# Patient Record
Sex: MALE | Race: WHITE | Employment: OTHER | URBAN - METROPOLITAN AREA
[De-identification: names, ages, dates, MRNs, and addresses within clinical notes are randomized per-mention and may not be internally consistent; named-entity substitution may affect disease eponyms.]

---

## 2017-05-15 ENCOUNTER — OFFICE VISIT (OUTPATIENT)
Dept: INTERNAL MEDICINE CLINIC | Age: 72
End: 2017-05-15

## 2017-05-15 VITALS
TEMPERATURE: 98.7 F | WEIGHT: 229 LBS | RESPIRATION RATE: 18 BRPM | HEART RATE: 52 BPM | SYSTOLIC BLOOD PRESSURE: 132 MMHG | DIASTOLIC BLOOD PRESSURE: 71 MMHG | OXYGEN SATURATION: 98 % | BODY MASS INDEX: 34.71 KG/M2 | HEIGHT: 68 IN

## 2017-05-15 DIAGNOSIS — L98.9 SKIN LESION: ICD-10-CM

## 2017-05-15 DIAGNOSIS — I10 ESSENTIAL HYPERTENSION, BENIGN: ICD-10-CM

## 2017-05-15 DIAGNOSIS — Z76.89 ENCOUNTER TO ESTABLISH CARE WITH NEW DOCTOR: Primary | ICD-10-CM

## 2017-05-15 DIAGNOSIS — M10.9 GOUT, UNSPECIFIED: ICD-10-CM

## 2017-05-15 DIAGNOSIS — E78.5 OTHER AND UNSPECIFIED HYPERLIPIDEMIA: ICD-10-CM

## 2017-05-15 DIAGNOSIS — R79.89 LOW TESTOSTERONE LEVEL IN MALE: ICD-10-CM

## 2017-05-15 DIAGNOSIS — G47.33 OSA TREATED WITH BIPAP: ICD-10-CM

## 2017-05-15 DIAGNOSIS — R73.09 ELEVATED RANDOM BLOOD GLUCOSE LEVEL: ICD-10-CM

## 2017-05-15 DIAGNOSIS — E53.8 B12 DEFICIENCY: ICD-10-CM

## 2017-05-15 DIAGNOSIS — Z85.46 HISTORY OF PROSTATE CANCER: ICD-10-CM

## 2017-05-15 DIAGNOSIS — M19.90 ARTHRITIS: ICD-10-CM

## 2017-05-15 RX ORDER — ALBUTEROL SULFATE 90 UG/1
AEROSOL, METERED RESPIRATORY (INHALATION) AS NEEDED
COMMUNITY

## 2017-05-15 RX ORDER — LISINOPRIL 40 MG/1
40 TABLET ORAL DAILY
COMMUNITY

## 2017-05-15 RX ORDER — LORATADINE 10 MG/1
10 TABLET ORAL
COMMUNITY
End: 2018-12-03

## 2017-05-15 RX ORDER — ALLOPURINOL 300 MG/1
TABLET ORAL DAILY
COMMUNITY
End: 2017-05-15 | Stop reason: SDUPTHER

## 2017-05-15 RX ORDER — ALLOPURINOL 300 MG/1
300 TABLET ORAL DAILY
Qty: 30 TAB | Refills: 11 | Status: SHIPPED | OUTPATIENT
Start: 2017-05-15 | End: 2019-04-21 | Stop reason: SDUPTHER

## 2017-05-15 RX ORDER — OMEPRAZOLE 20 MG/1
20 CAPSULE, DELAYED RELEASE ORAL DAILY
COMMUNITY
End: 2019-06-07

## 2017-05-15 RX ORDER — ASPIRIN 81 MG/1
TABLET ORAL DAILY
COMMUNITY

## 2017-05-15 RX ORDER — LANOLIN ALCOHOL/MO/W.PET/CERES
CREAM (GRAM) TOPICAL
COMMUNITY
End: 2018-12-03

## 2017-05-15 NOTE — PROGRESS NOTES
Jessy Shields is a 80 y.o. male who presents today for Establish Care (Not fasting)      He has a history of   Patient Active Problem List   Diagnosis Code    Other and unspecified hyperlipidemia E78.5    Other abnormal glucose R73.09    Essential hypertension, benign I10    Gout, unspecified M10.9    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    Allergic rhinitis, cause unspecified J30.9    Reflux esophagitis K21.0    Benign neoplasm of colon D12.6    Rheumatoid arthritis ([facility] [address] M06.9    Contact dermatitis and other eczema, due to unspecified cause L25.9    Bunion M21.619    History of prostate cancer Z85.46   . Today patient is here to establish care. Previous PCP Dr. Jose Ly . he is switching because moved here back from [facility]. Records are not available for me to review. he does not have other concerns. HTN: on ACEi, BB, CBB. Pretty well controlled today. Low Testosterone: having injection monthly from old PCP for the last couple mos. Last injection 30 days ago. Notes no improvement with shots. B12 def: getting shots last about one month ago. СЕРГЕЙ: on BiPAP    HLD: on Lipitor. Gout: on allopurinol. Last big flare some time ago. Chronic arthritis. L knee replacement in 2012. History of Prostate Ca: S/p prostatectomy at least 10 yrs ago. Social: Retired from [employer]. Enjoys golfing. Lives with wife,  2 adult children. ROS  Review of Systems   Constitutional: Negative for chills, diaphoresis, fever, malaise/fatigue and weight loss. HENT: Negative for congestion, ear discharge, ear pain, hearing loss, nosebleeds, sore throat and tinnitus. Eyes: Negative for blurred vision, double vision and photophobia. Respiratory: Negative for cough, hemoptysis, sputum production and shortness of breath. Cardiovascular: Negative for chest pain, palpitations and leg swelling.    Gastrointestinal: Negative for abdominal pain, constipation, diarrhea, heartburn, nausea and vomiting. Genitourinary: Negative for dysuria, flank pain, frequency, hematuria and urgency. Erectile dysfunction   Musculoskeletal: Positive for joint pain (Mainly hands in the AM, denies RA history). Negative for myalgias. Skin: Negative for itching and rash. Neurological: Negative. Negative for headaches. Endo/Heme/Allergies: Does not bruise/bleed easily. Psychiatric/Behavioral: Positive for depression (stable). Negative for hallucinations, memory loss, substance abuse and suicidal ideas. The patient is not nervous/anxious. Visit Vitals    /71 (BP 1 Location: Left arm, BP Patient Position: Sitting)    Pulse (!) 52    Temp 98.7 °F (37.1 °C) (Oral)    Resp 18    Ht 5' 8\" (1.727 m)    Wt 229 lb (103.9 kg)    SpO2 98%    BMI 34.82 kg/m2       Physical Exam   Constitutional: He is oriented to person, place, and time and well-developed, well-nourished, and in no distress. No distress. HENT:   Head: Normocephalic and atraumatic. Mouth/Throat: No oropharyngeal exudate. Eyes: Conjunctivae are normal. Pupils are equal, round, and reactive to light. No scleral icterus. Neck: Normal range of motion. No JVD present. No thyromegaly present. Cardiovascular: Normal rate and regular rhythm. Exam reveals no gallop and no friction rub. No murmur heard. Pulmonary/Chest: Effort normal and breath sounds normal. No respiratory distress. He has no wheezes. Abdominal: Soft. Bowel sounds are normal. He exhibits no distension. There is no rebound and no guarding. Musculoskeletal: Normal range of motion. He exhibits no edema. Swelling of PIP of L and R PIP. Denies any history of AI arthritis. Neurological: He is alert and oriented to person, place, and time. No cranial nerve deficit. Skin: Skin is dry. No rash noted. Rosacea, very fair skinned.     Psychiatric: Mood, memory, affect and judgment normal.       Current Outpatient Prescriptions   Medication Sig    lisinopril (PRINIVIL, ZESTRIL) 40 mg tablet Take 40 mg by mouth daily.  aspirin delayed-release 81 mg tablet Take  by mouth daily.  omeprazole (PRILOSEC) 20 mg capsule Take 20 mg by mouth daily.  ferrous sulfate (IRON) 325 mg (65 mg iron) tablet Take  by mouth Daily (before breakfast).  loratadine (CLARITIN) 10 mg tablet Take 10 mg by mouth.  guaiFENesin (MUCINEX) 1,200 mg Ta12 ER tablet Take 1,200 mg by mouth two (2) times a day.  albuterol (VENTOLIN HFA) 90 mcg/actuation inhaler Take  by inhalation.  allopurinol (ZYLOPRIM) 300 mg tablet Take 1 Tab by mouth daily.  omega-3 fatty acids-vitamin e (FISH OIL) 1,000 mg Cap Take 1 Cap by mouth.  citalopram (CELEXA) 20 mg tablet Take 1 Tab by mouth daily.  atorvastatin (LIPITOR) 40 mg tablet Take 1 Tab by mouth daily.  amlodipine (NORVASC) 2.5 mg tablet Take 1 Tab by mouth daily.  atenolol (TENORMIN) 25 mg tablet Take 1 Tab by mouth daily. No current facility-administered medications for this visit.          Past Medical History:   Diagnosis Date    Allergic rhinitis     Arthritis     RA    Cancer (HCC)     prostate    Diabetes (HCC)     GERD (gastroesophageal reflux disease)     Gout     Hypercholesterolemia     Hypertension       Past Surgical History:   Procedure Laterality Date    ENDOSCOPY, COLON, DIAGNOSTIC      09, due 12, done 11, due 15    HX KNEE REPLACEMENT Left 2012    HX PROSTATECTOMY        Social History   Substance Use Topics    Smoking status: Former Smoker    Smokeless tobacco: Never Used    Alcohol use Yes      Comment: 5-6 per day      Family History   Problem Relation Age of Onset    Dementia Mother     Heart Disease Father      CAD    Kidney Disease Father      renal failure    Diabetes Paternal Grandmother     Cancer Paternal Grandfather      colon    Cancer Other      family hx colon cancer        Allergies   Allergen Reactions    Stings [Sting, Bee] Swelling     Swelling at site Assessment/Plan  Edison Mabry was seen today for establish care. Diagnoses and all orders for this visit:    Encounter to establish care with new doctor - will get old recs. Some in our system from 2011    Essential hypertension, benign - good control. Continue current therapy. -     METABOLIC PANEL, COMPREHENSIVE  -     CBC WITH AUTOMATED DIFF    History of prostate cancer - s/p prostatectomy. Some residual ED  -     PSA W/ REFLX FREE PSA    Gout, unspecified - no acute flare, but some signs of arthritis in hands. -     URIC ACID  -     allopurinol (ZYLOPRIM) 300 mg tablet; Take 1 Tab by mouth daily. Other and unspecified hyperlipidemia - [facility]  -     LIPID PANEL    Low testosterone level in male - Has been getting injection. Denies any significant improvement with these. Notes being told that this is to make him feel better. Will get levels. Likely not truly deficient. Discussed risks with these injections. -     TESTOSTERONE, FREE & TOTAL    СЕРГЕЙ treated with BiPAP - stable. B12 deficiency - Again. No obvious history of this. Check levels. -     VITAMIN B12 & FOLATE    Arthritis - hands worse. Notes that he has had AI workup. Elevated random blood glucose level  -     HEMOGLOBIN A1C WITH EAG    Skin lesion - rosacea and multiple concerning spots given sun exposure and fair skinned. -     REFERRAL TO DERMATOLOGY        Follow-up Disposition:  Return in about 3 months (around 8/15/2017) for Medicare Wellness.     Tracie Correa MD  5/15/2017

## 2017-05-15 NOTE — PATIENT INSTRUCTIONS
Purine-Restricted Diet: Care Instructions  Your Care Instructions  Purines are substances that are found in some foods. Your body turns purines into uric acid. High levels of uric acid can cause gout, which is a form of arthritis that causes pain and inflammation in joints. You may be able to help control the amount of uric acid in your body by limiting high-purine foods in your diet. Follow-up care is a key part of your treatment and safety. Be sure to make and go to all appointments, and call your doctor if you are having problems. It's also a good idea to know your test results and keep a list of the medicines you take. How can you care for yourself at home? · Plan your meals and snacks around foods that are low in purines and are safe for you to eat. These foods include:  ¨ Green vegetables and tomatoes. ¨ Fruits. ¨ Whole-grain breads, rice, and cereals. ¨ Eggs, peanut butter, and nuts. ¨ Low-fat milk, cheese, and other milk products. ¨ Popcorn. ¨ Gelatin desserts, chocolate, cocoa, and cakes and sweets, in small amounts. · You can eat certain foods that are medium-high in purines, but eat them only once in a while. These foods include:  ¨ Legumes, such as dried beans and dried peas. You can have 1 cup cooked legumes each day. ¨ Asparagus, cauliflower, spinach, mushrooms, and green peas. ¨ Fish and seafood (other than very high-purine seafood). ¨ Oatmeal, wheat bran, and wheat germ. · Limit very high-purine foods, including:  ¨ Organ meats, such as liver, kidneys, sweetbreads, and brains. ¨ Meats, including medley, beef, pork, and lamb. ¨ Game meats and any other meats in large amounts. ¨ Anchovies, sardines, herring, mackerel, and scallops. ¨ Gravy. ¨ Beer. Where can you learn more? Go to http://mira-tania.info/. Enter F448 in the search box to learn more about \"Purine-Restricted Diet: Care Instructions. \"  Current as of: July 26, 2016  Content Version: 11.2  © 1789-5579 Healthwise, Incorporated. Care instructions adapted under license by [employer] (which disclaims liability or warranty for this information). If you have questions about a medical condition or this instruction, always ask your healthcare professional. [place] [address] any warranty or liability for your use of this information.

## 2017-05-15 NOTE — MR AVS SNAPSHOT
Visit Information Date & Time Provider Department Dept. Phone Encounter #  
 5/15/2017 10:15 AM Valerie Fall MD [facility] [phone] [id number] Follow-up Instructions Return in about 3 months (around 8/15/2017) for Medicare Wellness. Upcoming Health Maintenance Date Due Hepatitis C Screening 1945 COLONOSCOPY 10/11/1964 GLAUCOMA SCREENING Q2Y 10/11/2011 MEDICARE YEARLY EXAM 10/21/2012 Pneumococcal 65+ High/Highest Risk (2 of 2 - PPSV23) 10/21/2016 INFLUENZA AGE 9 TO ADULT 8/1/2017 DTaP/Tdap/Td series (2 - Td) 12/15/2020 Allergies as of 5/15/2017  Review Complete On: 5/15/2017 By: Griselda Falk Severity Noted Reaction Type Reactions Stings [Sting, Bee] Medium 12/02/2009   Topical Swelling Swelling at site Current Immunizations  Reviewed on 10/21/2011 Name Date Influenza Vaccine Split 9/21/2011 Influenza Vaccine Whole 9/17/2010 Pneumococcal Vaccine (Unspecified Type) 10/21/2011 TDAP Vaccine 12/15/2010 Not reviewed this visit You Were Diagnosed With   
  
 Codes Comments Encounter to establish care with new doctor    -  Primary ICD-10-CM: Z71.89 ICD-9-CM: V65.8 Essential hypertension, benign     ICD-10-CM: I10 
ICD-9-CM: 401.1 History of prostate cancer     ICD-10-CM: Z85.46 
ICD-9-CM: V10.46 Gout, unspecified     ICD-10-CM: M10.9 ICD-9-CM: 274.9 Other and unspecified hyperlipidemia     ICD-10-CM: E78.5 ICD-9-CM: 272.4 Low testosterone level in male     ICD-10-CM: E29.1 ICD-9-CM: 257.2 СЕРГЕЙ treated with BiPAP     ICD-10-CM: G47.33 
ICD-9-CM: 327.23   
 B12 deficiency     ICD-10-CM: E53.8 ICD-9-CM: 266.2 Arthritis     ICD-10-CM: M19.90 ICD-9-CM: 716.90 Elevated random blood glucose level     ICD-10-CM: R73.09 
ICD-9-CM: 790.29 Skin lesion     ICD-10-CM: L98.9 ICD-9-CM: 709.9 Vitals BP Pulse Temp Resp Height(growth percentile) Weight(growth percentile) 132/71 (BP 1 Location: Left arm, BP Patient Position: Sitting) (!) 52 98.7 °F (37.1 °C) (Oral) 18 5' 8\" (1.727 m) 229 lb (103.9 kg) SpO2 BMI Smoking Status 98% 34.82 kg/m2 Former Smoker Vitals History BMI and BSA Data Body Mass Index Body Surface Area 34.82 kg/m 2 2.23 m 2 Preferred Pharmacy Pharmacy Name Phone [facility], [address] [phone] Your Updated Medication List  
  
   
This list is accurate as of: 5/15/17 11:01 AM.  Always use your most recent med list.  
  
  
  
  
 allopurinol 300 mg tablet Commonly known as:  True Leash Take 1 Tab by mouth daily. amLODIPine 2.5 mg tablet Commonly known as:  Sameul Javier Take 1 Tab by mouth daily. aspirin delayed-release 81 mg tablet Take  by mouth daily. atenolol 25 mg tablet Commonly known as:  TENORMIN Take 1 Tab by mouth daily. atorvastatin 40 mg tablet Commonly known as:  LIPITOR Take 1 Tab by mouth daily. citalopram 20 mg tablet Commonly known as:  [place] Take 1 Tab by mouth daily. CLARITIN 10 mg tablet Generic drug:  loratadine Take 10 mg by mouth. FISH OIL 1,000 mg Cap Generic drug:  omega-3 fatty acids-vitamin e Take 1 Cap by mouth. Iron 325 mg (65 mg iron) tablet Generic drug:  ferrous sulfate Take  by mouth Daily (before breakfast). lisinopril 40 mg tablet Commonly known as:  [place] Take 40 mg by mouth daily. MUCINEX 1,200 mg Ta12 ER tablet Generic drug:  guaiFENesin Take 1,200 mg by mouth two (2) times a day. omeprazole 20 mg capsule Commonly known as:  PRILOSEC Take 20 mg by mouth daily. VENTOLIN HFA 90 mcg/actuation inhaler Generic drug:  albuterol Take  by inhalation. Prescriptions Sent to Pharmacy Refills allopurinol (ZYLOPRIM) 300 mg tablet 11 Sig: Take 1 Tab by mouth daily. Class: Normal  
 Pharmacy: [facility] Ph #: [phone] Route: Oral  
  
We Performed the Following CBC WITH AUTOMATED DIFF [91176 CPT(R)] HEMOGLOBIN A1C WITH EAG [31641 CPT(R)] LIPID PANEL [09276 CPT(R)] METABOLIC PANEL, COMPREHENSIVE [57872 CPT(R)] PSA W/ REFLX FREE PSA [29955 CPT(R)] REFERRAL TO DERMATOLOGY [REF19 Custom] Comments:  
 Please evaluate patient for skin cancer check. TESTOSTERONE, FREE & TOTAL [66653 CPT(R)] URIC ACID [id number] CPT(R)] VITAMIN B12 & FOLATE [10608 CPT(R)] Follow-up Instructions Return in about 3 months (around 8/15/2017) for Medicare Wellness. Referral Information Referral ID Referred By Referred To  
  
 [id number] Didier Chino MD   
   [address] [place] [address] Phone: [phone] Fax: [phone] Visits Status Start Date End Date 1 New Request 5/15/17 5/15/18 If your referral has a status of pending review or denied, additional information will be sent to support the outcome of this decision. Patient Instructions Purine-Restricted Diet: Care Instructions Your Care Instructions Purines are substances that are found in some foods. Your body turns purines into uric acid. High levels of uric acid can cause gout, which is a form of arthritis that causes pain and inflammation in joints. You may be able to help control the amount of uric acid in your body by limiting high-purine foods in your diet. Follow-up care is a key part of your treatment and safety. Be sure to make and go to all appointments, and call your doctor if you are having problems. It's also a good idea to know your test results and keep a list of the medicines you take. How can you care for yourself at home? · Plan your meals and snacks around foods that are low in purines and are safe for you to eat. These foods include: ¨ Green vegetables and tomatoes. ¨ Fruits. ¨ Whole-grain breads, rice, and cereals. ¨ Eggs, peanut butter, and nuts. ¨ Low-fat milk, cheese, and other milk products. ¨ Popcorn. ¨ Gelatin desserts, chocolate, cocoa, and cakes and sweets, in small amounts. · You can eat certain foods that are medium-high in purines, but eat them only once in a while. These foods include: ¨ Legumes, such as dried beans and dried peas. You can have 1 cup cooked legumes each day. ¨ Asparagus, cauliflower, spinach, mushrooms, and green peas. ¨ Fish and seafood (other than very high-purine seafood). ¨ Oatmeal, wheat bran, and wheat germ. · Limit very high-purine foods, including: ¨ Organ meats, such as liver, kidneys, sweetbreads, and brains. ¨ Meats, including medley, beef, pork, and lamb. ¨ Game meats and any other meats in large amounts. ¨ Anchovies, sardines, herring, mackerel, and scallops. ¨ Gravy. ¨ Beer. Where can you learn more? Go to http://mira-tania.info/. Enter F448 in the search box to learn more about \"Purine-Restricted Diet: Care Instructions. \" Current as of: July 26, 2016 Content Version: 11.2 © 4535-3041 [employer]. Care instructions adapted under license by [employer] (which disclaims liability or warranty for this information). If you have questions about a medical condition or this instruction, always ask your healthcare professional. [facility] any warranty or liability for your use of this information. Introducing [facility]! Elpidio Frances introduces [facility] patient portal. Now you can access parts of your medical record, email your doctor's office, and request medication refills online. 1. In your internet browser, go to [email or web address] 2. Click on the First Time User? Click Here link in the Sign In box. You will see the New Member Sign Up page. 3. Enter your [employer] Access Code exactly as it appears below. You will not need to use this code after youve completed the sign-up process. If you do not sign up before the expiration date, you must request a new code. · [employer] Access Code: [email or web address] Expires: 8/13/2017 11:00 AM 
 
4. Enter the last four digits of your Social Security Number (xxxx) and Date of Birth (mm/dd/yyyy) as indicated and click Submit. You will be taken to the next sign-up page. 5. Create a [employer] ID. This will be your [employer] login ID and cannot be changed, so think of one that is secure and easy to remember. 6. Create a [employer] password. You can change your password at any time. 7. Enter your Password Reset Question and Answer. This can be used at a later time if you forget your password. 8. Enter your e-mail address. You will receive e-mail notification when new information is available in [address] 9. Click Sign Up. You can now view and download portions of your medical record. 10. Click the Download Summary menu link to download a portable copy of your medical information. If you have questions, please visit the Frequently Asked Questions section of the [employer] website. Remember, [employer] is NOT to be used for urgent needs. For medical emergencies, dial 911. Now available from your iPhone and Android! Please provide this summary of care documentation to your next provider. Your primary care clinician is listed as TARAS MOE. If you have any questions after today's visit, please call [phone].

## 2017-05-15 NOTE — PROGRESS NOTES
1. Have you been to the ER, urgent care clinic since your last visit? Hospitalized since your last visit? No    2. Have you seen or consulted any other health care providers outside of the [address] [place] [address] since your last visit? Include any pap smears or colon screening. Pt was seeing  in [address] [place] [address] for PCP, Pt was going to [address] at [facility] in [facility] for injections in his left shoulder.

## 2017-05-22 ENCOUNTER — HOSPITAL ENCOUNTER (OUTPATIENT)
Dept: LAB | Age: 72
Discharge: HOME | End: 2017-05-22
Payer: MEDICARE

## 2017-05-22 PROCEDURE — 84403 ASSAY OF TOTAL TESTOSTERONE: CPT

## 2017-05-22 PROCEDURE — 36415 COLL VENOUS BLD VENIPUNCTURE: CPT

## 2017-05-22 PROCEDURE — 80061 LIPID PANEL: CPT

## 2017-05-22 PROCEDURE — 84153 ASSAY OF PSA TOTAL: CPT

## 2017-05-22 PROCEDURE — 84550 ASSAY OF BLOOD/URIC ACID: CPT

## 2017-05-22 PROCEDURE — 83036 HEMOGLOBIN GLYCOSYLATED A1C: CPT

## 2017-05-22 PROCEDURE — 80053 COMPREHEN METABOLIC PANEL: CPT

## 2017-05-22 PROCEDURE — 85025 COMPLETE CBC W/AUTO DIFF WBC: CPT

## 2017-05-22 PROCEDURE — 82607 VITAMIN B-12: CPT

## 2017-05-23 ENCOUNTER — DOCUMENTATION ONLY (OUTPATIENT)
Dept: INTERNAL MEDICINE CLINIC | Age: 72
End: 2017-05-23

## 2017-05-23 LAB
ALBUMIN SERPL-MCNC: 4.1 G/DL (ref 3.5–4.8)
ALBUMIN/GLOB SERPL: 1.6 {RATIO} (ref 1.2–2.2)
ALP SERPL-CCNC: 85 IU/L (ref 39–117)
ALT SERPL-CCNC: 21 IU/L (ref 0–44)
AST SERPL-CCNC: 9 IU/L (ref 0–40)
BASOPHILS # BLD AUTO: 0 X10E3/UL (ref 0–0.2)
BASOPHILS NFR BLD AUTO: 0 %
BILIRUB SERPL-MCNC: 0.5 MG/DL (ref 0–1.2)
BUN SERPL-MCNC: 16 MG/DL (ref 8–27)
BUN/CREAT SERPL: 18 (ref 10–24)
CALCIUM SERPL-MCNC: 9.2 MG/DL (ref 8.6–10.2)
CHLORIDE SERPL-SCNC: 100 MMOL/L (ref 96–106)
CHOLEST SERPL-MCNC: 161 MG/DL (ref 100–199)
CO2 SERPL-SCNC: 23 MMOL/L (ref 18–29)
COMMENT: ABNORMAL
CREAT SERPL-MCNC: 0.89 MG/DL (ref 0.76–1.27)
EOSINOPHIL # BLD AUTO: 0.4 X10E3/UL (ref 0–0.4)
EOSINOPHIL NFR BLD AUTO: 4 %
ERYTHROCYTE [DISTWIDTH] IN BLOOD BY AUTOMATED COUNT: 13.2 % (ref 12.3–15.4)
EST. AVERAGE GLUCOSE BLD GHB EST-MCNC: 123 MG/DL
FOLATE SERPL-MCNC: 10.9 NG/ML
GLOBULIN SER CALC-MCNC: 2.6 G/DL (ref 1.5–4.5)
GLUCOSE SERPL-MCNC: 100 MG/DL (ref 65–99)
HBA1C MFR BLD: 5.9 % (ref 4.8–5.6)
HCT VFR BLD AUTO: 44.2 % (ref 37.5–51)
HDLC SERPL-MCNC: 42 MG/DL
HGB BLD-MCNC: 14.9 G/DL (ref 12.6–17.7)
IMM GRANULOCYTES # BLD: 0 X10E3/UL (ref 0–0.1)
IMM GRANULOCYTES NFR BLD: 0 %
INTERPRETATION, 910389: NORMAL
LDLC SERPL CALC-MCNC: 78 MG/DL (ref 0–99)
LYMPHOCYTES # BLD AUTO: 2.1 X10E3/UL (ref 0.7–3.1)
LYMPHOCYTES NFR BLD AUTO: 19 %
MCH RBC QN AUTO: 33 PG (ref 26.6–33)
MCHC RBC AUTO-ENTMCNC: 33.7 G/DL (ref 31.5–35.7)
MCV RBC AUTO: 98 FL (ref 79–97)
MONOCYTES # BLD AUTO: 0.7 X10E3/UL (ref 0.1–0.9)
MONOCYTES NFR BLD AUTO: 7 %
NEUTROPHILS # BLD AUTO: 7.6 X10E3/UL (ref 1.4–7)
NEUTROPHILS NFR BLD AUTO: 70 %
PLATELET # BLD AUTO: 243 X10E3/UL (ref 150–379)
POTASSIUM SERPL-SCNC: 4.8 MMOL/L (ref 3.5–5.2)
PROT SERPL-MCNC: 6.7 G/DL (ref 6–8.5)
PSA SERPL-MCNC: <0.1 NG/ML (ref 0–4)
RBC # BLD AUTO: 4.52 X10E6/UL (ref 4.14–5.8)
REFLEX CRITERIA: NORMAL
SODIUM SERPL-SCNC: 140 MMOL/L (ref 134–144)
TESTOST FREE SERPL-MCNC: 7.1 PG/ML (ref 6.6–18.1)
TESTOST SERPL-MCNC: 177 NG/DL (ref 348–1197)
TRIGL SERPL-MCNC: 204 MG/DL (ref 0–149)
URATE SERPL-MCNC: 5 MG/DL (ref 3.7–8.6)
VIT B12 SERPL-MCNC: 485 PG/ML (ref 211–946)
VLDLC SERPL CALC-MCNC: 41 MG/DL (ref 5–40)
WBC # BLD AUTO: 11 X10E3/UL (ref 3.4–10.8)

## 2017-05-23 NOTE — PROGRESS NOTES
Received medical records from [facility]. Records have been placed on Dr. Yue Koch desmohit for review.

## 2017-07-27 ENCOUNTER — OFFICE VISIT (OUTPATIENT)
Dept: INTERNAL MEDICINE CLINIC | Age: 72
End: 2017-07-27

## 2017-07-27 VITALS
BODY MASS INDEX: 34.56 KG/M2 | RESPIRATION RATE: 18 BRPM | TEMPERATURE: 98.3 F | SYSTOLIC BLOOD PRESSURE: 114 MMHG | DIASTOLIC BLOOD PRESSURE: 60 MMHG | HEIGHT: 68 IN | WEIGHT: 228 LBS | HEART RATE: 44 BPM | OXYGEN SATURATION: 92 %

## 2017-07-27 DIAGNOSIS — R00.1 BRADYCARDIA: ICD-10-CM

## 2017-07-27 DIAGNOSIS — C43.9 MALIGNANT MELANOMA, UNSPECIFIED SITE (HCC): ICD-10-CM

## 2017-07-27 DIAGNOSIS — I10 ESSENTIAL HYPERTENSION, BENIGN: ICD-10-CM

## 2017-07-27 DIAGNOSIS — L02.91 ABSCESS: Primary | ICD-10-CM

## 2017-07-27 RX ORDER — SULFAMETHOXAZOLE AND TRIMETHOPRIM 800; 160 MG/1; MG/1
1 TABLET ORAL 2 TIMES DAILY
Qty: 14 TAB | Refills: 0 | Status: SHIPPED | OUTPATIENT
Start: 2017-07-27 | End: 2017-08-03

## 2017-07-27 NOTE — MR AVS SNAPSHOT
Visit Information Date & Time Provider Department Dept. Phone Encounter #  
 7/27/2017 11:30 AM Alejandra Morrison MD [facility] [address] [facility] [id number] Your Appointments 8/15/2017  9:30 AM  
Medicare Physical with Alejandra Morrison MD  
[facility]) Appt Note: welcome to Medicare [address] [place] [facility] [id number]  
[phone]  
  
   
 [address] [facility] [address] Upcoming Health Maintenance Date Due Hepatitis C Screening 1945 GLAUCOMA SCREENING Q2Y 10/11/2011 MEDICARE YEARLY EXAM 10/21/2012 Pneumococcal 65+ Low/Medium Risk (2 of 2 - PPSV23) 10/21/2016 INFLUENZA AGE 9 TO ADULT 8/1/2017 DTaP/Tdap/Td series (2 - Td) 12/15/2020 COLONOSCOPY 2/27/2024 Allergies as of 7/27/2017  Review Complete On: 9/24/7719 By: Corrina Jefft Jean Severity Noted Reaction Type Reactions Stings [Sting, Bee] Medium 12/02/2009   Topical Swelling Swelling at site Current Immunizations  Reviewed on 10/21/2011 Name Date Influenza Vaccine Split 9/21/2011 Influenza Vaccine Whole 9/17/2010 TDAP Vaccine 12/15/2010 ZZZ-RETIRED (DO NOT USE) Pneumococcal Vaccine (Unspecified Type) 10/21/2011 Not reviewed this visit You Were Diagnosed With   
  
 Codes Comments Abscess    -  Primary ICD-10-CM: L02.91 
ICD-9-CM: 682.9 Essential hypertension, benign     ICD-10-CM: I10 
ICD-9-CM: 401.1 Bradycardia     ICD-10-CM: R00.1 ICD-9-CM: 427.89 Vitals BP Pulse Temp Resp Height(growth percentile) Weight(growth percentile) 114/60 (BP 1 Location: Left arm, BP Patient Position: Sitting) (!) 44 98.3 °F (36.8 °C) (Oral) 18 5' 8\" (1.727 m) 228 lb (103.4 kg) SpO2 BMI Smoking Status 92% 34.67 kg/m2 Former Smoker Vitals History BMI and BSA Data  Body Mass Index Body Surface Area  
 34.67 kg/m 2 2.23 m 2  
  
 Preferred Pharmacy Pharmacy Name Phone [facility] [address], [address] [phone] Your Updated Medication List  
  
   
This list is accurate as of: 7/27/17 12:00 PM.  Always use your most recent med list.  
  
  
  
  
 allopurinol 300 mg tablet Commonly known as:  Diane Fulling Take 1 Tab by mouth daily. amLODIPine 2.5 mg tablet Commonly known as:  Merna Pall Take 1 Tab by mouth daily. aspirin delayed-release 81 mg tablet Take  by mouth daily. atorvastatin 40 mg tablet Commonly known as:  LIPITOR Take 1 Tab by mouth daily. citalopram 20 mg tablet Commonly known as:  Smiley Penning Take 1 Tab by mouth daily. CLARITIN 10 mg tablet Generic drug:  loratadine Take 10 mg by mouth. FISH OIL 1,000 mg Cap Generic drug:  omega-3 fatty acids-vitamin e Take 1 Cap by mouth. Iron 325 mg (65 mg iron) tablet Generic drug:  ferrous sulfate Take  by mouth Daily (before breakfast). lisinopril 40 mg tablet Commonly known as:  Helon Estrin Take 40 mg by mouth daily. MUCINEX 1,200 mg Ta12 ER tablet Generic drug:  guaiFENesin Take 1,200 mg by mouth two (2) times a day. omeprazole 20 mg capsule Commonly known as:  PRILOSEC Take 20 mg by mouth daily. trimethoprim-sulfamethoxazole 160-800 mg per tablet Commonly known as:  BACTRIM DS, SEPTRA DS Take 1 Tab by mouth two (2) times a day for 7 days. VENTOLIN HFA 90 mcg/actuation inhaler Generic drug:  albuterol Take  by inhalation. Prescriptions Sent to Pharmacy Refills  
 trimethoprim-sulfamethoxazole (BACTRIM DS, SEPTRA DS) 160-800 mg per tablet 0 Sig: Take 1 Tab by mouth two (2) times a day for 7 days. Class: Normal  
 Pharmacy: [facility], [address] #: [phone]  Route: Oral  
  
 Patient Instructions Skin Abscess: Care Instructions Your Care Instructions A skin abscess is a bacterial infection that forms a pocket of pus. A boil is a kind of skin abscess. The doctor may have cut an opening in the abscess so that the pus can drain out. You may have gauze in the cut so that the abscess will stay open and keep draining. You may need antibiotics. You will need to follow up with your doctor to make sure the infection has gone away. The doctor has checked you carefully, but problems can develop later. If you notice any problems or new symptoms, get medical treatment right away. Follow-up care is a key part of your treatment and safety. Be sure to make and go to all appointments, and call your doctor if you are having problems. It's also a good idea to know your test results and keep a list of the medicines you take. How can you care for yourself at home? · Apply warm and dry compresses, a heating pad set on low, or a hot water bottle 3 or 4 times a day for pain. Keep a cloth between the heat source and your skin. · If your doctor prescribed antibiotics, take them as directed. Do not stop taking them just because you feel better. You need to take the full course of antibiotics. · Take pain medicines exactly as directed. ¨ If the doctor gave you a prescription medicine for pain, take it as prescribed. ¨ If you are not taking a prescription pain medicine, ask your doctor if you can take an over-the-counter medicine. · Keep your bandage clean and dry. Change the bandage whenever it gets wet or dirty, or at least one time a day. · If the abscess was packed with gauze: 
¨ Keep follow-up appointments to have the gauze changed or removed. If the doctor instructed you to remove the gauze, gently pull out all of the gauze when your doctor tells you to. ¨ After the gauze is removed, soak the area in warm water for 15 to 20 minutes 2 times a day, until the wound closes. When should you call for help? Call your doctor now or seek immediate medical care if: 
· You have signs of worsening infection, such as: 
¨ Increased pain, swelling, warmth, or redness. ¨ Red streaks leading from the infected skin. ¨ Pus draining from the wound. ¨ A fever. Watch closely for changes in your health, and be sure to contact your doctor if: 
· You do not get better as expected. Where can you learn more? Go to http://mira-tania.info/. Enter H171 in the search box to learn more about \"Skin Abscess: Care Instructions. \" Current as of: October 13, 2016 Content Version: 11.3 © 7201-0287 Senscient. Care instructions adapted under license by [employer] (which disclaims liability or warranty for this information). If you have questions about a medical condition or this instruction, always ask your healthcare professional. [place] [address] any warranty or liability for your use of this information. High Blood Pressure: Care Instructions Your Care Instructions If your blood pressure is usually above 140/90, you have high blood pressure, or hypertension. That means the top number is 140 or higher or the bottom number is 90 or higher, or both. Despite what a lot of people think, high blood pressure usually doesn't cause headaches or make you feel dizzy or lightheaded. It usually has no symptoms. But it does increase your risk for heart attack, stroke, and kidney or eye damage. The higher your blood pressure, the more your risk increases. Your doctor will give you a goal for your blood pressure. Your goal will be based on your health and your age. An example of a goal is to keep your blood pressure below 140/90. Lifestyle changes, such as eating healthy and being active, are always important to help lower blood pressure.  You might also take medicine to reach your blood pressure goal. 
 Follow-up care is a key part of your treatment and safety. Be sure to make and go to all appointments, and call your doctor if you are having problems. It's also a good idea to know your test results and keep a list of the medicines you take. How can you care for yourself at home? Medical treatment · If you stop taking your medicine, your blood pressure will go back up. You may take one or more types of medicine to lower your blood pressure. Be safe with medicines. Take your medicine exactly as prescribed. Call your doctor if you think you are having a problem with your medicine. · Talk to your doctor before you start taking aspirin every day. Aspirin can help certain people lower their risk of a heart attack or stroke. But taking aspirin isn't right for everyone, because it can cause serious bleeding. · See your doctor regularly. You may need to see the doctor more often at first or until your blood pressure comes down. · If you are taking blood pressure medicine, talk to your doctor before you take decongestants or anti-inflammatory medicine, such as ibuprofen. Some of these medicines can raise blood pressure. · Learn how to check your blood pressure at home. Lifestyle changes · Stay at a healthy weight. This is especially important if you put on weight around the waist. Losing even 10 pounds can help you lower your blood pressure. · If your doctor recommends it, get more exercise. Walking is a good choice. Bit by bit, increase the amount you walk every day. Try for at least 30 minutes on most days of the week. You also may want to swim, bike, or do other activities. · Avoid or limit alcohol. Talk to your doctor about whether you can drink any alcohol. · Try to limit how much sodium you eat to less than 2,300 milligrams (mg) a day. Your doctor may ask you to try to eat less than 1,500 mg a day.  
· Eat plenty of fruits (such as bananas and oranges), vegetables, legumes, whole grains, and low-fat dairy products. · Lower the amount of saturated fat in your diet. Saturated fat is found in animal products such as milk, cheese, and meat. Limiting these foods may help you lose weight and also lower your risk for heart disease. · Do not smoke. Smoking increases your risk for heart attack and stroke. If you need help quitting, talk to your doctor about stop-smoking programs and medicines. These can increase your chances of quitting for good. When should you call for help? Call 911 anytime you think you may need emergency care. This may mean having symptoms that suggest that your blood pressure is causing a serious heart or blood vessel problem. Your blood pressure may be over 180/110. For example, call 911 if: 
· You have symptoms of a heart attack. These may include: ¨ Chest pain or pressure, or a strange feeling in the chest. 
¨ Sweating. ¨ Shortness of breath. ¨ Nausea or vomiting. ¨ Pain, pressure, or a strange feeling in the back, neck, jaw, or upper belly or in one or both shoulders or arms. ¨ Lightheadedness or sudden weakness. ¨ A fast or irregular heartbeat. · You have symptoms of a stroke. These may include: 
¨ Sudden numbness, tingling, weakness, or loss of movement in your face, arm, or leg, especially on only one side of your body. ¨ Sudden vision changes. ¨ Sudden trouble speaking. ¨ Sudden confusion or trouble understanding simple statements. ¨ Sudden problems with walking or balance. ¨ A sudden, severe headache that is different from past headaches. · You have severe back or belly pain. Do not wait until your blood pressure comes down on its own. Get help right away. Call your doctor now or seek immediate care if: 
· Your blood pressure is much higher than normal (such as 180/110 or higher), but you don't have symptoms. · You think high blood pressure is causing symptoms, such as: ¨ Severe headache. ¨ Blurry vision. Watch closely for changes in your health, and be sure to contact your doctor if: 
· Your blood pressure measures 140/90 or higher at least 2 times. That means the top number is 140 or higher or the bottom number is 90 or higher, or both. · You think you may be having side effects from your blood pressure medicine. · Your blood pressure is usually normal, but it goes above normal at least 2 times. Where can you learn more? Go to http://mira-tania.info/. Enter S312 in the search box to learn more about \"High Blood Pressure: Care Instructions. \" Current as of: August 8, 2016 Content Version: 11.3 © 8706-9454 [employer] Energy. Care instructions adapted under license by [employer] (which disclaims liability or warranty for this information). If you have questions about a medical condition or this instruction, always ask your healthcare professional. [place] [address] any warranty or liability for your use of this information. Introducing [facility] & HEALTH SERVICES! [facility] introduces Dextr patient portal. Now you can access parts of your medical record, email your doctor's office, and request medication refills online. 1. In your internet browser, go to [email or web address] [employer] Solutionst 2. Click on the First Time User? Click Here link in the Sign In box. You will see the New Member Sign Up page. 3. Enter your Dextr Access Code exactly as it appears below. You will not need to use this code after youve completed the sign-up process. If you do not sign up before the expiration date, you must request a new code. · Dextr Access Code: [email or web address] Expires: 8/13/2017 11:00 AM 
 
4. Enter the last four digits of your Social Security Number (xxxx) and Date of Birth (mm/dd/yyyy) as indicated and click Submit. You will be taken to the next sign-up page. 5. Create a Newlight Technologiest ID.  This will be your Dextr login ID and cannot be changed, so think of one that is secure and easy to remember. 6. Create a Accelergy password. You can change your password at any time. 7. Enter your Password Reset Question and Answer. This can be used at a later time if you forget your password. 8. Enter your e-mail address. You will receive e-mail notification when new information is available in [address] 9. Click Sign Up. You can now view and download portions of your medical record. 10. Click the Download Summary menu link to download a portable copy of your medical information. If you have questions, please visit the Frequently Asked Questions section of the Accelergy website. Remember, Accelergy is NOT to be used for urgent needs. For medical emergencies, dial 911. Now available from your iPhone and Android! Please provide this summary of care documentation to your next provider. Your primary care clinician is listed as Jarett Alfaro. If you have any questions after today's visit, please call [phone].

## 2017-07-27 NOTE — PATIENT INSTRUCTIONS
Skin Abscess: Care Instructions  Your Care Instructions    A skin abscess is a bacterial infection that forms a pocket of pus. A boil is a kind of skin abscess. The doctor may have cut an opening in the abscess so that the pus can drain out. You may have gauze in the cut so that the abscess will stay open and keep draining. You may need antibiotics. You will need to follow up with your doctor to make sure the infection has gone away. The doctor has checked you carefully, but problems can develop later. If you notice any problems or new symptoms, get medical treatment right away. Follow-up care is a key part of your treatment and safety. Be sure to make and go to all appointments, and call your doctor if you are having problems. It's also a good idea to know your test results and keep a list of the medicines you take. How can you care for yourself at home? · Apply warm and dry compresses, a heating pad set on low, or a hot water bottle 3 or 4 times a day for pain. Keep a cloth between the heat source and your skin. · If your doctor prescribed antibiotics, take them as directed. Do not stop taking them just because you feel better. You need to take the full course of antibiotics. · Take pain medicines exactly as directed. ¨ If the doctor gave you a prescription medicine for pain, take it as prescribed. ¨ If you are not taking a prescription pain medicine, ask your doctor if you can take an over-the-counter medicine. · Keep your bandage clean and dry. Change the bandage whenever it gets wet or dirty, or at least one time a day. · If the abscess was packed with gauze:  ¨ Keep follow-up appointments to have the gauze changed or removed. If the doctor instructed you to remove the gauze, gently pull out all of the gauze when your doctor tells you to. ¨ After the gauze is removed, soak the area in warm water for 15 to 20 minutes 2 times a day, until the wound closes. When should you call for help?   Call your doctor now or seek immediate medical care if:  · You have signs of worsening infection, such as:  ¨ Increased pain, swelling, warmth, or redness. ¨ Red streaks leading from the infected skin. ¨ Pus draining from the wound. ¨ A fever. Watch closely for changes in your health, and be sure to contact your doctor if:  · You do not get better as expected. Where can you learn more? Go to http://mira-tania.info/. Enter U801 in the search box to learn more about \"Skin Abscess: Care Instructions. \"  Current as of: October 13, 2016  Content Version: 11.3  © 9948-2694 [email or web address].co. Care instructions adapted under license by [employer]d.[employer]) (which disclaims liability or warranty for this information). If you have questions about a medical condition or this instruction, always ask your healthcare professional. [place] [address] any warranty or liability for your use of this information. High Blood Pressure: Care Instructions  Your Care Instructions  If your blood pressure is usually above 140/90, you have high blood pressure, or hypertension. That means the top number is 140 or higher or the bottom number is 90 or higher, or both. Despite what a lot of people think, high blood pressure usually doesn't cause headaches or make you feel dizzy or lightheaded. It usually has no symptoms. But it does increase your risk for heart attack, stroke, and kidney or eye damage. The higher your blood pressure, the more your risk increases. Your doctor will give you a goal for your blood pressure. Your goal will be based on your health and your age. An example of a goal is to keep your blood pressure below 140/90. Lifestyle changes, such as eating healthy and being active, are always important to help lower blood pressure. You might also take medicine to reach your blood pressure goal.  Follow-up care is a key part of your treatment and safety.  Be sure to make and go to all appointments, and call your doctor if you are having problems. It's also a good idea to know your test results and keep a list of the medicines you take. How can you care for yourself at home? Medical treatment  · If you stop taking your medicine, your blood pressure will go back up. You may take one or more types of medicine to lower your blood pressure. Be safe with medicines. Take your medicine exactly as prescribed. Call your doctor if you think you are having a problem with your medicine. · Talk to your doctor before you start taking aspirin every day. Aspirin can help certain people lower their risk of a heart attack or stroke. But taking aspirin isn't right for everyone, because it can cause serious bleeding. · See your doctor regularly. You may need to see the doctor more often at first or until your blood pressure comes down. · If you are taking blood pressure medicine, talk to your doctor before you take decongestants or anti-inflammatory medicine, such as ibuprofen. Some of these medicines can raise blood pressure. · Learn how to check your blood pressure at home. Lifestyle changes  · Stay at a healthy weight. This is especially important if you put on weight around the waist. Losing even 10 pounds can help you lower your blood pressure. · If your doctor recommends it, get more exercise. Walking is a good choice. Bit by bit, increase the amount you walk every day. Try for at least 30 minutes on most days of the week. You also may want to swim, bike, or do other activities. · Avoid or limit alcohol. Talk to your doctor about whether you can drink any alcohol. · Try to limit how much sodium you eat to less than 2,300 milligrams (mg) a day. Your doctor may ask you to try to eat less than 1,500 mg a day. · Eat plenty of fruits (such as bananas and oranges), vegetables, legumes, whole grains, and low-fat dairy products. · Lower the amount of saturated fat in your diet.  Saturated fat is found in animal products such as milk, cheese, and meat. Limiting these foods may help you lose weight and also lower your risk for heart disease. · Do not smoke. Smoking increases your risk for heart attack and stroke. If you need help quitting, talk to your doctor about stop-smoking programs and medicines. These can increase your chances of quitting for good. When should you call for help? Call 911 anytime you think you may need emergency care. This may mean having symptoms that suggest that your blood pressure is causing a serious heart or blood vessel problem. Your blood pressure may be over 180/110. For example, call 911 if:  · You have symptoms of a heart attack. These may include:  ¨ Chest pain or pressure, or a strange feeling in the chest.  ¨ Sweating. ¨ Shortness of breath. ¨ Nausea or vomiting. ¨ Pain, pressure, or a strange feeling in the back, neck, jaw, or upper belly or in one or both shoulders or arms. ¨ Lightheadedness or sudden weakness. ¨ A fast or irregular heartbeat. · You have symptoms of a stroke. These may include:  ¨ Sudden numbness, tingling, weakness, or loss of movement in your face, arm, or leg, especially on only one side of your body. ¨ Sudden vision changes. ¨ Sudden trouble speaking. ¨ Sudden confusion or trouble understanding simple statements. ¨ Sudden problems with walking or balance. ¨ A sudden, severe headache that is different from past headaches. · You have severe back or belly pain. Do not wait until your blood pressure comes down on its own. Get help right away. Call your doctor now or seek immediate care if:  · Your blood pressure is much higher than normal (such as 180/110 or higher), but you don't have symptoms. · You think high blood pressure is causing symptoms, such as:  ¨ Severe headache. ¨ Blurry vision. Watch closely for changes in your health, and be sure to contact your doctor if:  · Your blood pressure measures 140/90 or higher at least 2 times. That means the top number is 140 or higher or the bottom number is 90 or higher, or both. · You think you may be having side effects from your blood pressure medicine. · Your blood pressure is usually normal, but it goes above normal at least 2 times. Where can you learn more? Go to http://mira-tania.info/. Enter J601 in the search box to learn more about \"High Blood Pressure: Care Instructions. \"  Current as of: August 8, 2016  Content Version: 11.3  © 1753-6437 N-Trig. Care instructions adapted under license by [employer] (which disclaims liability or warranty for this information). If you have questions about a medical condition or this instruction, always ask your healthcare professional. [address] any warranty or liability for your use of this information.

## 2017-07-27 NOTE — PROGRESS NOTES
Marco A Stoddard is a 80 y.o. male who presents today for Foot Injury (eval for foot wound)  . He has a history of   Patient Active Problem List   Diagnosis Code    Other and unspecified hyperlipidemia E78.5    Other abnormal glucose R73.09    Essential hypertension, benign I10    Gout, unspecified M10.9    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    Allergic rhinitis, cause unspecified J30.9    Reflux esophagitis K21.0    Benign neoplasm of colon D12.6    Rheumatoid arthritis ([facility] [address] M06.9    Contact dermatitis and other eczema, due to unspecified cause L25.9    Bunion M21.619    History of prostate cancer Z85.46   . Today patient is here for an acute visit. Problem visit:  Marco A Stoddard is here for complaint of R foot pain. Problem began 2 week(s) ago. Severity is moderate  Character of problem: Painful lesion at heal.   This has not changed. Pressure makes the problem worse. Nothing  makes the problem better. Associated symptoms include: none besides pain  Treatments tried include: pt's wife seems to have expressed some pus from the lesion yesterday. Bradycardia: Quite noticeable today. Has been on BB for some time. Not symptomatic. Will hold BB    Melanoma: Found on his back. Surgery scheduled for late August.     ROS  Review of Systems   Constitutional: Negative for chills, fever and weight loss. Respiratory: Negative for cough and shortness of breath. Cardiovascular: Negative for chest pain, palpitations, orthopnea, claudication and leg swelling. Gastrointestinal: Negative for abdominal pain, diarrhea, nausea and vomiting. Genitourinary: Negative. Musculoskeletal: Negative. Foot pain   Neurological: Negative. Endo/Heme/Allergies: Does not bruise/bleed easily. Psychiatric/Behavioral: Negative for depression. The patient is not nervous/anxious.         Visit Vitals    /60 (BP 1 Location: Left arm, BP Patient Position: Sitting)    Pulse (!) 44    Temp 98.3 °F (36.8 °C) (Oral)    Resp 18    Ht 5' 8\" (1.727 m)    Wt 228 lb (103.4 kg)    SpO2 92%    BMI 34.67 kg/m2       Physical Exam   Constitutional: He is oriented to person, place, and time. He appears well-developed and well-nourished. HENT:   Head: Normocephalic and atraumatic. Eyes: EOM are normal. Pupils are equal, round, and reactive to light. Cardiovascular: Regular rhythm. Exam reveals no friction rub. No murmur heard. Bradycardic   Pulmonary/Chest: Effort normal and breath sounds normal. No respiratory distress. Musculoskeletal: Normal range of motion. He exhibits no edema. Feet:    R foot small abscess. No drainage. Neurological: He is alert and oriented to person, place, and time. Skin: Skin is warm and dry. He is not diaphoretic. Psychiatric: He has a normal mood and affect. His behavior is normal.         Current Outpatient Prescriptions   Medication Sig    trimethoprim-sulfamethoxazole (BACTRIM DS, SEPTRA DS) 160-800 mg per tablet Take 1 Tab by mouth two (2) times a day for 7 days.  lisinopril (PRINIVIL, ZESTRIL) 40 mg tablet Take 40 mg by mouth daily.  aspirin delayed-release 81 mg tablet Take  by mouth daily.  omeprazole (PRILOSEC) 20 mg capsule Take 20 mg by mouth daily.  ferrous sulfate (IRON) 325 mg (65 mg iron) tablet Take  by mouth Daily (before breakfast).  loratadine (CLARITIN) 10 mg tablet Take 10 mg by mouth.  guaiFENesin (MUCINEX) 1,200 mg Ta12 ER tablet Take 1,200 mg by mouth two (2) times a day.  albuterol (VENTOLIN HFA) 90 mcg/actuation inhaler Take  by inhalation.  allopurinol (ZYLOPRIM) 300 mg tablet Take 1 Tab by mouth daily.  omega-3 fatty acids-vitamin e (FISH OIL) 1,000 mg Cap Take 1 Cap by mouth.  citalopram (CELEXA) 20 mg tablet Take 1 Tab by mouth daily.  atorvastatin (LIPITOR) 40 mg tablet Take 1 Tab by mouth daily.  amlodipine (NORVASC) 2.5 mg tablet Take 1 Tab by mouth daily.      No current facility-administered medications for this visit. Past Medical History:   Diagnosis Date    Allergic rhinitis     Arthritis     RA    Cancer (HCC)     prostate    Diabetes (HCC)     GERD (gastroesophageal reflux disease)     Gout     Hypercholesterolemia     Hypertension     Melanoma in situ of back [facility])       Past Surgical History:   Procedure Laterality Date    ENDOSCOPY, COLON, DIAGNOSTIC      09, due 12, done 11, due 15    HX KNEE REPLACEMENT Left 2012    HX PROSTATECTOMY        Social History   Substance Use Topics    Smoking status: Former Smoker    Smokeless tobacco: Never Used    Alcohol use Yes      Comment: 5-6 per day      Family History   Problem Relation Age of Onset    Dementia Mother     Heart Disease Father      CAD    Kidney Disease Father      renal failure    Diabetes Paternal Grandmother     Cancer Paternal Grandfather      colon    Cancer Other      family hx colon cancer        Allergies   Allergen Reactions    Stings [Sting, Bee] Swelling     Swelling at site        Assessment/Plan  Diagnoses and all orders for this visit:    1. Abscess - To medial R heal. Though this could be a plantar wart, given pus, will treat this way. Not able to express any pus. Pt to take bactrim (MRSA coverage) and soak foot to help express puss. Will see in mid august.   -     trimethoprim-sulfamethoxazole (BACTRIM DS, SEPTRA DS) 160-800 mg per tablet; Take 1 Tab by mouth two (2) times a day for 7 days. 2. Essential hypertension, benign    3. Bradycardia - significant. Though asymptomatic, stop BB and monitor BP. Revisit mid august when f/u is scheduled. 4. Malignant melanoma, unspecified site ([facility] [address] - found on back.  Having surgery in August.         Follow-up Disposition: Not on File    Tamara Angeles MD  7/27/2017

## 2017-08-15 ENCOUNTER — OFFICE VISIT (OUTPATIENT)
Dept: INTERNAL MEDICINE CLINIC | Age: 72
End: 2017-08-15

## 2017-08-15 VITALS
TEMPERATURE: 98.4 F | OXYGEN SATURATION: 95 % | DIASTOLIC BLOOD PRESSURE: 66 MMHG | WEIGHT: 228 LBS | HEART RATE: 50 BPM | RESPIRATION RATE: 18 BRPM | SYSTOLIC BLOOD PRESSURE: 138 MMHG | BODY MASS INDEX: 34.56 KG/M2 | HEIGHT: 68 IN

## 2017-08-15 DIAGNOSIS — Z13.39 SCREENING FOR ALCOHOLISM: ICD-10-CM

## 2017-08-15 DIAGNOSIS — Z13.31 SCREENING FOR DEPRESSION: ICD-10-CM

## 2017-08-15 DIAGNOSIS — Z85.46 HISTORY OF PROSTATE CANCER: ICD-10-CM

## 2017-08-15 DIAGNOSIS — I10 ESSENTIAL HYPERTENSION, BENIGN: ICD-10-CM

## 2017-08-15 DIAGNOSIS — M67.912 DISORDER OF LEFT ROTATOR CUFF: ICD-10-CM

## 2017-08-15 DIAGNOSIS — Z71.89 ADVANCED CARE PLANNING/COUNSELING DISCUSSION: ICD-10-CM

## 2017-08-15 DIAGNOSIS — Z11.59 ENCOUNTER FOR HEPATITIS C SCREENING TEST FOR LOW RISK PATIENT: ICD-10-CM

## 2017-08-15 DIAGNOSIS — M25.50 ARTHRALGIA, UNSPECIFIED JOINT: ICD-10-CM

## 2017-08-15 DIAGNOSIS — M10.9 GOUT, UNSPECIFIED: ICD-10-CM

## 2017-08-15 DIAGNOSIS — Z00.00 MEDICARE ANNUAL WELLNESS VISIT, INITIAL: Primary | ICD-10-CM

## 2017-08-15 DIAGNOSIS — E78.2 MIXED HYPERLIPIDEMIA: ICD-10-CM

## 2017-08-15 DIAGNOSIS — B00.1 FEVER BLISTER: ICD-10-CM

## 2017-08-15 DIAGNOSIS — K21.00 REFLUX ESOPHAGITIS: ICD-10-CM

## 2017-08-15 DIAGNOSIS — Z00.00 ROUTINE GENERAL MEDICAL EXAMINATION AT A HEALTH CARE FACILITY: ICD-10-CM

## 2017-08-15 RX ORDER — ACYCLOVIR 50 MG/G
OINTMENT TOPICAL EVERY 4 HOURS
Qty: 15 G | Refills: 0 | Status: SHIPPED | OUTPATIENT
Start: 2017-08-15 | End: 2017-10-18 | Stop reason: ALTCHOICE

## 2017-08-15 NOTE — MR AVS SNAPSHOT
Visit Information Date & Time Provider Department Dept. Phone Encounter #  
 8/15/2017  9:30 AM Isauro Hicks MD [facility] [address] [id number] Follow-up Instructions Return in about 3 months (around 11/15/2017) for Fasting blood work before. Rolly Aparicio Upcoming Health Maintenance Date Due Hepatitis C Screening 1945 GLAUCOMA SCREENING Q2Y 10/11/2011 INFLUENZA AGE 9 TO ADULT 8/1/2017 MEDICARE YEARLY EXAM 8/16/2018 DTaP/Tdap/Td series (2 - Td) 12/15/2020 COLONOSCOPY 2/27/2024 Allergies as of 8/15/2017  Review Complete On: 4/83/0350 By: Vernal Gross Wears Severity Noted Reaction Type Reactions Stings [Sting, Bee] Medium 12/02/2009   Topical Swelling Swelling at site Current Immunizations  Reviewed on 10/21/2011 Name Date Influenza High Dose Vaccine PF 10/14/2016 Influenza Vaccine Split 9/21/2011 Influenza Vaccine Whole 9/17/2010 Pneumococcal Conjugate (PCV-13) 1/1/2015 TDAP Vaccine 12/15/2010 ZZZ-RETIRED (DO NOT USE) Pneumococcal Vaccine (Unspecified Type) 10/21/2011 Zoster Vaccine, Live 10/21/2011 Not reviewed this visit You Were Diagnosed With   
  
 Codes Comments Medicare annual wellness visit, initial    -  Primary ICD-10-CM: Z00.00 ICD-9-CM: V70.0 History of prostate cancer     ICD-10-CM: Z85.46 
ICD-9-CM: V10.46 Essential hypertension, benign     ICD-10-CM: I10 
ICD-9-CM: 401.1 Gout, unspecified     ICD-10-CM: M10.9 ICD-9-CM: 274.9 Reflux esophagitis     ICD-10-CM: K21.0 ICD-9-CM: 530.11 Mixed hyperlipidemia     ICD-10-CM: E78.2 ICD-9-CM: 272.2 Disorder of left rotator cuff     ICD-10-CM: P83.788 ICD-9-CM: 726.10 Encounter for hepatitis C screening test for low risk patient     ICD-10-CM: Z11.59 
ICD-9-CM: V73.89 Arthralgia, unspecified joint     ICD-10-CM: M25.50 ICD-9-CM: 719.40 Fever blister     ICD-10-CM: B00.1 ICD-9-CM: 054.9 Vitals BP Pulse Temp Resp Height(growth percentile) Weight(growth percentile)  
 138/66 (BP 1 Location: Left arm, BP Patient Position: Sitting) (!) 50 98.4 °F (36.9 °C) (Oral) 18 5' 8\" (1.727 m) 228 lb (103.4 kg) SpO2 BMI Smoking Status 95% 34.67 kg/m2 Former Smoker Vitals History BMI and BSA Data Body Mass Index Body Surface Area  
 34.67 kg/m 2 2.23 m 2 Preferred Pharmacy Pharmacy Name Phone [facility], [address] [phone] Your Updated Medication List  
  
   
This list is accurate as of: 8/15/17 10:32 AM.  Always use your most recent med list.  
  
  
  
  
 acyclovir 5 % ointment Commonly known as:  ZOVIRAX Apply  to affected area every four (4) hours. allopurinol 300 mg tablet Commonly known as:  Екатерина Mazariegos Take 1 Tab by mouth daily. amLODIPine 2.5 mg tablet Commonly known as:  Job Dub Take 1 Tab by mouth daily. aspirin delayed-release 81 mg tablet Take  by mouth daily. atorvastatin 40 mg tablet Commonly known as:  LIPITOR Take 1 Tab by mouth daily. citalopram 20 mg tablet Commonly known as:  Cloyce Coco Take 1 Tab by mouth daily. CLARITIN 10 mg tablet Generic drug:  loratadine Take 10 mg by mouth. FISH OIL 1,000 mg Cap Generic drug:  omega-3 fatty acids-vitamin e Take 1 Cap by mouth. Iron 325 mg (65 mg iron) tablet Generic drug:  ferrous sulfate Take  by mouth Daily (before breakfast). lisinopril 40 mg tablet Commonly known as:  Neo Munira Take 40 mg by mouth daily. MUCINEX 1,200 mg Ta12 ER tablet Generic drug:  guaiFENesin Take 1,200 mg by mouth two (2) times a day. omeprazole 20 mg capsule Commonly known as:  PRILOSEC Take 20 mg by mouth daily. VENTOLIN HFA 90 mcg/actuation inhaler Generic drug:  albuterol Take  by inhalation. Prescriptions Sent to Pharmacy Refills  
 acyclovir (ZOVIRAX) 5 % ointment 0 Sig: Apply  to affected area every four (4) hours. Class: Normal  
 Pharmacy: [facility], [address] #: [phone] Route: Topical  
  
Follow-up Instructions Return in about 3 months (around 11/15/2017) for Fasting blood work before. Guerline Jon To-Do List   
 11/01/2017 Lab:  CRP, HIGH SENSITIVITY   
  
 11/01/2017 Lab:  HEPATITIS C AB   
  
 11/01/2017 Lab:  LIPID PANEL   
  
 11/01/2017 Lab:  METABOLIC PANEL, COMPREHENSIVE   
  
 11/01/2017 Outpatient Referral:  REFERRAL TO ORTHOPEDIC SURGERY   
  
 11/01/2017 Lab:  SED RATE (ESR) Referral Information Referral ID Referred By Referred To  
  
 [id number] [facility] [address] Phone: [phone] Fax: [phone] Visits Status Start Date End Date 1 New Request 8/15/17 8/15/18 If your referral has a status of pending review or denied, additional information will be sent to support the outcome of this decision. Patient Instructions Well Visit, Over 72: Care Instructions Your Care Instructions Physical exams can help you stay healthy. Your doctor has checked your overall health and may have suggested ways to take good care of yourself. He or she also may have recommended tests. At home, you can help prevent illness with healthy eating, regular exercise, and other steps. Follow-up care is a key part of your treatment and safety. Be sure to make and go to all appointments, and call your doctor if you are having problems. It's also a good idea to know your test results and keep a list of the medicines you take. How can you care for yourself at home? · Reach and stay at a healthy weight.  This will lower your risk for many problems, such as obesity, diabetes, heart disease, and high blood pressure. · Get at least 30 minutes of exercise on most days of the week. Walking is a good choice. You also may want to do other activities, such as running, swimming, cycling, or playing tennis or team sports. · Do not smoke. Smoking can make health problems worse. If you need help quitting, talk to your doctor about stop-smoking programs and medicines. These can increase your chances of quitting for good. · Protect your skin from too much sun. When you're outdoors from 10 a.m. to 4 p.m., stay in the shade or cover up with clothing and a hat with a wide brim. Wear sunglasses that block UV rays. Even when it's cloudy, put broad-spectrum sunscreen (SPF 30 or higher) on any exposed skin. · See a dentist one or two times a year for checkups and to have your teeth cleaned. · Wear a seat belt in the car. · Limit alcohol to 2 drinks a day for men and 1 drink a day for women. Too much alcohol can cause health problems. Follow your doctor's advice about when to have certain tests. These tests can spot problems early. For men and women · Cholesterol. Your doctor will tell you how often to have this done based on your overall health and other things that can increase your risk for heart attack and stroke. · Blood pressure. Have your blood pressure checked during a routine doctor visit. Your doctor will tell you how often to check your blood pressure based on your age, your blood pressure results, and other factors. · Diabetes. Ask your doctor whether you should have tests for diabetes. · Vision. Experts recommend that you have yearly exams for glaucoma and other age-related eye problems. · Hearing. Tell your doctor if you notice any change in your hearing. You can have tests to find out how well you hear. · Colon cancer tests. Keep having colon cancer tests as your doctor recommends. You can have one of several types of tests. · Heart attack and stroke risk. At least every 4 to 6 years, you should have your risk for heart attack and stroke assessed. Your doctor uses factors such as your age, blood pressure, cholesterol, and whether you smoke or have diabetes to show what your risk for a heart attack or stroke is over the next 10 years. · Osteoporosis. Talk to your doctor about whether you should have a bone density test to find out whether you have thinning bones. Also ask your doctor about whether you should take calcium and vitamin D supplements. For women · Pap test and pelvic exam. You may no longer need a Pap test. Talk with your doctor about whether to stop or continue to have Pap tests. · Breast exam and mammogram. Ask how often you should have a mammogram, which is an X-ray of your breasts. A mammogram can spot breast cancer before it can be felt and when it is easiest to treat. · Thyroid disease. Talk to your doctor about whether to have your thyroid checked as part of a regular physical exam. Women have an increased chance of a thyroid problem. For men · Prostate exam. Talk to your doctor about whether you should have a blood test (called a PSA test) for prostate cancer. Experts disagree on whether men should have this test. Some experts recommend that you discuss the benefits and risks of the test with your doctor. · Abdominal aortic aneurysm. Ask your doctor whether you should have a test to check for an aneurysm. You may need a test if you ever smoked or if your parent, brother, sister, or child has had an aneurysm. When should you call for help? Watch closely for changes in your health, and be sure to contact your doctor if you have any problems or symptoms that concern you. Where can you learn more? Go to http://mira-tania.info/. Enter B046 in the search box to learn more about \"Well Visit, Over 65: Care Instructions. \" Current as of: July 19, 2016 Content Version: 11.3 © 3989-7583 Healthwise[employer]. Care instructions adapted under license by [employer] (which disclaims liability or warranty for this information). If you have questions about a medical condition or this instruction, always ask your healthcare professional. [address] any warranty or liability for your use of this information. Introducing [facility]! [facility] introduces [email or web address] patient portal. Now you can access parts of your medical record, email your doctor's office, and request medication refills online. 1. In your internet browser, go to [email or web address] 2. Click on the First Time User? Click Here link in the Sign In box. You will see the New Member Sign Up page. 3. Enter your [email or web address] Access Code exactly as it appears below. You will not need to use this code after youve completed the sign-up process. If you do not sign up before the expiration date, you must request a new code. · [email or web address] Access Code: [email or web address] Expires: 11/13/2017 10:32 AM 
 
4. Enter the last four digits of your Social Security Number (xxxx) and Date of Birth (mm/dd/yyyy) as indicated and click Submit. You will be taken to the next sign-up page. 5. Create a [email or web address] ID. This will be your [email or web address] login ID and cannot be changed, so think of one that is secure and easy to remember. 6. Create a [email or web address] password. You can change your password at any time. 7. Enter your Password Reset Question and Answer. This can be used at a later time if you forget your password. 8. Enter your e-mail address. You will receive e-mail notification when new information is available in [address] 9. Click Sign Up. You can now view and download portions of your medical record. 10. Click the Download Summary menu link to download a portable copy of your medical information.  
 
If you have questions, please visit the Frequently Asked Questions section of the [employer]. Remember, [employer]YouNethart is NOT to be used for urgent needs. For medical emergencies, dial 911. Now available from your iPhone and Android! Please provide this summary of care documentation to your next provider. Your primary care clinician is listed as Devon Connor. If you have any questions after today's visit, please call [phone].

## 2017-08-15 NOTE — PROGRESS NOTES
Suzie Marroquin is a 80 y.o. male who presents today for Welcome To Medicare  . He has a history of   Patient Active Problem List   Diagnosis Code    Mixed hyperlipidemia E78.2    Other abnormal glucose R73.09    Essential hypertension, benign I10    Gout, unspecified M10.9    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    Allergic rhinitis, cause unspecified J30.9    Reflux esophagitis K21.0    Benign neoplasm of colon D12.6    Rheumatoid arthritis ([facility] [address] M06.9    Contact dermatitis and other eczema, due to unspecified cause L25.9    Bunion M21.619    History of prostate cancer Z85.46   . Today patient is here for MW and f/u. NN performed his MW exam.   C-scope UTD. PSA negative. Immunization: UTD. he does not have other concerns. No help with energy with testosterone. L shoulder pain. Some limitations. Has been getting injections in the past. Notes a partial rotator cuff tear. Has done PT. Last injection 2-3 years ago. Melanoma: recently found. Having surgery next week. Hypertension - Was bradycardic at last visit. Stopped his BB. Today a bit better. No CAD history. Hypertension ROS: taking medications as instructed, no medication side effects noted, no TIA's, no chest pain on exertion, no dyspnea on exertion, no swelling of ankles     reports that he has quit smoking. He has never used smokeless tobacco.    reports that he drinks alcohol. BP Readings from Last 2 Encounters:   08/15/17 138/66   07/27/17 114/60     Prostate Cancer: history of this. S/p resection. SPA negative. Gout follow-up  Past gout history: acute gouty arthritis. Current gout treatment: allopurinol (Zyloprim). Side effects of current therapy: none. Progress since last visit:  no acute gout attacks since last clinic visit. .  The patient is attempting to avoid high purine foods and alcohol.    Lab Results   Component Value Date/Time    Uric acid 5.0 05/22/2017 11:18 AM       [facility] maintenance hx includes:  Exercise: moderately active. Form of exercise: Golf 3x/week   Diet: generally follows a low fat low cholesterol diet  Social: Retired from [employer]. Enjoys golfing. Lives with wife,  2 adult children. Cancer screening:    UTD    Immunizations:     Immunization History   Administered Date(s) Administered    Influenza High Dose Vaccine PF 10/14/2016    Influenza Vaccine Split 09/21/2011    Influenza Vaccine Whole 09/17/2010    Pneumococcal Conjugate (PCV-13) 01/01/2015    TDAP Vaccine 12/15/2010    ZZZ-RETIRED (DO NOT USE) Pneumococcal Vaccine (Unspecified Type) 10/21/2011    Zoster Vaccine, Live 10/21/2011         ROS  Review of Systems   Constitutional: Negative for chills, fever and weight loss. HENT: Negative. Respiratory: Negative for cough, hemoptysis, sputum production and shortness of breath. Cardiovascular: Negative for chest pain, palpitations and leg swelling. Gastrointestinal: Negative for abdominal pain, blood in stool, constipation, diarrhea, nausea and vomiting. Heartburn: Stable. Genitourinary: Negative for dysuria, frequency, hematuria and urgency. Musculoskeletal: Positive for joint pain (Hands/Shoulders). Negative for back pain, myalgias and neck pain. Skin:        Multiple skin spots. Neurological: Negative. Endo/Heme/Allergies: Does not bruise/bleed easily. Psychiatric/Behavioral: Negative for depression, substance abuse and suicidal ideas. The patient is not nervous/anxious. Visit Vitals    /66 (BP 1 Location: Left arm, BP Patient Position: Sitting)    Pulse (!) 50    Temp 98.4 °F (36.9 °C) (Oral)    Resp 18    Ht 5' 8\" (1.727 m)    Wt 228 lb (103.4 kg)    SpO2 95%    BMI 34.67 kg/m2       Physical Exam   Constitutional: He is oriented to person, place, and time. He appears well-developed and well-nourished. HENT:   Head: Normocephalic and atraumatic.    Eyes: EOM are normal. Pupils are equal, round, and reactive to light. Cardiovascular: Normal rate and regular rhythm. No murmur heard. Pulmonary/Chest: Effort normal and breath sounds normal. No respiratory distress. Abdominal: Soft. He exhibits no distension. Obese   Musculoskeletal: Normal range of motion. He exhibits no edema. Limitations of ROM of R shoulder. Some ulnar deviation. Neurological: He is alert and oriented to person, place, and time. Skin: Skin is warm and dry. He is not diaphoretic. Rosacea, multiple skin lesions. Melanoma to L mid back. Psychiatric: He has a normal mood and affect. His behavior is normal.         Current Outpatient Prescriptions   Medication Sig    acyclovir (ZOVIRAX) 5 % ointment Apply  to affected area every four (4) hours.  lisinopril (PRINIVIL, ZESTRIL) 40 mg tablet Take 40 mg by mouth daily.  aspirin delayed-release 81 mg tablet Take  by mouth daily.  omeprazole (PRILOSEC) 20 mg capsule Take 20 mg by mouth daily.  ferrous sulfate (IRON) 325 mg (65 mg iron) tablet Take  by mouth Daily (before breakfast).  loratadine (CLARITIN) 10 mg tablet Take 10 mg by mouth.  guaiFENesin (MUCINEX) 1,200 mg Ta12 ER tablet Take 1,200 mg by mouth two (2) times a day.  albuterol (VENTOLIN HFA) 90 mcg/actuation inhaler Take  by inhalation.  allopurinol (ZYLOPRIM) 300 mg tablet Take 1 Tab by mouth daily.  omega-3 fatty acids-vitamin e (FISH OIL) 1,000 mg Cap Take 1 Cap by mouth.  citalopram (CELEXA) 20 mg tablet Take 1 Tab by mouth daily.  atorvastatin (LIPITOR) 40 mg tablet Take 1 Tab by mouth daily.  amlodipine (NORVASC) 2.5 mg tablet Take 1 Tab by mouth daily. No current facility-administered medications for this visit.          Past Medical History:   Diagnosis Date    Allergic rhinitis     Arthritis     RA    Cancer (HCC)     prostate    Diabetes (HCC)     GERD (gastroesophageal reflux disease)     Gout     Hypercholesterolemia     Hypertension     Melanoma in situ of back ([facility] [address]     Skin cancer (melanoma) ([facility] [address]       Past Surgical History:   Procedure Laterality Date    ENDOSCOPY, COLON, DIAGNOSTIC      09, due 12, done 11, due 15    HX KNEE REPLACEMENT Left 2012    HX PROSTATECTOMY        Social History   Substance Use Topics    Smoking status: Former Smoker    Smokeless tobacco: Never Used    Alcohol use Yes      Comment: 5-6 per day      Family History   Problem Relation Age of Onset    Dementia Mother     Heart Disease Father      CAD    Kidney Disease Father      renal failure    Diabetes Paternal Grandmother     Cancer Paternal Grandfather      colon    Cancer Other      family hx colon cancer        Allergies   Allergen Reactions    Stings [Sting, Bee] Swelling     Swelling at site        Assessment/Plan  Diagnoses and all orders for this visit:    1. Medicare annual wellness visit, initial - HM noted to be UTD. See NN note for full details. Weight stable. Andres Nunez was counseled on age-appropriate/ guideline-based risk prevention behaviors and screening for a 80y.o. year old   male . We also discussed adjustments in screening based on family history if necessary. Printed instructions for preventative screening guidelines and healthy behaviors given to patient with after visit summary. 2. History of prostate cancer - PSA negative     3. Essential hypertension, benign - stable without BB. Energy a bit better. Could increase CCB if gets elevated. 4. Gout, unspecified - no new attacks. UA WNL. 5. Reflux esophagitis - On PPI    6. Mixed hyperlipidemia - On statin. Repeat in 3 mos. -     METABOLIC PANEL, COMPREHENSIVE; Future  -     LIPID PANEL; Future    7. Disorder of left rotator cuff - History of problems. Limited ROM. Refer to ortho. -     REFERRAL TO ORTHOPEDIC SURGERY; Future    8. Encounter for hepatitis C screening test for low risk patient  -     HEPATITIS C AB; Future    9.  Arthralgia, unspecified joint - ? History of RA.   -     SED RATE (ESR); Future  -     CRP, HIGH SENSITIVITY; Future    10. Fever blister - PRN meds. -     acyclovir (ZOVIRAX) 5 % ointment; Apply  to affected area every four (4) hours. Follow-up Disposition:  Return in about 3 months (around 11/15/2017) for Fasting blood work before.  Farhat Motta MD  8/15/2017

## 2017-08-15 NOTE — PROGRESS NOTES
Nurse Navigator Medicare Wellness Visit performed by RILEY Lau [facility]    This is an Initial Martín Exam (AWV) (Performed 12 months after IPPE or effective date of Medicare Part B enrollment, Once in a lifetime)    I have reviewed the patient's medical history in detail and updated the computerized patient record. History     Past Medical History:   Diagnosis Date    Allergic rhinitis     Arthritis     RA    Cancer (HCC)     prostate    Diabetes ([facility] [address]     GERD (gastroesophageal reflux disease)     Gout     Hypercholesterolemia     Hypertension     Melanoma in situ of back ([address]     Skin cancer (melanoma) ([address]       Past Surgical History:   Procedure Laterality Date    ENDOSCOPY, COLON, DIAGNOSTIC      09, due 12, done 11, due 14    HX KNEE REPLACEMENT Left 2012    HX PROSTATECTOMY       Current Outpatient Prescriptions   Medication Sig Dispense Refill    acyclovir (ZOVIRAX) 5 % ointment Apply  to affected area every four (4) hours. 15 g 0    lisinopril (PRINIVIL, ZESTRIL) 40 mg tablet Take 40 mg by mouth daily.  aspirin delayed-release 81 mg tablet Take  by mouth daily.  omeprazole (PRILOSEC) 20 mg capsule Take 20 mg by mouth daily.  ferrous sulfate (IRON) 325 mg (65 mg iron) tablet Take  by mouth Daily (before breakfast).  loratadine (CLARITIN) 10 mg tablet Take 10 mg by mouth.  guaiFENesin (MUCINEX) 1,200 mg Ta12 ER tablet Take 1,200 mg by mouth two (2) times a day.  albuterol (VENTOLIN HFA) 90 mcg/actuation inhaler Take  by inhalation.  allopurinol (ZYLOPRIM) 300 mg tablet Take 1 Tab by mouth daily. 30 Tab 11    omega-3 fatty acids-vitamin e (FISH OIL) 1,000 mg Cap Take 1 Cap by mouth.  citalopram (CELEXA) 20 mg tablet Take 1 Tab by mouth daily. 30 Tab 11    atorvastatin (LIPITOR) 40 mg tablet Take 1 Tab by mouth daily. 30 Tab 11    amlodipine (NORVASC) 2.5 mg tablet Take 1 Tab by mouth daily.  30 Tab 11     Allergies   Allergen Reactions    Stings [Sting, Bee] Swelling     Swelling at site     Family History   Problem Relation Age of Onset    Dementia Mother     Heart Disease Father      CAD    Kidney Disease Father      renal failure    Diabetes Paternal Grandmother     Cancer Paternal Grandfather      colon    Cancer Other      family hx colon cancer     Social History   Substance Use Topics    Smoking status: Former Smoker    Smokeless tobacco: Never Used    Alcohol use Yes      Comment: 5-6 per day     Patient Active Problem List   Diagnosis Code    Mixed hyperlipidemia E78.2    Other abnormal glucose R73.09    Essential hypertension, benign I10    Gout, unspecified M10.9    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    Allergic rhinitis, cause unspecified J30.9    Reflux esophagitis K21.0    Benign neoplasm of colon D12.6    Rheumatoid arthritis ([facility] [address] M06.9    Contact dermatitis and other eczema, due to unspecified cause L25.9    Bunion M21.619    History of prostate cancer Z85.46    Advanced care planning/counseling discussion Z71.89         Depression Risk Factor Screening:   Patient denies feelings of being down, depressed or hopeless at this time. Patient states that they have a strong support system within their family & friends. PHQ over the last two weeks 8/15/2017   Little interest or pleasure in doing things Not at all   Feeling down, depressed or hopeless Not at all   Total Score PHQ 2 0     Alcohol Risk Factor Screening: On any occasion during the past 3 months, have you had more than 4 drinks containing alcohol? No    Do you average more than 14 drinks per week? No      Functional Ability and Level of Safety:     Hearing Loss   normal-to-mild    Activities of Daily Living   Self-care. Patient states that he lives with his wife in a private residence. Patient states independence in all ADLs & denies the use of assistive devices for ambulation.   NN encouraged patient to continue and/ or introduce routine physical exercise into their daily routine as applicable & as recommended by PCP. Patient verbalized understanding & agreement to take this into consideration. Patient states that he engages in regular exercise. Requires assistance with:   ADL Assessment 8/15/2017   Feeding yourself No Help Needed   Getting from bed to chair No Help Needed   Getting dressed No Help Needed   Bathing or showering No Help Needed   Walk across the room (includes cane/walker) No Help Needed   Using the telphone No Help Needed   Taking your medications No Help Needed   Preparing meals No Help Needed   Managing money (expenses/bills) No Help Needed   Moderately strenuous housework (laundry) No Help Needed   Shopping for personal items (toiletries/medicines) No Help Needed   Shopping for groceries No Help Needed   Driving No Help Needed   Climbing a flight of stairs No Help Needed   Getting to places beyond walking distances No Help Needed       Fall Risk   Fall Risk Assessment, last 12 mths 8/15/2017   Able to walk? Yes   Fall in past 12 months? No   Patient denies falls within the past year & verbalizes awareness of fall prevention strategies. Abuse Screen   Patient is not abused     Abuse Screening Questionnaire 8/15/2017   Do you ever feel afraid of your partner? N   Are you in a relationship with someone who physically or mentally threatens you? N   Is it safe for you to go home? Y       Review of Systems   Medicare Wellness Visit    Physical Examination     No exam data present    Evaluation of Cognitive Function:  Mood/affect:  happy  Appearance: age appropriate and casually dressed  Family member/caregiver input: None present; however, patient reports a strong support system. No exam performed today, Medicare Wellness Visit.     Patient Care Team:  Nathalie Douglas MD as PCP - General (Internal Medicine)    Advice/Referrals/Counseling   Education and counseling provided:  End-of-Life planning (with patient's consent)  Pneumococcal Vaccine  Influenza Vaccine  Prostate cancer screening tests (PSA, covered annually)  Colorectal cancer screening tests  Screening for glaucoma  tdap & shingles vaccinations      Assessment/Plan   1. Patient states that a completed Advanced Medical Directive is at home. NN encouraged patient to bring a copy of the completed Advanced Medical Directive to the office for scanning into the medical record. Patient verbalized understanding & agreement. 2. Patient is up to date on the following immunizations:  Immunization History   Administered Date(s) Administered    Influenza High Dose Vaccine PF 10/14/2016    Influenza Vaccine Split 09/21/2011    Influenza Vaccine Whole 09/17/2010    Pneumococcal Conjugate (PCV-13) 01/01/2015    TDAP Vaccine 12/15/2010    ZZZ-RETIRED (DO NOT USE) Pneumococcal Vaccine (Unspecified Type) 10/21/2011    Zoster Vaccine, Live 10/21/2011   Patient confirmed the aforementioned preventative immunization dates are correct. Patient's health maintenance immunization record has been updated & is current. 3. Patient states that he follows his PCP's recommendations for PSA screenings (report on file from 5/2017) & Colonoscopy screenings (report on file from 2/27/2014 performed by Dr. Lisseth Mackay with recommended screening follow-up in 5 years, 2019). Patient confirmed the aforementioned preventative screening dates. Today, PCP provided patient with an order for a screening Hep C lab test.    4. Patient was not wearing corrective lenses. Patient reports having a routine eye exam & glaucoma screening within the last year performed by Dr. Maksim Rey at the [facility]. [facility] faxed requesting a copy of patient's last eye exam with glaucoma screening with patient's verbal approval.     Patient verbalized understanding of all information discussed. Patient was given the opportunity to ask questions. Medication reconciliation completed by MA/ LPN and reviewed by PCP.   Patient provided AVS, either a printed version or electronic version in [employer], which includes Medicare Wellness Preventative Screening Table.

## 2017-08-15 NOTE — PATIENT INSTRUCTIONS
Well Visit, Over 72: Care Instructions  Your Care Instructions  Physical exams can help you stay healthy. Your doctor has checked your overall health and may have suggested ways to take good care of yourself. He or she also may have recommended tests. At home, you can help prevent illness with healthy eating, regular exercise, and other steps. Follow-up care is a key part of your treatment and safety. Be sure to make and go to all appointments, and call your doctor if you are having problems. It's also a good idea to know your test results and keep a list of the medicines you take. How can you care for yourself at home? · Reach and stay at a healthy weight. This will lower your risk for many problems, such as obesity, diabetes, heart disease, and high blood pressure. · Get at least 30 minutes of exercise on most days of the week. Walking is a good choice. You also may want to do other activities, such as running, swimming, cycling, or playing tennis or team sports. · Do not smoke. Smoking can make health problems worse. If you need help quitting, talk to your doctor about stop-smoking programs and medicines. These can increase your chances of quitting for good. · Protect your skin from too much sun. When you're outdoors from 10 a.m. to 4 p.m., stay in the shade or cover up with clothing and a hat with a wide brim. Wear sunglasses that block UV rays. Even when it's cloudy, put broad-spectrum sunscreen (SPF 30 or higher) on any exposed skin. · See a dentist one or two times a year for checkups and to have your teeth cleaned. · Wear a seat belt in the car. · Limit alcohol to 2 drinks a day for men and 1 drink a day for women. Too much alcohol can cause health problems. Follow your doctor's advice about when to have certain tests. These tests can spot problems early. For men and women  · Cholesterol.  Your doctor will tell you how often to have this done based on your overall health and other things that can increase your risk for heart attack and stroke. · Blood pressure. Have your blood pressure checked during a routine doctor visit. Your doctor will tell you how often to check your blood pressure based on your age, your blood pressure results, and other factors. · Diabetes. Ask your doctor whether you should have tests for diabetes. · Vision. Experts recommend that you have yearly exams for glaucoma and other age-related eye problems. · Hearing. Tell your doctor if you notice any change in your hearing. You can have tests to find out how well you hear. · Colon cancer tests. Keep having colon cancer tests as your doctor recommends. You can have one of several types of tests. · Heart attack and stroke risk. At least every 4 to 6 years, you should have your risk for heart attack and stroke assessed. Your doctor uses factors such as your age, blood pressure, cholesterol, and whether you smoke or have diabetes to show what your risk for a heart attack or stroke is over the next 10 years. · Osteoporosis. Talk to your doctor about whether you should have a bone density test to find out whether you have thinning bones. Also ask your doctor about whether you should take calcium and vitamin D supplements. For women  · Pap test and pelvic exam. You may no longer need a Pap test. Talk with your doctor about whether to stop or continue to have Pap tests. · Breast exam and mammogram. Ask how often you should have a mammogram, which is an X-ray of your breasts. A mammogram can spot breast cancer before it can be felt and when it is easiest to treat. · Thyroid disease. Talk to your doctor about whether to have your thyroid checked as part of a regular physical exam. Women have an increased chance of a thyroid problem. For men  · Prostate exam. Talk to your doctor about whether you should have a blood test (called a PSA test) for prostate cancer.  Experts disagree on whether men should have this test. Some experts recommend that you discuss the benefits and risks of the test with your doctor. · Abdominal aortic aneurysm. Ask your doctor whether you should have a test to check for an aneurysm. You may need a test if you ever smoked or if your parent, brother, sister, or child has had an aneurysm. When should you call for help? Watch closely for changes in your health, and be sure to contact your doctor if you have any problems or symptoms that concern you. Where can you learn more? Go to http://mira-tania.info/. Enter K081 in the search box to learn more about \"Well Visit, Over 65: Care Instructions. \"  Current as of: July 19, 2016  Content Version: 11.3  © 1811-5570 [employer]. Care instructions adapted under license by [employer] (which disclaims liability or warranty for this information). If you have questions about a medical condition or this instruction, always ask your healthcare professional. [place] [address] any warranty or liability for your use of this information. Medicare Part B Preventive Services Guidelines/Limitations Date last completed and Frequency Due Date   Cardiovascular Screening Blood Tests (every 5 years)  Total cholesterol, HDL, Triglycerides Order as a panel if possible Completed 5/2017    As recommended by your PCP As recommended by your PCP or Specialist   Colorectal Cancer Screening  -Fecal occult blood test (annual)  -Flexible sigmoidoscopy (5y)  -Screening colonoscopy (10y)  -Barium Enema Age 49-80;  After age [de-identified] if history of abnormal results Completed 2/27/2014     Recommended follow-up in 5 years As recommended by your PCP or Specialist     Counseling to Prevent Tobacco Use (up to 8 sessions per year)  - Counseling greater than 3 and up to 10 minutes  - Counseling greater than 10 minutes Patients must be asymptomatic of tobacco-related conditions to receive as preventive service N/A N/A   Diabetes Screening Tests (at least every 3 years, Medicare covers annually or at 6-month intervals for prediabetic patients)    Fasting blood sugar (FBS) or glucose tolerance test (GTT) Patient must be diagnosed with one of the following:  -Hypertension, Dyslipidemia, obesity, previous impaired FBS or GTT  Or any two of the following: overweight, FH of diabetes, age ? 72, history of gestational diabetes, birth of baby weighing more than 9 pounds Completed 5/2017    Recommended every 3 years for non-diabetics     As recommended by your PCP or Specialist     Glaucoma Screening (no USPSTF recommendation) Diabetes mellitus, family history, , age 48 or over,  American, age 72 or over Completed within the last year    Recommended annually As recommended by your PCP or Specialist   Prostate Cancer Screening (annually up to age 76)  - Digital rectal exam (TITO)  - Prostate specific antigen (PSA) Annually (age 48 or over), TITO not paid separately when covered E/M service is provided on same date Completed 5/2017    Recommended annually to age 76 As recommended by your PCP or Specialist     Seasonal Influenza Vaccination (annually)  Completed 10/2016    Recommended Annually Due Fall 2017   TDAP Vaccination  Completed 12/2010    Recommended every 10 years As recommended by your PCP or Specialist   Zoster (Shingles) Vaccination Covered by Medicare Part D through the pharmacy- PCP provides prescription Completed 10/2011    Recommended once over age 48  Complete   Pneumococcal Vaccination (once after 72)  Pneumo 23- 10/2011  Recommended once over the age of 72    Prevnar 15- 1/2015 Recommended once over the age of 72 Complete        Complete   Ultrasound Screening for Abdominal Aortic Aneurysm (AAA) (once) Patient must be referred through IPPE and not have had a screening for abdominal aortic aneurysm before under Medicare.   Limited to patients who meet one of the following criteria:  - Men who are 73-68 years old and have smoked more than 100 cigarettes in their lifetime.  -Anyone with a FH of AAA  -Anyone recommended for screening by USPSTF Not indicated unless recommended by PCP   Not indicated unless recommended by PCP     Family Practice Management 2011    Please bring a copy of your completed advance medical directive to the office so it may be added to your medical record. Thank you. If you have any questions or concerns please feel free to contact me at [phone]. It was a pleasure meeting you today and participating in your healthcare.   Micheal Cortez RN

## 2017-10-18 ENCOUNTER — OFFICE VISIT (OUTPATIENT)
Dept: INTERNAL MEDICINE CLINIC | Age: 72
End: 2017-10-18

## 2017-10-18 VITALS
SYSTOLIC BLOOD PRESSURE: 131 MMHG | DIASTOLIC BLOOD PRESSURE: 60 MMHG | WEIGHT: 223.6 LBS | HEART RATE: 55 BPM | TEMPERATURE: 98.5 F | BODY MASS INDEX: 33.89 KG/M2 | OXYGEN SATURATION: 96 % | HEIGHT: 68 IN | RESPIRATION RATE: 14 BRPM

## 2017-10-18 DIAGNOSIS — J32.9 SINUSITIS, UNSPECIFIED CHRONICITY, UNSPECIFIED LOCATION: ICD-10-CM

## 2017-10-18 DIAGNOSIS — M79.601 RIGHT ARM PAIN: Primary | ICD-10-CM

## 2017-10-18 DIAGNOSIS — R22.32 ARM MASS, LEFT: ICD-10-CM

## 2017-10-18 DIAGNOSIS — T50.Z95S ADVERSE EFFECT OF VACCINE, SEQUELA: ICD-10-CM

## 2017-10-18 RX ORDER — DOXYCYCLINE 100 MG/1
100 TABLET ORAL 2 TIMES DAILY
Qty: 20 TAB | Refills: 0 | Status: SHIPPED | OUTPATIENT
Start: 2017-10-18 | End: 2017-10-28

## 2017-10-18 RX ORDER — HYDROCODONE BITARTRATE AND ACETAMINOPHEN 10; 325 MG/1; MG/1
1 TABLET ORAL
Qty: 12 TAB | Refills: 0 | Status: SHIPPED | OUTPATIENT
Start: 2017-10-18 | End: 2017-11-16 | Stop reason: ALTCHOICE

## 2017-10-18 NOTE — PROGRESS NOTES
Mitchell Patrick is a 71 y.o. male who presents today for Immunization/Injection (pt in office for pain to right arm where pt had Tdap injection 14 days ago, pt states he cant pull his pants up from pain ) and Sinus Infection (pt c/o nasal congestion x few weeks )  . He has a history of   Patient Active Problem List   Diagnosis Code    Mixed hyperlipidemia E78.2    Other abnormal glucose R73.09    Essential hypertension, benign I10    Gout, unspecified M10.9    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    Allergic rhinitis, cause unspecified J30.9    Reflux esophagitis K21.0    Benign neoplasm of colon D12.6    Rheumatoid arthritis(714.0) M06.9    Contact dermatitis and other eczema, due to unspecified cause L25.9    Bunion M21.619    History of prostate cancer Z85.46    Advanced care planning/counseling discussion Z71.89   . Today patient is here for an acute visit. he does have other concerns. Upper respiratory illness:  Mitchell Patrick presents with complaints of congestion, post nasal drip and hoarseness for 2 weeks. no nausea and no vomiting . he has not had  dry cough and productive cough. Symptoms are moderate. Over-the-counter remedies including claritin. Drinking plenty of fluids: yes  Asthma?:  no  non-smoker  Contacts with similar infections: no       Problem visit:  Mitchell Patrick is here for complaint of R arm pain. .  Problem began October 4th  Severity is moderate  Character of problem: severe pain. Had flu shot to L arm and Tdap the same day to R arm. Since his R arm has been progressively getting worse. Pain to mid arm. Had some warmth for a couple days. Pain in the arm and limited ROM. Skin initially warm and red. No fevers/chills. Skin changes have resolved. Associated symptoms include: mainly pain      ROS  Review of Systems   Constitutional: Negative for chills, fever and weight loss. HENT: Negative for ear discharge, ear pain, hearing loss and tinnitus. Respiratory: Negative for cough and shortness of breath. Cardiovascular: Negative for chest pain, palpitations, orthopnea, claudication, leg swelling and PND. Gastrointestinal: Negative for abdominal pain, blood in stool, constipation, diarrhea, heartburn, nausea and vomiting. Genitourinary: Negative for dysuria, frequency and urgency. Musculoskeletal: Negative for myalgias and neck pain. Skin: Negative for itching and rash. Neurological: Negative. Endo/Heme/Allergies: Does not bruise/bleed easily. Psychiatric/Behavioral: Negative for depression. The patient is not nervous/anxious. Visit Vitals    /60 (BP 1 Location: Left arm, BP Patient Position: Sitting)    Pulse (!) 55    Temp 98.5 °F (36.9 °C) (Oral)    Resp 14    Ht 5' 8\" (1.727 m)    Wt 223 lb 9.6 oz (101.4 kg)    SpO2 96%    BMI 34 kg/m2       Physical Exam   Constitutional: He is oriented to person, place, and time. He appears well-developed and well-nourished. HENT:   Head: Normocephalic and atraumatic. Mouth/Throat: Oropharynx is clear and moist.   Pressure behind both TM. Eyes: EOM are normal. Pupils are equal, round, and reactive to light. Cardiovascular: Normal rate and regular rhythm. No murmur heard. Pulmonary/Chest: Effort normal and breath sounds normal. No respiratory distress. Musculoskeletal: Normal range of motion. He exhibits no edema. Arms:  Scar tissue vs abscess to area where noted. This is where injection of Tdap occurred. ROM of elbow and shoulder normal, but pain at area with active ROM, no pain with passive ROM. Neurological: He is alert and oriented to person, place, and time. Skin: Skin is warm and dry. He is not diaphoretic. Psychiatric: He has a normal mood and affect. His behavior is normal.         Current Outpatient Prescriptions   Medication Sig    doxycycline (ADOXA) 100 mg tablet Take 1 Tab by mouth two (2) times a day for 10 days.     HYDROcodone-acetaminophen (NORCO)  mg tablet Take 1 Tab by mouth every eight (8) hours as needed for Pain. Max Daily Amount: 3 Tabs.  lisinopril (PRINIVIL, ZESTRIL) 40 mg tablet Take 40 mg by mouth daily.  aspirin delayed-release 81 mg tablet Take  by mouth daily.  omeprazole (PRILOSEC) 20 mg capsule Take 20 mg by mouth daily.  ferrous sulfate (IRON) 325 mg (65 mg iron) tablet Take  by mouth Daily (before breakfast).  loratadine (CLARITIN) 10 mg tablet Take 10 mg by mouth.  guaiFENesin (MUCINEX) 1,200 mg Ta12 ER tablet Take 1,200 mg by mouth two (2) times a day.  albuterol (VENTOLIN HFA) 90 mcg/actuation inhaler Take  by inhalation.  allopurinol (ZYLOPRIM) 300 mg tablet Take 1 Tab by mouth daily.  omega-3 fatty acids-vitamin e (FISH OIL) 1,000 mg Cap Take 1 Cap by mouth.  citalopram (CELEXA) 20 mg tablet Take 1 Tab by mouth daily.  atorvastatin (LIPITOR) 40 mg tablet Take 1 Tab by mouth daily.  amlodipine (NORVASC) 2.5 mg tablet Take 1 Tab by mouth daily. No current facility-administered medications for this visit.          Past Medical History:   Diagnosis Date    Allergic rhinitis     Arthritis     RA    Cancer (HCC)     prostate    Diabetes ([address]     GERD (gastroesophageal reflux disease)     Gout     Hypercholesterolemia     Hypertension     [facility]     Skin cancer (melanoma) ([facility]       Past Surgical History:   Procedure Laterality Date    ENDOSCOPY, COLON, DIAGNOSTIC      09, due 12, done 11, due 15    HX KNEE REPLACEMENT Left 2012    HX PROSTATECTOMY        Social History   Substance Use Topics    Smoking status: Former Smoker    Smokeless tobacco: Never Used    Alcohol use Yes      Comment: 5-6 per day      Family History   Problem Relation Age of Onset    Dementia Mother     Heart Disease Father      CAD    Kidney Disease Father      renal failure    Diabetes Paternal Grandmother     Cancer Paternal Grandfather colon    Cancer Other      family hx colon cancer        Allergies   Allergen Reactions    Stings [Sting, Bee] Swelling     Swelling at site        Assessment/Plan  Diagnoses and all orders for this visit:    1. Right arm pain - concern for abscess to area of injection. Need to get CT ASAP. Will be treating sinus infection with doxy, which could help in case of infection. Reaction to shot still possible, but 2 weeks out less likely. -     CT UP EXT RT W WO CONT; Future  -     HYDROcodone-acetaminophen (NORCO)  mg tablet; Take 1 Tab by mouth every eight (8) hours as needed for Pain. Max Daily Amount: 3 Tabs. 2. Adverse effect of vaccine, sequela  -     CT UP EXT RT W WO CONT; Future    3. Sinusitis, unspecified chronicity, unspecified location - treat with doxy. Continue antihistamine.   -     doxycycline (ADOXA) 100 mg tablet; Take 1 Tab by mouth two (2) times a day for 10 days. 4. Arm mass, left - concern for abscess. NSAIDs and PRN norco.   -     CT UP EXT RT W WO CONT; Future  -     HYDROcodone-acetaminophen (NORCO)  mg tablet; Take 1 Tab by mouth every eight (8) hours as needed for Pain. Max Daily Amount: 3 Tabs.         Follow-up Disposition: Not on File    Negrita Monahan MD  10/18/2017

## 2017-10-18 NOTE — MR AVS SNAPSHOT
Visit Information Date & Time Provider Department Dept. Phone Encounter #  
 10/18/2017 10:00 AM Negrita Monahan MD [facility] [address] [id number] Upcoming Health Maintenance Date Due Hepatitis C Screening 1945 GLAUCOMA SCREENING Q2Y 10/11/2011 INFLUENZA AGE 9 TO ADULT 8/1/2017 MEDICARE YEARLY EXAM 8/16/2018 DTaP/Tdap/Td series (2 - Td) 12/15/2020 COLONOSCOPY 2/27/2024 Allergies as of 10/18/2017  Review Complete On: 10/18/2017 By: Hanna Martinez LPN Severity Noted Reaction Type Reactions Stings [Sting, Bee] Medium 12/02/2009   Topical Swelling Swelling at site Current Immunizations  Reviewed on 10/21/2011 Name Date Influenza High Dose Vaccine PF 10/14/2016 Influenza Vaccine Split 9/21/2011 Influenza Vaccine Whole 9/17/2010 Pneumococcal Conjugate (PCV-13) 1/1/2015 TDAP Vaccine 12/15/2010 ZZZ-RETIRED (DO NOT USE) Pneumococcal Vaccine (Unspecified Type) 10/21/2011 Zoster Vaccine, Live 10/21/2011 Not reviewed this visit You Were Diagnosed With   
  
 Codes Comments Right arm pain    -  Primary ICD-10-CM: P87.757 ICD-9-CM: 729.5 Adverse effect of vaccine, sequela     ICD-10-CM: T50.Z95S ICD-9-CM: 909.5, E949.9 Sinusitis, unspecified chronicity, unspecified location     ICD-10-CM: J32.9 ICD-9-CM: 473.9 Arm mass, left     ICD-10-CM: R22.32 
ICD-9-CM: 782. 2 Vitals BP Pulse Temp Resp Height(growth percentile) Weight(growth percentile) 131/60 (BP 1 Location: Left arm, BP Patient Position: Sitting) (!) 55 98.5 °F (36.9 °C) (Oral) 14 5' 8\" (1.727 m) 223 lb 9.6 oz (101.4 kg) SpO2 BMI Smoking Status 96% 34 kg/m2 Former Smoker Vitals History BMI and BSA Data Body Mass Index Body Surface Area 34 kg/m 2 2.21 m 2 Preferred Pharmacy Pharmacy Name Phone  [facility] [address] [facility] [address] [phone] Your Updated Medication List  
  
   
This list is accurate as of: 10/18/17 10:45 AM.  Always use your most recent med list.  
  
  
  
  
 allopurinol 300 mg tablet Commonly known as:  Clementeen Sigala Take 1 Tab by mouth daily. amLODIPine 2.5 mg tablet Commonly known as:  Diane Fraction Take 1 Tab by mouth daily. aspirin delayed-release 81 mg tablet Take  by mouth daily. atorvastatin 40 mg tablet Commonly known as:  LIPITOR Take 1 Tab by mouth daily. citalopram 20 mg tablet Commonly known as:  Izetta Grisel Take 1 Tab by mouth daily. CLARITIN 10 mg tablet Generic drug:  loratadine Take 10 mg by mouth. doxycycline 100 mg tablet Commonly known as:  ADOXA Take 1 Tab by mouth two (2) times a day for 10 days. FISH OIL 1,000 mg Cap Generic drug:  omega-3 fatty acids-vitamin e Take 1 Cap by mouth. HYDROcodone-acetaminophen  mg tablet Commonly known as:  Themilton Lyons Take 1 Tab by mouth every eight (8) hours as needed for Pain. Max Daily Amount: 3 Tabs. Iron 325 mg (65 mg iron) tablet Generic drug:  ferrous sulfate Take  by mouth Daily (before breakfast). lisinopril 40 mg tablet Commonly known as:  Uriostegui [place] Take 40 mg by mouth daily. MUCINEX 1,200 mg Ta12 ER tablet Generic drug:  guaiFENesin Take 1,200 mg by mouth two (2) times a day. omeprazole 20 mg capsule Commonly known as:  PRILOSEC Take 20 mg by mouth daily. VENTOLIN HFA 90 mcg/actuation inhaler Generic drug:  albuterol Take  by inhalation. Prescriptions Printed Refills HYDROcodone-acetaminophen (NORCO)  mg tablet 0 Sig: Take 1 Tab by mouth every eight (8) hours as needed for Pain. Max Daily Amount: 3 Tabs. Class: Print Route: Oral  
  
Prescriptions Sent to Pharmacy  Refills  
 doxycycline (ADOXA) 100 mg tablet 0  
 Sig: Take 1 Tab by mouth two (2) times a day for 10 days. Class: Normal  
 Pharmacy: [facility] [address]  #: [phone] Route: Oral  
  
To-Do List   
 10/18/2017 Imaging:  CT UP EXT RT W WO CONT Introducing [facility]! Romayne Duster introduces [employer] patient portal. Now you can access parts of your medical record, email your doctor's office, and request medication refills online. 1. In your internet browser, go to [email or web address] 2. Click on the First Time User? Click Here link in the Sign In box. You will see the New Member Sign Up page. 3. Enter your [employer] Access Code exactly as it appears below. You will not need to use this code after youve completed the sign-up process. If you do not sign up before the expiration date, you must request a new code. · [employer] Access Code: [email or web address] Expires: 11/13/2017 10:32 AM 
 
4. Enter the last four digits of your Social Security Number (xxxx) and Date of Birth (mm/dd/yyyy) as indicated and click Submit. You will be taken to the next sign-up page. 5. Create a [employer] ID. This will be your [employer] login ID and cannot be changed, so think of one that is secure and easy to remember. 6. Create a [employer] password. You can change your password at any time. 7. Enter your Password Reset Question and Answer. This can be used at a later time if you forget your password. 8. Enter your e-mail address. You will receive e-mail notification when new information is available in [address] 9. Click Sign Up. You can now view and download portions of your medical record. 10. Click the Download Summary menu link to download a portable copy of your medical information. If you have questions, please visit the Frequently Asked Questions section of the [employer] website.  Remember, [employer] is NOT to be used for urgent needs. For medical emergencies, dial 911. Now available from your iPhone and Android! Please provide this summary of care documentation to your next provider. Your primary care clinician is listed as Roxanne Ayala. If you have any questions after today's visit, please call [phone].

## 2017-10-19 ENCOUNTER — HOSPITAL ENCOUNTER (OUTPATIENT)
Dept: CT IMAGING | Age: 72
Discharge: HOME | End: 2017-10-19
Attending: INTERNAL MEDICINE
Payer: MEDICARE

## 2017-10-19 ENCOUNTER — TELEPHONE (OUTPATIENT)
Dept: INTERNAL MEDICINE CLINIC | Age: 72
End: 2017-10-19

## 2017-10-19 DIAGNOSIS — T50.Z95S ADVERSE EFFECT OF VACCINE, SEQUELA: ICD-10-CM

## 2017-10-19 DIAGNOSIS — R22.32 ARM MASS, LEFT: ICD-10-CM

## 2017-10-19 DIAGNOSIS — M79.601 RIGHT ARM PAIN: ICD-10-CM

## 2017-10-19 LAB — CREAT BLD-MCNC: 0.8 MG/DL (ref 0.6–1.3)

## 2017-10-19 PROCEDURE — 73201 CT UPPER EXTREMITY W/DYE: CPT

## 2017-10-19 PROCEDURE — 82565 ASSAY OF CREATININE: CPT

## 2017-10-19 PROCEDURE — 74011636320 HC RX REV CODE- 636/320: Performed by: RADIOLOGY

## 2017-10-19 RX ADMIN — IOPAMIDOL 95 ML: 612 INJECTION, SOLUTION INTRAVENOUS at 12:00

## 2017-10-19 NOTE — TELEPHONE ENCOUNTER
----- Message from Jaci Sue sent at 10/19/2017  2:45 PM EDT -----  Regarding: Dr. Luanne Suresh / telephone  Pt is requesting the results of his ct test and is still not feeling very well and would like to talk to the doctor about what to do next and best contact number is [phone].

## 2017-10-31 ENCOUNTER — TELEPHONE (OUTPATIENT)
Dept: INTERNAL MEDICINE CLINIC | Age: 72
End: 2017-10-31

## 2017-10-31 NOTE — TELEPHONE ENCOUNTER
Pt has completed doxycycline 3 days ago and is not feeling better. Pt c/o sinus pain and would like to know if more ABX could be sent to pharmacy? Pt also c/o ongoing shoulder and arm achyness d/t vaccination.

## 2017-10-31 NOTE — TELEPHONE ENCOUNTER
He needs to give it sometime for abx to work? Sometimes if there is no fever or chills we ate to give abx right back to back unless this is something Tresunday Enriquez has had to do before? Can he come back in? We could try steroids if there is more congestion and not fever or purulent discharge?

## 2017-10-31 NOTE — TELEPHONE ENCOUNTER
Per patient wife he needs a refill on his Doxycycline call it into the [facility] no [phone]  Her no [phone]

## 2017-11-01 DIAGNOSIS — Z11.59 ENCOUNTER FOR HEPATITIS C SCREENING TEST FOR LOW RISK PATIENT: ICD-10-CM

## 2017-11-01 DIAGNOSIS — E78.2 MIXED HYPERLIPIDEMIA: ICD-10-CM

## 2017-11-01 DIAGNOSIS — M25.50 ARTHRALGIA, UNSPECIFIED JOINT: ICD-10-CM

## 2017-11-02 ENCOUNTER — OFFICE VISIT (OUTPATIENT)
Dept: INTERNAL MEDICINE CLINIC | Age: 72
End: 2017-11-02

## 2017-11-02 ENCOUNTER — HOSPITAL ENCOUNTER (OUTPATIENT)
Dept: LAB | Age: 72
Discharge: HOME | End: 2017-11-02
Payer: MEDICARE

## 2017-11-02 VITALS
SYSTOLIC BLOOD PRESSURE: 108 MMHG | DIASTOLIC BLOOD PRESSURE: 62 MMHG | RESPIRATION RATE: 16 BRPM | HEART RATE: 64 BPM | TEMPERATURE: 98 F | WEIGHT: 221 LBS | BODY MASS INDEX: 33.49 KG/M2 | OXYGEN SATURATION: 94 % | HEIGHT: 68 IN

## 2017-11-02 DIAGNOSIS — T50.Z95D ADVERSE EFFECT OF VACCINE, SUBSEQUENT ENCOUNTER: ICD-10-CM

## 2017-11-02 DIAGNOSIS — M12.9 ARTHRITIS, MULTIPLE JOINT INVOLVEMENT: Primary | ICD-10-CM

## 2017-11-02 DIAGNOSIS — R09.81 SINUS CONGESTION: ICD-10-CM

## 2017-11-02 DIAGNOSIS — I10 ESSENTIAL HYPERTENSION, BENIGN: ICD-10-CM

## 2017-11-02 PROCEDURE — 85651 RBC SED RATE NONAUTOMATED: CPT

## 2017-11-02 PROCEDURE — 86141 C-REACTIVE PROTEIN HS: CPT

## 2017-11-02 PROCEDURE — 80053 COMPREHEN METABOLIC PANEL: CPT

## 2017-11-02 PROCEDURE — 84550 ASSAY OF BLOOD/URIC ACID: CPT

## 2017-11-02 PROCEDURE — 85025 COMPLETE CBC W/AUTO DIFF WBC: CPT

## 2017-11-02 RX ORDER — PREDNISONE 20 MG/1
TABLET ORAL
Qty: 14 TAB | Refills: 0 | Status: SHIPPED | OUTPATIENT
Start: 2017-11-02 | End: 2017-11-16 | Stop reason: ALTCHOICE

## 2017-11-02 NOTE — MR AVS SNAPSHOT
Visit Information Date & Time Provider Department Dept. Phone Encounter #  
 11/2/2017  2:00 PM Julio Tovar MD [facility] [address] [id number] Follow-up Instructions Return in about 2 weeks (around 11/16/2017). Upcoming Health Maintenance Date Due Hepatitis C Screening 1945 GLAUCOMA SCREENING Q2Y 10/11/2011 INFLUENZA AGE 9 TO ADULT 8/1/2017 MEDICARE YEARLY EXAM 8/16/2018 COLONOSCOPY 2/27/2024 DTaP/Tdap/Td series (3 - Td) 10/4/2027 Allergies as of 11/2/2017  Review Complete On: 57/9/2634 By: Yannick Pena Severity Noted Reaction Type Reactions Stings [Sting, Bee] Medium 12/02/2009   Topical Swelling Swelling at site Current Immunizations  Reviewed on 10/21/2011 Name Date Influenza High Dose Vaccine PF 10/14/2016 Influenza Vaccine Split 9/21/2011 Influenza Vaccine Whole 9/17/2010 Pneumococcal Conjugate (PCV-13) 1/1/2015 TDAP Vaccine 12/15/2010 ZZZ-RETIRED (DO NOT USE) Pneumococcal Vaccine (Unspecified Type) 10/21/2011 Zoster Vaccine, Live 10/21/2011 Not reviewed this visit You Were Diagnosed With   
  
 Codes Comments Arthritis, multiple joint involvement    -  Primary ICD-10-CM: M12.9 ICD-9-CM: 716.99 Adverse effect of vaccine, subsequent encounter     ICD-10-CM: E74.B41H ICD-9-CM: V58.89 Sinus congestion     ICD-10-CM: R09.81 ICD-9-CM: 478.19 Essential hypertension, benign     ICD-10-CM: I10 
ICD-9-CM: 401.1 Vitals BP Pulse Temp Resp Height(growth percentile) Weight(growth percentile) 108/62 (BP 1 Location: Left arm, BP Patient Position: Sitting) 64 98 °F (36.7 °C) (Oral) 16 5' 8\" (1.727 m) 221 lb (100.2 kg) SpO2 BMI Smoking Status 94% 33.6 kg/m2 Former Smoker Vitals History BMI and BSA Data Body Mass Index Body Surface Area  
 33.6 kg/m 2 2.19 m 2 Preferred Pharmacy Pharmacy Name Phone [facility] [address], [address] [phone] Your Updated Medication List  
  
   
This list is accurate as of: 11/2/17  2:43 PM.  Always use your most recent med list.  
  
  
  
  
 allopurinol 300 mg tablet Commonly known as:  Oxana Casa Take 1 Tab by mouth daily. amLODIPine 2.5 mg tablet Commonly known as:  Gloria Gables Take 1 Tab by mouth daily. aspirin delayed-release 81 mg tablet Take  by mouth daily. atorvastatin 40 mg tablet Commonly known as:  LIPITOR Take 1 Tab by mouth daily. citalopram 20 mg tablet Commonly known as:  Fede Dame Take 1 Tab by mouth daily. CLARITIN 10 mg tablet Generic drug:  loratadine Take 10 mg by mouth. FISH OIL 1,000 mg Cap Generic drug:  omega-3 fatty acids-vitamin e Take 1 Cap by mouth. HYDROcodone-acetaminophen  mg tablet Commonly known as:  Melchor Gideon Take 1 Tab by mouth every eight (8) hours as needed for Pain. Max Daily Amount: 3 Tabs. Iron 325 mg (65 mg iron) tablet Generic drug:  ferrous sulfate Take  by mouth Daily (before breakfast). lisinopril 40 mg tablet Commonly known as:  Richardson Edman Take 40 mg by mouth daily. MUCINEX 1,200 mg Ta12 ER tablet Generic drug:  guaiFENesin Take 1,200 mg by mouth two (2) times a day. omeprazole 20 mg capsule Commonly known as:  PRILOSEC Take 20 mg by mouth daily. predniSONE 20 mg tablet Commonly known as:  Martine Thompson Take two for 4 days, then one for 4 days then 1/2 for 4 days then stop. VENTOLIN HFA 90 mcg/actuation inhaler Generic drug:  albuterol Take  by inhalation. Prescriptions Sent to Pharmacy Refills  
 predniSONE (DELTASONE) 20 mg tablet 0 Sig: Take two for 4 days, then one for 4 days then 1/2 for 4 days then stop.   
 Class: Normal  
 Pharmacy: [facility] [address], [address] [facility] [address] [facility] [address] [facility] #: [phone] We Performed the Following CBC WITH AUTOMATED DIFF [11225 CPT(R)] CRP, HIGH SENSITIVITY [69073 CPT(R)] METABOLIC PANEL, COMPREHENSIVE [60227 CPT(R)] SED RATE (ESR) [id number] CPT(R)] URIC ACID [id number] CPT(R)] Follow-up Instructions Return in about 2 weeks (around 11/16/2017). Introducing [facility] & HEALTH SERVICES! [facility] introduces [employer] patient portal. Now you can access parts of your medical record, email your doctor's office, and request medication refills online. 1. In your internet browser, go to [email or web address] 2. Click on the First Time User? Click Here link in the Sign In box. You will see the New Member Sign Up page. 3. Enter your [employer] Access Code exactly as it appears below. You will not need to use this code after youve completed the sign-up process. If you do not sign up before the expiration date, you must request a new code. · [employer] Access Code: [email or web address] Expires: 11/13/2017 10:32 AM 
 
4. Enter the last four digits of your Social Security Number (xxxx) and Date of Birth (mm/dd/yyyy) as indicated and click Submit. You will be taken to the next sign-up page. 5. Create a [employer] ID. This will be your [employer] login ID and cannot be changed, so think of one that is secure and easy to remember. 6. Create a [employer] password. You can change your password at any time. 7. Enter your Password Reset Question and Answer. This can be used at a later time if you forget your password. 8. Enter your e-mail address. You will receive e-mail notification when new information is available in [address] 9. Click Sign Up. You can now view and download portions of your medical record. 10. Click the Download Summary menu link to download a portable copy of your medical information.  
 
If you have questions, please visit the Frequently Asked Questions section of the [employer]. Remember, [employer] is NOT to be used for urgent needs. For medical emergencies, dial 911. Now available from your iPhone and Android! Please provide this summary of care documentation to your next provider. Your primary care clinician is listed as Paige Fischer. If you have any questions after today's visit, please call [phone].

## 2017-11-02 NOTE — PROGRESS NOTES
Mady Deleon is a 71 y.o. male who presents today for Sinus Infection and Joint Pain  . He has a history of   Patient Active Problem List   Diagnosis Code    Mixed hyperlipidemia E78.2    Other abnormal glucose R73.09    Essential hypertension, benign I10    Gout, unspecified M10.9    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    Allergic rhinitis, cause unspecified J30.9    Reflux esophagitis K21.0    Benign neoplasm of colon D12.6    Rheumatoid arthritis(714.0) M06.9    Contact dermatitis and other eczema, due to unspecified cause L25.9    Bunion M21.619    History of prostate cancer Z85.46    Advanced care planning/counseling discussion Z71.89   . Today patient is here for f/u.   he does have other concerns. Upper respiratory illness:  Treated for a sinus infection at last visit with jagruti. Pt notes some continued congestion. No F/C. Problem visit:  Mady Deleon is here for complaint of bilateral hand/wrist pain. Problem began Ocotober 4th after Tdap in R arm. Severity is constant, chronic. Character of problem: multiple joints aching. Generalized weakness. Nothing makes the problem worse. Pain worse in the morning. Pain in wrists and hands are the worst.   Ibuprofen makes the problem better. Associated symptoms include: Multiple joint swelling, especially hands. Treatments tried include: NSAIDs. ROS  Review of Systems   Constitutional: Positive for malaise/fatigue. Negative for chills, diaphoresis, fever and weight loss. HENT: Negative for ear discharge, ear pain, hearing loss and tinnitus. Eyes: Negative for blurred vision, double vision and photophobia. Respiratory: Negative for cough, hemoptysis and shortness of breath. Cardiovascular: Negative for chest pain, palpitations and leg swelling. Gastrointestinal: Negative for abdominal pain, blood in stool, constipation, diarrhea, heartburn, nausea and vomiting.    Genitourinary: Negative for dysuria, frequency, hematuria and urgency. Musculoskeletal: Positive for back pain, joint pain, myalgias and neck pain. Negative for falls. Skin: Negative for itching and rash. Neurological: Positive for weakness. Endo/Heme/Allergies: Does not bruise/bleed easily. Psychiatric/Behavioral: Negative for depression, hallucinations, substance abuse and suicidal ideas. The patient is not nervous/anxious. Visit Vitals    /62 (BP 1 Location: Left arm, BP Patient Position: Sitting)    Pulse 64    Temp 98 °F (36.7 °C) (Oral)    Resp 16    Ht 5' 8\" (1.727 m)    Wt 221 lb (100.2 kg)    SpO2 94%    BMI 33.6 kg/m2       Physical Exam   Constitutional: He appears well-developed and well-nourished. HENT:   Head: Normocephalic and atraumatic. Still fluid behind TM   Eyes: Pupils are equal, round, and reactive to light. Neck: Normal range of motion. Cardiovascular: Normal rate and regular rhythm. No murmur heard. Pulmonary/Chest: Effort normal and breath sounds normal. No respiratory distress. Abdominal: Soft. Bowel sounds are normal. He exhibits no distension. Musculoskeletal:   Mild swelling of hand joints. Firm knot of L arm improved. Skin: Skin is warm and dry. Psychiatric: He has a normal mood and affect. His behavior is normal.         Current Outpatient Prescriptions   Medication Sig    predniSONE (DELTASONE) 20 mg tablet Take two for 4 days, then one for 4 days then 1/2 for 4 days then stop.  lisinopril (PRINIVIL, ZESTRIL) 40 mg tablet Take 40 mg by mouth daily.  aspirin delayed-release 81 mg tablet Take  by mouth daily.  omeprazole (PRILOSEC) 20 mg capsule Take 20 mg by mouth daily.  ferrous sulfate (IRON) 325 mg (65 mg iron) tablet Take  by mouth Daily (before breakfast).  loratadine (CLARITIN) 10 mg tablet Take 10 mg by mouth.  guaiFENesin (MUCINEX) 1,200 mg Ta12 ER tablet Take 1,200 mg by mouth two (2) times a day.     albuterol (VENTOLIN HFA) 90 mcg/actuation inhaler Take  by inhalation.  allopurinol (ZYLOPRIM) 300 mg tablet Take 1 Tab by mouth daily.  omega-3 fatty acids-vitamin e (FISH OIL) 1,000 mg Cap Take 1 Cap by mouth.  citalopram (CELEXA) 20 mg tablet Take 1 Tab by mouth daily.  atorvastatin (LIPITOR) 40 mg tablet Take 1 Tab by mouth daily.  amlodipine (NORVASC) 2.5 mg tablet Take 1 Tab by mouth daily.  HYDROcodone-acetaminophen (NORCO)  mg tablet Take 1 Tab by mouth every eight (8) hours as needed for Pain. Max Daily Amount: 3 Tabs. No current facility-administered medications for this visit. Past Medical History:   Diagnosis Date    Allergic rhinitis     Arthritis     RA    Cancer (HCC)     prostate    Diabetes ([facility]     GERD (gastroesophageal reflux disease)     Gout     Hypercholesterolemia     Hypertension     Melanoma in situ of back ([facility]     Skin cancer (melanoma) ([facility]       Past Surgical History:   Procedure Laterality Date    ENDOSCOPY, COLON, DIAGNOSTIC      09, due 12, done 11, due 15    HX KNEE REPLACEMENT Left 2012    HX PROSTATECTOMY        Social History   Substance Use Topics    Smoking status: Former Smoker    Smokeless tobacco: Never Used    Alcohol use Yes      Comment: 5-6 per day      Family History   Problem Relation Age of Onset    Dementia Mother     Heart Disease Father      CAD    Kidney Disease Father      renal failure    Diabetes Paternal Grandmother     Cancer Paternal Grandfather      colon    Cancer Other      family hx colon cancer        Allergies   Allergen Reactions    Stings [Sting, Bee] Swelling     Swelling at site        Assessment/Plan  Diagnoses and all orders for this visit:    1. Arthritis, multiple joint involvement - seems quite severe. Multiple small and large joints involved. ? Reaction/cross reaction to pertusis as this was the only new immunization to his body. Pt to take a steroid taper. This should also help with his sinuses.    -     predniSONE (DELTASONE) 20 mg tablet; Take two for 4 days, then one for 4 days then 1/2 for 4 days then stop. -     URIC ACID  -     METABOLIC PANEL, COMPREHENSIVE  -     SED RATE (ESR)  -     CRP, HIGH SENSITIVITY  -     CBC WITH AUTOMATED DIFF    2. Adverse effect of vaccine, subsequent encounter    3. Sinus congestion    4. Essential hypertension, benign - noted to be a bit up at [place]. Repeat. BP borderline low today. -     CBC WITH AUTOMATED DIFF    Over 50% of a visit of 25 minutes spent reviewing diagnosis and treatment options and side effects of medicines. Follow-up Disposition:  Return in about 2 weeks (around 11/16/2017).     Keagan Alfonso MD  11/2/2017

## 2017-11-03 ENCOUNTER — HOSPITAL ENCOUNTER (OUTPATIENT)
Dept: LAB | Age: 72
Discharge: HOME | End: 2017-11-03
Payer: MEDICARE

## 2017-11-03 DIAGNOSIS — M19.90 GENERALIZED ARTHRITIS: ICD-10-CM

## 2017-11-03 DIAGNOSIS — D64.9 ANEMIA, UNSPECIFIED TYPE: ICD-10-CM

## 2017-11-03 DIAGNOSIS — T80.69XD SERUM SICKNESS, SUBSEQUENT ENCOUNTER: Primary | ICD-10-CM

## 2017-11-03 LAB
ALBUMIN SERPL-MCNC: 3.8 G/DL (ref 3.5–4.8)
ALBUMIN/GLOB SERPL: 1.5 {RATIO} (ref 1.2–2.2)
ALP SERPL-CCNC: 72 IU/L (ref 39–117)
ALT SERPL-CCNC: 28 IU/L (ref 0–44)
AST SERPL-CCNC: 11 IU/L (ref 0–40)
BASOPHILS # BLD AUTO: 0 X10E3/UL (ref 0–0.2)
BASOPHILS NFR BLD AUTO: 0 %
BILIRUB SERPL-MCNC: 0.4 MG/DL (ref 0–1.2)
BUN SERPL-MCNC: 19 MG/DL (ref 8–27)
BUN/CREAT SERPL: 26 (ref 10–24)
CALCIUM SERPL-MCNC: 9.1 MG/DL (ref 8.6–10.2)
CHLORIDE SERPL-SCNC: 98 MMOL/L (ref 96–106)
CO2 SERPL-SCNC: 21 MMOL/L (ref 18–29)
CREAT SERPL-MCNC: 0.74 MG/DL (ref 0.76–1.27)
CRP SERPL HS-MCNC: 58.52 MG/L (ref 0–3)
EOSINOPHIL # BLD AUTO: 0.3 X10E3/UL (ref 0–0.4)
EOSINOPHIL NFR BLD AUTO: 3 %
ERYTHROCYTE [DISTWIDTH] IN BLOOD BY AUTOMATED COUNT: 13.2 % (ref 12.3–15.4)
ERYTHROCYTE [SEDIMENTATION RATE] IN BLOOD BY WESTERGREN METHOD: 42 MM/HR (ref 0–30)
GFR SERPLBLD CREATININE-BSD FMLA CKD-EPI: 107 ML/MIN/1.73
GFR SERPLBLD CREATININE-BSD FMLA CKD-EPI: 93 ML/MIN/1.73
GLOBULIN SER CALC-MCNC: 2.6 G/DL (ref 1.5–4.5)
GLUCOSE SERPL-MCNC: 116 MG/DL (ref 65–99)
HCT VFR BLD AUTO: 34.6 % (ref 37.5–51)
HGB BLD-MCNC: 11.8 G/DL (ref 12.6–17.7)
IMM GRANULOCYTES # BLD: 0 X10E3/UL (ref 0–0.1)
IMM GRANULOCYTES NFR BLD: 0 %
LYMPHOCYTES # BLD AUTO: 1.9 X10E3/UL (ref 0.7–3.1)
LYMPHOCYTES NFR BLD AUTO: 15 %
MCH RBC QN AUTO: 31.2 PG (ref 26.6–33)
MCHC RBC AUTO-ENTMCNC: 34.1 G/DL (ref 31.5–35.7)
MCV RBC AUTO: 92 FL (ref 79–97)
MONOCYTES # BLD AUTO: 1 X10E3/UL (ref 0.1–0.9)
MONOCYTES NFR BLD AUTO: 8 %
NEUTROPHILS # BLD AUTO: 9.5 X10E3/UL (ref 1.4–7)
NEUTROPHILS NFR BLD AUTO: 74 %
PLATELET # BLD AUTO: 409 X10E3/UL (ref 150–379)
POTASSIUM SERPL-SCNC: 4.3 MMOL/L (ref 3.5–5.2)
PROT SERPL-MCNC: 6.4 G/DL (ref 6–8.5)
RBC # BLD AUTO: 3.78 X10E6/UL (ref 4.14–5.8)
SODIUM SERPL-SCNC: 139 MMOL/L (ref 134–144)
URATE SERPL-MCNC: 4.4 MG/DL (ref 3.7–8.6)
WBC # BLD AUTO: 12.9 X10E3/UL (ref 3.4–10.8)

## 2017-11-03 PROCEDURE — 83550 IRON BINDING TEST: CPT

## 2017-11-03 PROCEDURE — 82728 ASSAY OF FERRITIN: CPT

## 2017-11-03 PROCEDURE — 81001 URINALYSIS AUTO W/SCOPE: CPT

## 2017-11-03 PROCEDURE — 83615 LACTATE (LD) (LDH) ENZYME: CPT

## 2017-11-03 PROCEDURE — 86162 COMPLEMENT TOTAL (CH50): CPT

## 2017-11-03 PROCEDURE — 86160 COMPLEMENT ANTIGEN: CPT

## 2017-11-03 PROCEDURE — 36415 COLL VENOUS BLD VENIPUNCTURE: CPT

## 2017-11-03 PROCEDURE — 83010 ASSAY OF HAPTOGLOBIN QUANT: CPT

## 2017-11-03 PROCEDURE — 82550 ASSAY OF CK (CPK): CPT

## 2017-11-03 NOTE — PROGRESS NOTES
Please let him know that blood work looks like potential allergic reaction. Need to do more blood work if possible today as he has started steroids. Have ordered these. See if they can be run on blood work from yesterday, if not will need to have him draw these. Continue steroids as prescribed. Will be in touch after results of new blood work.

## 2017-11-03 NOTE — PROGRESS NOTES
Verified 3 Pt ID. Notified Pt and he verbalized understanding. Labs can be added to existing samples, order has been faxed to [facility] will come in today to provide urine sample.

## 2017-11-06 LAB
APPEARANCE UR: CLEAR
BACTERIA #/AREA URNS HPF: NORMAL /[HPF]
BILIRUB UR QL STRIP: NEGATIVE
C3 SERPL-MCNC: 156 MG/DL (ref 82–167)
C4 SERPL-MCNC: 20 MG/DL (ref 14–44)
CASTS URNS QL MICRO: NORMAL /LPF
CH50 SERPL-ACNC: >60 U/ML (ref 42–60)
CK SERPL-CCNC: 59 U/L (ref 24–204)
COLOR UR: YELLOW
EPI CELLS #/AREA URNS HPF: NORMAL /HPF
FERRITIN SERPL-MCNC: 726 NG/ML (ref 30–400)
GLUCOSE UR QL: NEGATIVE
HAPTOGLOB SERPL-MCNC: 357 MG/DL (ref 34–200)
HGB UR QL STRIP: NEGATIVE
IRON SATN MFR SERPL: 33 % (ref 15–55)
IRON SERPL-MCNC: 68 UG/DL (ref 38–169)
KETONES UR QL STRIP: NEGATIVE
LDH SERPL-CCNC: 156 IU/L (ref 121–224)
LEUKOCYTE ESTERASE UR QL STRIP: NEGATIVE
MICRO URNS: NORMAL
MICRO URNS: NORMAL
MUCOUS THREADS URNS QL MICRO: PRESENT
NITRITE UR QL STRIP: NEGATIVE
PH UR STRIP: 6.5 [PH] (ref 5–7.5)
PROT UR QL STRIP: NEGATIVE
RBC #/AREA URNS HPF: NORMAL /HPF
SP GR UR: 1.02 (ref 1–1.03)
TIBC SERPL-MCNC: 205 UG/DL (ref 250–450)
UIBC SERPL-MCNC: 137 UG/DL (ref 111–343)
UROBILINOGEN UR STRIP-MCNC: 0.2 MG/DL (ref 0.2–1)
WBC #/AREA URNS HPF: NORMAL /HPF

## 2017-11-16 ENCOUNTER — OFFICE VISIT (OUTPATIENT)
Dept: INTERNAL MEDICINE CLINIC | Age: 72
End: 2017-11-16

## 2017-11-16 VITALS
OXYGEN SATURATION: 95 % | WEIGHT: 223 LBS | HEART RATE: 61 BPM | RESPIRATION RATE: 18 BRPM | HEIGHT: 68 IN | DIASTOLIC BLOOD PRESSURE: 69 MMHG | TEMPERATURE: 98 F | SYSTOLIC BLOOD PRESSURE: 127 MMHG | BODY MASS INDEX: 33.8 KG/M2

## 2017-11-16 DIAGNOSIS — M19.90 INFLAMMATORY ARTHRITIS: Primary | ICD-10-CM

## 2017-11-16 DIAGNOSIS — R00.1 BRADYCARDIA: ICD-10-CM

## 2017-11-16 DIAGNOSIS — M19.90 ARTHRITIS: ICD-10-CM

## 2017-11-16 DIAGNOSIS — R79.89 LOW TESTOSTERONE: ICD-10-CM

## 2017-11-16 DIAGNOSIS — I10 ESSENTIAL HYPERTENSION, BENIGN: ICD-10-CM

## 2017-11-16 DIAGNOSIS — Z87.39 HISTORY OF RHEUMATOID ARTHRITIS: ICD-10-CM

## 2017-11-16 NOTE — PROGRESS NOTES
Lindy Mason is a 71 y.o. male who presents today for Hypertension  . He has a history of   Patient Active Problem List   Diagnosis Code    Mixed hyperlipidemia E78.2    Other abnormal glucose R73.09    Essential hypertension, benign I10    Gout, unspecified M10.9    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    Allergic rhinitis, cause unspecified J30.9    Reflux esophagitis K21.0    Benign neoplasm of colon D12.6    Rheumatoid arthritis(714.0) M06.9    Contact dermatitis and other eczema, due to unspecified cause L25.9    Bunion M21.619    History of prostate cancer Z85.46    Advanced care planning/counseling discussion Z71.89    Low testosterone E34.9   . Today patient is here for f/u.   he does not have other concerns. Inflammatory Response: seems to correlate with Tdap injection. Started with local severe pain which lasted weeks out from injection. Concern over abscess and therefor CT'ed arm, which was negative. Then also led to generealized fatigue and small joint pains. Acute phase reactants all +. Mildly anemic on blood work. Pt placed on a taper of steroids. Finished 12 day taper about 3 days ago. R arm much better. ROS  Review of Systems   Constitutional: Negative for chills, fever, malaise/fatigue and weight loss. HENT: Negative for ear discharge, ear pain, hearing loss, nosebleeds and tinnitus. Eyes: Negative for photophobia and pain. Respiratory: Negative for cough, hemoptysis and shortness of breath. Cardiovascular: Negative for chest pain, palpitations, orthopnea, claudication and leg swelling. Gastrointestinal: Negative for abdominal pain, diarrhea, nausea and vomiting. Genitourinary: Negative for dysuria, frequency, hematuria and urgency. Musculoskeletal: Positive for joint pain and myalgias (morning stiffness). Negative for back pain, falls and neck pain. Neurological: Negative. Endo/Heme/Allergies: Does not bruise/bleed easily.    Psychiatric/Behavioral: Negative for depression, substance abuse and suicidal ideas. The patient is not nervous/anxious. Visit Vitals    /69 (BP 1 Location: Left arm, BP Patient Position: Sitting)    Pulse 61    Temp 98 °F (36.7 °C) (Oral)    Resp 18    Ht 5' 8\" (1.727 m)    Wt 223 lb (101.2 kg)    SpO2 95%    BMI 33.91 kg/m2       Physical Exam   Constitutional: He is oriented to person, place, and time. He appears well-developed and well-nourished. HENT:   Head: Normocephalic and atraumatic. Eyes: EOM are normal. Pupils are equal, round, and reactive to light. Cardiovascular: Normal rate and regular rhythm. No murmur heard. Pulmonary/Chest: Effort normal and breath sounds normal. No respiratory distress. Musculoskeletal: Normal range of motion. He exhibits no edema. Several swollen joints in both hands. Mild ulnar drift of both hands. Neurological: He is alert and oriented to person, place, and time. Skin: Skin is warm and dry. He is not diaphoretic. Psychiatric: He has a normal mood and affect. His behavior is normal.         Current Outpatient Prescriptions   Medication Sig    lisinopril (PRINIVIL, ZESTRIL) 40 mg tablet Take 40 mg by mouth daily.  aspirin delayed-release 81 mg tablet Take  by mouth daily.  omeprazole (PRILOSEC) 20 mg capsule Take 20 mg by mouth daily.  ferrous sulfate (IRON) 325 mg (65 mg iron) tablet Take  by mouth Daily (before breakfast).  loratadine (CLARITIN) 10 mg tablet Take 10 mg by mouth.  guaiFENesin (MUCINEX) 1,200 mg Ta12 ER tablet Take 1,200 mg by mouth two (2) times a day.  albuterol (VENTOLIN HFA) 90 mcg/actuation inhaler Take  by inhalation.  allopurinol (ZYLOPRIM) 300 mg tablet Take 1 Tab by mouth daily.  omega-3 fatty acids-vitamin e (FISH OIL) 1,000 mg Cap Take 1 Cap by mouth.  citalopram (CELEXA) 20 mg tablet Take 1 Tab by mouth daily.  atorvastatin (LIPITOR) 40 mg tablet Take 1 Tab by mouth daily.     amlodipine (NORVASC) 2.5 mg tablet Take 1 Tab by mouth daily. No current facility-administered medications for this visit. Past Medical History:   Diagnosis Date    Allergic rhinitis     Arthritis     RA    Cancer (HCC)     prostate    [facility]     GERD (gastroesophageal reflux disease)     Gout     Hypercholesterolemia     Hypertension     Melanoma in situ of back ([facility]     Skin cancer (melanoma) ([facility]       Past Surgical History:   Procedure Laterality Date    ENDOSCOPY, COLON, DIAGNOSTIC      09, due 12, done 11, due 15    HX KNEE REPLACEMENT Left 2012    HX PROSTATECTOMY        Social History   Substance Use Topics    Smoking status: Former Smoker    Smokeless tobacco: Never Used    Alcohol use Yes      Comment: 5-6 per day      Family History   Problem Relation Age of Onset    Dementia Mother     Heart Disease Father      CAD    Kidney Disease Father      renal failure    Diabetes Paternal Grandmother     Cancer Paternal Grandfather      colon    Cancer Other      family hx colon cancer        Allergies   Allergen Reactions    Stings [Sting, Bee] Swelling     Swelling at site        Assessment/Plan  Diagnoses and all orders for this visit:    1. Inflammatory arthritis - ? History of RA along with low T, inflammatory markers and MS changes, concern for underlying inflammatory arthritis. Would like to have him seen soon by Rheum. Overall better with prednisone, now back off. Still potential for acute inflammatory response to pertussis. -     REFERRAL TO RHEUMATOLOGY    2. Arthritis  -     REFERRAL TO RHEUMATOLOGY    3. Essential hypertension, benign - stable off BB. 4. Low testosterone    5. History of rheumatoid arthritis  -     REFERRAL TO RHEUMATOLOGY    6. Bradycardia - Better. Over 50% of a visit of 25 minutes spent reviewing diagnosis and treatment options and side effects of medicines.       Follow-up Disposition: Not on File    Vinod Rodriguez MD  11/16/2017

## 2017-11-16 NOTE — MR AVS SNAPSHOT
Visit Information Date & Time Provider Department Dept. Phone Encounter #  
 11/16/2017  9:15 AM Toño Narvaez MD [facility] [address] [id number] Upcoming Health Maintenance Date Due Hepatitis C Screening 1945 Influenza Age 5 to Adult 8/1/2017 MEDICARE YEARLY EXAM 8/16/2018 GLAUCOMA SCREENING Q2Y 6/23/2019 COLONOSCOPY 2/27/2024 DTaP/Tdap/Td series (3 - Td) 10/4/2027 Allergies as of 11/16/2017  Review Complete On: 40/60/5443 By: Scotty Pena Severity Noted Reaction Type Reactions Stings [Sting, Bee] Medium 12/02/2009   Topical Swelling Swelling at site Current Immunizations  Reviewed on 10/21/2011 Name Date Influenza High Dose Vaccine PF 10/14/2016 Influenza Vaccine Split 9/21/2011 Influenza Vaccine Whole 9/17/2010 Pneumococcal Conjugate (PCV-13) 1/1/2015 TDAP Vaccine 12/15/2010 ZZZ-RETIRED (DO NOT USE) Pneumococcal Vaccine (Unspecified Type) 10/21/2011 Zoster Vaccine, Live 10/21/2011 Not reviewed this visit You Were Diagnosed With   
  
 Codes Comments Inflammatory arthritis    -  Primary ICD-10-CM: M19.90 ICD-9-CM: 714.9 Arthritis     ICD-10-CM: M19.90 ICD-9-CM: 716.90 Essential hypertension, benign     ICD-10-CM: I10 
ICD-9-CM: 401.1 Low testosterone     ICD-10-CM: E34.9 ICD-9-CM: 259.9 History of rheumatoid arthritis     ICD-10-CM: Z87.39 
ICD-9-CM: V13.4 Vitals BP Pulse Temp Resp Height(growth percentile) Weight(growth percentile) 127/69 (BP 1 Location: Left arm, BP Patient Position: Sitting) 61 98 °F (36.7 °C) (Oral) 18 5' 8\" (1.727 m) 223 lb (101.2 kg) SpO2 BMI Smoking Status 95% 33.91 kg/m2 Former Smoker Vitals History BMI and BSA Data Body Mass Index Body Surface Area  
 33.91 kg/m 2 2.2 m 2 Preferred Pharmacy Pharmacy Name Phone [facility] [address], [address] [phone] Your Updated Medication List  
  
   
This list is accurate as of: 11/16/17  9:57 AM.  Always use your most recent med list.  
  
  
  
  
 allopurinol 300 mg tablet Commonly known as:  Orpha Conquest Take 1 Tab by mouth daily. amLODIPine 2.5 mg tablet Commonly known as:  Sinan Mend Take 1 Tab by mouth daily. aspirin delayed-release 81 mg tablet Take  by mouth daily. atorvastatin 40 mg tablet Commonly known as:  LIPITOR Take 1 Tab by mouth daily. citalopram 20 mg tablet Commonly known as:  Delona Nestle Take 1 Tab by mouth daily. CLARITIN 10 mg tablet Generic drug:  loratadine Take 10 mg by mouth. FISH OIL 1,000 mg Cap Generic drug:  omega-3 fatty acids-vitamin e Take 1 Cap by mouth. Iron 325 mg (65 mg iron) tablet Generic drug:  ferrous sulfate Take  by mouth Daily (before breakfast). lisinopril 40 mg tablet Commonly known as:  Kaley Needy Take 40 mg by mouth daily. MUCINEX 1,200 mg Ta12 ER tablet Generic drug:  guaiFENesin Take 1,200 mg by mouth two (2) times a day. omeprazole 20 mg capsule Commonly known as:  PRILOSEC Take 20 mg by mouth daily. VENTOLIN HFA 90 mcg/actuation inhaler Generic drug:  albuterol Take  by inhalation. We Performed the Following REFERRAL TO RHEUMATOLOGY [FTE67 Custom] Referral Information Referral ID Referred By Referred To  
  
 [id number] Chuck LAKE MD   
   [address] [place] [address] Phone: [phone] Fax: [phone] Visits Status Start Date End Date 1 New Request 11/16/17 11/16/18 If your referral has a status of pending review or denied, additional information will be sent to support the outcome of this decision. Introducing [facility] HEALTH [facility]! Dear Luis Waters: Thank you for requesting a [employer] account. Our records indicate that you already have an active [employer] account. You can access your account anytime at [email or web address] Did you know that you can access your hospital and ER discharge instructions at any time in [employer]? You can also review all of your test results from your hospital stay or ER visit. Additional Information If you have questions, please visit the Frequently Asked Questions section of the [employer] website at [email or web address] Remember, [employer] is NOT to be used for urgent needs. For medical emergencies, dial 911. Now available from your iPhone and Android! Please provide this summary of care documentation to your next provider. Your primary care clinician is listed as Kasia Worley. If you have any questions after today's visit, please call [phone].

## 2017-11-20 ENCOUNTER — OFFICE VISIT (OUTPATIENT)
Dept: RHEUMATOLOGY | Age: 72
End: 2017-11-20

## 2017-11-20 VITALS
RESPIRATION RATE: 18 BRPM | HEART RATE: 60 BPM | TEMPERATURE: 98.1 F | SYSTOLIC BLOOD PRESSURE: 117 MMHG | WEIGHT: 218 LBS | BODY MASS INDEX: 33.04 KG/M2 | HEIGHT: 68 IN | DIASTOLIC BLOOD PRESSURE: 79 MMHG

## 2017-11-20 DIAGNOSIS — M17.0 PRIMARY OSTEOARTHRITIS OF BOTH KNEES: ICD-10-CM

## 2017-11-20 DIAGNOSIS — R79.89 ELEVATED FERRITIN LEVEL: ICD-10-CM

## 2017-11-20 DIAGNOSIS — M06.9 RHEUMATOID ARTHRITIS WITH UNKNOWN RHEUMATOID FACTOR STATUS (HCC): Primary | ICD-10-CM

## 2017-11-20 DIAGNOSIS — M89.9 DISORDER OF BONE: ICD-10-CM

## 2017-11-20 RX ORDER — PREDNISONE 5 MG/1
TABLET ORAL
Qty: 70 TAB | Refills: 0 | Status: SHIPPED | OUTPATIENT
Start: 2017-11-20 | End: 2017-12-20

## 2017-11-20 RX ORDER — FOLIC ACID 1 MG/1
1 TABLET ORAL DAILY
Qty: 90 TAB | Refills: 0 | Status: SHIPPED | OUTPATIENT
Start: 2017-11-20 | End: 2018-01-15 | Stop reason: SDUPTHER

## 2017-11-20 RX ORDER — METHOTREXATE 2.5 MG/1
15 TABLET ORAL
Qty: 72 TAB | Refills: 0 | Status: SHIPPED | OUTPATIENT
Start: 2017-11-22 | End: 2017-12-20 | Stop reason: SDUPTHER

## 2017-11-20 NOTE — MR AVS SNAPSHOT
Visit Information Date & Time Provider Department Dept. Phone Encounter #  
 11/20/2017  1:00 PM Viktoria Diaz 48 [id number] Follow-up Instructions Return in about 4 weeks (around 12/18/2017). Upcoming Health Maintenance Date Due Hepatitis C Screening 1945 Influenza Age 5 to Adult 8/1/2017 MEDICARE YEARLY EXAM 8/16/2018 GLAUCOMA SCREENING Q2Y 6/23/2019 COLONOSCOPY 2/27/2024 DTaP/Tdap/Td series (3 - Td) 10/4/2027 Allergies as of 11/20/2017  Review Complete On: 11/20/2017 By: Elise Maza RN Severity Noted Reaction Type Reactions Stings [Sting, Bee] Medium 12/02/2009   Topical Swelling Swelling at site Current Immunizations  Reviewed on 10/21/2011 Name Date Influenza High Dose Vaccine PF 10/14/2016 Influenza Vaccine Split 9/21/2011 Influenza Vaccine Whole 9/17/2010 Pneumococcal Conjugate (PCV-13) 1/1/2015 TDAP Vaccine 12/15/2010 ZZZ-RETIRED (DO NOT USE) Pneumococcal Vaccine (Unspecified Type) 10/21/2011 Zoster Vaccine, Live 10/21/2011 Not reviewed this visit You Were Diagnosed With   
  
 Codes Comments Rheumatoid arthritis with unknown rheumatoid factor status ([facility]    -  Primary ICD-10-CM: M06.9 ICD-9-CM: 714.0 Disorder of bone     ICD-10-CM: M89.9 ICD-9-CM: 733.90 Vitals BP Pulse Temp Resp Height(growth percentile) Weight(growth percentile) 117/79 (BP 1 Location: Left arm, BP Patient Position: Sitting) 60 98.1 °F (36.7 °C) 18 5' 8\" (1.727 m) 218 lb (98.9 kg) BMI Smoking Status 33.15 kg/m2 Former Smoker BMI and BSA Data Body Mass Index Body Surface Area  
 33.15 kg/m 2 2.18 m 2 Preferred Pharmacy Pharmacy Name Phone [facility] [address], [address] [phone] Your Updated Medication List  
  
   
 This list is accurate as of: 17  1:45 PM.  Always use your most recent med list.  
  
  
  
  
 allopurinol 300 mg tablet Commonly known as:  Abby Dakins Take 1 Tab by mouth daily. amLODIPine 2.5 mg tablet Commonly known as:  Blount Mullet Take 1 Tab by mouth daily. aspirin delayed-release 81 mg tablet Take  by mouth daily. atorvastatin 40 mg tablet Commonly known as:  LIPITOR Take 1 Tab by mouth daily. citalopram 20 mg tablet Commonly known as:  Alysha Bio Take 1 Tab by mouth daily. CLARITIN 10 mg tablet Generic drug:  loratadine Take 10 mg by mouth. FISH OIL 1,000 mg Cap Generic drug:  omega-3 fatty acids-vitamin e Take 1 Cap by mouth. folic acid 1 mg tablet Commonly known as:  Google Take 1 Tab by mouth daily for 90 days. Iron 325 mg (65 mg iron) tablet Generic drug:  ferrous sulfate Take  by mouth Daily (before breakfast). lisinopril 40 mg tablet Commonly known as:  Mollie Ripa Take 40 mg by mouth daily. methotrexate 2.5 mg tablet Commonly known as:  Ruthie Dirk Take 6 Tabs by mouth every Wednesday for 90 days. Start taking on:  2017 MUCINEX 1,200 mg Ta12 ER tablet Generic drug:  guaiFENesin Take 1,200 mg by mouth two (2) times a day. omeprazole 20 mg capsule Commonly known as:  PRILOSEC Take 20 mg by mouth daily. predniSONE 5 mg tablet Commonly known as:  DELTASONE  
4 tabs daily for 7 days, 3 tabs for 7 days, 2 tabs for 7 days, 1 tab for 7 days VENTOLIN HFA 90 mcg/actuation inhaler Generic drug:  albuterol Take  by inhalation. Prescriptions Sent to Pharmacy Refills  
 predniSONE (DELTASONE) 5 mg tablet 0 Si tabs daily for 7 days, 3 tabs for 7 days, 2 tabs for 7 days, 1 tab for 7 days Class: Normal  
 Pharmacy: [facility], [address] #: [phone] methotrexate (RHEUMATREX) 2.5 mg tablet 0 Starting on: 11/22/2017 Sig: Take 6 Tabs by mouth every Wednesday for 90 days. Class: Normal  
 Pharmacy: [facility]  [address] Ph #: [phone] Route: Oral  
 folic acid (FOLVITE) 1 mg tablet 0 Sig: Take 1 Tab by mouth daily for 90 days. Class: Normal  
 Pharmacy: [facility] [address] Ph #: [phone] Route: Oral  
  
We Performed the Following [address], IGG [id number] CPT(R)] PROTEIN ELECTROPHORESIS W/ REFLX CROW [[id number] Custom] QUANTIFERON TB GOLD [[id number] Custom] RHEUMATOID FACTOR, QL [id number] CPT(R)] URIC ACID [id number] CPT(R)] VITAMIN D, 25 HYDROXY [id number] CPT(R)] Follow-up Instructions Return in about 4 weeks (around 12/18/2017). To-Do List   
 11/20/2017 Imaging:  XR FOOT LT MIN 3 V   
  
 11/20/2017 Imaging:  XR FOOT RT MIN 3 V   
  
 11/20/2017 Imaging:  XR HAND LT MIN 3 V   
  
 11/20/2017 Imaging:  XR HAND RT MIN 3 V Patient Instructions METHOTREXATE Methotrexate is a weekly regimen that is supplemented with daily folic acid to help counteract potential side effects. Potential Adverse Affects 
 
- Nausea - Vomiting - Upset stomach 
- Oral ulcers 
- Hair thinning - Infection - Liver function abnormalities - Blood count abnormalities - Rarely pneumonitis Medication Monitoring You will need routine blood counts and kidney and liver testing as a measure of monitoring for long term use of immunosuppressants. Things You Should Do You should avoid ill contacts You should get annual influenza vaccines and the pneumonia vaccines. You should apply SPF 50 sunscreen when out in the sun. You should avoid alcohol as it may hasten hepatotoxicity. You should avoid pregnancy due to risk for birth defects. If you have any problems or side effects with this medication, please contact me immediately to inform me. Plan I prescribed methotrexate 15 mg (6 tablets) every WEDNESDAY at bedtime with DAILY folic acid 1 mg. Methotrexate may take 6 to 12 weeks to be effective. I will start you on a short course of prednisone, called a prednisone taper, with 5 mg tablets. This regimen is to be taken as follows:  
 
 - 4 tablets (20 mg), all at once, daily for 7 days - 3 tablets (15 mg), all at once, daily for 7 days - 2 tablets (10 mg), all at once, daily for 7 days - 1 tablet (5 mg), daily for 7 days - then STOP Introducing [facility] SERVICES! Dear Isidro Kingston: 
Thank you for requesting a [employer] account. Our records indicate that you already have an active [employer] account. You can access your account anytime at [email or web address] Did you know that you can access your hospital and ER discharge instructions at any time in [employer]? You can also review all of your test results from your hospital stay or ER visit. Additional Information If you have questions, please visit the Frequently Asked Questions section of the [employer] website at [email or web address] Remember, [employer] is NOT to be used for urgent needs. For medical emergencies, dial 911. Now available from your iPhone and Android! Please provide this summary of care documentation to your next provider. Your primary care clinician is listed as Vinod Rodriguez. If you have any questions after today's visit, please call [phone].

## 2017-11-20 NOTE — PATIENT INSTRUCTIONS
METHOTREXATE    Methotrexate is a weekly regimen that is supplemented with daily folic acid to help counteract potential side effects. Potential Adverse Affects    - Nausea  - Vomiting  - Upset stomach  - Oral ulcers  - Hair thinning  - Infection  - Liver function abnormalities  - Blood count abnormalities  - Rarely pneumonitis      Medication Monitoring    You will need routine blood counts and kidney and liver testing as a measure of monitoring for long term use of immunosuppressants. Things You Should Do    You should avoid ill contacts     You should get annual influenza vaccines and the pneumonia vaccines. You should apply SPF 50 sunscreen when out in the sun. You should avoid alcohol as it may hasten hepatotoxicity. You should avoid pregnancy due to risk for birth defects. If you have any problems or side effects with this medication, please contact me immediately to inform me. Plan    I prescribed methotrexate 15 mg (6 tablets) every WEDNESDAY at bedtime with DAILY folic acid 1 mg. Methotrexate may take 6 to 12 weeks to be effective. I will start you on a short course of prednisone, called a prednisone taper, with 5 mg tablets.      This regimen is to be taken as follows:      - 4 tablets (20 mg), all at once, daily for 7 days   - 3 tablets (15 mg), all at once, daily for 7 days   - 2 tablets (10 mg), all at once, daily for 7 days   - 1 tablet (5 mg), daily for 7 days   - then STOP

## 2017-11-20 NOTE — PROGRESS NOTES
REASON FOR VISIT    This is the initial evaluation for Mr. Brandon Wright a 71 y.o.  male for question of an inflammatory arthritis. The patient is referred to the Arthritis and [address] [place] [address] at the request of Dr. Enmanuel Miller. HISTORY OF PRESENT ILLNESS      I have reviewed and summarized old records from [facility]. He has a history of whooping cough and he has a new granddaughter which was a pre-mature and was recommended the TDAP. He was a in the [place]. He reports a 35 year history of pain in his hands and knee and was seen a rheumatologist who diagnosed him with arthritis. He had an injection which helped and never game back. His wife notes ulnar deviation but no joint pain. In 10/04/2017, he received the TDAP vaccine in the right shoulder    In 10/05/2017, he developed pain in his right shoulder which starting causing pain in his contra-lateral arm and then he developed pain and swelling in his hands. He was treated with doxycycline without relief and then with a steroid dose pack which helped. In 10/19/2017, CT Right Upper Extremity with contrast showed examination of the soft tissues demonstrates no drainable fluid collection. There is no pathologically enlarged nodes within the right axilla. Alignment is normal. There is no bone destruction or fracture. No significant fatty atrophy. In 11/02/2017, labs showed WBC 12.9, lymphocytes 1.9, Hct 34.6%, platelets 930,431, creatinine 0.74 mg/dL, eGFR 93, albumin 3.8 g/dL, ALK 72 U/L, ALT 28 U/L, AST 11 U/L, ESR 42 mm/hr, hsCRP 58.52 mg/L. In 11/03/2017, labs showed ferritin 726. Today, he feels fine but his joints are swollen and aching painful in his hands and wrists associated with stiffness lasting a few hours. He has taken ibuprofen, which helps. Aleve dose. He notes hot showers help. His right knee hurts in the back without swelling but has stiffness as wekk. Overall, his pain improve with activity.  He feels weakness in his fingers. His shoulder feels fine. He had a left knee replacement. He drinks 3-4 beers a day. Therapy History includes:    Current DMARD therapy includes: none  Prior DMARD therapy includes: none  The following DMARDs have been ineffective: none  The following DMARDs were stopped because of side effects: none    REVIEW OF SYSTEMS    A 15 point review of systems was performed and summarized below. The questionnaire was reviewed with the patient and scanned into the patient's medical record.     General: endorses fatigue, weakness, denies recent weight gain, recent weight loss, fever, night sweats  Musculoskeletal: endorses joint pain, joint swelling, morning stiffness (lasting  minutes), muscle weakness  Ears: denies ringing in ears, loss of hearing, deafness  Eyes: denies pain, redness, loss of vision, double vision, blurred vision, dryness, foreign body sensation  Mouth: denies sore tongue, oral ulcers, bleeding gums, loss of taste, dryness, increased dental caries  Nose: denies nosebleeds, loss of smell, nasal ulcers  Throat: denies frequent sore throats, hoarseness, difficulty in swallowing, pain in jaw while chewing  Neck: endorses tender glands, denies swollen glands  Cardiopulmonary: denies pain in chest, irregular heart beat, sudden changes in heart beat, shortness of breath, difficulty breathing at night, swollen legs or feet, cough, coughing of blood, wheezing  Gastrointestinal: endorses denies nausea, heartburn, stomach pain relieved by food, vomiting of blood/\"coffee grounds\", jaundice, increasing constipation, persistent diarrhea, blood in stools, black stools  Genitourinary: endorses getting up at night to pass urine, denies difficult urination, pain or burning on urination, blood in urine, cloudy urine, pus in urine, genital discharge, frequent urination, rash/ulcers, sexual difficulties, prostate trouble  Hematologic: denies anemia, bleeding tendency, blood clots  Skin: denies easy bruising, redness, rash, hives, sun sensitive, skin tightness, nodules/bumps, hair loss, color changes of hands or feet in the cold (Raynaud's)  Neurologic: endorses muscle weakness, denies headaches, dizziness, fainting or loss of consciousness, numbness or tingling in hands/feet, memory loss  Psychiatric: endorses depression, denies excessive worries  Sleep: endorses snoring, obstructive sleep apnea on CPAP, denies daytime somnolence, difficulty falling asleep, difficulty staying asleep     PAST MEDICAL HISTORY    He has a past medical history of Allergic rhinitis; Arthritis; Cancer [facility]); GERD (gastroesophageal reflux disease); Gout; Hypercholesterolemia; Hypertension; Melanoma in situ of back ([facility] and Skin cancer (melanoma) ([facility] FAMILY HISTORY    His family history includes Cancer in his paternal grandfather and another family member; Dementia in his mother; Diabetes in his paternal grandmother; Heart Disease in his father; Kidney Disease in his father. SOCIAL HISTORY    He reports that he has quit smoking. He has never used smokeless tobacco. He reports that he drinks about 2.4 oz of alcohol per week  He reports that he does not use illicit drugs. HEALTH MAINTENANCE    Immunizations  Immunization History   Administered Date(s) Administered    Influenza High Dose Vaccine PF 10/14/2016    Influenza Vaccine Split 09/21/2011    Influenza Vaccine Whole 09/17/2010    Pneumococcal Conjugate (PCV-13) 01/01/2015    TDAP Vaccine 12/15/2010    ZZZ-RETIRED (DO NOT USE) Pneumococcal Vaccine (Unspecified Type) 10/21/2011    Zoster Vaccine, Live 10/21/2011     MEDICATIONS    Current Outpatient Prescriptions   Medication Sig Dispense Refill    predniSONE (DELTASONE) 5 mg tablet 4 tabs daily for 7 days, 3 tabs for 7 days, 2 tabs for 7 days, 1 tab for 7 days 70 Tab 0    [START ON 11/22/2017] methotrexate (RHEUMATREX) 2.5 mg tablet Take 6 Tabs by mouth every Wednesday for 90 days.  72 Tab 0    folic acid (FOLVITE) 1 mg tablet Take 1 Tab by mouth daily for 90 days. 90 Tab 0    lisinopril (PRINIVIL, ZESTRIL) 40 mg tablet Take 40 mg by mouth daily.  aspirin delayed-release 81 mg tablet Take  by mouth daily.  omeprazole (PRILOSEC) 20 mg capsule Take 20 mg by mouth daily.  ferrous sulfate (IRON) 325 mg (65 mg iron) tablet Take  by mouth Daily (before breakfast).  loratadine (CLARITIN) 10 mg tablet Take 10 mg by mouth.  guaiFENesin (MUCINEX) 1,200 mg Ta12 ER tablet Take 1,200 mg by mouth two (2) times a day.  albuterol (VENTOLIN HFA) 90 mcg/actuation inhaler Take  by inhalation.  allopurinol (ZYLOPRIM) 300 mg tablet Take 1 Tab by mouth daily. 30 Tab 11    omega-3 fatty acids-vitamin e (FISH OIL) 1,000 mg Cap Take 1 Cap by mouth.  citalopram (CELEXA) 20 mg tablet Take 1 Tab by mouth daily. 30 Tab 11    atorvastatin (LIPITOR) 40 mg tablet Take 1 Tab by mouth daily. 30 Tab 11    amlodipine (NORVASC) 2.5 mg tablet Take 1 Tab by mouth daily. 30 Tab 11       ALLERGIES    Allergies   Allergen Reactions    Stings [Sting, Bee] Swelling     Swelling at site       PHYSICAL EXAMINATION    Visit Vitals    /79 (BP 1 Location: Left arm, BP Patient Position: Sitting)    Pulse 60    Temp 98.1 °F (36.7 °C)    Resp 18    Ht 5' 8\" (1.727 m)    Wt 218 lb (98.9 kg)    BMI 33.15 kg/m2     Body mass index is 33.15 kg/(m^2). General: Patient is alert, oriented x 3, not in acute distress, wife at bedside    HEENT:   Conjunctiva are not injected and appear moist, oral mucous membranes are moist, there are no ulcers present, there is no alopecia, neck is supple, there is no lymphadenopathy. Salivary glands are normal    Cardiovascular:  Heart is regular rate and rhythm, no murmurs. Chest:  Lungs are clear to auscultation bilaterally. Abdomen:  Obese    Extremities:  Free of clubbing, cyanosis, edema, extremities well perfused.     Neurological exam:  No focal sensory deficits, muscle strength is full in upper and lower extremities. Skin exam:  There are no rashes, no tophi, no psoriasis, no active Raynaud's, no livedo reticularis, no periungual erythema. Musculoskeletal exam:  A comprehensive musculoskeletal exam was performed for all joints of each upper and lower extremity and assessed for swelling, tenderness and range of motion. Pertinent results are documented as below:    Decreased active and passive ROM of left shoulder  Bilateral Olivia and [place] nodes. Bilateral knee crepitus without effusion. Pes planus   Bbilateral hallux valgus    Z-Deformities:   no  [place] Neck Deformities:  no  Boutonierre's Deformities:  no  Ulnar Deviation:   Yes (bilateral)  MCP Subluxation:  no    Joint Count 11/20/2017   Patient pain (0-100) 90   MHAQ 0.375   Left wrist- Tender 1   Left wrist- Swollen 1   Left 1st MCP - Tender 1   Left 1st MCP - Swollen 1   Left 2nd MCP - Tender 1   Left 2nd MCP - Swollen 1   Left 3rd MCP - Tender 1   Left 3rd MCP - Swollen 1   Right wrist- Tender 1   Right wrist- Swollen 1   Right 1st MCP - Tender 1   Right 1st MCP - Swollen 1   Right 2nd MCP - Tender 1   Right 2nd MCP - Swollen 1   Right 3rd MCP - Swollen 1   Right 4th MCP - Tender 1   Right 4th MCP - Swollen 1   Right 2nd PIP - Tender 1   Right 2nd PIP - Swollen 1   Right 3rd PIP - Tender 1   Right 3rd PIP - Swollen 1   Right knee - Tender 1   Right knee - Swollen 1   Tender Joint Count (Total) 11   Swollen Joint Count (Total) 12   Physician Assessment (0-10) 5   Patient Assessment (0-10) 9   CDAI Total (calculated) 37       DATA REVIEW    Prior medical records were reviewed and are summarized as below:    Laboratory data: summarized in the HPI    Imaging: summarized in the HPI. ASSESSMENT AND PLAN    1) Seropositive Rheumatoid Arthritis. He presents after activation of underlying Rheumatoid Arthritis which appears to have been quiescent for more than 30 years from the TDAP vaccine.  He remembers being diagnosed with arthritis in his 30's that was treated with an injection in his finger. He has bilateral ulnar deviation which corresponds with the chronicity of his disease. His CDAI was 37 with 11 tender and 12 swollen joints. I will start him on a short course of prednisone, called a prednisone taper, with 5 mg tablets. This regimen is to be taken as follows: 4 tabs (20 mg) for 7 days, 3 tabs (15 mg) for 7 days, 2 tabs (10 mg) for 7 days and then 1 tab (5 mg) for 7 days. We discussed initiation of DMARD therapy with methotrexate, which is first line therapy in Rheumatoid Arthritis. It is a weekly regimen that is supplemented with daily folic acid to help counteract potential side effects. I discussed with the patient the potential adverse effects, which include: nausea, vomiting, dyspepsia, oral ulcers, hair thinning, infection, liver function abnormalities, blood count abnormalities, and rarely pneumonitis or melanoma. The patient understood these possible adverse effects. I informed the patient of the need for routine CBC and CMP as a measure for long term use of immunosuppressants. I also instructed the patient to avoid ill contacts and the needs for annual influenza vaccines and the pneumonia vaccines. I asked the patient to apply SPF 50 sunscreen when out in the sun. The patient was also instructed to avoid alcohol as it may hasten hepatotoxicity. In childbearing patients, pregnancy is contra-indicated while on methotrexate due to risk for birth defects and early termination. I prescribed methotrexate 15 mg every Wednesday with daily folic acid 1 mg. I asked him to abstain from drinking on Wednesday and Thursday. I informed the patient that methotrexate may take 6 to 12 weeks to be effective. Patient was informed to contact me if they develop any adverse reaction or infection. 2) Hyperferritinemia. This is from #1. 3) Bilateral Knee Osteoarthritis.  The patient has osteoarthritis, which is also known as \"wear and tear arthritis,\" non-inflammatory arthritis or mechanical arthritis. There are hereditary, vocational and posttraumatic joint injuries predisposing factors. I recommend maintaining a healthy weight to slow the progression of osteoarthritis in addition to following the [employer] of Rheumatology Osteoarthritis Treatment Guidelines: (1) non-pharmacologic modalities such as aerobic, aquatic, and/or resistance exercises as well as weight loss for overweight patients; in addition to (2) pharmacologic modalities such as acetaminophen as first line, oral and topical NSAIDs as second line, tramadol as third line and intra-articular corticosteroid injections as fourth line (Winter MC, et al. Arthritis Care Res [facility] ORTHOPEDIC). 2012;64(4):465). Naproxen is a low CHAVARRIA-2 selectivity inhibitor that poses lower cardiovascular risk than other NSAIDs (Pretty DJ, Cassandra SM. Clinical Pharmacology and Cardiovascular Safety of Naproxen. Am J Cardiovasc Drugs. 2016 Nov 8). NSAIDs should not be used in patients on blood thinners, chronic kidney disease, high risk coronary artery disease, and inflammatory bowel disease (ulcerative colitis or Crohn's disease) Joint replacement surgery if all previous fail, knowing that there is a 10 year lifespan per prosthesis. I recommend weight loss because every 1 lb of extra weight is approximately 5 lbs of extra weight on the knees. The patient voiced understanding of the aforementioned assessment and plan. Summary of plan was provided in the After Visit Summary patient instructions. I also provided education about MyChart setup and utility.     TODAY'S ORDERS    Orders Placed This Encounter    QUANTIFERON TB GOLD    XR FOOT LT MIN 3 V    XR FOOT RT MIN 3 V    XR HAND LT MIN 3 V    XR HAND RT MIN 3 V    CYCLIC CITRUL PEPTIDE AB, IGG    RHEUMATOID FACTOR, QL    PROTEIN ELECTROPHORESIS W/ REFLX CROW    VITAMIN D, 25 HYDROXY    URIC ACID    predniSONE (DELTASONE) 5 mg tablet    methotrexate (RHEUMATREX) 2.5 mg tablet    folic acid (FOLVITE) 1 mg tablet       Future Appointments  Date Time Provider Raiza Laura   12/20/2017 8:40 AM MD Fredi Iqbal MD, [address] [place] [address]    Adult Rheumatology   Musculoskeletal Ultrasound Certified  [address] [place] [address], [address] [place] [address]   Phone [phone]  Fax [phone]

## 2017-11-22 LAB
25(OH)D3+25(OH)D2 SERPL-MCNC: 22.6 NG/ML (ref 30–100)
ALBUMIN SERPL ELPH-MCNC: 3.6 G/DL (ref 2.9–4.4)
ALBUMIN/GLOB SERPL: 1.2 {RATIO} (ref 0.7–1.7)
ALPHA1 GLOB SERPL ELPH-MCNC: 0.3 G/DL (ref 0–0.4)
ALPHA2 GLOB SERPL ELPH-MCNC: 0.8 G/DL (ref 0.4–1)
ANNOTATION COMMENT IMP: NORMAL
B-GLOBULIN SERPL ELPH-MCNC: 0.9 G/DL (ref 0.7–1.3)
CCP IGA+IGG SERPL IA-ACNC: >250 UNITS (ref 0–19)
GAMMA GLOB SERPL ELPH-MCNC: 1.1 G/DL (ref 0.4–1.8)
GAMMA INTERFERON BACKGROUND BLD IA-ACNC: 0.02 IU/ML
GLOBULIN SER CALC-MCNC: 3.1 G/DL (ref 2.2–3.9)
M PROTEIN SERPL ELPH-MCNC: NORMAL G/DL
M TB IFN-G BLD-IMP: NEGATIVE
M TB IFN-G CD4+ BCKGRND COR BLD-ACNC: <0 IU/ML
M TB IFN-G CD4+ T-CELLS BLD-ACNC: 0.01 IU/ML
MITOGEN IGNF BLD-ACNC: 8.99 IU/ML
PLEASE NOTE, 011150: NORMAL
PROT PATTERN SERPL ELPH-IMP: NORMAL
PROT SERPL-MCNC: 6.7 G/DL (ref 6–8.5)
QUANTIFERON INCUBATION: NORMAL
RHEUMATOID FACT SERPL-ACNC: >650 IU/ML (ref 0–13.9)
SERVICE CMNT-IMP: NORMAL
URATE SERPL-MCNC: 4.4 MG/DL (ref 3.7–8.6)

## 2017-11-22 RX ORDER — ERGOCALCIFEROL 1.25 MG/1
50000 CAPSULE ORAL
Qty: 12 CAP | Refills: 3 | Status: SHIPPED | OUTPATIENT
Start: 2017-11-22 | End: 2018-12-19 | Stop reason: SDUPTHER

## 2017-12-20 ENCOUNTER — OFFICE VISIT (OUTPATIENT)
Dept: RHEUMATOLOGY | Age: 72
End: 2017-12-20

## 2017-12-20 VITALS
HEIGHT: 68 IN | WEIGHT: 221.6 LBS | TEMPERATURE: 98.3 F | HEART RATE: 57 BPM | DIASTOLIC BLOOD PRESSURE: 69 MMHG | SYSTOLIC BLOOD PRESSURE: 137 MMHG | OXYGEN SATURATION: 92 % | RESPIRATION RATE: 16 BRPM | BODY MASS INDEX: 33.59 KG/M2

## 2017-12-20 DIAGNOSIS — M06.9 RHEUMATOID ARTHRITIS WITH UNKNOWN RHEUMATOID FACTOR STATUS (HCC): ICD-10-CM

## 2017-12-20 DIAGNOSIS — E55.9 VITAMIN D DEFICIENCY: ICD-10-CM

## 2017-12-20 DIAGNOSIS — M17.0 PRIMARY OSTEOARTHRITIS OF BOTH KNEES: ICD-10-CM

## 2017-12-20 DIAGNOSIS — M05.79 SEROPOSITIVE RHEUMATOID ARTHRITIS OF MULTIPLE SITES (HCC): Primary | ICD-10-CM

## 2017-12-20 DIAGNOSIS — Z79.60 LONG-TERM USE OF IMMUNOSUPPRESSANT MEDICATION: ICD-10-CM

## 2017-12-20 RX ORDER — METHOTREXATE 2.5 MG/1
20 TABLET ORAL
Qty: 96 TAB | Refills: 0 | Status: SHIPPED | OUTPATIENT
Start: 2017-12-20 | End: 2018-04-17 | Stop reason: SDUPTHER

## 2017-12-20 NOTE — MR AVS SNAPSHOT
Visit Information Date & Time Provider Department Dept. Phone Encounter #  
 12/20/2017  8:40 AM Jonh Maxwell MD [address] [facility] [id number] Follow-up Instructions Return in about 2 months (around 2/20/2018). Upcoming Health Maintenance Date Due Hepatitis C Screening 1945 Influenza Age 5 to Adult 8/1/2017 MEDICARE YEARLY EXAM 8/16/2018 GLAUCOMA SCREENING Q2Y 6/23/2019 COLONOSCOPY 2/27/2024 DTaP/Tdap/Td series (3 - Td) 10/4/2027 Allergies as of 12/20/2017  Review Complete On: 12/20/2017 By: Jonh Maxwell MD  
  
 Severity Noted Reaction Type Reactions Stings [Sting, Bee] Medium 12/02/2009   Topical Swelling Swelling at site Current Immunizations  Reviewed on 10/21/2011 Name Date Influenza High Dose Vaccine PF 10/14/2016 Influenza Vaccine Split 9/21/2011 Influenza Vaccine Whole 9/17/2010 Pneumococcal Conjugate (PCV-13) 1/1/2015 TDAP Vaccine 12/15/2010 ZZZ-RETIRED (DO NOT USE) Pneumococcal Vaccine (Unspecified Type) 10/21/2011 Zoster Vaccine, Live 10/21/2011 Not reviewed this visit You Were Diagnosed With   
  
 Codes Comments Seropositive rheumatoid arthritis of multiple sites [facility])    -  Primary ICD-10-CM: M05.79 ICD-9-CM: 714.0 Long-term use of immunosuppressant medication     ICD-10-CM: Z79.899 ICD-9-CM: V58.69 Rheumatoid arthritis with unknown rheumatoid factor status (HCC)     ICD-10-CM: M06.9 ICD-9-CM: 714.0 Vitals BP Pulse Temp Resp Height(growth percentile) Weight(growth percentile)  
 137/69 (BP 1 Location: Right arm, BP Patient Position: Sitting) (!) 57 98.3 °F (36.8 °C) (Oral) 16 5' 8\" (1.727 m) 221 lb 9.6 oz (100.5 kg) SpO2 BMI Smoking Status 92% 33.69 kg/m2 Former Smoker Vitals History BMI and BSA Data Body Mass Index Body Surface Area  
 33.69 kg/m 2 2.2 m 2 Preferred Pharmacy Pharmacy Name Phone Kristin [facility] [address], [address] [phone] Your Updated Medication List  
  
   
This list is accurate as of: 12/20/17  9:03 AM.  Always use your most recent med list.  
  
  
  
  
 allopurinol 300 mg tablet Commonly known as:  Fred Needle Take 1 Tab by mouth daily. amLODIPine 2.5 mg tablet Commonly known as:  Cary Harder Take 1 Tab by mouth daily. aspirin delayed-release 81 mg tablet Take  by mouth daily. atorvastatin 40 mg tablet Commonly known as:  LIPITOR Take 1 Tab by mouth daily. citalopram 20 mg tablet Commonly known as:  Peck Busing Take 1 Tab by mouth daily. CLARITIN 10 mg tablet Generic drug:  loratadine Take 10 mg by mouth.  
  
 ergocalciferol 50,000 unit capsule Commonly known as:  ERGOCALCIFEROL Take 1 Cap by mouth every seven (7) days. FISH OIL 1,000 mg Cap Generic drug:  omega-3 fatty acids-vitamin e Take 1 Cap by mouth. folic acid 1 mg tablet Commonly known as:  Google Take 1 Tab by mouth daily for 90 days. Iron 325 mg (65 mg iron) tablet Generic drug:  ferrous sulfate Take  by mouth Daily (before breakfast). lisinopril 40 mg tablet Commonly known as:  Mead Tori Take 40 mg by mouth daily. methotrexate 2.5 mg tablet Commonly known as:  Torrance Crome Take 8 Tabs by mouth every Wednesday for 90 days. MUCINEX 1,200 mg Ta12 ER tablet Generic drug:  guaiFENesin Take 1,200 mg by mouth two (2) times a day. omeprazole 20 mg capsule Commonly known as:  PRILOSEC Take 20 mg by mouth daily. VENTOLIN HFA 90 mcg/actuation inhaler Generic drug:  albuterol Take  by inhalation. Prescriptions Sent to Pharmacy Refills  
 methotrexate (RHEUMATREX) 2.5 mg tablet 0 Sig: Take 8 Tabs by mouth every Wednesday for 90 days.   
 Class: Normal  
 Pharmacy: [facility], [address] [facility] #: [phone] Route: Oral  
  
We Performed the Following C REACTIVE PROTEIN, QT [71201 CPT(R)] CBC WITH AUTOMATED DIFF [68797 CPT(R)] CHRONIC HEPATITIS PANEL [CSX6855 Custom] METABOLIC PANEL, COMPREHENSIVE [02575 CPT(R)] SED RATE (ESR) [id number] CPT(R)] Follow-up Instructions Return in about 2 months (around 2/20/2018). Patient Instructions PLEASE INCREASE YOUR METHOTREXATE WEDNESDAY TO 7 tablets (17.5 mg) AND THEN TO 8 tablets (20 mg) THE NEXT WEDNESDAY AND CONTINUE 8 TABLETS EVERY WEDNESDAY WITH DAILY FOLIC ACID 1 MG. CONTACT ME IF YOU HAVE ANY SIDE EFFECTS OR HAVE AN INFECTION 
 
FOLLOW UP IN 2 MONTHS (early March is okay) Introducing \[facility]\"" & [facility] SERVICES! Dear Steven Sneads: 
Thank you for requesting a [employer] account. Our records indicate that you already have an active [employer] account. You can access your account anytime at [email or web address] Did you know that you can access your hospital and ER discharge instructions at any time in [employer]? You can also review all of your test results from your hospital stay or ER visit. Additional Information If you have questions, please visit the Frequently Asked Questions section of the [employer] website at [email or web address] Remember, [employer] is NOT to be used for urgent needs. For medical emergencies, dial 911. Now available from your iPhone and Android! Please provide this summary of care documentation to your next provider. Your primary care clinician is listed as Herson Todd. If you have any questions after today's visit, please call [phone].

## 2017-12-20 NOTE — PATIENT INSTRUCTIONS
PLEASE INCREASE YOUR METHOTREXATE WEDNESDAY TO 7 tablets (17.5 mg) AND THEN TO 8 tablets (20 mg) THE NEXT WEDNESDAY AND CONTINUE 8 TABLETS EVERY WEDNESDAY WITH DAILY FOLIC ACID 1 MG.     CONTACT ME IF YOU HAVE ANY SIDE EFFECTS OR HAVE AN INFECTION    FOLLOW UP IN 2 MONTHS (early March is okay)

## 2017-12-20 NOTE — PROGRESS NOTES
REASON FOR VISIT    This is a follow-up visit for Mr. Brandon Wright for Seropositive Erosive Rheumatoid Arthritis. Inflammatory arthritis phenotype includes:  Anti-CCP positive: yes (>250)  Rheumatoid factor positive: yes (>650)  Erosive disease: yes  Extra-articular manifestations include: none    Immunosuppression Screening:  Quantiferon TB: negative (11/20/2017)  PPD:  Not performed  Hepatitis B: pending  Hepatitis C: pending    Therapy History includes:  Current DMARD therapy include: methotrexate 15 mg every Wednesday  Prior DMARD therapy include: none  Discontinued DMARDs because of inefficacy: None  Discontinued DMARDs because of side effects: None    Immunizations:   Immunization History   Administered Date(s) Administered    Influenza High Dose Vaccine PF 10/14/2016    Influenza Vaccine Split 09/21/2011    Influenza Vaccine Whole 09/17/2010    Pneumococcal Conjugate (PCV-13) 01/01/2015    TDAP Vaccine 12/15/2010    ZZZ-RETIRED (DO NOT USE) Pneumococcal Vaccine (Unspecified Type) 10/21/2011    Zoster Vaccine, Live 10/21/2011       Active problems include:    Patient Active Problem List   Diagnosis Code    Mixed hyperlipidemia E78.2    Other abnormal glucose R73.09    Essential hypertension, benign I10    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    Allergic rhinitis, cause unspecified J30.9    Reflux esophagitis K21.0    Benign neoplasm of colon D12.6    Contact dermatitis and other eczema, due to unspecified cause L25.9    Bunion M21.619    History of prostate cancer Z85.46    Advanced care planning/counseling discussion Z71.89    Low testosterone E34.9    Primary osteoarthritis of both knees M17.0    Seropositive rheumatoid arthritis of multiple sites ([facility] [address] M05.79    Long-term use of immunosuppressant medication Z79.899    Vitamin D deficiency E55.9       HISTORY OF PRESENT ILLNESS    Mr. Brandon Wright returns for a follow-up.     On his last visit, I prescribed methotrexate 15 mg every Wednesday and a prednisone taper. The prednisone made much better. Today, he feels good. He has pain in his wrists, hands, and toes. He has stiffness lasting hours. Hot showers help. He denies swelling. He is not taking NSAIDs. Mr. Guillermo Barba has continued his medications for arthritis and reports good tolerance without significant side effects. Last toxicity monitoring by blood work was done on 11/02/2017 and did not reveal any significant adverse effects, except WBC 12.9, Hct 34.6, platelets 271,441. Most recent inflammatory markers from 11/02/2017 revealed a ESR 42 mm/hr (previously N/A mm/hr) and CRP N/A mg/L (previously N/A mg/L). The patient has not had any interval hospital admissions, infections, or surgeries. REVIEW OF SYSTEMS    A comprehensive review of systems was performed and pertinent results are documented in the HPI, review of systems is otherwise non-contributory. PAST MEDICAL HISTORY    He has a past medical history of Allergic rhinitis; Arthritis; Cancer [facility]); GERD (gastroesophageal reflux disease); Gout; Hypercholesterolemia; Hypertension; Melanoma in situ of back ([facility] [address] and Skin cancer (melanoma) ([facility] [address] FAMILY HISTORY    His family history includes Cancer in his paternal grandfather and another family member; Dementia in his mother; Diabetes in his paternal grandmother; Heart Disease in his father; Kidney Disease in his father. SOCIAL HISTORY    He reports that he has quit smoking. He has never used smokeless tobacco. He reports that he drinks about 2.4 oz of alcohol per week  He reports that he does not use illicit drugs. MEDICATIONS    Current Outpatient Prescriptions   Medication Sig Dispense Refill    methotrexate (RHEUMATREX) 2.5 mg tablet Take 8 Tabs by mouth every Wednesday for 90 days. 96 Tab 0    ergocalciferol (ERGOCALCIFEROL) 50,000 unit capsule Take 1 Cap by mouth every seven (7) days.  12 Cap 3    folic acid (FOLVITE) 1 mg tablet Take 1 Tab by mouth daily for 90 days. 90 Tab 0    lisinopril (PRINIVIL, ZESTRIL) 40 mg tablet Take 40 mg by mouth daily.  aspirin delayed-release 81 mg tablet Take  by mouth daily.  omeprazole (PRILOSEC) 20 mg capsule Take 20 mg by mouth daily.  ferrous sulfate (IRON) 325 mg (65 mg iron) tablet Take  by mouth Daily (before breakfast).  allopurinol (ZYLOPRIM) 300 mg tablet Take 1 Tab by mouth daily. 30 Tab 11    omega-3 fatty acids-vitamin e (FISH OIL) 1,000 mg Cap Take 1 Cap by mouth.  citalopram (CELEXA) 20 mg tablet Take 1 Tab by mouth daily. 30 Tab 11    atorvastatin (LIPITOR) 40 mg tablet Take 1 Tab by mouth daily. 30 Tab 11    amlodipine (NORVASC) 2.5 mg tablet Take 1 Tab by mouth daily. 30 Tab 11    loratadine (CLARITIN) 10 mg tablet Take 10 mg by mouth.  guaiFENesin (MUCINEX) 1,200 mg Ta12 ER tablet Take 1,200 mg by mouth two (2) times a day.  albuterol (VENTOLIN HFA) 90 mcg/actuation inhaler Take  by inhalation. ALLERGIES    Allergies   Allergen Reactions    Stings [Sting, Bee] Swelling     Swelling at site       PHYSICAL EXAMINATION    Visit Vitals    /69 (BP 1 Location: Right arm, BP Patient Position: Sitting)    Pulse (!) 57    Temp 98.3 °F (36.8 °C) (Oral)    Resp 16    Ht 5' 8\" (1.727 m)    Wt 221 lb 9.6 oz (100.5 kg)    SpO2 92%    BMI 33.69 kg/m2     Body mass index is 33.69 kg/(m^2). General: Patient is alert, oriented x 3, not in acute distress    HEENT:   Sclerae are not injected and appear moist.  Oral mucous membranes are moist, there are no ulcers present. There is no alopecia. Neck is supple     Cardiovascular:  Heart is regular rate and rhythm, no murmurs. Chest:  Lungs are clear to auscultation bilaterally. No rhonchi, wheezes, or crackles. Abdomen:  Obese.     Extremities:  Free of clubbing, cyanosis, edema    Neurological exam:  No focal sensory deficits, muscle strength is full in upper and lower extremities     Skin exam:  There are no rashes, no alopecia, no discoid lesions, no active Raynaud's, no livedo reticularis, no periungual erythema. Musculoskeletal exam:  A comprehensive musculoskeletal exam was performed for all joints of each upper and lower extremity and assessed for swelling, tenderness and range of motion. Positive results are documented as below:    Decreased active and passive ROM of left shoulder  Bilateral Olivia and Heberden nodes. Bilateral knee crepitus without effusion.   Pes planus   Bbilateral hallux valgus  Right MTP tenderness     Z-Deformities:   no  Uledi Neck Deformities:  no  Boutonierre's Deformities:  no  Ulnar Deviation:   Yes (bilateral)  MCP Subluxation:  no     Joint Count 12/20/2017 11/20/2017   Patient pain (0-100) 30 90   MHAQ 0.125 0.375   Left elbow - Tender 1 -   Left wrist- Tender 1 1   Left wrist- Swollen 1 1   Left 1st MCP - Tender - 1   Left 1st MCP - Swollen 1 1   Left 2nd MCP - Tender 1 1   Left 2nd MCP - Swollen 1 1   Left 3rd MCP - Tender - 1   Left 3rd MCP - Swollen 1 1   Right wrist- Tender 1 1   Right wrist- Swollen 1 1   Right 1st MCP - Tender 1 1   Right 1st MCP - Swollen - 1   Right 2nd MCP - Tender 1 1   Right 2nd MCP - Swollen 1 1   Right 3rd MCP - Swollen 1 1   Right 4th MCP - Tender 1 1   Right 4th MCP - Swollen 1 1   Right thumb IP - Tender 1 -   Right 2nd PIP - Tender 1 1   Right 2nd PIP - Swollen - 1   Right 3rd PIP - Tender 1 1   Right 3rd PIP - Swollen 1 1   Right knee - Tender - 1   Right knee - Swollen - 1   Tender Joint Count (Total) 10 11   Swollen Joint Count (Total) 9 12   Physician Assessment (0-10) 3 5   Patient Assessment (0-10) 5 9   CDAI Total (calculated) 27 37       DATA REVIEW    Laboratory     The following laboratory results were reviewed and discussed with the patient:    Office Visit on 11/20/2017   Component Date Value    CCP Antibodies IgG/IgA 11/20/2017 >250*    QuantiFERON Incubation 11/20/2017                      Value:Incubated, specimen forwarded to [employer], [place], [place] for  completion of the assay.       Rheumatoid factor 11/20/2017 >650.0*    Protein, total 11/20/2017 6.7     Albumin 11/20/2017 3.6     Alpha-1-globulin 11/20/2017 0.3     ALPHA-2 GLOBULIN 11/20/2017 0.8     Beta globulin 11/20/2017 0.9     Gamma globulin 11/20/2017 1.1     M-spike 11/20/2017 Not Observed     Globulin, total 11/20/2017 3.1     A/G ratio 11/20/2017 1.2     Please note 11/20/2017 Comment     Interpretation (see belo* 11/20/2017 Comment     VITAMIN D, 25-HYDROXY 11/20/2017 22.6*    Uric acid 11/20/2017 4.4     QuantiFERON TB Gold 11/20/2017 Negative     QUANTIFERON CRITERIA 11/20/2017 Comment     QuantiFERON TB Ag Value 11/20/2017 0.01     QuantiFERON Nil Value 11/20/2017 0.02     QuantiFERON Mitogen Value 11/20/2017 8.99     QFT TB Ag minus Nil Value 11/20/2017 <0.00     Interpretation: 11/20/2017 Comment    Orders Only on 11/03/2017   Component Date Value    C4 Complement 11/03/2017 20     C3 Complement 11/03/2017 156     Ferritin 11/03/2017 726*    TIBC 11/03/2017 205*    UIBC 11/03/2017 137     Iron 11/03/2017 68     Iron % saturation 11/03/2017 33     Creatine Kinase,Total 11/03/2017 59     Complement, Total (CH50) 11/03/2017 >60*    Specific Gravity 11/03/2017 1.016     pH (UA) 11/03/2017 6.5     Color 11/03/2017 Yellow     Appearance 11/03/2017 Clear     Leukocyte Esterase 11/03/2017 Negative     Protein 11/03/2017 Negative     Glucose 11/03/2017 Negative     Ketone 11/03/2017 Negative     Blood 11/03/2017 Negative     Bilirubin 11/03/2017 Negative     Urobilinogen 11/03/2017 0.2     Nitrites 11/03/2017 Negative     Microscopic Examination 11/03/2017 Comment     Microscopic exam 11/03/2017 See additional order     Haptoglobin 11/03/2017 357*    LD 11/03/2017 156     WBC 11/03/2017 0-5     RBC 11/03/2017 0-2     Epithelial cells 11/03/2017 None seen     Casts 11/03/2017 None seen     Mucus 11/03/2017 Present     Bacteria 11/03/2017 None seen    Office Visit on 11/02/2017   Component Date Value    Uric acid 11/02/2017 4.4     Glucose 11/02/2017 116*    BUN 11/02/2017 19     Creatinine 11/02/2017 0.74*    GFR est non-AA 11/02/2017 93     GFR est AA 11/02/2017 107     BUN/Creatinine ratio 11/02/2017 26*    Sodium 11/02/2017 139     Potassium 11/02/2017 4.3     Chloride 11/02/2017 98     CO2 11/02/2017 21     Calcium 11/02/2017 9.1     Protein, total 11/02/2017 6.4     Albumin 11/02/2017 3.8     GLOBULIN, TOTAL 11/02/2017 2.6     A-G Ratio 11/02/2017 1.5     Bilirubin, total 11/02/2017 0.4     Alk. phosphatase 11/02/2017 72     AST (SGOT) 11/02/2017 11     ALT (SGPT) 11/02/2017 28     Sed rate (ESR) 11/02/2017 42*    C-Reactive Protein, Card* 11/02/2017 58.52*    WBC 11/02/2017 12.9*    RBC 11/02/2017 3.78*    HGB 11/02/2017 11.8*    HCT 11/02/2017 34.6*    MCV 11/02/2017 92     MCH 11/02/2017 31.2     MCHC 11/02/2017 34.1     RDW 11/02/2017 13.2     PLATELET 65/51/1603 204*    NEUTROPHILS 11/02/2017 74     Lymphocytes 11/02/2017 15     MONOCYTES 11/02/2017 8     EOSINOPHILS 11/02/2017 3     BASOPHILS 11/02/2017 0     ABS. NEUTROPHILS 11/02/2017 9.5*    Abs Lymphocytes 11/02/2017 1.9     ABS. MONOCYTES 11/02/2017 1.0*    ABS. EOSINOPHILS 11/02/2017 0.3     ABS. BASOPHILS 11/02/2017 0.0     IMMATURE GRANULOCYTES 11/02/2017 0     ABS. IMM. GRANS. 11/02/2017 0.0    Hospital Outpatient Visit on 10/19/2017   Component Date Value    Creatinine (POC) 10/19/2017 0.8     GFRAA, POC 10/19/2017 >60     GFRNA, POC 10/19/2017 >60        Imaging    Musculoskeletal Ultrasound    None    Radiographs    Bilateral Hand 11/20/2017: RIGHT: No fracture or dislocation on plain film. The bones are osteopenic. Mild narrowing of the radiocarpal joint. Probable erosion of the radius and lunate at the radiocarpal joint. No widening of the intercarpal spaces. Subtle erosion of the ulnar styloid.  Mild narrowing of the triscaphe joint is age-appropriate. Irregular arthritis of the first and second CMC joints includes osteophytes and erosions. There are erosions of the first through fourth metacarpal heads at the MCP points. Moderate narrowing and anterior subluxation of the second MCP joint. IP joints are within normal limits. No chondrocalcinosis or periosteal reaction. LEFT: No fracture or dislocation on plain film. The bones are osteopenic. Moderate narrowing of the radiocarpal joint with subtle erosions of the lunate. Marrow DR UVJ with erosions. No definite ulnar styloid erosion. Triscaphe and first [facility] joint osteoarthritis are age-appropriate. Large erosions on both sides of the second MCP joint and in the third metacarpal head. IP joints are within normal limits. No chondrocalcinosis or periosteal reaction. Bilateral Foot 11/20/2017: RIGHT: No fracture or dislocation on plain film. Bones are osteopenic. Talonavicular joint space narrowing, osteophytes, and erosions. Subtalar arthritis is partially imaged. TMT joints are within normal limits. Severe narrowing of the first MTP joint with osteophytes and erosions. There is erosion and the fifth tarsal head. Mild narrowing of the second MTP joint without definable erosion. No periosteal reaction. LEFT: No fracture or dislocation on plain film. Bones are osteopenic. Intertarsal joints are within normal limits. Moderate narrowing of the first MTP joint with central and marginal erosions. 35 degrees hallux valgus angulation and fibular subluxation of the first proximal phalanx. There are also erosions in the laterally subluxed hallux sesamoids. Mild joint space narrowing and erosions of the third MTP joint. Fragmentation of the fourth middle phalanx is adjacent to the fourth PIP joint erosions. There are also erosions of the second and fifth PIP joints. First IP joint osteoarthritis is mild.     CT Imaging    CT Right Upper Extremity with contrast 10/19/2017: examination of the soft tissues demonstrates no drainable fluid collection. There is no pathologically enlarged nodes within the right axilla. Alignment is normal. There is no bone destruction or fracture. No significant fatty atrophy. MR Imaging    None    DXA     None    ASSESSMENT AND PLAN    This is a follow-up visit for Mr. Melany Kim. 1) Seropositive Erosive Rheumatoid Arthritis. He presents after activation of underlying Rheumatoid Arthritis which appears to have been quiescent for more than 30 years from the TDAP vaccine. He remembers being diagnosed with arthritis in his 30's that was treated with an injection in his finger. He has bilateral ulnar deviation which corresponds with the chronicity of his disease. Radiographs showed erosive disease confirming chronicity. serologies were high titer. He has been on methotrexate 15 mg every Wednesday for 4 weeks with good tolerance. His CDAI was 27 (previously 37) with 10 tender and 9 swollen joints. I asked him to increase her methotrexate incrementally over the next two doses to 20 mg weekly. Labs today. 2) Long Term Use of Immunosuppressants. The patient remains on immunomodulatory medications (methotrexate) and requires frequent toxicity monitoring by blood work. Respective labs were ordered (CBC and CMP). 3) Hyperferritinemia. This is from #1. 4) Bilateral Knee Osteoarthritis. This was not an active issue today. 5) Vitamin D Deficiency. His vitamin D level was 22.6. I prescribed weekly ergocalciferol 50,000. The patient voiced understanding of the aforementioned assessment and plan. Summary of plan was provided in the After Visit Summary patient instructions.      TODAY'S ORDERS    Orders Placed This Encounter    CBC WITH AUTOMATED DIFF    CHRONIC HEPATITIS PANEL    METABOLIC PANEL, COMPREHENSIVE    C REACTIVE PROTEIN, QT    SED RATE (ESR)    methotrexate (RHEUMATREX) 2.5 mg tablet       Future Appointments  Date Time Provider Raiza Sanchez 3/7/2018 10:00 AM MD Tayo Diaz MD, [address] [facility]    Adult Rheumatology   Musculoskeletal Ultrasound Certified  [facility] [place] [address], [facility], [address]   Phone [phone]  Fax [phone]

## 2017-12-21 LAB
ALBUMIN SERPL-MCNC: 4.3 G/DL (ref 3.5–4.8)
ALBUMIN/GLOB SERPL: 1.8 {RATIO} (ref 1.2–2.2)
ALP SERPL-CCNC: 72 IU/L (ref 39–117)
ALT SERPL-CCNC: 28 IU/L (ref 0–44)
AST SERPL-CCNC: 6 IU/L (ref 0–40)
BASOPHILS # BLD AUTO: 0 X10E3/UL (ref 0–0.2)
BASOPHILS NFR BLD AUTO: 0 %
BILIRUB SERPL-MCNC: 0.5 MG/DL (ref 0–1.2)
BUN SERPL-MCNC: 16 MG/DL (ref 8–27)
BUN/CREAT SERPL: 20 (ref 10–24)
CALCIUM SERPL-MCNC: 9.4 MG/DL (ref 8.6–10.2)
CHLORIDE SERPL-SCNC: 104 MMOL/L (ref 96–106)
CO2 SERPL-SCNC: 20 MMOL/L (ref 18–29)
COMMENT, 144067: NORMAL
CREAT SERPL-MCNC: 0.8 MG/DL (ref 0.76–1.27)
CRP SERPL-MCNC: 10.7 MG/L (ref 0–4.9)
EOSINOPHIL # BLD AUTO: 0.2 X10E3/UL (ref 0–0.4)
EOSINOPHIL NFR BLD AUTO: 3 %
ERYTHROCYTE [DISTWIDTH] IN BLOOD BY AUTOMATED COUNT: 15.4 % (ref 12.3–15.4)
ERYTHROCYTE [SEDIMENTATION RATE] IN BLOOD BY WESTERGREN METHOD: 22 MM/HR (ref 0–30)
GLOBULIN SER CALC-MCNC: 2.4 G/DL (ref 1.5–4.5)
GLUCOSE SERPL-MCNC: 119 MG/DL (ref 65–99)
HBV CORE AB SERPL QL IA: NEGATIVE
HBV CORE IGM SERPL QL IA: NEGATIVE
HBV E AB SERPL QL IA: NEGATIVE
HBV E AG SERPL QL IA: NEGATIVE
HBV SURFACE AB SER QL: NON REACTIVE
HBV SURFACE AG SERPL QL IA: NEGATIVE
HCT VFR BLD AUTO: 37.7 % (ref 37.5–51)
HCV AB S/CO SERPL IA: <0.1 S/CO RATIO (ref 0–0.9)
HGB BLD-MCNC: 12.5 G/DL (ref 13–17.7)
IMM GRANULOCYTES # BLD: 0 X10E3/UL (ref 0–0.1)
IMM GRANULOCYTES NFR BLD: 0 %
LYMPHOCYTES # BLD AUTO: 1.3 X10E3/UL (ref 0.7–3.1)
LYMPHOCYTES NFR BLD AUTO: 15 %
MCH RBC QN AUTO: 31.9 PG (ref 26.6–33)
MCHC RBC AUTO-ENTMCNC: 33.2 G/DL (ref 31.5–35.7)
MCV RBC AUTO: 96 FL (ref 79–97)
MONOCYTES # BLD AUTO: 0.6 X10E3/UL (ref 0.1–0.9)
MONOCYTES NFR BLD AUTO: 7 %
NEUTROPHILS # BLD AUTO: 6.7 X10E3/UL (ref 1.4–7)
NEUTROPHILS NFR BLD AUTO: 75 %
PLATELET # BLD AUTO: 241 X10E3/UL (ref 150–379)
POTASSIUM SERPL-SCNC: 4.5 MMOL/L (ref 3.5–5.2)
PROT SERPL-MCNC: 6.7 G/DL (ref 6–8.5)
RBC # BLD AUTO: 3.92 X10E6/UL (ref 4.14–5.8)
SODIUM SERPL-SCNC: 142 MMOL/L (ref 134–144)
WBC # BLD AUTO: 8.9 X10E3/UL (ref 3.4–10.8)

## 2017-12-28 ENCOUNTER — OFFICE VISIT (OUTPATIENT)
Dept: INTERNAL MEDICINE CLINIC | Age: 72
End: 2017-12-28

## 2017-12-28 VITALS
DIASTOLIC BLOOD PRESSURE: 65 MMHG | RESPIRATION RATE: 18 BRPM | WEIGHT: 226 LBS | BODY MASS INDEX: 34.25 KG/M2 | SYSTOLIC BLOOD PRESSURE: 122 MMHG | HEART RATE: 52 BPM | TEMPERATURE: 98.7 F | OXYGEN SATURATION: 96 % | HEIGHT: 68 IN

## 2017-12-28 DIAGNOSIS — J06.9 UPPER RESPIRATORY TRACT INFECTION, UNSPECIFIED TYPE: Primary | ICD-10-CM

## 2017-12-28 PROBLEM — F33.9 RECURRENT DEPRESSION (HCC): Status: ACTIVE | Noted: 2017-12-28

## 2017-12-28 RX ORDER — BENZONATATE 200 MG/1
200 CAPSULE ORAL
Qty: 21 CAP | Refills: 0 | Status: SHIPPED | OUTPATIENT
Start: 2017-12-28 | End: 2018-01-04

## 2017-12-28 RX ORDER — AZITHROMYCIN 250 MG/1
250 TABLET, FILM COATED ORAL SEE ADMIN INSTRUCTIONS
Qty: 6 TAB | Refills: 0 | Status: SHIPPED | OUTPATIENT
Start: 2017-12-28 | End: 2018-01-02

## 2017-12-28 NOTE — PROGRESS NOTES
Ethan Saavedra is a 71 y.o. male who presents today for Cough and Nasal Congestion  . He has a history of   Patient Active Problem List   Diagnosis Code    Mixed hyperlipidemia E78.2    Other abnormal glucose R73.09    Essential hypertension, benign I10    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    Allergic rhinitis, cause unspecified J30.9    Reflux esophagitis K21.0    Benign neoplasm of colon D12.6    Contact dermatitis and other eczema, due to unspecified cause L25.9    Bunion M21.619    History of prostate cancer Z85.46    Advanced care planning/counseling discussion Z71.89    Low testosterone E34.9    Primary osteoarthritis of both knees M17.0    Seropositive rheumatoid arthritis of multiple sites ([facility] [address] M05.79    Long-term use of immunosuppressant medication Z79.899    Vitamin D deficiency E55.9    Recurrent depression (HCC) F33.9   . Today patient is here for an acute visit. he  does not have other concerns. Joints are feeling better. Upper respiratory illness:  Ethan Saavedra presents with complaints of congestion, sore throat, dry cough and hoarseness for 6 days. no nausea and no vomiting . he has not had  fever and chills. Symptoms are moderate. Over-the-counter remedies including Mucinex. Drinking plenty of fluids: yes  Asthma?:  Yes in the past.   non-smoker  Contacts with similar infections: yes, family with URI. ROS  Review of Systems   Constitutional: Negative for chills, fever and weight loss. HENT: Positive for congestion and sore throat. Negative for hearing loss, sinus pain and tinnitus. Eyes: Negative for blurred vision and photophobia. Respiratory: Positive for cough. Negative for hemoptysis, sputum production, shortness of breath, wheezing and stridor. Cardiovascular: Negative for chest pain, palpitations, orthopnea and leg swelling. Gastrointestinal: Negative for abdominal pain, nausea and vomiting.    Genitourinary: Negative for dysuria, frequency and urgency. Musculoskeletal: Negative for myalgias and neck pain. Skin: Negative for itching and rash. Neurological: Negative. Endo/Heme/Allergies: Does not bruise/bleed easily. Psychiatric/Behavioral: Negative for depression. The patient is not nervous/anxious. Visit Vitals    /65 (BP 1 Location: Left arm, BP Patient Position: Sitting)    Pulse (!) 52    Temp 98.7 °F (37.1 °C) (Oral)    Resp 18    Ht 5' 8\" (1.727 m)    Wt 226 lb (102.5 kg)    SpO2 96%    BMI 34.36 kg/m2       Physical Exam   Constitutional: He is oriented to person, place, and time. He appears well-developed and well-nourished. HENT:   Head: Normocephalic and atraumatic. Right Ear: External ear normal.   Left Ear: External ear normal.   Some fluid behind TM. Eyes: EOM are normal. Pupils are equal, round, and reactive to light. Cardiovascular: Normal rate and regular rhythm. No murmur heard. Pulmonary/Chest: Effort normal and breath sounds normal. He has no wheezes. Some coarse BS   Abdominal: Soft. Bowel sounds are normal.   Neurological: He is alert and oriented to person, place, and time. Skin: Skin is warm and dry. Psychiatric: He has a normal mood and affect. His behavior is normal.         Current Outpatient Prescriptions   Medication Sig    azithromycin (ZITHROMAX) 250 mg tablet Take 1 Tab by mouth See Admin Instructions for 5 days.  benzonatate (TESSALON) 200 mg capsule Take 1 Cap by mouth three (3) times daily as needed for Cough for up to 7 days.  methotrexate (RHEUMATREX) 2.5 mg tablet Take 8 Tabs by mouth every Wednesday for 90 days.  ergocalciferol (ERGOCALCIFEROL) 50,000 unit capsule Take 1 Cap by mouth every seven (7) days.  folic acid (FOLVITE) 1 mg tablet Take 1 Tab by mouth daily for 90 days.  lisinopril (PRINIVIL, ZESTRIL) 40 mg tablet Take 40 mg by mouth daily.  aspirin delayed-release 81 mg tablet Take  by mouth daily.     omeprazole (PRILOSEC) 20 mg capsule Take 20 mg by mouth daily.  ferrous sulfate (IRON) 325 mg (65 mg iron) tablet Take  by mouth Daily (before breakfast).  loratadine (CLARITIN) 10 mg tablet Take 10 mg by mouth.  guaiFENesin (MUCINEX) 1,200 mg Ta12 ER tablet Take 1,200 mg by mouth two (2) times a day.  albuterol (VENTOLIN HFA) 90 mcg/actuation inhaler Take  by inhalation.  allopurinol (ZYLOPRIM) 300 mg tablet Take 1 Tab by mouth daily.  omega-3 fatty acids-vitamin e (FISH OIL) 1,000 mg Cap Take 1 Cap by mouth.  citalopram (CELEXA) 20 mg tablet Take 1 Tab by mouth daily.  atorvastatin (LIPITOR) 40 mg tablet Take 1 Tab by mouth daily.  amlodipine (NORVASC) 2.5 mg tablet Take 1 Tab by mouth daily. No current facility-administered medications for this visit. Past Medical History:   Diagnosis Date    Allergic rhinitis     Arthritis     RA    Cancer (HCC)     prostate    GERD (gastroesophageal reflux disease)     Gout     Hypercholesterolemia     Hypertension     Melanoma in situ of back ([address]     Skin cancer (melanoma) ([facility] [address]       Past Surgical History:   Procedure Laterality Date    ENDOSCOPY, COLON, DIAGNOSTIC      09, due 12, done 11, due 15    HX KNEE REPLACEMENT Left 2012    HX PROSTATECTOMY        Social History   Substance Use Topics    Smoking status: Former Smoker    Smokeless tobacco: Never Used    Alcohol use 2.4 oz/week     4 Cans of beer per week      Comment: 5-6 days per week      Family History   Problem Relation Age of Onset    Dementia Mother     Heart Disease Father      CAD    Kidney Disease Father      renal failure    Diabetes Paternal Grandmother     Cancer Paternal Grandfather      colon    Cancer Other      family hx colon cancer        Allergies   Allergen Reactions    Stings [Sting, Bee] Swelling     Swelling at site        Assessment/Plan  Diagnoses and all orders for this visit:    1.  Upper respiratory tract infection, unspecified type - On MTX, will cover for bacterial infection. Continue Mucinex. If not better will switch to augmentin. -     azithromycin (ZITHROMAX) 250 mg tablet; Take 1 Tab by mouth See Admin Instructions for 5 days. -     benzonatate (TESSALON) 200 mg capsule; Take 1 Cap by mouth three (3) times daily as needed for Cough for up to 7 days.         Follow-up Disposition: Not on File    Brandt Whitehead MD  12/28/2017

## 2017-12-28 NOTE — MR AVS SNAPSHOT
Visit Information Date & Time Provider Department Dept. Phone Encounter #  
 12/28/2017  9:30 AM Becky Gracia MD [facility] [address] [id number] Your Appointments 3/7/2018 10:00 AM  
ESTABLISHED PATIENT with Chloe Griffith MD  
[id number] ([facility]) [address] [facility] [address]  
[phone]  
  
   
 [address] [facility] [id number] Upcoming Health Maintenance Date Due  
 MEDICARE YEARLY EXAM 8/16/2018 GLAUCOMA SCREENING Q2Y 6/23/2019 COLONOSCOPY 2/27/2024 DTaP/Tdap/Td series (3 - Td) 10/4/2027 Allergies as of 12/28/2017  Review Complete On: 33/26/4826 By: Ronak Pena Severity Noted Reaction Type Reactions Stings [Sting, Bee] Medium 12/02/2009   Topical Swelling Swelling at site Current Immunizations  Reviewed on 10/21/2011 Name Date Influenza High Dose Vaccine PF 10/14/2016 Influenza Vaccine Split 9/21/2011 Influenza Vaccine Whole 9/17/2010 Pneumococcal Conjugate (PCV-13) 1/1/2015 TDAP Vaccine 12/15/2010 ZZZ-RETIRED (DO NOT USE) Pneumococcal Vaccine (Unspecified Type) 10/21/2011 Zoster Vaccine, Live 10/21/2011 Not reviewed this visit You Were Diagnosed With   
  
 Codes Comments Upper respiratory tract infection, unspecified type    -  Primary ICD-10-CM: J06.9 ICD-9-CM: 465.9 Vitals BP Pulse Temp Resp Height(growth percentile) Weight(growth percentile) 122/65 (BP 1 Location: Left arm, BP Patient Position: Sitting) (!) 52 98.7 °F (37.1 °C) (Oral) 18 5' 8\" (1.727 m) 226 lb (102.5 kg) SpO2 BMI Smoking Status 96% 34.36 kg/m2 Former Smoker Vitals History BMI and BSA Data Body Mass Index Body Surface Area  
 34.36 kg/m 2 2.22 m 2 Preferred Pharmacy Pharmacy Name Phone  [facility] [address] [facility] [place] [phone] Your Updated Medication List  
  
   
This list is accurate as of: 12/28/17 10:35 AM.  Always use your most recent med list.  
  
  
  
  
 allopurinol 300 mg tablet Commonly known as:  Alexandria Linda Take 1 Tab by mouth daily. amLODIPine 2.5 mg tablet Commonly known as:  Norma Nj Take 1 Tab by mouth daily. aspirin delayed-release 81 mg tablet Take  by mouth daily. atorvastatin 40 mg tablet Commonly known as:  LIPITOR Take 1 Tab by mouth daily. azithromycin 250 mg tablet Commonly known as:  Kim Abdias Take 1 Tab by mouth See Admin Instructions for 5 days. benzonatate 200 mg capsule Commonly known as:  TESSALON Take 1 Cap by mouth three (3) times daily as needed for Cough for up to 7 days. citalopram 20 mg tablet Commonly known as:  Lovetta Rape Take 1 Tab by mouth daily. CLARITIN 10 mg tablet Generic drug:  loratadine Take 10 mg by mouth.  
  
 ergocalciferol 50,000 unit capsule Commonly known as:  ERGOCALCIFEROL Take 1 Cap by mouth every seven (7) days. FISH OIL 1,000 mg Cap Generic drug:  omega-3 fatty acids-vitamin e Take 1 Cap by mouth. folic acid 1 mg tablet Commonly known as:  Kathie Take 1 Tab by mouth daily for 90 days. Iron 325 mg (65 mg iron) tablet Generic drug:  ferrous sulfate Take  by mouth Daily (before breakfast). lisinopril 40 mg tablet Commonly known as:  Aundra Gerry Take 40 mg by mouth daily. methotrexate 2.5 mg tablet Commonly known as:  Anayap Karla Take 8 Tabs by mouth every Wednesday for 90 days. MUCINEX 1,200 mg Ta12 ER tablet Generic drug:  guaiFENesin Take 1,200 mg by mouth two (2) times a day. omeprazole 20 mg capsule Commonly known as:  PRILOSEC Take 20 mg by mouth daily. VENTOLIN HFA 90 mcg/actuation inhaler Generic drug:  albuterol Take  by inhalation. Prescriptions Sent to Pharmacy Refills  
 azithromycin (ZITHROMAX) 250 mg tablet 0 Sig: Take 1 Tab by mouth See Admin Instructions for 5 days. Class: Normal  
 Pharmacy: [place] [id number] Ph #: [phone] Route: Oral  
 benzonatate (TESSALON) 200 mg capsule 0 Sig: Take 1 Cap by mouth three (3) times daily as needed for Cough for up to 7 days. Class: Normal  
 Pharmacy: [facility] [id number] Ph #: [phone] Route: Oral  
  
Patient Instructions Bronchitis: Care Instructions Your Care Instructions Bronchitis is inflammation of the bronchial tubes, which carry air to the lungs. The tubes swell and produce mucus, or phlegm. The mucus and inflamed bronchial tubes make you cough. You may have trouble breathing. Most cases of bronchitis are caused by viruses like those that cause colds. Antibiotics usually do not help and they may be harmful. Bronchitis usually develops rapidly and lasts about 2 to 3 weeks in otherwise healthy people. Follow-up care is a key part of your treatment and safety. Be sure to make and go to all appointments, and call your doctor if you are having problems. It's also a good idea to know your test results and keep a list of the medicines you take. How can you care for yourself at home? · Take all medicines exactly as prescribed. Call your doctor if you think you are having a problem with your medicine. · Get some extra rest. 
· Take an over-the-counter pain medicine, such as acetaminophen (Tylenol), ibuprofen (Advil, Motrin), or naproxen (Aleve) to reduce fever and relieve body aches. Read and follow all instructions on the label. · Do not take two or more pain medicines at the same time unless the doctor told you to. Many pain medicines have acetaminophen, which is Tylenol. Too much acetaminophen (Tylenol) can be harmful. · Take an over-the-counter cough medicine that contains dextromethorphan to help quiet a dry, hacking cough so that you can sleep. Avoid cough medicines that have more than one active ingredient. Read and follow all instructions on the label. · Breathe moist air from a humidifier, hot shower, or sink filled with hot water. The heat and moisture will thin mucus so you can cough it out. · Do not smoke. Smoking can make bronchitis worse. If you need help quitting, talk to your doctor about stop-smoking programs and medicines. These can increase your chances of quitting for good. When should you call for help? Call 911 anytime you think you may need emergency care. For example, call if: 
? · You have severe trouble breathing. ?Call your doctor now or seek immediate medical care if: 
? · You have new or worse trouble breathing. ? · You cough up dark brown or bloody mucus (sputum). ? · You have a new or higher fever. ? · You have a new rash. ? Watch closely for changes in your health, and be sure to contact your doctor if: 
? · You cough more deeply or more often, especially if you notice more mucus or a change in the color of your mucus. ? · You are not getting better as expected. Where can you learn more? Go to http://mira-tania.info/. Enter H333 in the search box to learn more about \"Bronchitis: Care Instructions. \" Current as of: May 12, 2017 Content Version: 11.4 © 3645-8002 [employer]. Care instructions adapted under license by [employer] (which disclaims liability or warranty for this information). If you have questions about a medical condition or this instruction, always ask your healthcare professional. [address] any warranty or liability for your use of this information. Introducing [facility]! Dear Peter Valenzuela: 
Thank you for requesting a [employer] account.   Our records indicate that you already have an active [employer] account. You can access your account anytime at [email or web address]. [email or web address] Did you know that you can access your hospital and ER discharge instructions at any time in [employer]? You can also review all of your test results from your hospital stay or ER visit. Additional Information If you have questions, please visit the Frequently Asked Questions section of the [employer] website at [email or web address]. [employer]/. Remember, [employer] is NOT to be used for urgent needs. For medical emergencies, dial 911. Now available from your iPhone and Android! Please provide this summary of care documentation to your next provider. Your primary care clinician is listed as Leah Bartno. If you have any questions after today's visit, please call [phone].

## 2017-12-28 NOTE — PATIENT INSTRUCTIONS
Bronchitis: Care Instructions  Your Care Instructions    Bronchitis is inflammation of the bronchial tubes, which carry air to the lungs. The tubes swell and produce mucus, or phlegm. The mucus and inflamed bronchial tubes make you cough. You may have trouble breathing. Most cases of bronchitis are caused by viruses like those that cause colds. Antibiotics usually do not help and they may be harmful. Bronchitis usually develops rapidly and lasts about 2 to 3 weeks in otherwise healthy people. Follow-up care is a key part of your treatment and safety. Be sure to make and go to all appointments, and call your doctor if you are having problems. It's also a good idea to know your test results and keep a list of the medicines you take. How can you care for yourself at home? · Take all medicines exactly as prescribed. Call your doctor if you think you are having a problem with your medicine. · Get some extra rest.  · Take an over-the-counter pain medicine, such as acetaminophen (Tylenol), ibuprofen (Advil, Motrin), or naproxen (Aleve) to reduce fever and relieve body aches. Read and follow all instructions on the label. · Do not take two or more pain medicines at the same time unless the doctor told you to. Many pain medicines have acetaminophen, which is Tylenol. Too much acetaminophen (Tylenol) can be harmful. · Take an over-the-counter cough medicine that contains dextromethorphan to help quiet a dry, hacking cough so that you can sleep. Avoid cough medicines that have more than one active ingredient. Read and follow all instructions on the label. · Breathe moist air from a humidifier, hot shower, or sink filled with hot water. The heat and moisture will thin mucus so you can cough it out. · Do not smoke. Smoking can make bronchitis worse. If you need help quitting, talk to your doctor about stop-smoking programs and medicines. These can increase your chances of quitting for good.   When should you call for help? Call 911 anytime you think you may need emergency care. For example, call if:  ? · You have severe trouble breathing. ?Call your doctor now or seek immediate medical care if:  ? · You have new or worse trouble breathing. ? · You cough up dark brown or bloody mucus (sputum). ? · You have a new or higher fever. ? · You have a new rash. ? Watch closely for changes in your health, and be sure to contact your doctor if:  ? · You cough more deeply or more often, especially if you notice more mucus or a change in the color of your mucus. ? · You are not getting better as expected. Where can you learn more? Go to http://mira-tania.info/. Enter H333 in the search box to learn more about \"Bronchitis: Care Instructions. \"  Current as of: May 12, 2017  Content Version: 11.4  © 9019-6413 [employer]. Care instructions adapted under license by [employer] (which disclaims liability or warranty for this information). If you have questions about a medical condition or this instruction, always ask your healthcare professional. [address] any warranty or liability for your use of this information.

## 2018-01-03 DIAGNOSIS — J32.9 SINUSITIS, UNSPECIFIED CHRONICITY, UNSPECIFIED LOCATION: Primary | ICD-10-CM

## 2018-01-03 RX ORDER — AMOXICILLIN AND CLAVULANATE POTASSIUM 875; 125 MG/1; MG/1
1 TABLET, FILM COATED ORAL EVERY 12 HOURS
Qty: 14 TAB | Refills: 0 | Status: SHIPPED | OUTPATIENT
Start: 2018-01-03 | End: 2018-01-10

## 2018-01-12 ENCOUNTER — TELEPHONE (OUTPATIENT)
Dept: INTERNAL MEDICINE CLINIC | Age: 73
End: 2018-01-12

## 2018-01-12 NOTE — TELEPHONE ENCOUNTER
R/C to Pt, he c/o painful cyst behind knee x 2-3 weeks. Pt states he contacted his rheumatologist and was advised to go to urgent care. Discussed urgent care and ortho on call locations with Pt and he verbalized understanding.

## 2018-01-12 NOTE — TELEPHONE ENCOUNTER
----- Message from Aric Tamez sent at 1/12/2018 11:23 AM EST -----  Regarding: Dr. Yasemin Glover / Telephone  Pt would like to know if the doctor can drain a cyst or does he need to get referred to an Aromatologist. Best contact: [phone]

## 2018-01-15 DIAGNOSIS — M06.9 RHEUMATOID ARTHRITIS WITH UNKNOWN RHEUMATOID FACTOR STATUS (HCC): ICD-10-CM

## 2018-01-15 RX ORDER — FOLIC ACID 1 MG/1
TABLET ORAL
Qty: 90 TAB | Refills: 0 | Status: SHIPPED | OUTPATIENT
Start: 2018-01-15 | End: 2018-05-17 | Stop reason: SDUPTHER

## 2018-03-07 ENCOUNTER — OFFICE VISIT (OUTPATIENT)
Dept: RHEUMATOLOGY | Age: 73
End: 2018-03-07

## 2018-03-07 VITALS
HEART RATE: 65 BPM | RESPIRATION RATE: 18 BRPM | BODY MASS INDEX: 33.34 KG/M2 | WEIGHT: 220 LBS | TEMPERATURE: 98 F | SYSTOLIC BLOOD PRESSURE: 122 MMHG | HEIGHT: 68 IN | DIASTOLIC BLOOD PRESSURE: 74 MMHG

## 2018-03-07 DIAGNOSIS — Z79.60 LONG-TERM USE OF IMMUNOSUPPRESSANT MEDICATION: ICD-10-CM

## 2018-03-07 DIAGNOSIS — E55.9 VITAMIN D DEFICIENCY: ICD-10-CM

## 2018-03-07 DIAGNOSIS — M05.79 SEROPOSITIVE RHEUMATOID ARTHRITIS OF MULTIPLE SITES (HCC): Primary | ICD-10-CM

## 2018-03-07 DIAGNOSIS — M17.0 PRIMARY OSTEOARTHRITIS OF BOTH KNEES: ICD-10-CM

## 2018-03-07 RX ORDER — PREDNISONE 5 MG/1
TABLET ORAL
Qty: 70 TAB | Refills: 0 | Status: SHIPPED | OUTPATIENT
Start: 2018-03-07 | End: 2018-05-08

## 2018-03-07 NOTE — MR AVS SNAPSHOT
[address] [facility] 
[phone] Patient: Nader Yang MRN:  [id number] Visit Information Date & Time Provider Department Dept. Phone Encounter #  
 3/7/2018 10:00 AM Viktoria Carson [id number] Follow-up Instructions Return in about 3 months (around 2018) for Schedule with [address] [place]. Upcoming Health Maintenance Date Due  
 MEDICARE YEARLY EXAM 2018 GLAUCOMA SCREENING Q2Y 2019 COLONOSCOPY 2024 DTaP/Tdap/Td series (3 - Td) 10/4/2027 Allergies as of 3/7/2018  Review Complete On: 3/7/2018 By: Shruthi Uribe RN Severity Noted Reaction Type Reactions Stings [Sting, Bee] Medium 2009   Topical Swelling Swelling at site Current Immunizations  Reviewed on 10/21/2011 Name Date Influenza High Dose Vaccine PF 10/14/2016 Influenza Vaccine Split 2011 Influenza Vaccine Whole 2010 Pneumococcal Conjugate (PCV-13) 2015 TDAP Vaccine 12/15/2010 ZZZ-RETIRED (DO NOT USE) Pneumococcal Vaccine (Unspecified Type) 10/21/2011 Zoster Vaccine, Live 10/21/2011 Not reviewed this visit You Were Diagnosed With   
  
 Codes Comments Seropositive rheumatoid arthritis of multiple sites [facility])    -  Primary ICD-10-CM: M05.79 ICD-9-CM: 714.0 Long-term use of immunosuppressant medication     ICD-10-CM: Z79.899 ICD-9-CM: V58.69 Vitals BP Pulse Temp Resp Height(growth percentile) Weight(growth percentile) 122/74 65 98 °F (36.7 °C) 18 5' 8\" (1.727 m) 220 lb (99.8 kg) BMI Smoking Status 33.45 kg/m2 Former Smoker BMI and BSA Data Body Mass Index Body Surface Area  
 33.45 kg/m 2 2.19 m 2 Preferred Pharmacy Pharmacy Name Phone [id number] [place], [address] [phone] Your Updated Medication List  
  
   
This list is accurate as of 3/7/18 10:26 AM.  Always use your most recent med list.  
  
  
  
  
 allopurinol 300 mg tablet Commonly known as:  Ozella Furrow Take 1 Tab by mouth daily. amLODIPine 2.5 mg tablet Commonly known as:  Anaitonia Boast Take 1 Tab by mouth daily. aspirin delayed-release 81 mg tablet Take  by mouth daily. atorvastatin 40 mg tablet Commonly known as:  LIPITOR Take 1 Tab by mouth daily. citalopram 20 mg tablet Commonly known as:  Holly Trade Take 1 Tab by mouth daily. CLARITIN 10 mg tablet Generic drug:  loratadine Take 10 mg by mouth.  
  
 ergocalciferol 50,000 unit capsule Commonly known as:  ERGOCALCIFEROL Take 1 Cap by mouth every seven (7) days. etanercept 50 mg/mL (0.98 mL) Crtg Commonly known as:  ENBREL MINI 50 mg by SubCUTAneous route every seven (7) days for 30 days. Indications: Rheumatoid Arthritis FISH OIL 1,000 mg Cap Generic drug:  omega-3 fatty acids-vitamin e Take 1 Cap by mouth. folic acid 1 mg tablet Commonly known as:  FOLVITE  
TAKE ONE TABLET BY MOUTH DAILY Iron 325 mg (65 mg iron) tablet Generic drug:  ferrous sulfate Take  by mouth Daily (before breakfast). lisinopril 40 mg tablet Commonly known as:  Day Rogelio Take 40 mg by mouth daily. methotrexate 2.5 mg tablet Commonly known as:  Jaspal Lame Take 8 Tabs by mouth every Wednesday for 90 days. MUCINEX 1,200 mg Ta12 ER tablet Generic drug:  guaiFENesin Take 1,200 mg by mouth two (2) times a day. omeprazole 20 mg capsule Commonly known as:  PRILOSEC Take 20 mg by mouth daily. VENTOLIN HFA 90 mcg/actuation inhaler Generic drug:  albuterol Take  by inhalation. Prescriptions Sent to Pharmacy Refills  
 etanercept (ENBREL MINI) 50 mg/mL (0.98 mL) crtg 11 Si mg by SubCUTAneous route every seven (7) days for 30 days. Indications: Rheumatoid Arthritis Class: Normal  
 Pharmacy: [address] [facility] ([id number] - [facility], [address]  #: [phone] Route: SubCUTAneous We Performed the Following C REACTIVE PROTEIN, QT [60004 CPT(R)] CBC WITH AUTOMATED DIFF [36175 CPT(R)] METABOLIC PANEL, COMPREHENSIVE [46633 CPT(R)] SED RATE (ESR) [id number] CPT(R)] Follow-up Instructions Return in about 3 months (around 6/7/2018) for Schedule with [address]. Patient Instructions Etanercept (By injection) Etanercept (ee-TAN-er-sept) Treats rheumatoid arthritis, psoriatic arthritis, juvenile idiopathic arthritis, ankylosing spondylitis, and plaque psoriasis. Brand Name(s): Enbrel There may be other brand names for this medicine. When This Medicine Should Not Be Used: This medicine is not right for everyone. Do not use it if you had an allergic reaction to etanercept. How to Use This Medicine:  
Injectable · Your doctor will prescribe your exact dose and tell you how often it should be given. This medicine is given as a shot under your skin. · A nurse or other health provider will give you this medicine. · You may be taught how to give your medicine at home. Make sure you understand all instructions before giving yourself an injection. Do not use more medicine or use it more often than your doctor tells you to. · Allow the medicine to come to room temperature for 15 to 30 minutes before you use it. · Vial: ¨ Mix the medicine with the liquid provided in your dose kit. Gently swirl the medicine to mix it. Do not shake. ¨ Write the date you mixed the medicine on the sticker from the dose kit. Attach the sticker to the vial. 
¨ After your dose, put the unused mixture in the refrigerator right away. Do not mix vials together. Throw away any unused medicine after 14 days. · Prefilled syringe or autoinjector: ¨ Do not remove the needle cover from the syringe or autoinjector until you are ready to use it. ¨ If the amount of liquid in the prefilled syringe does not fall between the purple indicator lines, do not use that syringe. · Throw away used needles in a hard, closed container that the needles cannot poke through. Keep this container away from children and pets. · This medicine should come with a Medication Guide. Ask your pharmacist for a copy if you do not have one. · Missed dose: Call your doctor or pharmacist for instructions. · If you store this medicine at home, keep it in the refrigerator. Do not freeze. Drugs and Foods to Avoid: Ask your doctor or pharmacist before using any other medicine, including over-the-counter medicines, vitamins, and herbal products. · Some medicines can affect how etanercept work. Tell your doctor if you are using any of the following: ¨ Abatacept, anakinra, cyclophosphamide ¨ Steroid medicine (including dexamethasone, hydrocortisone, methylprednisolone, prednisone, prednisolone) · This medicine may interfere with vaccines. Ask your doctor before you get a flu shot or any other vaccines. Warnings While Using This Medicine: · Tell your doctor if you are pregnant or breastfeeding, or if you have diabetes, heart failure, liver problems including hepatitis, multiple sclerosis or any nervous system problem, Wegener granulomatosis, or a history of cancer, seizures, or Guillain-Barré syndrome. Tell your doctor if you are allergic to latex. · This medicine may cause you to get infections more easily. Tell your doctor if you have any type of infection before you start treatment. Also tell your doctor if you or a family member has a history of tuberculosis (TB). Take precautions to avoid illness. Wash your hands often. · This medicine may cause the following problems:  
¨ Increased risk of cancer, including lymphoma, leukemia, and skin cancer ¨ Nervous system problems ¨ Autoimmune problems, including lupus-like syndrome and autoimmune hepatitis ¨ Heart failure · Your doctor will check your progress and the effects of this medicine at regular visits. Keep all appointments. · Keep all medicine out of the reach of children. Never share your medicine with anyone. Possible Side Effects While Using This Medicine:  
Call your doctor right away if you notice any of these side effects: · Allergic reaction: Itching or hives, swelling in your face or hands, swelling or tingling in your mouth or throat, chest tightness, trouble breathing · Blistering, peeling, red skin rash · Change in how much or how often you urinate, painful or difficult urination · Change in vision, eye pain · Chest pain, coughing up blood, muscle pain, night sweats, weight loss · Dark urine or pale stools, nausea, vomiting, loss of appetite, stomach pain, yellow skin or eyes · Fever, chills, cough, runny or stuffy nose, sore throat, body aches · Numbness, tingling, or burning pain in your hands, arms, legs, or feet · Seizures · Skin changes or growths, red, scaly patches on the skin · Sores or white patches on your lips, mouth, or throat · Swollen glands in your neck, armpits, or groin · Trouble breathing, cold sweat, blue skin, swelling in your hands, ankles, or feet · Unusual bleeding, bruising, tiredness, or weakness If you notice these less serious side effects, talk with your doctor: · Pain, redness, swelling, itching, bleeding, or bruising where the shot was given If you notice other side effects that you think are caused by this medicine, tell your doctor. Call your doctor for medical advice about side effects. You may report side effects to FDA at 8-429-FDA-0932 © 2017 [facility] Information is for End User's use only and may not be sold, redistributed or otherwise used for commercial purposes. The above information is an  only.  It is not intended as medical advice for individual conditions or treatments. Talk to your doctor, nurse or pharmacist before following any medical regimen to see if it is safe and effective for you. Introducing [facility]! Dear Bertha Mejía: 
Thank you for requesting a Granular account. Our records indicate that you already have an active Granular account. You can access your account anytime at [email or web address] Did you know that you can access your hospital and ER discharge instructions at any time in Granular? You can also review all of your test results from your hospital stay or ER visit. Additional Information If you have questions, please visit the Frequently Asked Questions section of the Granular website at [email or web address] Remember, Granular is NOT to be used for urgent needs. For medical emergencies, dial 911. Now available from your iPhone and Android! Please provide this summary of care documentation to your next provider. Your primary care clinician is listed as Lauren Enriquez. If you have any questions after today's visit, please call [phone].

## 2018-03-07 NOTE — PATIENT INSTRUCTIONS
Etanercept (By injection)   Etanercept (ee-TAN-er-sept)  Treats rheumatoid arthritis, psoriatic arthritis, juvenile idiopathic arthritis, ankylosing spondylitis, and plaque psoriasis. Brand Name(s): Enbrel   There may be other brand names for this medicine. When This Medicine Should Not Be Used: This medicine is not right for everyone. Do not use it if you had an allergic reaction to etanercept. How to Use This Medicine:   Injectable  · Your doctor will prescribe your exact dose and tell you how often it should be given. This medicine is given as a shot under your skin. · A nurse or other health provider will give you this medicine. · You may be taught how to give your medicine at home. Make sure you understand all instructions before giving yourself an injection. Do not use more medicine or use it more often than your doctor tells you to. · Allow the medicine to come to room temperature for 15 to 30 minutes before you use it. · Vial:   ¨ Mix the medicine with the liquid provided in your dose kit. Gently swirl the medicine to mix it. Do not shake. ¨ Write the date you mixed the medicine on the sticker from the dose kit. Attach the sticker to the vial.  ¨ After your dose, put the unused mixture in the refrigerator right away. Do not mix vials together. Throw away any unused medicine after 14 days. · Prefilled syringe or autoinjector:   ¨ Do not remove the needle cover from the syringe or autoinjector until you are ready to use it. ¨ If the amount of liquid in the prefilled syringe does not fall between the purple indicator lines, do not use that syringe. · Throw away used needles in a hard, closed container that the needles cannot poke through. Keep this container away from children and pets. · This medicine should come with a Medication Guide. Ask your pharmacist for a copy if you do not have one. · Missed dose: Call your doctor or pharmacist for instructions.   · If you store this medicine at home, keep it in the refrigerator. Do not freeze. Drugs and Foods to Avoid:   Ask your doctor or pharmacist before using any other medicine, including over-the-counter medicines, vitamins, and herbal products. · Some medicines can affect how etanercept work. Tell your doctor if you are using any of the following:   ¨ Abatacept, anakinra, cyclophosphamide  ¨ Steroid medicine (including dexamethasone, hydrocortisone, methylprednisolone, prednisone, prednisolone)  · This medicine may interfere with vaccines. Ask your doctor before you get a flu shot or any other vaccines. Warnings While Using This Medicine:   · Tell your doctor if you are pregnant or breastfeeding, or if you have diabetes, heart failure, liver problems including hepatitis, multiple sclerosis or any nervous system problem, Wegener granulomatosis, or a history of cancer, seizures, or Guillain-Barré syndrome. Tell your doctor if you are allergic to latex. · This medicine may cause you to get infections more easily. Tell your doctor if you have any type of infection before you start treatment. Also tell your doctor if you or a family member has a history of tuberculosis (TB). Take precautions to avoid illness. Wash your hands often. · This medicine may cause the following problems:   ¨ Increased risk of cancer, including lymphoma, leukemia, and skin cancer  ¨ Nervous system problems  ¨ Autoimmune problems, including lupus-like syndrome and autoimmune hepatitis  ¨ Heart failure  · Your doctor will check your progress and the effects of this medicine at regular visits. Keep all appointments. · Keep all medicine out of the reach of children. Never share your medicine with anyone.   Possible Side Effects While Using This Medicine:   Call your doctor right away if you notice any of these side effects:  · Allergic reaction: Itching or hives, swelling in your face or hands, swelling or tingling in your mouth or throat, chest tightness, trouble breathing  · Blistering, peeling, red skin rash  · Change in how much or how often you urinate, painful or difficult urination  · Change in vision, eye pain  · Chest pain, coughing up blood, muscle pain, night sweats, weight loss  · Dark urine or pale stools, nausea, vomiting, loss of appetite, stomach pain, yellow skin or eyes  · Fever, chills, cough, runny or stuffy nose, sore throat, body aches  · Numbness, tingling, or burning pain in your hands, arms, legs, or feet  · Seizures  · Skin changes or growths, red, scaly patches on the skin  · Sores or white patches on your lips, mouth, or throat  · Swollen glands in your neck, armpits, or groin  · Trouble breathing, cold sweat, blue skin, swelling in your hands, ankles, or feet  · Unusual bleeding, bruising, tiredness, or weakness  If you notice these less serious side effects, talk with your doctor:   · Pain, redness, swelling, itching, bleeding, or bruising where the shot was given  If you notice other side effects that you think are caused by this medicine, tell your doctor. Call your doctor for medical advice about side effects. You may report side effects to FDA at 9-396-FDA-1554  © 2017 [address] Information is for End User's use only and may not be sold, redistributed or otherwise used for commercial purposes. The above information is an  only. It is not intended as medical advice for individual conditions or treatments. Talk to your doctor, nurse or pharmacist before following any medical regimen to see if it is safe and effective for you.

## 2018-03-07 NOTE — PROGRESS NOTES
REASON FOR VISIT    This is a follow-up visit for Mr. Mendoza Miller for Seropositive Erosive Rheumatoid Arthritis. Inflammatory arthritis phenotype includes:  Anti-CCP positive: yes (>250)  Rheumatoid factor positive: yes (>650)  Erosive disease: yes  Extra-articular manifestations include: none    Immunosuppression Screening:  Quantiferon TB: negative (11/20/2017)   PPD:  Not performed  Hepatitis B: negative (12/20/2017)  Hepatitis C: negative (12/20/2017)    Therapy History includes:  Current DMARD therapy include: methotrexate 20 mg every Wednesday  Prior DMARD therapy include: none  Discontinued DMARDs because of inefficacy: None  Discontinued DMARDs because of side effects: None    Immunizations:   Immunization History   Administered Date(s) Administered    Influenza High Dose Vaccine PF 10/14/2016    Influenza Vaccine Split 09/21/2011    Influenza Vaccine Whole 09/17/2010    Pneumococcal Conjugate (PCV-13) 01/01/2015    TDAP Vaccine 12/15/2010    ZZZ-RETIRED (DO NOT USE) Pneumococcal Vaccine (Unspecified Type) 10/21/2011    Zoster Vaccine, Live 10/21/2011       Active problems include:    Patient Active Problem List   Diagnosis Code    Mixed hyperlipidemia E78.2    Other abnormal glucose R73.09    Essential hypertension, benign I10    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    Allergic rhinitis, cause unspecified J30.9    Reflux esophagitis K21.0    Benign neoplasm of colon D12.6    Contact dermatitis and other eczema, due to unspecified cause L25.9    Bunion M21.619    History of prostate cancer Z85.46    Advanced care planning/counseling discussion Z71.89    Low testosterone E34.9    Primary osteoarthritis of both knees M17.0    Seropositive rheumatoid arthritis of multiple sites ([facility] [address] M05.79    Long-term use of immunosuppressant medication Z79.899    Vitamin D deficiency E55.9    Recurrent depression ([facility] [address] F33.9       HISTORY OF PRESENT ILLNESS    Mr. Mendoza Miller returns for a follow-up.     On his last visit, I increased his methotrexate to 20 mg every Wednesday. Today, he complains of pain and swelling in his hands and wrists. He has stiffness lasting hours. He has trouble opening bottles of water. He also complains of fatigue. He was recently in [place] for a month and did not have improvement with the warmth. Mr. Jerry Hendrix has continued his medications for arthritis and reports good tolerance without significant side effects. Last toxicity monitoring by blood work was done on 12/20/2017 and did not reveal any significant adverse effects. Most recent inflammatory markers from 12/20/2017 revealed a ESR 22 mm/hr (previously 42 mm/hr) and CRP 10.7mg/L (previously N/A mg/L). The patient has not had any interval hospital admissions, infections, or surgeries. REVIEW OF SYSTEMS    A comprehensive review of systems was performed and pertinent results are documented in the HPI, review of systems is otherwise non-contributory. PAST MEDICAL HISTORY    He has a past medical history of Allergic rhinitis; Arthritis; Cancer [facility]); GERD (gastroesophageal reflux disease); Gout; Hypercholesterolemia; Hypertension; Melanoma in situ of back ([facility] [address] and Skin cancer (melanoma) ([facility] [address] FAMILY HISTORY    His family history includes Cancer in his paternal grandfather and another family member; Dementia in his mother; Diabetes in his paternal grandmother; Heart Disease in his father; Kidney Disease in his father. SOCIAL HISTORY    He reports that he has quit smoking. He has never used smokeless tobacco. He reports that he drinks about 2.4 oz of alcohol per week  He reports that he does not use illicit drugs. MEDICATIONS    Current Outpatient Prescriptions   Medication Sig Dispense Refill    etanercept (ENBREL MINI) 50 mg/mL (0.98 mL) crtg 50 mg by SubCUTAneous route every seven (7) days for 30 days.  Indications: Rheumatoid Arthritis 4 Cartridge 11    predniSONE (DELTASONE) 5 mg tablet 4 tabs daily for 7 days, 3 tabs for 7 days, 2 tabs for 7 days, 1 tab for 7 days 70 Tab 0    folic acid (FOLVITE) 1 mg tablet TAKE ONE TABLET BY MOUTH DAILY 90 Tab 0    methotrexate (RHEUMATREX) 2.5 mg tablet Take 8 Tabs by mouth every Wednesday for 90 days. 96 Tab 0    ergocalciferol (ERGOCALCIFEROL) 50,000 unit capsule Take 1 Cap by mouth every seven (7) days. 12 Cap 3    lisinopril (PRINIVIL, ZESTRIL) 40 mg tablet Take 40 mg by mouth daily.  aspirin delayed-release 81 mg tablet Take  by mouth daily.  omeprazole (PRILOSEC) 20 mg capsule Take 20 mg by mouth daily.  ferrous sulfate (IRON) 325 mg (65 mg iron) tablet Take  by mouth Daily (before breakfast).  loratadine (CLARITIN) 10 mg tablet Take 10 mg by mouth.  guaiFENesin (MUCINEX) 1,200 mg Ta12 ER tablet Take 1,200 mg by mouth two (2) times a day.  albuterol (VENTOLIN HFA) 90 mcg/actuation inhaler Take  by inhalation.  allopurinol (ZYLOPRIM) 300 mg tablet Take 1 Tab by mouth daily. 30 Tab 11    omega-3 fatty acids-vitamin e (FISH OIL) 1,000 mg Cap Take 1 Cap by mouth.  citalopram (CELEXA) 20 mg tablet Take 1 Tab by mouth daily. 30 Tab 11    atorvastatin (LIPITOR) 40 mg tablet Take 1 Tab by mouth daily. 30 Tab 11    amlodipine (NORVASC) 2.5 mg tablet Take 1 Tab by mouth daily. 30 Tab 11        ALLERGIES    Allergies   Allergen Reactions    Stings [Sting, Bee] Swelling     Swelling at site       PHYSICAL EXAMINATION    Visit Vitals    /74    Pulse 65    Temp 98 °F (36.7 °C)    Resp 18    Ht 5' 8\" (1.727 m)    Wt 220 lb (99.8 kg)    BMI 33.45 kg/m2     Body mass index is 33.45 kg/(m^2). General: Patient is alert, oriented x 3, not in acute distress, wife and granddaughter at bedside    HEENT:   Sclerae are not injected and appear moist.  Oral mucous membranes are moist, there are no ulcers present. There is no alopecia. Neck is supple     Cardiovascular:  Heart is regular rate and rhythm, no murmurs.     Chest:  Lungs are clear to auscultation bilaterally. No rhonchi, wheezes, or crackles. Abdomen:  Obese. Extremities:  Free of clubbing, cyanosis, edema    Neurological exam:  No focal sensory deficits, muscle strength is full in upper and lower extremities     Skin exam:  There are no rashes, no alopecia, no discoid lesions, no active Raynaud's, no livedo reticularis, no periungual erythema. Musculoskeletal exam:  A comprehensive musculoskeletal exam was performed for all joints of each upper and lower extremity and assessed for swelling, tenderness and range of motion. Positive results are documented as below:    Decreased active and passive ROM of left shoulder  Bilateral Olivia and Heberden nodes. Bilateral knee crepitus without effusion.   Pes planus   Bbilateral hallux valgus  Right MTP tenderness     Z-Deformities:   no  Yoder Neck Deformities:  no  Boutonierre's Deformities:  no  Ulnar Deviation:   Yes (bilateral)  MCP Subluxation:  no     Joint Count 3/7/2018 12/20/2017 11/20/2017   Patient pain (0-100) 60 30 90   MHAQ 0.25 0.125 0.375   Left elbow - Tender - 1 -   Left wrist- Tender 1 1 1   Left wrist- Swollen 1 1 1   Left 1st MCP - Tender 1 - 1   Left 1st MCP - Swollen 1 1 1   Left 2nd MCP - Tender 1 1 1   Left 2nd MCP - Swollen 1 1 1   Left 3rd MCP - Tender 1 - 1   Left 3rd MCP - Swollen 1 1 1   Left 4th MCP - Tender 1 - -   Left 5th MCP - Tender 1 - -   Left thumb IP - Tender 1 - -   Left thumb IP - Swollen 1 - -   Left 2nd PIP - Tender 1 - -   Left 3rd PIP - Tender 1 - -   Left 4th PIP - Tender 1 - -   Left 5th PIP - Tender 1 - -   Right wrist- Tender 1 1 1   Right wrist- Swollen 1 1 1   Right 1st MCP - Tender 1 1 1   Right 1st MCP - Swollen 1 - 1   Right 2nd MCP - Tender 1 1 1   Right 2nd MCP - Swollen 1 1 1   Right 3rd MCP - Tender 1 - -   Right 3rd MCP - Swollen 1 1 1   Right 4th MCP - Tender 1 1 1   Right 4th MCP - Swollen 1 1 1   Right 5th MCP - Swollen 1 - -   Right thumb IP - Tender - 1 -   Right 2nd PIP - Tender - 1 1   Right 2nd PIP - Swollen 1 - 1   Right 3rd PIP - Tender - 1 1   Right 3rd PIP - Swollen 1 1 1   Right 4th PIP - Swollen 1 - -   Right knee - Tender - - 1   Right knee - Swollen - - 1   Tender Joint Count (Total) 16 10 11   Swollen Joint Count (Total) 14 9 12   Physician Assessment (0-10) 4 3 5   Patient Assessment (0-10) 6 5 9   CDAI Total (calculated) 40 27 40       DATA REVIEW    Laboratory     Recent laboratory results were reviewed, summarized, and discussed with the patient. Imaging    Musculoskeletal Ultrasound    None    Radiographs    Bilateral Hand 11/20/2017: RIGHT: No fracture or dislocation on plain film. The bones are osteopenic. Mild narrowing of the radiocarpal joint. Probable erosion of the radius and lunate at the radiocarpal joint. No widening of the intercarpal spaces. Subtle erosion of the ulnar styloid. Mild narrowing of the triscaphe joint is age-appropriate. Irregular arthritis of the first and second CMC joints includes osteophytes and erosions. There are erosions of the first through fourth metacarpal heads at the MCP points. Moderate narrowing and anterior subluxation of the second MCP joint. IP joints are within normal limits. No chondrocalcinosis or periosteal reaction. LEFT: No fracture or dislocation on plain film. The bones are osteopenic. Moderate narrowing of the radiocarpal joint with subtle erosions of the lunate. Marrow DR UVJ with erosions. No definite ulnar styloid erosion. Triscaphe and first [facility] joint osteoarthritis are age-appropriate. Large erosions on both sides of the second MCP joint and in the third metacarpal head. IP joints are within normal limits. No chondrocalcinosis or periosteal reaction. Bilateral Foot 11/20/2017: RIGHT: No fracture or dislocation on plain film. Bones are osteopenic. Talonavicular joint space narrowing, osteophytes, and erosions. Subtalar arthritis is partially imaged. TMT joints are within normal limits.  Severe narrowing of the first MTP joint with osteophytes and erosions. There is erosion and the fifth tarsal head. Mild narrowing of the second MTP joint without definable erosion. No periosteal reaction. LEFT: No fracture or dislocation on plain film. Bones are osteopenic. Intertarsal joints are within normal limits. Moderate narrowing of the first MTP joint with central and marginal erosions. 35 degrees hallux valgus angulation and fibular subluxation of the first proximal phalanx. There are also erosions in the laterally subluxed hallux sesamoids. Mild joint space narrowing and erosions of the third MTP joint. Fragmentation of the fourth middle phalanx is adjacent to the fourth PIP joint erosions. There are also erosions of the second and fifth PIP joints. First IP joint osteoarthritis is mild. CT Imaging    CT Right Upper Extremity with contrast 10/19/2017: examination of the soft tissues demonstrates no drainable fluid collection. There is no pathologically enlarged nodes within the right axilla. Alignment is normal. There is no bone destruction or fracture. No significant fatty atrophy. MR Imaging    None    DXA     None    ASSESSMENT AND PLAN    This is a follow-up visit for Mr. Vale Alejandro. 1) Seropositive Erosive Rheumatoid Arthritis. He presented after activation of underlying Rheumatoid Arthritis which appears to have been quiescent for more than 30 years from the TDAP vaccine. He remembers being diagnosed with arthritis in his 30's that was treated with an injection in his finger. He has bilateral ulnar deviation which corresponds with the chronicity of his disease. Radiographs showed erosive disease confirming chronicity. serologies were high titer. He has been on methotrexate 20 mg every Wednesday for about 3 months with good tolerance, but continues to have active disease. His CDAI was 40 (previously 27, 37) with 16 tender and 14 swollen joints, consistent with high disease activity.  I discussed with him about advancing therapy to the biologic (small particle, anti-TNF). We discussed the potential adverse effects, which include infections, side effects, and routes of administration (oral versus infusion versus subcutaneous). The patient was informed these medications co-pay are subject to the patient's insurance coverage and we will not know until it has been submitted to the insurance company. He preferred Enbrel. An order will be submitted today to [facility]. I will start him on a short course of prednisone, called a prednisone taper, with 5 mg tablets. This regimen is to be taken as follows: 4 tabs (20 mg) for 7 days, 3 tabs (15 mg) for 7 days, 2 tabs (10 mg) for 7 days and then 1 tab (5 mg) for 7 days      2) Long Term Use of Immunosuppressants. The patient remains on immunomodulatory medications (methotrexate) and requires frequent toxicity monitoring by blood work. Respective labs were ordered (CBC and CMP). 3) Hyperferritinemia. This is from #1. 4) Bilateral Knee Osteoarthritis. This was not an active issue today. 5) Vitamin D Deficiency. His vitamin D level was 22.6. He is on weekly ergocalciferol 50,000. The patient voiced understanding of the aforementioned assessment and plan. Summary of plan was provided in the After Visit Summary patient instructions.      TODAY'S ORDERS    Orders Placed This Encounter    CBC WITH AUTOMATED DIFF    METABOLIC PANEL, COMPREHENSIVE    C REACTIVE PROTEIN, QT    SED RATE (ESR)    etanercept (ENBREL MINI) 50 mg/mL (0.98 mL) crtg    predniSONE (DELTASONE) 5 mg tablet     Future Appointments  Date Time Provider Raiza Sanchez   6/4/2018 10:30 AM MAXIM Ortiz MD, [address] [place] [address]    Adult Rheumatology   Musculoskeletal Ultrasound Certified  [address] [place] [address], [address] [place] [address]   Phone [phone]  Fax [phone]

## 2018-03-08 LAB
ALBUMIN SERPL-MCNC: 4.1 G/DL (ref 3.5–4.8)
ALBUMIN/GLOB SERPL: 1.5 {RATIO} (ref 1.2–2.2)
ALP SERPL-CCNC: 78 IU/L (ref 39–117)
ALT SERPL-CCNC: 24 IU/L (ref 0–44)
AST SERPL-CCNC: 8 IU/L (ref 0–40)
BASOPHILS # BLD AUTO: 0 X10E3/UL (ref 0–0.2)
BASOPHILS NFR BLD AUTO: 0 %
BILIRUB SERPL-MCNC: 0.7 MG/DL (ref 0–1.2)
BUN SERPL-MCNC: 15 MG/DL (ref 8–27)
BUN/CREAT SERPL: 18 (ref 10–24)
CALCIUM SERPL-MCNC: 9.4 MG/DL (ref 8.6–10.2)
CHLORIDE SERPL-SCNC: 100 MMOL/L (ref 96–106)
CO2 SERPL-SCNC: 20 MMOL/L (ref 18–29)
CREAT SERPL-MCNC: 0.84 MG/DL (ref 0.76–1.27)
CRP SERPL-MCNC: 24.6 MG/L (ref 0–4.9)
EOSINOPHIL # BLD AUTO: 0.3 X10E3/UL (ref 0–0.4)
EOSINOPHIL NFR BLD AUTO: 3 %
ERYTHROCYTE [DISTWIDTH] IN BLOOD BY AUTOMATED COUNT: 14.5 % (ref 12.3–15.4)
ERYTHROCYTE [SEDIMENTATION RATE] IN BLOOD BY WESTERGREN METHOD: 61 MM/HR (ref 0–30)
GFR SERPLBLD CREATININE-BSD FMLA CKD-EPI: 102 ML/MIN/1.73
GFR SERPLBLD CREATININE-BSD FMLA CKD-EPI: 88 ML/MIN/1.73
GLOBULIN SER CALC-MCNC: 2.8 G/DL (ref 1.5–4.5)
GLUCOSE SERPL-MCNC: 102 MG/DL (ref 65–99)
HCT VFR BLD AUTO: 37.2 % (ref 37.5–51)
HGB BLD-MCNC: 13.1 G/DL (ref 13–17.7)
IMM GRANULOCYTES # BLD: 0.1 X10E3/UL (ref 0–0.1)
IMM GRANULOCYTES NFR BLD: 1 %
LYMPHOCYTES # BLD AUTO: 1.8 X10E3/UL (ref 0.7–3.1)
LYMPHOCYTES NFR BLD AUTO: 16 %
MCH RBC QN AUTO: 33.9 PG (ref 26.6–33)
MCHC RBC AUTO-ENTMCNC: 35.2 G/DL (ref 31.5–35.7)
MCV RBC AUTO: 96 FL (ref 79–97)
MONOCYTES # BLD AUTO: 1.1 X10E3/UL (ref 0.1–0.9)
MONOCYTES NFR BLD AUTO: 10 %
NEUTROPHILS # BLD AUTO: 8 X10E3/UL (ref 1.4–7)
NEUTROPHILS NFR BLD AUTO: 70 %
PLATELET # BLD AUTO: 336 X10E3/UL (ref 150–379)
POTASSIUM SERPL-SCNC: 4.4 MMOL/L (ref 3.5–5.2)
PROT SERPL-MCNC: 6.9 G/DL (ref 6–8.5)
RBC # BLD AUTO: 3.87 X10E6/UL (ref 4.14–5.8)
SODIUM SERPL-SCNC: 139 MMOL/L (ref 134–144)
WBC # BLD AUTO: 11.3 X10E3/UL (ref 3.4–10.8)

## 2018-03-09 ENCOUNTER — TELEPHONE (OUTPATIENT)
Dept: RHEUMATOLOGY | Age: 73
End: 2018-03-09

## 2018-03-09 DIAGNOSIS — M05.79 SEROPOSITIVE RHEUMATOID ARTHRITIS OF MULTIPLE SITES (HCC): ICD-10-CM

## 2018-03-12 ENCOUNTER — TELEPHONE (OUTPATIENT)
Dept: RHEUMATOLOGY | Age: 73
End: 2018-03-12

## 2018-03-12 NOTE — TELEPHONE ENCOUNTER
Spoke with Italia @ [employer] returning a phane call. She will fax prior auth form for [employer], and cancel Cover [employer] Meds request by [employer].

## 2018-03-12 NOTE — TELEPHONE ENCOUNTER
----- Message from Fouzia Diaz sent at 3/12/2018  8:15 AM EDT -----  Regarding: FW: /Telephone      ----- Message -----     From: Kathy Ansari     Sent: 3/9/2018   4:01 PM       To: [facility]  Subject: /Telephone                               Brooke Days from cover my meds is requesting a call back in reference to a PA that was started. Best contact number is [phone].  Reference spence-[id number]

## 2018-03-12 NOTE — TELEPHONE ENCOUNTER
Spoke with pt and his wife regarding the Enbrel medication and told them that this medication is not filled at a retail pharmacy rather it is sent to a Specialty pharmacy. I explained to them that we are working on the authorization for the medication now, and that they will hear something back from our office regarding the status of the approval/denial.  They stated an understanding and will wait to hear from us.

## 2018-03-12 NOTE — TELEPHONE ENCOUNTER
Patient is needed clarification on his Enbrel to where this is sent for refill. This needs to go [facility] at the [facility]. His phone is [phone].

## 2018-03-16 ENCOUNTER — TELEPHONE (OUTPATIENT)
Dept: RHEUMATOLOGY | Age: 73
End: 2018-03-16

## 2018-03-19 NOTE — TELEPHONE ENCOUNTER
Faxed prior auth request to [employer] for insurance approval for [employer].

## 2018-03-21 DIAGNOSIS — M05.79 SEROPOSITIVE RHEUMATOID ARTHRITIS OF MULTIPLE SITES (HCC): ICD-10-CM

## 2018-04-17 DIAGNOSIS — M06.9 RHEUMATOID ARTHRITIS WITH UNKNOWN RHEUMATOID FACTOR STATUS (HCC): ICD-10-CM

## 2018-04-17 RX ORDER — METHOTREXATE 2.5 MG/1
TABLET ORAL
Qty: 96 TAB | Refills: 0 | Status: SHIPPED | OUTPATIENT
Start: 2018-04-17 | End: 2018-07-11 | Stop reason: SDUPTHER

## 2018-05-02 RX ORDER — ACETAMINOPHEN 325 MG/1
650 TABLET ORAL ONCE
Status: COMPLETED | OUTPATIENT
Start: 2018-05-08 | End: 2018-05-08

## 2018-05-02 RX ORDER — DIPHENHYDRAMINE HYDROCHLORIDE 50 MG/ML
50 INJECTION, SOLUTION INTRAMUSCULAR; INTRAVENOUS ONCE
Status: COMPLETED | OUTPATIENT
Start: 2018-05-08 | End: 2018-05-08

## 2018-05-02 RX ORDER — ACETAMINOPHEN 325 MG/1
650 TABLET ORAL
Status: ACTIVE | OUTPATIENT
Start: 2018-05-08 | End: 2018-05-08

## 2018-05-02 RX ORDER — DIPHENHYDRAMINE HYDROCHLORIDE 50 MG/ML
50 INJECTION, SOLUTION INTRAMUSCULAR; INTRAVENOUS
Status: ACTIVE | OUTPATIENT
Start: 2018-05-08 | End: 2018-05-08

## 2018-05-08 ENCOUNTER — HOSPITAL ENCOUNTER (OUTPATIENT)
Dept: INFUSION THERAPY | Age: 73
Discharge: HOME | End: 2018-05-08
Payer: MEDICARE

## 2018-05-08 VITALS
WEIGHT: 223 LBS | BODY MASS INDEX: 33.91 KG/M2 | SYSTOLIC BLOOD PRESSURE: 132 MMHG | RESPIRATION RATE: 16 BRPM | TEMPERATURE: 98 F | DIASTOLIC BLOOD PRESSURE: 76 MMHG | HEART RATE: 60 BPM

## 2018-05-08 PROCEDURE — 96375 TX/PRO/DX INJ NEW DRUG ADDON: CPT

## 2018-05-08 PROCEDURE — 74011250636 HC RX REV CODE- 250/636: Performed by: INTERNAL MEDICINE

## 2018-05-08 PROCEDURE — 74011250637 HC RX REV CODE- 250/637: Performed by: INTERNAL MEDICINE

## 2018-05-08 PROCEDURE — 96413 CHEMO IV INFUSION 1 HR: CPT

## 2018-05-08 PROCEDURE — 74011000258 HC RX REV CODE- 258: Performed by: INTERNAL MEDICINE

## 2018-05-08 RX ORDER — SODIUM CHLORIDE 0.9 % (FLUSH) 0.9 %
10 SYRINGE (ML) INJECTION AS NEEDED
Status: ACTIVE | OUTPATIENT
Start: 2018-05-08 | End: 2018-05-09

## 2018-05-08 RX ADMIN — METHYLPREDNISOLONE SODIUM SUCCINATE 125 MG: 125 INJECTION, POWDER, FOR SOLUTION INTRAMUSCULAR; INTRAVENOUS at 11:44

## 2018-05-08 RX ADMIN — DIPHENHYDRAMINE HYDROCHLORIDE 50 MG: 50 INJECTION INTRAMUSCULAR; INTRAVENOUS at 11:43

## 2018-05-08 RX ADMIN — GOLIMUMAB 200 MG: 50 SOLUTION INTRAVENOUS at 12:35

## 2018-05-08 RX ADMIN — ACETAMINOPHEN 650 MG: 325 TABLET ORAL at 11:44

## 2018-05-08 NOTE — DISCHARGE INSTRUCTIONS
Golimumab (By injection)   Golimumab (mih-IGL-[id number]-mab)  Treats rheumatoid arthritis, psoriatic arthritis, ankylosing spondylitis, and ulcerative colitis. Brand Name(s): LarrydiamondrenaldoTito   There may be other brand names for this medicine. When This Medicine Should Not Be Used: This medicine is not right for everyone. You should not receive it if you had an allergic reaction to golimumab. How to Use This Medicine:   Injectable  · Your doctor will prescribe your dose and schedule. This medicine is given through a needle placed in a vein or as a shot under your skin. Cherry Bushy must be injected slowly, so the IV will need to stay in place for 30 minutes. · A nurse or other health provider will give you this medicine. · You may be taught how to give your medicine at home. Make sure you understand all instructions before giving yourself an injection. Do not use more medicine or use it more often than your doctor tells you to. · Use a new needle and syringe each time you inject your medicine. · You will be shown the body areas where this shot can be given. Use a different body area each time you give yourself a shot. Keep track of where you give each shot to make sure you rotate body areas. Do not inject into skin areas that are red, bruised, tender, or hard, or have scars or stretch marks. · Check the liquid in the syringe or autoinjector. It should be clear and colorless or slightly yellow. Do not use Simponi® if it is cloudy, discolored, or has particles in it. Do not shake the medicine. · This medicine should come with a Medication Guide. Ask your pharmacist for a copy if you do not have one. · Missed dose: Take a dose as soon as you remember. If it is almost time for your next dose, wait until then and take a regular dose. Do not take extra medicine to make up for a missed dose. · If you store this medicine at home, keep it in the refrigerator. Do not freeze.  Protect the medicine from direct light. Keep the medicine in the original package until you are ready to use it. Drugs and Foods to Avoid:   Ask your doctor or pharmacist before using any other medicine, including over-the-counter medicines, vitamins, and herbal products. · Some medicines can affect how golimumab works. Tell your doctor if you are using any of the following:  ¨ Abatacept, adalimumab, anakinra, certolizumab, cyclosporine, etanercept, infliximab, rituximab, theophylline, tocilizumab  ¨ Blood thinner (including warfarin)  ¨ Medicine that weakens the immune system (including a steroid or cancer medicine)  · This medicine may interfere with vaccines. Ask your doctor before you get a flu shot or any other vaccines. Warnings While Using This Medicine:   · Tell your doctor if you are pregnant or breastfeeding, or if you have liver disease, cancer, heart failure, diabetes, COPD, psoriasis, problems with your immune system, Wegener granulomatosis, optic neuritis, multiple sclerosis, or a history of Guillain-Barré syndrome. Tell your doctor if you have any type of infection (including hepatitis B or tuberculosis) or an infection that keeps coming back. Tell your doctor if you are allergic to latex. · This medicine may cause the following problems:  ¨ Increased risk for infection  ¨ Increased risk of certain cancers (including lymphoma, leukemia, colon cancer, skin cancer)  ¨ Heart problems, including heart failure  ¨ Liver problems  ¨ Lupus-like syndrome  · You will need to have a skin test for tuberculosis (TB) before you start this medicine. Tell your doctor if you or anyone in your home has ever had a positive TB skin test or been exposed to TB. · This medicine may make you bleed, bruise, or get infections more easily. Take precautions to prevent illness and injury. Wash your hands often. · This medicine may make your skin more sensitive to sunlight. Wear sunscreen. Do not use sunlamps or tanning beds.   · Your doctor will do lab tests at regular visits to check on the effects of this medicine. Keep all appointments. · Throw away used needles in a hard, closed container that the needles cannot poke through. Keep this container away from children and pets. · Keep all medicine out of the reach of children. Never share your medicine with anyone. Possible Side Effects While Using This Medicine:   Call your doctor right away if you notice any of these side effects:  · Allergic reaction: Itching or hives, swelling in your face or hands, swelling or tingling in your mouth or throat, chest tightness, trouble breathing  · Change in how much or how often you urinate, painful urination  · Changes in vision  · Dark urine or pale stools, nausea, vomiting, loss of appetite, stomach pain, yellow skin or eyes  · Fever, chills, cough, runny or stuffy nose, sore throat, and body aches  · Numbness, tingling, or burning pain in your hands, arms, legs, or feet  · Rapid weight gain, swelling in your hands, ankles, or feet  · Swollen glands in the neck, underarms, or groin  · Trouble breathing, chest pain, uneven heartbeat  · Unusual bleeding, bruising, or weakness  · Warm, red, swollen, or painful skin, blisters, skin sores  If you notice these less serious side effects, talk with your doctor:   · Redness, itching, pain, or swelling where the needle was placed or the shot was given  If you notice other side effects that you think are caused by this medicine, tell your doctor. Call your doctor for medical advice about side effects. You may report side effects to FDA at 2-366-FDA-2129  © 2017 [facility] Information is for End User's use only and may not be sold, redistributed or otherwise used for commercial purposes. The above information is an  only. It is not intended as medical advice for individual conditions or treatments.  Talk to your doctor, nurse or pharmacist before following any medical regimen to see if it is safe and effective for you.

## 2018-05-08 NOTE — PROGRESS NOTES
[facility] VISIT NOTE    1125  Pt arrived at [facility] ambulatory and in no distress for Simponi week 0. Assessment completed, pt c/o arthritic pain and stiffness in both hands. Blood pressure 123/66, pulse (!) 55, temperature 98.8 °F (37.1 °C), resp. rate 16, weight 101.2 kg (223 lb). 24 G PIV placed in right AC, flushes easily with positive blood return. Reviewed side effects and discharge instructions for Simponi. Patient asked appropriate questions and verbalized understanding. Medications received:  Tylenol PO  Benadryl IVP  Solumedrol IVP  Simponi IV    Blood pressure 132/76, pulse 60, temperature 98 °F (36.7 °C), resp. rate 16, weight 101.2 kg (223 lb). Patient remained in the [facility] for 30 minutes following completion of the infusion. He did have restless legs after the benadryl was given that lasted the duration of the infusion. Tolerated treatment well, no adverse reaction noted. PIV flushed and removed. 1345  D/C'd from [facility] ambulatory and in no distress accompanied by his wife.  Next appointment is 6/5/18 at 11am.

## 2018-05-17 DIAGNOSIS — M06.9 RHEUMATOID ARTHRITIS WITH UNKNOWN RHEUMATOID FACTOR STATUS (HCC): ICD-10-CM

## 2018-05-18 DIAGNOSIS — M06.9 RHEUMATOID ARTHRITIS WITH UNKNOWN RHEUMATOID FACTOR STATUS (HCC): ICD-10-CM

## 2018-05-18 RX ORDER — FOLIC ACID 1 MG/1
TABLET ORAL
Qty: 90 TAB | Refills: 0 | Status: SHIPPED | OUTPATIENT
Start: 2018-05-18 | End: 2019-03-04 | Stop reason: SDUPTHER

## 2018-05-21 RX ORDER — FOLIC ACID 1 MG/1
1 TABLET ORAL DAILY
Qty: 90 TAB | Refills: 0 | Status: SHIPPED | OUTPATIENT
Start: 2018-05-21 | End: 2018-11-08 | Stop reason: SDUPTHER

## 2018-05-21 NOTE — TELEPHONE ENCOUNTER
From: Bev Colon  To: Karo Chase MD  Sent: 5/18/2018 1:27 PM EDT  Subject: Medication Renewal Request    Original authorizing provider: MD Bev Vega would like a refill of the following medications:  folic acid (FOLVITE) 1 mg tablet Karo Chase MD]    [facility] [address] [address]:  I need this as soon as possible. I have enough to last through Sunday.  Thanks, Nicolette Conteh

## 2018-05-30 ENCOUNTER — APPOINTMENT (OUTPATIENT)
Dept: INFUSION THERAPY | Age: 73
End: 2018-05-30
Payer: MEDICARE

## 2018-05-31 RX ORDER — ACETAMINOPHEN 325 MG/1
650 TABLET ORAL ONCE
Status: COMPLETED | OUTPATIENT
Start: 2018-06-05 | End: 2018-06-05

## 2018-05-31 RX ORDER — ACETAMINOPHEN 325 MG/1
650 TABLET ORAL
Status: ACTIVE | OUTPATIENT
Start: 2018-06-05 | End: 2018-06-05

## 2018-05-31 RX ORDER — DIPHENHYDRAMINE HYDROCHLORIDE 50 MG/ML
50 INJECTION, SOLUTION INTRAMUSCULAR; INTRAVENOUS ONCE
Status: DISCONTINUED | OUTPATIENT
Start: 2018-06-05 | End: 2018-06-05

## 2018-05-31 RX ORDER — DIPHENHYDRAMINE HYDROCHLORIDE 50 MG/ML
50 INJECTION, SOLUTION INTRAMUSCULAR; INTRAVENOUS
Status: ACTIVE | OUTPATIENT
Start: 2018-06-05 | End: 2018-06-05

## 2018-06-04 ENCOUNTER — OFFICE VISIT (OUTPATIENT)
Dept: RHEUMATOLOGY | Age: 73
End: 2018-06-04

## 2018-06-04 VITALS
TEMPERATURE: 98.6 F | BODY MASS INDEX: 34.59 KG/M2 | HEIGHT: 68 IN | OXYGEN SATURATION: 95 % | DIASTOLIC BLOOD PRESSURE: 82 MMHG | SYSTOLIC BLOOD PRESSURE: 129 MMHG | HEART RATE: 54 BPM | RESPIRATION RATE: 16 BRPM | WEIGHT: 228.2 LBS

## 2018-06-04 DIAGNOSIS — E55.9 VITAMIN D DEFICIENCY: ICD-10-CM

## 2018-06-04 DIAGNOSIS — M17.0 PRIMARY OSTEOARTHRITIS OF BOTH KNEES: ICD-10-CM

## 2018-06-04 DIAGNOSIS — Z98.890 HISTORY OF MELANOMA EXCISION: ICD-10-CM

## 2018-06-04 DIAGNOSIS — M05.79 SEROPOSITIVE RHEUMATOID ARTHRITIS OF MULTIPLE SITES (HCC): Primary | ICD-10-CM

## 2018-06-04 DIAGNOSIS — Z85.820 HISTORY OF MELANOMA EXCISION: ICD-10-CM

## 2018-06-04 DIAGNOSIS — C44.41 BASAL CELL CARCINOMA (BCC) OF SCALP: ICD-10-CM

## 2018-06-04 DIAGNOSIS — Z79.60 LONG-TERM USE OF IMMUNOSUPPRESSANT MEDICATION: ICD-10-CM

## 2018-06-04 NOTE — PATIENT INSTRUCTIONS
We will lower the benadryl that you get with your infusion from 50 mg to 25 mg. Let us know if this helps.     Inform your dermatologist of the medicines we have you on, to include the methotrexate and Simponi Aria infusion for your Rheumatoid Arthritis

## 2018-06-04 NOTE — MR AVS SNAPSHOT
[address] [facility] Raymundo Nuñez 13 
[phone] Patient: Jessica Del Angel MRN:  [id number] Visit Information Date & Time Provider Department Dept. Phone Encounter #  
 2018 10:30 AM Italia Winters PA-C [address] [facility] [id number] Follow-up Instructions Return in about 3 months (around 2018) for with Dr. Kimberly Flores. Upcoming Health Maintenance Date Due Influenza Age 5 to Adult 2018 MEDICARE YEARLY EXAM 2018 GLAUCOMA SCREENING Q2Y 2019 COLONOSCOPY 2024 DTaP/Tdap/Td series (3 - Td) 10/4/2027 Allergies as of 2018  Review Complete On: 2018 By: Kyle Anaya Severity Noted Reaction Type Reactions Stings [Sting, Bee] Medium 2009   Topical Swelling Swelling at site Current Immunizations  Reviewed on 2018 Name Date Influenza High Dose Vaccine PF 10/14/2016 Influenza Vaccine Split 2011 Influenza Vaccine Whole 2010 Pneumococcal Conjugate (PCV-13) 2015 TDAP Vaccine 12/15/2010 ZZZ-RETIRED (DO NOT USE) Pneumococcal Vaccine (Unspecified Type) 10/21/2011 Zoster Vaccine, Live 10/21/2011 Not reviewed this visit You Were Diagnosed With   
  
 Codes Comments Seropositive rheumatoid arthritis of multiple sites [facility])    -  Primary ICD-10-CM: M05.79 ICD-9-CM: 714.0 Long-term use of immunosuppressant medication     ICD-10-CM: Z79.899 ICD-9-CM: V58.69 Primary osteoarthritis of both knees     ICD-10-CM: M17.0 ICD-9-CM: 715.16 Vitamin D deficiency     ICD-10-CM: E55.9 ICD-9-CM: 268.9 Basal cell carcinoma (BCC) of scalp     ICD-10-CM: C44.41 
ICD-9-CM: 173.41 History of melanoma excision     ICD-10-CM: Z98.890, A96.846 ICD-9-CM: V15.29 Vitals BP Pulse Temp Resp Height(growth percentile) Weight(growth percentile) 129/82 (BP 1 Location: Right arm, BP Patient Position: Sitting) (!) 54 98.6 °F (37 °C) (Oral) 16 5' 8\" (1.727 m) 228 lb 3.2 oz (103.5 kg) SpO2 BMI Smoking Status 95% 34.7 kg/m2 Former Smoker Vitals History BMI and BSA Data Body Mass Index Body Surface Area 34.7 kg/m 2 2.23 m 2 Preferred Pharmacy Pharmacy Name Phone [facility] [address], [phone] [phone] Your Updated Medication List  
  
   
This list is accurate as of 6/4/18 11:24 AM.  Always use your most recent med list.  
  
  
  
  
 allopurinol 300 mg tablet Commonly known as:  Vena Ditch Take 1 Tab by mouth daily. amLODIPine 2.5 mg tablet Commonly known as:  Sofía Matar Take 1 Tab by mouth daily. aspirin delayed-release 81 mg tablet Take  by mouth daily. atorvastatin 40 mg tablet Commonly known as:  LIPITOR Take 1 Tab by mouth daily. citalopram 20 mg tablet Commonly known as:  Judythe Grade Take 1 Tab by mouth daily. CLARITIN 10 mg tablet Generic drug:  loratadine Take 10 mg by mouth.  
  
 ergocalciferol 50,000 unit capsule Commonly known as:  ERGOCALCIFEROL Take 1 Cap by mouth every seven (7) days. FISH OIL 1,000 mg Cap Generic drug:  omega-3 fatty acids-vitamin e Take 1 Cap by mouth. * folic acid 1 mg tablet Commonly known as:  FOLVITE  
TAKE ONE TABLET BY MOUTH DAILY * folic acid 1 mg tablet Commonly known as:  Google Take 1 Tab by mouth daily for 90 days. Iron 325 mg (65 mg iron) tablet Generic drug:  ferrous sulfate Take  by mouth Daily (before breakfast). lisinopril 40 mg tablet Commonly known as:  Filemon Boehringer Take 40 mg by mouth daily. methotrexate 2.5 mg tablet Commonly known as:  RHEUMATREX  
TAKE 8 TABLETS BY MOUTH EVERY WEDNESDAY MUCINEX 1,200 mg Ta12 ER tablet Generic drug:  guaiFENesin Take 1,200 mg by mouth two (2) times a day. omeprazole 20 mg capsule Commonly known as:  PRILOSEC Take 20 mg by mouth daily. [address] ARIA IV  
by IntraVENous route. VENTOLIN HFA 90 mcg/actuation inhaler Generic drug:  albuterol Take  by inhalation. * Notice: This list has 2 medication(s) that are the same as other medications prescribed for you. Read the directions carefully, and ask your doctor or other care provider to review them with you. We Performed the Following C REACTIVE PROTEIN, QT [78524 CPT(R)] CBC WITH AUTOMATED DIFF [35955 CPT(R)] METABOLIC PANEL, COMPREHENSIVE [64025 CPT(R)] SED RATE (ESR) [id number] CPT(R)] VITAMIN D, 25 HYDROXY [id number] CPT(R)] Follow-up Instructions Return in about 3 months (around 9/4/2018) for with Dr. Kingsley Caldwell. To-Do List   
 06/05/2018 11:00 AM  
  Appointment with [address] at [place] [address] ([phone])  
  
 07/31/2018 11:00 AM  
  Appointment with [address] at [place] [address] ([phone])  
  
 09/25/2018 11:00 AM  
  Appointment with [address] at [place] [address] ([phone]) Patient Instructions We will lower the benadryl that you get with your infusion from 50 mg to 25 mg. Let us know if this helps. Inform your dermatologist of the medicines we have you on, to include the methotrexate and Simponi Aria infusion for your Rheumatoid Arthritis Introducing [facility]! Dear Dario Blackwood: 
Thank you for requesting a [employer] account. Our records indicate that you already have an active [employer] account. You can access your account anytime at [email or web address] Did you know that you can access your hospital and ER discharge instructions at any time in [employer]? You can also review all of your test results from your hospital stay or ER visit. Additional Information If you have questions, please visit the Frequently Asked Questions section of the [email or web address] website at [email or web address] Remember, [employer] is NOT to be used for urgent needs. For medical emergencies, dial 911. Now available from your iPhone and Android! Please provide this summary of care documentation to your next provider. Your primary care clinician is listed as Navi Das. If you have any questions after today's visit, please call [phone].

## 2018-06-04 NOTE — PROGRESS NOTES
REASON FOR VISIT    This is a follow-up visit for Mr. Antoine Cortez for Seropositive Erosive Rheumatoid Arthritis.     Inflammatory arthritis phenotype includes:  Anti-CCP positive: yes (>250)  Rheumatoid factor positive: yes (>650)  Erosive disease: yes  Extra-articular manifestations include: none    Immunosuppression Screening:  Quantiferon TB: negative (11/20/2017)   PPD:  Not performed  Hepatitis B: negative (12/20/2017)  Hepatitis C: negative (12/20/2017)    Therapy History includes:  Current DMARD therapy include: methotrexate 20 mg every Wednesday, Simponi Aria (5/08/2018)  Prior DMARD therapy include: none  Discontinued DMARDs because of inefficacy: None  Discontinued DMARDs because of side effects: None  Unaffordable DMARDs: Enbrel    Immunizations:   Immunization History   Administered Date(s) Administered    Influenza High Dose Vaccine PF 10/14/2016    Influenza Vaccine Split 09/21/2011    Influenza Vaccine Whole 09/17/2010    Pneumococcal Conjugate (PCV-13) 01/01/2015    TDAP Vaccine 12/15/2010    ZZZ-RETIRED (DO NOT USE) Pneumococcal Vaccine (Unspecified Type) 10/21/2011    Zoster Vaccine, Live 10/21/2011       Active problems include:    Patient Active Problem List   Diagnosis Code    Mixed hyperlipidemia E78.2    Other abnormal glucose R73.09    Essential hypertension, benign I10    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    Allergic rhinitis, cause unspecified J30.9    Reflux esophagitis K21.0    Benign neoplasm of colon D12.6    Contact dermatitis and other eczema, due to unspecified cause L25.9    Bunion M21.619    History of prostate cancer Z85.46    Advanced care planning/counseling discussion Z71.89    Low testosterone R79.89    Primary osteoarthritis of both knees M17.0    Seropositive rheumatoid arthritis of multiple sites ([facility] [address] M05.79    Long-term use of immunosuppressant medication Z79.899    Vitamin D deficiency E55.9    Recurrent depression ([facility] [address] F33.9       HISTORY OF PRESENT ILLNESS    Mr. Baltazar Weir returns for a follow-up. On his last visit with Dr. Mariajose Lao, he continued methotrexate 20 mg every Wednesday. He had persistent synovitis, so he started him on a prednisone taper. He advanced therapy to the biologic. Mr. Baltazar Weir preferred Enbrel, which was approved, but was too expensive. He was then approved for Simponi Aria infusion. He received his first infusion on 5/08/2018. Today, he states his joints \"feel good, better than in a long time, I can't believe I have no pain\". Today he denies any joint pain, swelling, or stiffness. He states the benadryl with his infusion \"knocked him out\" and slept the whole day. He asks if the dose can be lowered. He had basal cell removed from scalp 5/31/2018. He has 2 more basal cell lesions to remove from left anterior chest and lower back. He reports he had a melanoma on his mid-back removed 10/2017. His melanoma was treated by complete excision and he did not need chemotherapy. He has skin exam every 3 months. Mr. Baltazar Weir has continued his medications for arthritis and reports good tolerance without significant side effects. Last toxicity monitoring by blood work was done on 3/07/2017 and did not reveal any significant adverse effects, except WBC 11.3. Most recent inflammatory markers from 3/07/2017 revealed a ESR 61 mm/hr (previously 22, 42 mm/hr) and CRP 24.6 mg/L (previously 10.7,  N/A mg/L). The patient has not had any interval hospital admissions, infections, or surgeries. REVIEW OF SYSTEMS    A comprehensive review of systems was performed and pertinent results are documented in the HPI, review of systems is otherwise non-contributory. PAST MEDICAL HISTORY    He has a past medical history of Allergic rhinitis; Arthritis; Basal cell carcinoma (BCC) in situ of skin; Cancer [facility]); GERD (gastroesophageal reflux disease); Gout; Hypercholesterolemia;  Hypertension; Melanoma in situ of back ([facility] [address] and Skin cancer (melanoma) ([facility] [address] FAMILY HISTORY    His family history includes Cancer in his paternal grandfather and another family member; Dementia in his mother; Diabetes in his paternal grandmother; Heart Disease in his father; Kidney Disease in his father. SOCIAL HISTORY    He reports that he has quit smoking. He has never used smokeless tobacco. He reports that he drinks about 2.4 oz of alcohol per week  He reports that he does not use illicit drugs. MEDICATIONS    Current Outpatient Prescriptions   Medication Sig Dispense Refill    golimumab ([address] ARIA IV) by IntraVENous route.  folic acid (FOLVITE) 1 mg tablet Take 1 Tab by mouth daily for 90 days. 90 Tab 0    folic acid (FOLVITE) 1 mg tablet TAKE ONE TABLET BY MOUTH DAILY 90 Tab 0    methotrexate (RHEUMATREX) 2.5 mg tablet TAKE 8 TABLETS BY MOUTH EVERY WEDNESDAY 96 Tab 0    ergocalciferol (ERGOCALCIFEROL) 50,000 unit capsule Take 1 Cap by mouth every seven (7) days. 12 Cap 3    lisinopril (PRINIVIL, ZESTRIL) 40 mg tablet Take 40 mg by mouth daily.  aspirin delayed-release 81 mg tablet Take  by mouth daily.  omeprazole (PRILOSEC) 20 mg capsule Take 20 mg by mouth daily.  ferrous sulfate (IRON) 325 mg (65 mg iron) tablet Take  by mouth Daily (before breakfast).  loratadine (CLARITIN) 10 mg tablet Take 10 mg by mouth.  guaiFENesin (MUCINEX) 1,200 mg Ta12 ER tablet Take 1,200 mg by mouth two (2) times a day.  albuterol (VENTOLIN HFA) 90 mcg/actuation inhaler Take  by inhalation.  allopurinol (ZYLOPRIM) 300 mg tablet Take 1 Tab by mouth daily. 30 Tab 11    omega-3 fatty acids-vitamin e (FISH OIL) 1,000 mg Cap Take 1 Cap by mouth.  citalopram (CELEXA) 20 mg tablet Take 1 Tab by mouth daily. 30 Tab 11    atorvastatin (LIPITOR) 40 mg tablet Take 1 Tab by mouth daily. 30 Tab 11    amlodipine (NORVASC) 2.5 mg tablet Take 1 Tab by mouth daily.  30 Tab 11     Facility-Administered Medications Ordered in Other Visits Medication Dose Route Frequency Provider Last Rate Last Dose    [START ON 6/5/2018] golimumab (SIMPONI ARIA) 200 mg in 0.9% sodium chloride, overfill volume 110 mL infusion  200 mg IntraVENous ONCE Dalton Christopher MD        [START ON 6/5/2018] acetaminophen (TYLENOL) tablet 650 mg  650 mg Oral ONCE Dalton Christopher MD        [START ON 6/5/2018] diphenhydrAMINE (BENADRYL) injection 50 mg  50 mg IntraVENous ONCE Dalton Christopher MD        [START ON 6/5/2018] methylPREDNISolone (PF) (SOLU-MEDROL) injection 125 mg  125 mg IntraVENous ONCE Dalton Christopher MD        [START ON 6/5/2018] acetaminophen (TYLENOL) tablet 650 mg  650 mg Oral Q4H PRN Dalton Christopher MD        [START ON 6/5/2018] diphenhydrAMINE (BENADRYL) injection 50 mg  50 mg IntraVENous Q4H PRN Dalton Christopher MD            ALLERGIES    Allergies   Allergen Reactions    Stings [Sting, Bee] Swelling     Swelling at site       PHYSICAL EXAMINATION    Visit Vitals    /82 (BP 1 Location: Right arm, BP Patient Position: Sitting)    Pulse (!) 54    Temp 98.6 °F (37 °C) (Oral)    Resp 16    Ht 5' 8\" (1.727 m)    Wt 228 lb 3.2 oz (103.5 kg)    SpO2 95%    BMI 34.7 kg/m2     Body mass index is 34.7 kg/(m^2). General: Patient is alert, oriented x 3, not in acute distress, wife and granddaughter at bedside    HEENT:   Sclerae are not injected and appear moist.  Oral mucous membranes are moist, there are no ulcers present. There is no alopecia. Neck is supple     Cardiovascular:  Heart is regular rate and rhythm, no murmurs. Chest:  Lungs are clear to auscultation bilaterally. No rhonchi, wheezes, or crackles. Abdomen:  Obese. Extremities:  Free of clubbing, cyanosis, edema    Neurological exam:  No focal sensory deficits, muscle strength is full in upper and lower extremities     Skin exam:  There are no rashes, no alopecia, no discoid lesions, no active Raynaud's, no livedo reticularis, no periungual erythema.     Sutures in place anterior frontal scalp, no signs secondary infection  Periorbital dependent edema  from lesion removal on scalp    Musculoskeletal exam:  A comprehensive musculoskeletal exam was performed for all joints of each upper and lower extremity and assessed for swelling, tenderness and range of motion. Positive results are documented as below:    Decreased active and passive ROM of left shoulder  Bilateral Olivia and Heberden nodes. Bilateral knee crepitus without effusion.   Pes planus   Bbilateral hallux valgus  Right MTP tenderness (RESOLVED)    Z-Deformities:   no  Fitzgerald Neck Deformities:  no  Boutonierre's Deformities:  no  Ulnar Deviation:   Yes (bilateral)  MCP Subluxation:  no     Joint Count 6/4/2018 3/7/2018 12/20/2017 11/20/2017   Patient pain (0-100) 0 60 30 90   MHAQ 0 0.25 0.125 0.375   Left elbow - Tender - - 1 -   Left wrist- Tender 0 1 1 1   Left wrist- Swollen 1 1 1 1   Left 1st MCP - Tender - 1 - 1   Left 1st MCP - Swollen - 1 1 1   Left 2nd MCP - Tender - 1 1 1   Left 2nd MCP - Swollen 1 1 1 1   Left 3rd MCP - Tender - 1 - 1   Left 3rd MCP - Swollen 1 1 1 1   Left 4th MCP - Tender - 1 - -   Left 5th MCP - Tender - 1 - -   Left thumb IP - Tender - 1 - -   Left thumb IP - Swollen - 1 - -   Left 2nd PIP - Tender - 1 - -   Left 3rd PIP - Tender - 1 - -   Left 4th PIP - Tender - 1 - -   Left 5th PIP - Tender - 1 - -   Right wrist- Tender - 1 1 1   Right wrist- Swollen 1 1 1 1   Right 1st MCP - Tender - 1 1 1   Right 1st MCP - Swollen 1 1 - 1   Right 2nd MCP - Tender - 1 1 1   Right 2nd MCP - Swollen 1 1 1 1   Right 3rd MCP - Tender - 1 - -   Right 3rd MCP - Swollen 1 1 1 1   Right 4th MCP - Tender - 1 1 1   Right 4th MCP - Swollen 1 1 1 1   Right 5th MCP - Swollen - 1 - -   Right thumb IP - Tender - - 1 -   Right 2nd PIP - Tender - - 1 1   Right 2nd PIP - Swollen - 1 - 1   Right 3rd PIP - Tender - - 1 1   Right 3rd PIP - Swollen 1 1 1 1   Right 4th PIP - Swollen 1 1 - -   Right knee - Tender - - - 1   Right knee - Swollen - - - 1   Tender Joint Count (Total) 0 16 10 11   Swollen Joint Count (Total) 10 14 9 12   Physician Assessment (0-10) 2 4 3 5   Patient Assessment (0-10) 0 6 5 9   CDAI Total (calculated) 12 40 27 40       DATA REVIEW    Laboratory     Recent laboratory results were reviewed, summarized, and discussed with the patient. Imaging    Musculoskeletal Ultrasound    None    Radiographs    Bilateral Hand 11/20/2017: RIGHT: No fracture or dislocation on plain film. The bones are osteopenic. Mild narrowing of the radiocarpal joint. Probable erosion of the radius and lunate at the radiocarpal joint. No widening of the intercarpal spaces. Subtle erosion of the ulnar styloid. Mild narrowing of the triscaphe joint is age-appropriate. Irregular arthritis of the first and second CMC joints includes osteophytes and erosions. There are erosions of the first through fourth metacarpal heads at the MCP points. Moderate narrowing and anterior subluxation of the second MCP joint. IP joints are within normal limits. No chondrocalcinosis or periosteal reaction. LEFT: No fracture or dislocation on plain film. The bones are osteopenic. Moderate narrowing of the radiocarpal joint with subtle erosions of the lunate. Marrow DR UVJ with erosions. No definite ulnar styloid erosion. Triscaphe and first [facility] joint osteoarthritis are age-appropriate. Large erosions on both sides of the second MCP joint and in the third metacarpal head. IP joints are within normal limits. No chondrocalcinosis or periosteal reaction. Bilateral Foot 11/20/2017: RIGHT: No fracture or dislocation on plain film. Bones are osteopenic. Talonavicular joint space narrowing, osteophytes, and erosions. Subtalar arthritis is partially imaged. TMT joints are within normal limits. Severe narrowing of the first MTP joint with osteophytes and erosions. There is erosion and the fifth tarsal head. Mild narrowing of the second MTP joint without definable erosion.  No periosteal reaction. LEFT: No fracture or dislocation on plain film. Bones are osteopenic. Intertarsal joints are within normal limits. Moderate narrowing of the first MTP joint with central and marginal erosions. 35 degrees hallux valgus angulation and fibular subluxation of the first proximal phalanx. There are also erosions in the laterally subluxed hallux sesamoids. Mild joint space narrowing and erosions of the third MTP joint. Fragmentation of the fourth middle phalanx is adjacent to the fourth PIP joint erosions. There are also erosions of the second and fifth PIP joints. First IP joint osteoarthritis is mild. CT Imaging    CT Right Upper Extremity with contrast 10/19/2017: examination of the soft tissues demonstrates no drainable fluid collection. There is no pathologically enlarged nodes within the right axilla. Alignment is normal. There is no bone destruction or fracture. No significant fatty atrophy. MR Imaging    None    DXA     None    ASSESSMENT AND PLAN    This is a follow-up visit for Mr. Cedric Ariza. 1) Seropositive Erosive Rheumatoid Arthritis. He presented after activation of underlying Rheumatoid Arthritis which appears to have been quiescent for more than 30 years from the TDAP vaccine. He remembers being diagnosed with arthritis in his 30's that was treated with an injection in his finger. He has bilateral ulnar deviation which corresponds with the chronicity of his disease. Radiographs showed erosive disease confirming chronicity. serologies were high titer. He has been on methotrexate 20 mg every Wednesday, but due to persistently active disease, Heidy Coup was added. He had his first infusion on 5/08/2018. He denies joint pain, stiffness or swelling today. His CDAI was 12 (previously 40, 27, 37) with 0 tender and 10 swollen joints, consistent with low disease activity. He has had excellent clinical response with Simponi Aria.  He reports sleepiness with the Benadryl given at his infusion, so I will submit an order to lower his Benadryl from 50 mg to 25 mg with his infusion. I will continue current therapy. Labs today. Follow up in 3 months. 2) Long Term Use of Immunosuppressants. The patient remains on immunomodulatory medications (methotrexate) and requires frequent toxicity monitoring by blood work. Respective labs were ordered (CBC and CMP). 3) Hyperferritinemia. This is from #1. 4) Bilateral Knee Osteoarthritis. This was not an active issue today. 5) Vitamin D Deficiency. His vitamin D level was 22.6. He is on weekly ergocalciferol 50,000. I will check his levels today. 6) Basal Cell Carcinoma. Lesion removed on his scalp 5/31/2018. Wound without secondary infection. He is scheduled to have 2 more lesions of basal cell removed from left chest and his back. 7) History of Melanoma. He had melanoma removed from his back 10/2017. He did not require chemotherapy. He has skin exams every 3 months. He has reviewed his medications, including Evelin Almanza, with his dermatologist. He understands to continue close surveillance and inform us if he has any new melanoma lesions. The patient voiced understanding of the aforementioned assessment and plan. Summary of plan was provided in the After Visit Summary patient instructions. TODAY'S ORDERS    Orders Placed This Encounter    CBC WITH AUTOMATED DIFF    METABOLIC PANEL, COMPREHENSIVE    C REACTIVE PROTEIN, QT    SED RATE (ESR)    VITAMIN D, 25 HYDROXY     Future Appointments  Date Time Provider Raiza Sanchez   6/5/2018 11:00 AM [facility] INFUSION NURSE 4 [address]   7/31/2018 11:00 AM [facility] INFUSION NURSE 5 [address]   9/4/2018 10:20 AM William Jaramillo MD [facility] [address]   9/25/2018 11:00 AM [facility]    Adult Rheumatology   [facility] [address] [place] [address]   Phone [phone]  Fax [phone]

## 2018-06-05 ENCOUNTER — TELEPHONE (OUTPATIENT)
Dept: RHEUMATOLOGY | Age: 73
End: 2018-06-05

## 2018-06-05 ENCOUNTER — HOSPITAL ENCOUNTER (OUTPATIENT)
Dept: INFUSION THERAPY | Age: 73
Discharge: HOME | End: 2018-06-05
Payer: MEDICARE

## 2018-06-05 VITALS
RESPIRATION RATE: 16 BRPM | SYSTOLIC BLOOD PRESSURE: 117 MMHG | HEART RATE: 53 BPM | BODY MASS INDEX: 34.76 KG/M2 | WEIGHT: 228.6 LBS | DIASTOLIC BLOOD PRESSURE: 60 MMHG | TEMPERATURE: 97.8 F

## 2018-06-05 LAB
25(OH)D3+25(OH)D2 SERPL-MCNC: 27.7 NG/ML (ref 30–100)
ALBUMIN SERPL-MCNC: 4.3 G/DL (ref 3.5–4.8)
ALBUMIN/GLOB SERPL: 1.9 {RATIO} (ref 1.2–2.2)
ALP SERPL-CCNC: 78 IU/L (ref 39–117)
ALT SERPL-CCNC: 43 IU/L (ref 0–44)
AST SERPL-CCNC: 10 IU/L (ref 0–40)
BASOPHILS # BLD AUTO: 0 X10E3/UL (ref 0–0.2)
BASOPHILS NFR BLD AUTO: 0 %
BILIRUB SERPL-MCNC: 0.5 MG/DL (ref 0–1.2)
BUN SERPL-MCNC: 14 MG/DL (ref 8–27)
BUN/CREAT SERPL: 20 (ref 10–24)
CALCIUM SERPL-MCNC: 9.5 MG/DL (ref 8.6–10.2)
CHLORIDE SERPL-SCNC: 105 MMOL/L (ref 96–106)
CO2 SERPL-SCNC: 22 MMOL/L (ref 18–29)
CREAT SERPL-MCNC: 0.71 MG/DL (ref 0.76–1.27)
CRP SERPL-MCNC: 0.5 MG/L (ref 0–4.9)
EOSINOPHIL # BLD AUTO: 0.4 X10E3/UL (ref 0–0.4)
EOSINOPHIL NFR BLD AUTO: 5 %
ERYTHROCYTE [DISTWIDTH] IN BLOOD BY AUTOMATED COUNT: 14.5 % (ref 12.3–15.4)
ERYTHROCYTE [SEDIMENTATION RATE] IN BLOOD BY WESTERGREN METHOD: 3 MM/HR (ref 0–30)
GFR SERPLBLD CREATININE-BSD FMLA CKD-EPI: 109 ML/MIN/1.73
GFR SERPLBLD CREATININE-BSD FMLA CKD-EPI: 94 ML/MIN/1.73
GLOBULIN SER CALC-MCNC: 2.3 G/DL (ref 1.5–4.5)
GLUCOSE SERPL-MCNC: 88 MG/DL (ref 65–99)
HCT VFR BLD AUTO: 40.4 % (ref 37.5–51)
HGB BLD-MCNC: 13.7 G/DL (ref 13–17.7)
IMM GRANULOCYTES # BLD: 0 X10E3/UL (ref 0–0.1)
IMM GRANULOCYTES NFR BLD: 0 %
LYMPHOCYTES # BLD AUTO: 2.4 X10E3/UL (ref 0.7–3.1)
LYMPHOCYTES NFR BLD AUTO: 28 %
MCH RBC QN AUTO: 33.3 PG (ref 26.6–33)
MCHC RBC AUTO-ENTMCNC: 33.9 G/DL (ref 31.5–35.7)
MCV RBC AUTO: 98 FL (ref 79–97)
MONOCYTES # BLD AUTO: 0.6 X10E3/UL (ref 0.1–0.9)
MONOCYTES NFR BLD AUTO: 7 %
NEUTROPHILS # BLD AUTO: 5.1 X10E3/UL (ref 1.4–7)
NEUTROPHILS NFR BLD AUTO: 60 %
PLATELET # BLD AUTO: 209 X10E3/UL (ref 150–379)
POTASSIUM SERPL-SCNC: 4.6 MMOL/L (ref 3.5–5.2)
PROT SERPL-MCNC: 6.6 G/DL (ref 6–8.5)
RBC # BLD AUTO: 4.11 X10E6/UL (ref 4.14–5.8)
SODIUM SERPL-SCNC: 142 MMOL/L (ref 134–144)
WBC # BLD AUTO: 8.5 X10E3/UL (ref 3.4–10.8)

## 2018-06-05 PROCEDURE — 74011250637 HC RX REV CODE- 250/637: Performed by: INTERNAL MEDICINE

## 2018-06-05 PROCEDURE — 74011000258 HC RX REV CODE- 258: Performed by: INTERNAL MEDICINE

## 2018-06-05 PROCEDURE — 74011250636 HC RX REV CODE- 250/636: Performed by: INTERNAL MEDICINE

## 2018-06-05 PROCEDURE — 96413 CHEMO IV INFUSION 1 HR: CPT

## 2018-06-05 PROCEDURE — 96375 TX/PRO/DX INJ NEW DRUG ADDON: CPT

## 2018-06-05 RX ORDER — DIPHENHYDRAMINE HYDROCHLORIDE 50 MG/ML
25 INJECTION, SOLUTION INTRAMUSCULAR; INTRAVENOUS ONCE
Status: COMPLETED | OUTPATIENT
Start: 2018-06-05 | End: 2018-06-05

## 2018-06-05 RX ADMIN — DIPHENHYDRAMINE HYDROCHLORIDE 25 MG: 50 INJECTION INTRAMUSCULAR; INTRAVENOUS at 11:52

## 2018-06-05 RX ADMIN — GOLIMUMAB 200 MG: 50 SOLUTION INTRAVENOUS at 12:26

## 2018-06-05 RX ADMIN — ACETAMINOPHEN 650 MG: 325 TABLET ORAL at 11:49

## 2018-06-05 RX ADMIN — METHYLPREDNISOLONE SODIUM SUCCINATE 125 MG: 125 INJECTION, POWDER, FOR SOLUTION INTRAMUSCULAR; INTRAVENOUS at 11:51

## 2018-06-05 NOTE — TELEPHONE ENCOUNTER
Nova Otto from [facility] called because Dr. Andree Ruff said he was going to decrease the Benadryl dose but they have not received the order for the change in dosage. Nova Otto can be reached at [phone]. The fax number is [phone].

## 2018-06-05 NOTE — TELEPHONE ENCOUNTER
Order to decrease Benadryl to 25mg IV prior to infusion was resent to [address] [place] [address].

## 2018-07-11 DIAGNOSIS — M06.9 RHEUMATOID ARTHRITIS WITH UNKNOWN RHEUMATOID FACTOR STATUS (HCC): ICD-10-CM

## 2018-07-12 RX ORDER — METHOTREXATE 2.5 MG/1
TABLET ORAL
Qty: 96 TAB | Refills: 0 | Status: SHIPPED | OUTPATIENT
Start: 2018-07-12 | End: 2018-10-01 | Stop reason: SDUPTHER

## 2018-07-17 ENCOUNTER — APPOINTMENT (OUTPATIENT)
Dept: INFUSION THERAPY | Age: 73
End: 2018-07-17
Payer: MEDICARE

## 2018-07-26 RX ORDER — ACETAMINOPHEN 325 MG/1
650 TABLET ORAL ONCE
Status: COMPLETED | OUTPATIENT
Start: 2018-07-31 | End: 2018-07-31

## 2018-07-26 RX ORDER — DIPHENHYDRAMINE HYDROCHLORIDE 50 MG/ML
25 INJECTION, SOLUTION INTRAMUSCULAR; INTRAVENOUS ONCE
Status: COMPLETED | OUTPATIENT
Start: 2018-07-31 | End: 2018-07-31

## 2018-07-31 ENCOUNTER — HOSPITAL ENCOUNTER (OUTPATIENT)
Dept: INFUSION THERAPY | Age: 73
Discharge: HOME | End: 2018-07-31
Payer: MEDICARE

## 2018-07-31 VITALS
DIASTOLIC BLOOD PRESSURE: 66 MMHG | SYSTOLIC BLOOD PRESSURE: 136 MMHG | BODY MASS INDEX: 35.43 KG/M2 | WEIGHT: 233 LBS | HEART RATE: 49 BPM | TEMPERATURE: 97.5 F

## 2018-07-31 PROCEDURE — 74011000258 HC RX REV CODE- 258: Performed by: INTERNAL MEDICINE

## 2018-07-31 PROCEDURE — 96365 THER/PROPH/DIAG IV INF INIT: CPT

## 2018-07-31 PROCEDURE — 96375 TX/PRO/DX INJ NEW DRUG ADDON: CPT

## 2018-07-31 PROCEDURE — 74011250636 HC RX REV CODE- 250/636: Performed by: INTERNAL MEDICINE

## 2018-07-31 PROCEDURE — 74011250637 HC RX REV CODE- 250/637: Performed by: INTERNAL MEDICINE

## 2018-07-31 RX ORDER — SODIUM CHLORIDE 0.9 % (FLUSH) 0.9 %
10-40 SYRINGE (ML) INJECTION AS NEEDED
Status: ACTIVE | OUTPATIENT
Start: 2018-07-31 | End: 2018-08-01

## 2018-07-31 RX ADMIN — METHYLPREDNISOLONE SODIUM SUCCINATE 125 MG: 125 INJECTION, POWDER, FOR SOLUTION INTRAMUSCULAR; INTRAVENOUS at 11:00

## 2018-07-31 RX ADMIN — GOLIMUMAB 200 MG: 50 SOLUTION INTRAVENOUS at 12:08

## 2018-07-31 RX ADMIN — DIPHENHYDRAMINE HYDROCHLORIDE 25 MG: 50 INJECTION INTRAMUSCULAR; INTRAVENOUS at 11:07

## 2018-07-31 RX ADMIN — ACETAMINOPHEN 650 MG: 325 TABLET ORAL at 11:00

## 2018-08-14 ENCOUNTER — APPOINTMENT (OUTPATIENT)
Dept: INFUSION THERAPY | Age: 73
End: 2018-08-14
Payer: MEDICARE

## 2018-08-30 ENCOUNTER — OFFICE VISIT (OUTPATIENT)
Dept: INTERNAL MEDICINE CLINIC | Age: 73
End: 2018-08-30

## 2018-08-30 VITALS
DIASTOLIC BLOOD PRESSURE: 64 MMHG | WEIGHT: 235 LBS | OXYGEN SATURATION: 91 % | TEMPERATURE: 98.6 F | SYSTOLIC BLOOD PRESSURE: 120 MMHG | RESPIRATION RATE: 16 BRPM | BODY MASS INDEX: 35.61 KG/M2 | HEART RATE: 50 BPM | HEIGHT: 68 IN

## 2018-08-30 DIAGNOSIS — G47.33 OSA TREATED WITH BIPAP: ICD-10-CM

## 2018-08-30 DIAGNOSIS — I10 ESSENTIAL HYPERTENSION, BENIGN: ICD-10-CM

## 2018-08-30 DIAGNOSIS — Z00.00 MEDICARE ANNUAL WELLNESS VISIT, SUBSEQUENT: Primary | ICD-10-CM

## 2018-08-30 DIAGNOSIS — M10.9 GOUT, UNSPECIFIED CAUSE, UNSPECIFIED CHRONICITY, UNSPECIFIED SITE: ICD-10-CM

## 2018-08-30 DIAGNOSIS — R73.01 IFG (IMPAIRED FASTING GLUCOSE): ICD-10-CM

## 2018-08-30 DIAGNOSIS — M05.79 SEROPOSITIVE RHEUMATOID ARTHRITIS OF MULTIPLE SITES (HCC): ICD-10-CM

## 2018-08-30 DIAGNOSIS — E78.2 MIXED HYPERLIPIDEMIA: ICD-10-CM

## 2018-08-30 DIAGNOSIS — J30.9 ALLERGIC RHINITIS, UNSPECIFIED SEASONALITY, UNSPECIFIED TRIGGER: ICD-10-CM

## 2018-08-30 DIAGNOSIS — Z85.46 HISTORY OF PROSTATE CANCER: ICD-10-CM

## 2018-08-30 DIAGNOSIS — Z71.89 ADVANCED CARE PLANNING/COUNSELING DISCUSSION: ICD-10-CM

## 2018-08-30 DIAGNOSIS — Z13.31 SCREENING FOR DEPRESSION: ICD-10-CM

## 2018-08-30 PROBLEM — E66.01 SEVERE OBESITY (BMI 35.0-39.9): Status: ACTIVE | Noted: 2018-08-30

## 2018-08-30 NOTE — PATIENT INSTRUCTIONS
Medicare Part B Preventive Services Guidelines/Limitations Date last completed and Frequency Due Date Cardiovascular Screening Blood Tests (every 5 years) Total cholesterol, HDL, Triglycerides Order as a panel if possible Completed 5/2017 As recommended by your PCP As recommended by your PCP or Specialist  
Colorectal Cancer Screening 
-Fecal occult blood test (annual) -Flexible sigmoidoscopy (5y) 
-Screening colonoscopy (10y) -Barium Enema Age 49-80; After age [de-identified] if history of abnormal results Completed 2/27/2014 Recommended follow-up in 5 years  As recommended by your PCP or Specialist 
  
Counseling to Prevent Tobacco Use (up to 8 sessions per year) - Counseling greater than 3 and up to 10 minutes - Counseling greater than 10 minutes Patients must be asymptomatic of tobacco-related conditions to receive as preventive service N/A N/A Diabetes Screening Tests (at least every 3 years, Medicare covers annually or at 6-month intervals for prediabetic patients) Fasting blood sugar (FBS) or glucose tolerance test (GTT) Patient must be diagnosed with one of the following: 
-Hypertension, Dyslipidemia, obesity, previous impaired FBS or GTT 
Or any two of the following: overweight, FH of diabetes, age ? 72, history of gestational diabetes, birth of baby weighing more than 9 pounds Completed 6/2018 Recommended every 3 years for non-diabetics As recommended by your PCP or Specialist 
  
Glaucoma Screening (no USPSTF recommendation) Diabetes mellitus, family history, , age 48 or over,  American, age 72 or over Completed 10/2017 Recommended annually As recommended by your PCP or Specialist  
Prostate Cancer Screening (annually up to age 76) - Digital rectal exam (TITO) - Prostate specific antigen (PSA) Annually (age 48 or over), TITO not paid separately when covered E/M service is provided on same date As recommended by your PCP or Specialist 
 
 Recommended annually to age 76 As recommended by your PCP or Specialist 
  
Seasonal Influenza Vaccination (annually)  Completed 10/2017 Recommended Annually Due Fall 2018 TDAP Vaccination  Completed 10/2017 Recommended every 10 years As recommended by your PCP or Specialist  
Zoster (Shingles) Vaccination Covered by Medicare Part D through the pharmacy- PCP provides prescription Completed 10/2011 Recommended once over age 48  Complete Pneumococcal Vaccination (once after 65)  Pneumo 23- 10/2011 Recommended once over the age of 72 Prevnar 13- 1/2015 Recommended once over the age of 72 Complete Complete Ultrasound Screening for Abdominal Aortic Aneurysm (AAA) (once) Patient must be referred through IPPE and not have had a screening for abdominal aortic aneurysm before under Medicare. Limited to patients who meet one of the following criteria: 
- Men who are 73-68 years old and have smoked more than 100 cigarettes in their lifetime. 
-Anyone with a FH of AAA 
-Anyone recommended for screening by USPSTF Not indicated unless recommended by PCP Not indicated unless recommended by PCP Family Practice Management 2011 Please bring a copy of your completed advance medical directive to the office so it may be added to your medical record. Thank you. If you have any questions or concerns please feel free to contact me at [phone]. It was a pleasure meeting you today and participating in your healthcare. Maryjane Agrawal RN Medicare Wellness Visit, Male The best way to live healthy is to have a lifestyle where you eat a well-balanced diet, exercise regularly, limit alcohol use, and quit all forms of tobacco/nicotine, if applicable. Regular preventive services are another way to keep healthy. Preventive services (vaccines, screening tests, monitoring & exams) can help personalize your care plan, which helps you manage your own care. Screening tests can find health problems at the earliest stages, when they are easiest to treat. [facility] follows the current, evidence-based guidelines published by the [facility] Jaron Colon ([facility]) when recommending preventive services for our patients. Because we follow these guidelines, sometimes recommendations change over time as research supports it. (For example, a prostate screening blood test is no longer routinely recommended for men with no symptoms.) Of course, you and your doctor may decide to screen more often for some diseases, based on your risk and co-morbidities (chronic disease you are already diagnosed with). Preventive services for you include: - Medicare offers their members a free annual wellness visit, which is time for you and your primary care provider to discuss and plan for your preventive service needs. Take advantage of this benefit every year! 
-All adults over age 72 should receive the recommended pneumonia vaccines. Current USPSTF guidelines recommend a series of two vaccines for the best pneumonia protection.  
-All adults should have a flu vaccine yearly and an ECG. All adults age 61 and older should receive a shingles vaccine once in their lifetime.   
-All adults age 38-68 who are overweight should have a diabetes screening test once every three years.  
-Other screening tests & preventive services for persons with diabetes include: an eye exam to screen for diabetic retinopathy, a kidney function test, a foot exam, and stricter control over your cholesterol.  
-Cardiovascular screening for adults with routine risk involves an electrocardiogram (ECG) at intervals determined by the provider.  
-Colorectal cancer screening should be done for adults age 54-65 with no increased risk factors for colorectal cancer. There are a number of acceptable methods of screening for this type of cancer.  Each test has its own benefits and drawbacks. Discuss with your provider what is most appropriate for you during your annual wellness visit. The different tests include: colonoscopy (considered the best screening method), a fecal occult blood test, a fecal DNA test, and sigmoidoscopy. 
-All adults born between [place] should be screened once for Hepatitis C. 
-An Abdominal Aortic Aneurysm (AAA) Screening is recommended for men age 73-68 who has ever smoked in their lifetime. Here is a list of your current Health Maintenance items (your personalized list of preventive services) with a due date: 
Health Maintenance Due Topic Date Due  
 Flu Vaccine  08/01/2018

## 2018-08-30 NOTE — PROGRESS NOTES
Remedios Petty is a 71 y.o. male who presents today for Annual Wellness Visit and CPAP [place]  He has a history of  
Patient Active Problem List  
Diagnosis Code  Mixed hyperlipidemia E78.2  Other abnormal glucose R73.09  
 Essential hypertension, benign I10  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  Allergic rhinitis, cause unspecified J30.9  Reflux esophagitis K21.0  Benign neoplasm of colon D12.6  Contact dermatitis and other eczema, due to unspecified cause L25.9  Bunion M21.619  
 History of prostate cancer Z85.46  
 Advanced care planning/counseling discussion Z71.89  Low testosterone R79.89  
 Primary osteoarthritis of both knees M17.0  Seropositive rheumatoid arthritis of multiple sites ([facility] [address] M05.79  Long-term use of immunosuppressant medication Z79.899  Vitamin D deficiency E55.9  Recurrent depression ([facility] [address] F33.9  Severe obesity (BMI 35.0-39.9) ([facility]) E66.01  
 Gout M10.9 Nohemy  Today patient is here for f/u and MW. NN performed MW portion of visit. Will get Shingrix and otherwise HM is UTD. he does have other concerns. СЕРГЕЙ: still on BiPAP machine. Wearing nightly and doing well. Energy level stable. Needs new supplies. RA: seeing rheumatology. Getting Simponi injections and methotrexate. Doing great. Hypertension - very well controlled. Taking ACEi and 2.5mg of ccb. Hypertension ROS: taking medications as instructed, no medication side effects noted, no TIA's, no chest pain on exertion, no dyspnea on exertion, no swelling of ankles     reports that he has quit smoking. He has never used smokeless tobacco.    reports that he drinks about 2.4 oz of alcohol per week BP Readings from Last 2 Encounters:  
08/30/18 120/64  
07/31/18 136/66 Hyperlipidemia Currently he takes 40 mg of lipitor ROS: taking medications as instructed, no medication side effects noted No new myalgias, no joint pains, no weakness No TIA's, no chest pain on exertion, no dyspnea on exertion, no swelling of ankles. Lab Results Component Value Date/Time Cholesterol, total 161 05/22/2017 11:18 AM  
 HDL Cholesterol 42 05/22/2017 11:18 AM  
 LDL, calculated 78 05/22/2017 11:18 AM  
 VLDL, calculated 41 (H) 05/22/2017 11:18 AM  
 Triglyceride 204 (H) 05/22/2017 11:18 AM  
 CHOL/HDL Ratio 3.6 04/28/2010 09:23 AM  
 
Gout: still on allopurinol. ROS Review of Systems Constitutional: Negative for chills, fever, malaise/fatigue and weight loss. Eyes: Negative for blurred vision, double vision, photophobia, pain and discharge. Respiratory: Negative for cough, hemoptysis, sputum production and shortness of breath. Cardiovascular: Negative for chest pain, palpitations and leg swelling. Gastrointestinal: Negative for abdominal pain, constipation, diarrhea, heartburn, nausea and vomiting. Musculoskeletal: Positive for joint pain. Negative for back pain, falls, myalgias and neck pain. Neurological: Negative. Endo/Heme/Allergies: Does not bruise/bleed easily. Psychiatric/Behavioral: Negative for depression. The patient is not nervous/anxious. Visit Vitals  /64 (BP 1 Location: Left arm, BP Patient Position: Sitting)  Pulse (!) 50  Temp 98.6 °F (37 °C) (Oral)  Resp 16  
 Ht 5' 8\" (1.727 m)  Wt 235 lb (106.6 kg)  SpO2 91%  BMI 35.73 kg/m2 Physical Exam  
Constitutional: He is oriented to person, place, and time. He appears well-developed and well-nourished. HENT:  
Head: Normocephalic and atraumatic. Eyes: EOM are normal. Pupils are equal, round, and reactive to light. Cardiovascular: Normal rate and regular rhythm. No murmur heard. Pulmonary/Chest: Effort normal and breath sounds normal. No respiratory distress. Abdominal: Soft. Bowel sounds are normal. He exhibits no distension. Musculoskeletal: Normal range of motion. He exhibits no edema. Ulnar drift Neurological: He is alert and oriented to person, place, and time. Skin: Skin is warm and dry. He is not diaphoretic. Psychiatric: He has a normal mood and affect. His behavior is normal.  
 
 
 
Current Outpatient Prescriptions Medication Sig  
 0.9% sodium chloride solp 100 mL with golimumab 12.5 mg/mL soln 2 mg/kg 2 mg/kg by IntraVENous route once.  methotrexate (RHEUMATREX) 2.5 mg tablet TAKE 8 TABLETS BY MOUTH EVERY WEDNESDAY  folic acid (FOLVITE) 1 mg tablet TAKE ONE TABLET BY MOUTH DAILY  ergocalciferol (ERGOCALCIFEROL) 50,000 unit capsule Take 1 Cap by mouth every seven (7) days.  lisinopril (PRINIVIL, ZESTRIL) 40 mg tablet Take 40 mg by mouth daily.  aspirin delayed-release 81 mg tablet Take  by mouth daily.  omeprazole (PRILOSEC) 20 mg capsule Take 20 mg by mouth daily.  loratadine (CLARITIN) 10 mg tablet Take 10 mg by mouth.  guaiFENesin (MUCINEX) 1,200 mg Ta12 ER tablet Take 1,200 mg by mouth two (2) times a day.  albuterol (VENTOLIN HFA) 90 mcg/actuation inhaler Take  by inhalation.  allopurinol (ZYLOPRIM) 300 mg tablet Take 1 Tab by mouth daily.  omega-3 fatty acids-vitamin e (FISH OIL) 1,000 mg Cap Take 1 Cap by mouth.  citalopram (CELEXA) 20 mg tablet Take 1 Tab by mouth daily.  atorvastatin (LIPITOR) 40 mg tablet Take 1 Tab by mouth daily.  amlodipine (NORVASC) 2.5 mg tablet Take 1 Tab by mouth daily.  ferrous sulfate (IRON) 325 mg (65 mg iron) tablet Take  by mouth Daily (before breakfast). No current facility-administered medications for this visit. Past Medical History:  
Diagnosis Date  Allergic rhinitis  Arthritis RA  Basal cell carcinoma (BCC) in situ of skin   
 shoudler and scalp  Cancer [facility])   
 prostate  GERD (gastroesophageal reflux disease)  Gout  Hypercholesterolemia  Hypertension  Melanoma in situ of back ([facility] [address]  Skin cancer (melanoma) ([facility] [address] Past Surgical History: Procedure Laterality Date  ENDOSCOPY, COLON, DIAGNOSTIC    
 09, due 12, done 11, due 14  
 HX KNEE REPLACEMENT Left 2012  HX OTHER SURGICAL N/A 05/31/2018 Basal cell removal of scalp  HX PROSTATECTOMY Social History Substance Use Topics  Smoking status: Former Smoker  Smokeless tobacco: Never Used  Alcohol use 2.4 oz/week 4 Cans of beer per week Comment: 5-6 days per week Family History Problem Relation Age of Onset  Dementia Mother  Heart Disease Father CAD  Kidney Disease Father   
  renal failure  Diabetes Paternal Grandmother  Cancer Paternal Grandfather   
  colon  Cancer Other   
  family hx colon cancer Allergies Allergen Reactions  Stings [Sting, Bee] Swelling Swelling at site Assessment/Plan Diagnoses and all orders for this visit: 
 
1. Medicare annual wellness visit, subsequent - I have reviewed NN note and agree with A/P. Pt will talk to [place] about the shingrix. Lindy Mason was counseled on age-appropriate/ guideline-based risk prevention behaviors and screening for a 71y.o. year old   male . We also discussed adjustments in screening based on family history if necessary. Printed instructions for preventative screening guidelines and healthy behaviors given to patient with after visit summary. 2. Seropositive rheumatoid arthritis of multiple sites ([address] - much improved with new medication. 3. Mixed hyperlipidemia - repeat when he sees Rheum.  
-     LIPID PANEL 
 
4. IFG (impaired fasting glucose) - Rpeat 
-     HEMOGLOBIN A1C WITH EAG 
 
5. Essential hypertension, benign - very well controlled. No changes needed. 6. Gout, unspecified cause, unspecified chronicity, unspecified site - on allopurinol. Repeat UA 
-     URIC ACID 7. History of prostate cancer - repeat PSA 
-     PSA W/ REFLX FREE PSA 8. СЕРГЕЙ treated with BiPAP - Stable on current BiPAP.  Needs new mask and supplies Rx provided. 9. Allergic rhinitis, unspecified seasonality, unspecified trigger - has been bad. Continue antihistamine and nasal steroid. 10. Screening for depression 11. Advanced care planning/counseling discussion Follow-up Disposition: 
Return in about 6 months (around 2/28/2019). Delmer Alicea MD 
8/30/2018

## 2018-08-30 NOTE — PROGRESS NOTES
Nurse Navigator Medicare Wellness Visit performed by RILEY Taylor This is the Subsequent Medicare Annual Wellness Exam, performed 12 months or more after the Initial AWV or the last Subsequent AWV I have reviewed the patient's medical history in detail and updated the computerized patient record. History Past Medical History:  
Diagnosis Date  Allergic rhinitis  Arthritis RA  Basal cell carcinoma (BCC) in situ of skin   
 shoudler and scalp  Cancer [facility])   
 prostate  GERD (gastroesophageal reflux disease)  Gout  Hypercholesterolemia  Hypertension  Melanoma in situ of back ([facility] [address]  Skin cancer (melanoma) ([facility] [address] Past Surgical History:  
Procedure Laterality Date  ENDOSCOPY, COLON, DIAGNOSTIC    
 09, due 12, done 11, due 14  
 HX KNEE REPLACEMENT Left 2012  HX OTHER SURGICAL N/A 05/31/2018 Basal cell removal of scalp  HX PROSTATECTOMY Current Outpatient Prescriptions Medication Sig Dispense Refill  
 0.9% sodium chloride solp 100 mL with golimumab 12.5 mg/mL soln 2 mg/kg 2 mg/kg by IntraVENous route once.  methotrexate (RHEUMATREX) 2.5 mg tablet TAKE 8 TABLETS BY MOUTH EVERY WEDNESDAY 96 Tab 0  
 folic acid (FOLVITE) 1 mg tablet TAKE ONE TABLET BY MOUTH DAILY 90 Tab 0  
 ergocalciferol (ERGOCALCIFEROL) 50,000 unit capsule Take 1 Cap by mouth every seven (7) days. 12 Cap 3  
 lisinopril (PRINIVIL, ZESTRIL) 40 mg tablet Take 40 mg by mouth daily.  aspirin delayed-release 81 mg tablet Take  by mouth daily.  omeprazole (PRILOSEC) 20 mg capsule Take 20 mg by mouth daily.  loratadine (CLARITIN) 10 mg tablet Take 10 mg by mouth.  guaiFENesin (MUCINEX) 1,200 mg Ta12 ER tablet Take 1,200 mg by mouth two (2) times a day.  albuterol (VENTOLIN HFA) 90 mcg/actuation inhaler Take  by inhalation.  allopurinol (ZYLOPRIM) 300 mg tablet Take 1 Tab by mouth daily.  30 Tab 11  
  omega-3 fatty acids-vitamin e (FISH OIL) 1,000 mg Cap Take 1 Cap by mouth.  citalopram (CELEXA) 20 mg tablet Take 1 Tab by mouth daily. 30 Tab 11  
 atorvastatin (LIPITOR) 40 mg tablet Take 1 Tab by mouth daily. 30 Tab 11  
 amlodipine (NORVASC) 2.5 mg tablet Take 1 Tab by mouth daily. 30 Tab 11  
 ferrous sulfate (IRON) 325 mg (65 mg iron) tablet Take  by mouth Daily (before breakfast). Allergies Allergen Reactions  Stings [Sting, Bee] Swelling Swelling at site Family History Problem Relation Age of Onset  Dementia Mother  Heart Disease Father CAD  Kidney Disease Father   
  renal failure  Diabetes Paternal Grandmother  Cancer Paternal Grandfather   
  colon  Cancer Other   
  family hx colon cancer Social History Substance Use Topics  Smoking status: Former Smoker  Smokeless tobacco: Never Used  Alcohol use 2.4 oz/week 4 Cans of beer per week Comment: 5-6 days per week Patient Active Problem List  
Diagnosis Code  Mixed hyperlipidemia E78.2  Other abnormal glucose R73.09  
 Essential hypertension, benign I10  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  Allergic rhinitis, cause unspecified J30.9  Reflux esophagitis K21.0  Benign neoplasm of colon D12.6  Contact dermatitis and other eczema, due to unspecified cause L25.9  Bunion M21.619  
 History of prostate cancer Z85.46  
 Advanced care planning/counseling discussion Z71.89  Low testosterone R79.89  
 Primary osteoarthritis of both knees M17.0  Seropositive rheumatoid arthritis of multiple sites ([facility] M05.79  Long-term use of immunosuppressant medication Z79.899  Vitamin D deficiency E55.9  Recurrent depression ([facility] [address] F33.9  Severe obesity (BMI 35.0-39.9) ([facility] E66.01  
 Gout M10.9 Depression Risk Factor Screening:  
Patient denies feelings of being down, depressed or hopeless at this time. Patient states that they have a strong support system within their family & friends. PHQ over the last two weeks 8/30/2018 Little interest or pleasure in doing things Not at all Feeling down, depressed, irritable, or hopeless Not at all Total Score PHQ 2 0 Trouble falling or staying asleep, or sleeping too much Not at all Feeling tired or having little energy Not at all Poor appetite, weight loss, or overeating Not at all Feeling bad about yourself - or that you are a failure or have let yourself or your family down Not at all Trouble concentrating on things such as school, work, reading, or watching TV Not at all Moving or speaking so slowly that other people could have noticed; or the opposite being so fidgety that others notice Not at all Thoughts of being better off dead, or hurting yourself in some way Not at all PHQ 9 Score 0 How difficult have these problems made it for you to do your work, take care of your home and get along with others Not difficult at all Alcohol Risk Factor Screening: You do not drink alcohol or very rarely. Functional Ability and Level of Safety:  
Hearing Loss Hearing is good. Activities of Daily Living The home contains: no safety equipment. Patient does total self care Patient states that he lives with his wife in a private residence. Patient states independence in all ADLs & denies the use of assistive devices for ambulation. NN encouraged patient to continue and/ or introduce routine physical exercise into their daily routine as applicable & as recommended by PCP. Patient verbalized understanding & agreement to take this into consideration; however, patient states that when his RA is in exacerbation, his physical activity is limited. Patient shares that he enjoys playing golf. Patient's wife present in a supportive manner. ADL Assessment 8/30/2018 Feeding yourself No Help Needed Getting from bed to chair No Help Needed Getting dressed No Help Needed Bathing or showering No Help Needed Walk across the room (includes cane/walker) No Help Needed Using the telphone No Help Needed Taking your medications No Help Needed Preparing meals No Help Needed Managing money (expenses/bills) No Help Needed Moderately strenuous housework (laundry) No Help Needed Shopping for personal items (toiletries/medicines) No Help Needed Shopping for groceries No Help Needed Driving No Help Needed Climbing a flight of stairs No Help Needed Getting to places beyond walking distances No Help Needed Patient denies falls within the past year & verbalizes awareness of fall prevention strategies. Fall Risk Fall Risk Assessment, last 12 mths 8/30/2018 Able to walk? Yes Fall in past 12 months? No  
 
 
Abuse Screen Patient is not abused Abuse Screening Questionnaire 8/30/2018 Do you ever feel afraid of your partner? Natividad Hogue Are you in a relationship with someone who physically or mentally threatens you? Natividad Hogue Is it safe for you to go home? Alice Olsen Cognitive Screening Evaluation of Cognitive Function: 
Has your family/caregiver stated any concerns about your memory: no 
 
Patient Care Team  
Patient Care Team: 
Donovan Duque MD as PCP - General (Internal Medicine) Assessment/Plan Education and counseling provided: 
Are appropriate based on today's review and evaluation End-of-Life planning (with patient's consent) Pneumococcal Vaccine Influenza Vaccine Prostate cancer screening tests (PSA, covered annually) Colorectal cancer screening tests Screening for glaucoma 
tdap & shingles vaccinations Diagnoses and all orders for this visit: 1. Seropositive rheumatoid arthritis of multiple sites ([facility] [address] 2. Mixed hyperlipidemia -     LIPID PANEL 
 
3. IFG (impaired fasting glucose) 
-     HEMOGLOBIN A1C WITH EAG 4. Essential hypertension, benign 5. Gout, unspecified cause, unspecified chronicity, unspecified site -     URIC ACID 6. History of prostate cancer -     PSA W/ REFLX FREE PSA 7. СЕРГЕЙ treated with BiPAP 8. Allergic rhinitis, unspecified seasonality, unspecified trigger AWV Summary: 1. Patient states that a completed Advanced Medical Directive is at home. NN encouraged patient to bring a copy of the completed Advanced Medical Directive to the office for scanning into the medical record. Patient verbalized understanding & agreement. 2. Patient is up to date on the following immunizations: 
Immunization History Administered Date(s) Administered  Influenza High Dose Vaccine PF 10/14/2016  Influenza Vaccine Split 09/21/2011  Influenza Vaccine Whole 09/17/2010  Pneumococcal Conjugate (PCV-13) 01/01/2015  TDAP Vaccine 12/15/2010  Tdap 12/15/2010, 10/04/2017  ZZZ-RETIRED (DO NOT USE) Pneumococcal Vaccine (Unspecified Type) 10/21/2011  Zoster Vaccine, Live 10/21/2011 Patient confirmed the aforementioned preventative immunization dates are correct. Patient's health maintenance immunization record has been updated & is current. NN provided patient with information on the new shingrix vaccine. Patient states that he will research this new vaccine, discuss with his healthcare team & consider getting it at the [facility] in the future. 3. Patient states that he follows his PCP's recommendations for PSA screenings & Colonoscopy screenings (report on file from 2/27/2014 with recommended follow-up screening in 5 years, 2019). Patient confirmed the aforementioned preventative screening dates. 4. Patient was not wearing corrective lenses. Patient reports having a routine eye exam & glaucoma screening within the last year performed by Dr. Yoel Parham at [facility]. A copy of the patient's last eye exam report dated 10/2017 is on file in the patient's medical record. Patient verbalized understanding of all information discussed.  Patient was given the opportunity to ask questions. Medication reconciliation completed by MA/ LPN and reviewed by PCP. Patient provided AVS, either a printed version or electronic version in [employer], which includes Medicare Wellness Preventative Screening Table. Health Maintenance Due Topic Date Due  Influenza Age 5 to Adult  08/01/2018

## 2018-08-30 NOTE — MR AVS SNAPSHOT
[facility] 
 
 
 [address] [place] [address] [place] [address] [facility] 
[phone] Patient: Yobani Willis MRN:  [id number] Visit Information Date & Time Provider Department Dept. Phone Encounter #  
 2018 10:15 AM Danica Hull MD [facility] [address] [facility] [id number] Follow-up Instructions Return in about 6 months (around 2019). Your Appointments 2018 10:20 AM  
ESTABLISHED PATIENT with Jonh Maxwell MD  
[facility] [address]) Appt Note: lab only Dr. Louise Song; . ..  
 [facility] [place] [address]  
[address] [place] [id number] Upcoming Health Maintenance Date Due Influenza Age 5 to Adult 2018 GLAUCOMA SCREENING Q2Y 2019 MEDICARE YEARLY EXAM 2019 COLONOSCOPY 2024 DTaP/Tdap/Td series (3 - Td) 10/4/2027 Allergies as of 2018  Review Complete On: 2144 By: Odella  Wears Severity Noted Reaction Type Reactions Stings [Sting, Bee] Medium 2009   Topical Swelling Swelling at site Current Immunizations  Reviewed on 2018 Name Date Influenza High Dose Vaccine PF 10/14/2016 Influenza Vaccine Split 2011 Influenza Vaccine Whole 2010 Pneumococcal Conjugate (PCV-13) 2015 TDAP Vaccine 12/15/2010 Tdap 12/15/2010 ZZZ-RETIRED (DO NOT USE) Pneumococcal Vaccine (Unspecified Type) 10/21/2011 Zoster Vaccine, Live 10/21/2011 Not reviewed this visit You Were Diagnosed With   
  
 Codes Comments Seropositive rheumatoid arthritis of multiple sites [facility])    -  Primary ICD-10-CM: M05.79 ICD-9-CM: 714.0 Mixed hyperlipidemia     ICD-10-CM: E78.2 ICD-9-CM: 272.2 IFG (impaired fasting glucose)     ICD-10-CM: R73.01 
ICD-9-CM: 790.21 Essential hypertension, benign     ICD-10-CM: I10 
ICD-9-CM: 401.1 Gout, unspecified cause, unspecified chronicity, unspecified site     ICD-10-CM: M10.9 ICD-9-CM: 274.9 History of prostate cancer     ICD-10-CM: Z85.46 
ICD-9-CM: V10.46 СЕРГЕЙ treated with BiPAP     ICD-10-CM: G47.33 
ICD-9-CM: 327.23 Allergic rhinitis, unspecified seasonality, unspecified trigger     ICD-10-CM: J30.9 ICD-9-CM: 477.9 Vitals BP Pulse Temp Resp Height(growth percentile) Weight(growth percentile) 120/64 (BP 1 Location: Left arm, BP Patient Position: Sitting) (!) 50 98.6 °F (37 °C) (Oral) 16 5' 8\" (1.727 m) 235 lb (106.6 kg) SpO2 BMI Smoking Status 91% 35.73 kg/m2 Former Smoker Vitals History BMI and BSA Data Body Mass Index Body Surface Area 35.73 kg/m 2 2.26 m 2 Preferred Pharmacy Pharmacy Name Phone [phone], [phone] [phone] Your Updated Medication List  
  
   
This list is accurate as of 8/30/18 10:54 AM.  Always use your most recent med list.  
  
  
  
  
 0.9% sodium chloride solp 100 mL with golimumab 12.5 mg/mL soln 2 mg/kg 2 mg/kg by IntraVENous route once. allopurinol 300 mg tablet Commonly known as:  [place] Take 1 Tab by mouth daily. amLODIPine 2.5 mg tablet Commonly known as:  [place] Take 1 Tab by mouth daily. aspirin delayed-release 81 mg tablet Take  by mouth daily. atorvastatin 40 mg tablet Commonly known as:  LIPITOR Take 1 Tab by mouth daily. citalopram 20 mg tablet Commonly known as:  [place] Rape Take 1 Tab by mouth daily. CLARITIN 10 mg tablet Generic drug:  loratadine Take 10 mg by mouth.  
  
 ergocalciferol 50,000 unit capsule Commonly known as:  ERGOCALCIFEROL Take 1 Cap by mouth every seven (7) days. FISH OIL 1,000 mg Cap Generic drug:  omega-3 fatty acids-vitamin e Take 1 Cap by mouth. folic acid 1 mg tablet Commonly known as:  Kathie  
 TAKE ONE TABLET BY MOUTH DAILY Iron 325 mg (65 mg iron) tablet Generic drug:  ferrous sulfate Take  by mouth Daily (before breakfast). lisinopril 40 mg tablet Commonly known as:  [place] Hu Take 40 mg by mouth daily. methotrexate 2.5 mg tablet Commonly known as:  RHEUMATREX  
TAKE 8 TABLETS BY MOUTH EVERY WEDNESDAY MUCINEX 1,200 mg Ta12 ER tablet Generic drug:  guaiFENesin Take 1,200 mg by mouth two (2) times a day. omeprazole 20 mg capsule Commonly known as:  PRILOSEC Take 20 mg by mouth daily. VENTOLIN HFA 90 mcg/actuation inhaler Generic drug:  albuterol Take  by inhalation. We Performed the Following HEMOGLOBIN A1C WITH EAG [21258 CPT(R)] LIPID PANEL [06066 CPT(R)] PSA W/ REFLX FREE PSA [59581 CPT(R)] URIC ACID [id number] CPT(R)] Follow-up Instructions Return in about 6 months (around 2/28/2019). To-Do List   
 09/25/2018 11:00 AM  
  Appointment with SS INF1 CH3 <4H at [facility] ([phone])  
  
 11/20/2018 11:00 AM  
  Appointment with SS INF1 CH3 <4H at [facility] ([phone])  
  
 01/15/2019 11:00 AM  
  [facility] with [facility] at [facility] ([phone])  
  
 03/12/2019 11:00 AM  
  Appointment with SS INF1 CH3 <4H at [facility] ([phone]) [facility]! Dear Gerhard Mejia: 
Thank you for requesting a [employer] account. Our records indicate that you already have an active [employer] account. You can access your account anytime at [email or web address] Did you know that you can access your hospital and ER discharge instructions at any time in [employer]? You can also review all of your test results from your hospital stay or ER visit. Additional Information If you have questions, please visit the Frequently Asked Questions section of the [employer] website at [email or web address] Remember, [facility] is NOT to be used for urgent needs. For medical emergencies, dial 911. Now available from your iPhone and Android! Please provide this summary of care documentation to your next provider. Your primary care clinician is listed as Rod Rivera. If you have any questions after today's visit, please call [phone].

## 2018-09-04 ENCOUNTER — OFFICE VISIT (OUTPATIENT)
Dept: RHEUMATOLOGY | Age: 73
End: 2018-09-04

## 2018-09-04 VITALS
SYSTOLIC BLOOD PRESSURE: 145 MMHG | DIASTOLIC BLOOD PRESSURE: 62 MMHG | RESPIRATION RATE: 18 BRPM | HEIGHT: 68 IN | HEART RATE: 49 BPM | WEIGHT: 235 LBS | TEMPERATURE: 98.5 F | BODY MASS INDEX: 35.61 KG/M2

## 2018-09-04 DIAGNOSIS — Z79.60 LONG-TERM USE OF IMMUNOSUPPRESSANT MEDICATION: ICD-10-CM

## 2018-09-04 DIAGNOSIS — E55.9 VITAMIN D DEFICIENCY: ICD-10-CM

## 2018-09-04 DIAGNOSIS — M17.0 PRIMARY OSTEOARTHRITIS OF BOTH KNEES: ICD-10-CM

## 2018-09-04 DIAGNOSIS — M05.79 SEROPOSITIVE RHEUMATOID ARTHRITIS OF MULTIPLE SITES (HCC): Primary | ICD-10-CM

## 2018-09-04 NOTE — PROGRESS NOTES
REASON FOR VISIT    This is a follow-up visit for Mr. Gianna Hernandez for Seropositive Erosive Rheumatoid Arthritis.     Inflammatory arthritis phenotype includes:  Anti-CCP positive: yes (>250)  Rheumatoid factor positive: yes (>650)  Erosive disease: yes  Extra-articular manifestations include: none    Immunosuppression Screening:  Quantiferon TB: negative (11/20/2017)   PPD:  Not performed  Hepatitis B: negative (12/20/2017)  Hepatitis C: negative (12/20/2017)    Therapy History includes:  Current DMARD therapy include: methotrexate 20 mg every Wednesday, Simponi Aria (5/08/2018 to present)  Prior DMARD therapy include: none  Discontinued DMARDs because of inefficacy: None  Discontinued DMARDs because of side effects: None  Unaffordable DMARDs: Enbrel    Immunizations:   Immunization History   Administered Date(s) Administered    Influenza High Dose Vaccine PF 10/14/2016    Influenza Vaccine Split 09/21/2011    Influenza Vaccine Whole 09/17/2010    Pneumococcal Conjugate (PCV-13) 01/01/2015    TDAP Vaccine 12/15/2010    Tdap 12/15/2010, 10/04/2017    ZZZ-RETIRED (DO NOT USE) Pneumococcal Vaccine (Unspecified Type) 10/21/2011    Zoster Vaccine, Live 10/21/2011       Active problems include:    Patient Active Problem List   Diagnosis Code    Mixed hyperlipidemia E78.2    Other abnormal glucose R73.09    Essential hypertension, benign I10    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    Allergic rhinitis, cause unspecified J30.9    Reflux esophagitis K21.0    Benign neoplasm of colon D12.6    Contact dermatitis and other eczema, due to unspecified cause L25.9    Bunion M21.619    History of prostate cancer Z85.46    Advanced care planning/counseling discussion Z71.89    Low testosterone R79.89    Primary osteoarthritis of both knees M17.0    Seropositive rheumatoid arthritis of multiple sites ([address] M05.79    Long-term use of immunosuppressant medication Z79.899    Vitamin D deficiency E55.9    Recurrent depression ([facility] F33.9    Severe obesity (BMI 35.0-39.9) ([facility]) E66.01    Gout M10.9       HISTORY OF PRESENT ILLNESS    Mr. Cedric Wolf returns for a follow-up. On his last visit, with my PA, she continued methotrexate 20 mg every Wednesday and Simponi Aria infusions. His most recent was 7/31/2018. Today, he complains of stiffness in his ankles with some aching pain when he walks that is not present in the morning and resolves with rest. He feels 90% better than he did before. He denies pain, swelling, or stiffness in his hands or wrists. Mr. Cedric Wolf has continued his medications for arthritis and reports good tolerance without significant side effects. Last toxicity monitoring by blood work was done on 6/04/2018 and did not reveal any significant adverse effects, except WBC 11.3. Most recent inflammatory markers from 6/04/2018 revealed a ESR 3 mm/hr (previously 61, 22, 42 mm/hr) and CRP 27.7 mg/L (previously 24.6, 10.7,  N/A mg/L). The patient has not had any interval hospital admissions, infections, or surgeries. REVIEW OF SYSTEMS    A comprehensive review of systems was performed and pertinent results are documented in the HPI, review of systems is otherwise non-contributory. PAST MEDICAL HISTORY    He has a past medical history of Allergic rhinitis; Arthritis; Basal cell carcinoma (BCC) in situ of skin; Cancer [facility]); GERD (gastroesophageal reflux disease); Gout; Hypercholesterolemia; Hypertension; Melanoma in situ of back ([facility] and Skin cancer (melanoma) ([facility] FAMILY HISTORY    His family history includes Cancer in his paternal grandfather and another family member; Dementia in his mother; Diabetes in his paternal grandmother; Heart Disease in his father; Kidney Disease in his father. SOCIAL HISTORY    He reports that he has quit smoking.  He has never used smokeless tobacco. He reports that he drinks about 2.4 oz of alcohol per week  He reports that he does not use illicit drugs.    MEDICATIONS    Current Outpatient Prescriptions   Medication Sig Dispense Refill    0.9% sodium chloride solp 100 mL with golimumab 12.5 mg/mL soln 2 mg/kg 2 mg/kg by IntraVENous route once.  methotrexate (RHEUMATREX) 2.5 mg tablet TAKE 8 TABLETS BY MOUTH EVERY WEDNESDAY 96 Tab 0    folic acid (FOLVITE) 1 mg tablet TAKE ONE TABLET BY MOUTH DAILY 90 Tab 0    ergocalciferol (ERGOCALCIFEROL) 50,000 unit capsule Take 1 Cap by mouth every seven (7) days. 12 Cap 3    lisinopril (PRINIVIL, ZESTRIL) 40 mg tablet Take 40 mg by mouth daily.  aspirin delayed-release 81 mg tablet Take  by mouth daily.  omeprazole (PRILOSEC) 20 mg capsule Take 20 mg by mouth daily.  ferrous sulfate (IRON) 325 mg (65 mg iron) tablet Take  by mouth Daily (before breakfast).  loratadine (CLARITIN) 10 mg tablet Take 10 mg by mouth.  albuterol (VENTOLIN HFA) 90 mcg/actuation inhaler Take  by inhalation.  allopurinol (ZYLOPRIM) 300 mg tablet Take 1 Tab by mouth daily. 30 Tab 11    omega-3 fatty acids-vitamin e (FISH OIL) 1,000 mg Cap Take 1 Cap by mouth.  citalopram (CELEXA) 20 mg tablet Take 1 Tab by mouth daily. 30 Tab 11    atorvastatin (LIPITOR) 40 mg tablet Take 1 Tab by mouth daily. 30 Tab 11    amlodipine (NORVASC) 2.5 mg tablet Take 1 Tab by mouth daily. 30 Tab 11        ALLERGIES    Allergies   Allergen Reactions    Stings [Sting, Bee] Swelling     Swelling at site       PHYSICAL EXAMINATION    Visit Vitals    /62    Pulse (!) 49    Temp 98.5 °F (36.9 °C)    Resp 18    Ht 5' 8\" (1.727 m)    Wt 235 lb (106.6 kg)    BMI 35.73 kg/m2     Body mass index is 35.73 kg/(m^2). General: Patient is alert, oriented x 3, not in acute distress     HEENT:   Sclerae are not injected and appear moist.  There is no alopecia. Neck is supple     Cardiovascular:  Heart is regular rate and rhythm, no murmurs. Chest:  Lungs are clear to auscultation bilaterally.  No rhonchi, wheezes, or crackles. Abdomen:  Obese. Extremities:  Free of clubbing, cyanosis, edema    Neurological exam:  No focal sensory deficits, muscle strength is full in upper and lower extremities     Skin exam:  There are no rashes, no alopecia, no discoid lesions, no active Raynaud's, no livedo reticularis, no periungual erythema. Musculoskeletal exam:  A comprehensive musculoskeletal exam was performed for all joints of each upper and lower extremity and assessed for swelling, tenderness and range of motion. Positive results are documented as below:    Decreased active and passive ROM of left shoulder  Bilateral Olivia and Heberden nodes. Bilateral knee crepitus without effusion.   Right ankle swelling with tenderness with decreased ROM  Pes planus   Bbilateral hallux valgus    Z-Deformities:   no  Henley Neck Deformities:  no  Boutonierre's Deformities:  no  Ulnar Deviation:   Yes (bilateral)  MCP Subluxation:  no     Joint Count 9/4/2018 6/4/2018 3/7/2018 12/20/2017 11/20/2017   Patient pain (0-100) 15 0 60 30 90   MHAQ 0 0 0.25 0.125 0.375   Left elbow - Tender - - - 1 -   Left wrist- Tender 1 0 1 1 1   Left wrist- Swollen 1 1 1 1 1   Left 1st MCP - Tender - - 1 - 1   Left 1st MCP - Swollen 1 - 1 1 1   Left 2nd MCP - Tender - - 1 1 1   Left 2nd MCP - Swollen 1 1 1 1 1   Left 3rd MCP - Tender - - 1 - 1   Left 3rd MCP - Swollen 1 1 1 1 1   Left 4th MCP - Tender - - 1 - -   Left 5th MCP - Tender - - 1 - -   Left thumb IP - Tender - - 1 - -   Left thumb IP - Swollen - - 1 - -   Left 2nd PIP - Tender - - 1 - -   Left 3rd PIP - Tender - - 1 - -   Left 4th PIP - Tender - - 1 - -   Left 5th PIP - Tender - - 1 - -   Right wrist- Tender - - 1 1 1   Right wrist- Swollen 1 1 1 1 1   Right 1st MCP - Tender - - 1 1 1   Right 1st MCP - Swollen 1 1 1 - 1   Right 2nd MCP - Tender - - 1 1 1   Right 2nd MCP - Swollen 1 1 1 1 1   Right 3rd MCP - Tender - - 1 - -   Right 3rd MCP - Swollen 1 1 1 1 1   Right 4th MCP - Tender - - 1 1 1 Right 4th MCP - Swollen - 1 1 1 1   Right 5th MCP - Swollen - - 1 - -   Right thumb IP - Tender - - - 1 -   Right 2nd PIP - Tender - - - 1 1   Right 2nd PIP - Swollen - - 1 - 1   Right 3rd PIP - Tender - - - 1 1   Right 3rd PIP - Swollen - 1 1 1 1   Right 4th PIP - Swollen - 1 1 - -   Right knee - Tender - - - - 1   Right knee - Swollen - - - - 1   Tender Joint Count (Total) 1 0 16 10 11   Swollen Joint Count (Total) 8 10 14 9 12   Physician Assessment (0-10) 2 2 4 3 5   Patient Assessment (0-10) 1.5 0 6 5 9   CDAI Total (calculated) 12.5 12 40 27 40       DATA REVIEW    Laboratory     Recent laboratory results were reviewed, summarized, and discussed with the patient. Imaging    Musculoskeletal Ultrasound    None    Radiographs    Bilateral Hand 11/20/2017: RIGHT: No fracture or dislocation on plain film. The bones are osteopenic. Mild narrowing of the radiocarpal joint. Probable erosion of the radius and lunate at the radiocarpal joint. No widening of the intercarpal spaces. Subtle erosion of the ulnar styloid. Mild narrowing of the triscaphe joint is age-appropriate. Irregular arthritis of the first and second CMC joints includes osteophytes and erosions. There are erosions of the first through fourth metacarpal heads at the MCP points. Moderate narrowing and anterior subluxation of the second MCP joint. IP joints are within normal limits. No chondrocalcinosis or periosteal reaction. LEFT: No fracture or dislocation on plain film. The bones are osteopenic. Moderate narrowing of the radiocarpal joint with subtle erosions of the lunate. Marrow DR UVJ with erosions. No definite ulnar styloid erosion. Triscaphe and first [address] 16 joint osteoarthritis are age-appropriate. Large erosions on both sides of the second MCP joint and in the third metacarpal head. IP joints are within normal limits. No chondrocalcinosis or periosteal reaction. Bilateral Foot 11/20/2017: RIGHT: No fracture or dislocation on plain film. Bones are osteopenic. Talonavicular joint space narrowing, osteophytes, and erosions. Subtalar arthritis is partially imaged. TMT joints are within normal limits. Severe narrowing of the first MTP joint with osteophytes and erosions. There is erosion and the fifth tarsal head. Mild narrowing of the second MTP joint without definable erosion. No periosteal reaction. LEFT: No fracture or dislocation on plain film. Bones are osteopenic. Intertarsal joints are within normal limits. Moderate narrowing of the first MTP joint with central and marginal erosions. 35 degrees hallux valgus angulation and fibular subluxation of the first proximal phalanx. There are also erosions in the laterally subluxed hallux sesamoids. Mild joint space narrowing and erosions of the third MTP joint. Fragmentation of the fourth middle phalanx is adjacent to the fourth PIP joint erosions. There are also erosions of the second and fifth PIP joints. First IP joint osteoarthritis is mild. CT Imaging    CT Right Upper Extremity with contrast 10/19/2017: examination of the soft tissues demonstrates no drainable fluid collection. There is no pathologically enlarged nodes within the right axilla. Alignment is normal. There is no bone destruction or fracture. No significant fatty atrophy. MR Imaging    None    DXA     None    ASSESSMENT AND PLAN    This is a follow-up visit for Mr. Zeke Rodriguez. 1) Seropositive Erosive Rheumatoid Arthritis. He presented after activation of underlying Rheumatoid Arthritis which appears to have been quiescent for more than 30 years from the TDAP vaccine. He remembers being diagnosed with arthritis in his 30's that was treated with an injection in his finger. He has bilateral ulnar deviation which corresponds with the chronicity of his disease. Radiographs showed erosive disease confirming chronicity.      He has been on methotrexate 20 mg every Wednesday, but due to persistently active disease, Juma Lackey was added. He had his first infusion on 5/08/2018. He denies joint pain, stiffness or swelling today. His CDAI was 12.5 (previously 12, 40, 27, 37) with 1 tender and 8 swollen joints, consistent with moderate disease activity. He has had excellent clinical response with Simponi Aria. I will continue current therapy. Labs today. Follow up in 3 months. 2) Long Term Use of Immunosuppressants. The patient remains on immunomodulatory medications (methotrexate, Simponi Aria) and requires frequent toxicity monitoring by blood work. Respective labs were ordered (CBC and CMP). 3) Vitamin D Deficiency. His vitamin D level was 27.7 (previously 22.6). He is on weekly ergocalciferol 50,000. 4) Bilateral Knee Osteoarthritis. This was not an active issue today. 5) Basal Cell Carcinoma. Lesion removed on his scalp 5/31/2018. Wound without secondary infection. He is scheduled to have 2 more lesions of basal cell removed from left chest and his back. 6) History of Melanoma. He had melanoma removed from his back 10/2017. He did not require chemotherapy. He has skin exams every 3 months. He has reviewed his medications, including Hazle Rolling, with his dermatologist. He understands to continue close surveillance and inform us if he has any new melanoma lesions. The patient voiced understanding of the aforementioned assessment and plan. Summary of plan was provided in the After Visit Summary patient instructions. TODAY'S ORDERS    No orders of the defined types were placed in this encounter.     Future Appointments  Date Time Provider Raiza Sanchez   9/25/2018 11:00 AM SS INF1 CH3 <4H [facility]   11/20/2018 11:00 AM SS INF1 CH3 <4H [facility]   12/3/2018 10:20 AM Tan Rojas MD [facility]   1/15/2019 11:00 AM SS INF1 CH3 <4H [facility]   3/12/2019 11:00 AM SS INF1 CH3 <4H Efrain Mtz MD, [facility]    Adult Rheumatology   Rheumatology Ultrasound Certified  [address]  [place], [address]   Phone [phone]  Fax [phone]

## 2018-09-04 NOTE — MR AVS SNAPSHOT
[address] [facility] [id number] [phone] Patient: Oni Shields MRN:  [id number] Visit Information Date & Time Provider Department Dept. Phone Encounter #  
 2018 10:20 AM Holger Raza [place] [facility] [phone] [id number] Follow-up Instructions Return in about 3 months (around 2018). Upcoming Health Maintenance Date Due Influenza Age 5 to Adult 2018 GLAUCOMA SCREENING Q2Y 2019 MEDICARE YEARLY EXAM 2019 COLONOSCOPY 2024 DTaP/Tdap/Td series (3 - Td) 10/4/2027 Allergies as of 2018  Review Complete On: 2018 By: Holger Raza MD  
  
 Severity Noted Reaction Type Reactions Stings [Sting, Bee] Medium 2009   Topical Swelling Swelling at site Current Immunizations  Reviewed on 2018 Name Date Influenza High Dose Vaccine PF 10/14/2016 Influenza Vaccine Split 2011 Influenza Vaccine Whole 2010 Pneumococcal Conjugate (PCV-13) 2015 TDAP Vaccine 12/15/2010 Tdap 10/4/2017, 12/15/2010 ZZZ-RETIRED (DO NOT USE) Pneumococcal Vaccine (Unspecified Type) 10/21/2011 Zoster Vaccine, Live 10/21/2011 Not reviewed this visit Vitals BP Pulse Temp Resp Height(growth percentile) Weight(growth percentile) 145/62 (!) 49 98.5 °F (36.9 °C) 18 5' 8\" (1.727 m) 235 lb (106.6 kg) BMI Smoking Status 35.73 kg/m2 Former Smoker Vitals History BMI and BSA Data Body Mass Index Body Surface Area 35.73 kg/m 2 2.26 m 2 Preferred Pharmacy Pharmacy Name Phone [facility], [address] [phone] Your Updated Medication List  
  
   
This list is accurate as of 18 10:33 AM.  Always use your most recent med list.  
  
  
  
  
 0.9% sodium chloride solp 100 mL with golimumab 12.5 mg/mL soln 2 mg/kg 2 mg/kg by IntraVENous route once. allopurinol 300 mg tablet Commonly known as:  Gaylan Elders Take 1 Tab by mouth daily. amLODIPine 2.5 mg tablet Commonly known as:  Alysa Darting Take 1 Tab by mouth daily. aspirin delayed-release 81 mg tablet Take  by mouth daily. atorvastatin 40 mg tablet Commonly known as:  LIPITOR Take 1 Tab by mouth daily. citalopram 20 mg tablet Commonly known as:  Harvey Gola Take 1 Tab by mouth daily. CLARITIN 10 mg tablet Generic drug:  loratadine Take 10 mg by mouth.  
  
 ergocalciferol 50,000 unit capsule Commonly known as:  ERGOCALCIFEROL Take 1 Cap by mouth every seven (7) days. FISH OIL 1,000 mg Cap Generic drug:  omega-3 fatty acids-vitamin e Take 1 Cap by mouth. folic acid 1 mg tablet Commonly known as:  FOLVITE  
TAKE ONE TABLET BY MOUTH DAILY Iron 325 mg (65 mg iron) tablet Generic drug:  ferrous sulfate Take  by mouth Daily (before breakfast). lisinopril 40 mg tablet Commonly known as:  Marilynne Shows Take 40 mg by mouth daily. methotrexate 2.5 mg tablet Commonly known as:  RHEUMATREX  
TAKE 8 TABLETS BY MOUTH EVERY WEDNESDAY  
  
 omeprazole 20 mg capsule Commonly known as:  PRILOSEC Take 20 mg by mouth daily. VENTOLIN HFA 90 mcg/actuation inhaler Generic drug:  albuterol Take  by inhalation. Follow-up Instructions Return in about 3 months (around 12/4/2018). To-Do List   
 09/25/2018 11:00 AM  
  Appointment with SS INF1 CH3 <4H at [facility] ([phone])  
  
 11/20/2018 11:00 AM  
  Appointment with SS INF1 CH3 <4H at [facility] ([phone])  
  
 01/15/2019 11:00 AM  
  Appointment with Lauren <4H at [facility] ([phone])  
  
 03/12/2019 11:00 AM  
  Appointment with SS INF1 CH3 <4H at [facility] ([phone]) Introducing [facility]! Dear Dontae Berger: 
Thank you for requesting a Checkout[employer] account. Our records indicate that you already have an active [email or web address] account. You can access your account anytime at [email or web address] Did you know that you can access your hospital and ER discharge instructions at any time in Checkout[employer]? You can also review all of your test results from your hospital stay or ER visit. Additional Information If you have questions, please visit the Frequently Asked Questions section of the [email or web address] website at [email or web address] Remember, Checkout10 is NOT to be used for urgent needs. For medical emergencies, dial 911. Now available from your iPhone and Android! Please provide this summary of care documentation to your next provider. Your primary care clinician is listed as Zara Tan. If you have any questions after today's visit, please call [phone].

## 2018-09-06 ENCOUNTER — TELEPHONE (OUTPATIENT)
Dept: INTERNAL MEDICINE CLINIC | Age: 73
End: 2018-09-06

## 2018-09-06 NOTE — TELEPHONE ENCOUNTER
[facility] with [facility] in [place] is requesting patient's last office visit note be faxed to them at [phone]

## 2018-09-19 RX ORDER — ACETAMINOPHEN 325 MG/1
650 TABLET ORAL ONCE
Status: COMPLETED | OUTPATIENT
Start: 2018-09-25 | End: 2018-09-25

## 2018-09-19 RX ORDER — DIPHENHYDRAMINE HYDROCHLORIDE 50 MG/ML
25 INJECTION, SOLUTION INTRAMUSCULAR; INTRAVENOUS ONCE
Status: COMPLETED | OUTPATIENT
Start: 2018-09-25 | End: 2018-09-25

## 2018-09-25 ENCOUNTER — HOSPITAL ENCOUNTER (OUTPATIENT)
Dept: INFUSION THERAPY | Age: 73
Discharge: HOME | End: 2018-09-25
Payer: MEDICARE

## 2018-09-25 ENCOUNTER — APPOINTMENT (OUTPATIENT)
Dept: INFUSION THERAPY | Age: 73
End: 2018-09-25
Payer: MEDICARE

## 2018-09-25 VITALS — TEMPERATURE: 97.1 F | SYSTOLIC BLOOD PRESSURE: 162 MMHG | DIASTOLIC BLOOD PRESSURE: 72 MMHG | HEART RATE: 49 BPM

## 2018-09-25 LAB
ALBUMIN SERPL-MCNC: 3.6 G/DL (ref 3.5–5)
ALBUMIN/GLOB SERPL: 1.1 {RATIO} (ref 1.1–2.2)
ALP SERPL-CCNC: 81 U/L (ref 45–117)
ALT SERPL-CCNC: 51 U/L (ref 12–78)
ANION GAP SERPL CALC-SCNC: 11 MMOL/L (ref 5–15)
AST SERPL-CCNC: 9 U/L (ref 15–37)
BASOPHILS # BLD: 0 K/UL (ref 0–0.1)
BASOPHILS NFR BLD: 0 % (ref 0–1)
BILIRUB SERPL-MCNC: 0.5 MG/DL (ref 0.2–1)
BUN SERPL-MCNC: 16 MG/DL (ref 6–20)
BUN/CREAT SERPL: 19 (ref 12–20)
CALCIUM SERPL-MCNC: 8.8 MG/DL (ref 8.5–10.1)
CHLORIDE SERPL-SCNC: 105 MMOL/L (ref 97–108)
CHOLEST SERPL-MCNC: 172 MG/DL
CO2 SERPL-SCNC: 23 MMOL/L (ref 21–32)
CREAT SERPL-MCNC: 0.85 MG/DL (ref 0.7–1.3)
CRP SERPL-MCNC: <0.29 MG/DL
DIFFERENTIAL METHOD BLD: ABNORMAL
EOSINOPHIL # BLD: 0.3 K/UL (ref 0–0.4)
EOSINOPHIL NFR BLD: 4 % (ref 0–7)
ERYTHROCYTE [DISTWIDTH] IN BLOOD BY AUTOMATED COUNT: 12.6 % (ref 11.5–14.5)
ERYTHROCYTE [SEDIMENTATION RATE] IN BLOOD: 15 MM/HR (ref 0–20)
EST. AVERAGE GLUCOSE BLD GHB EST-MCNC: 105 MG/DL
GLOBULIN SER CALC-MCNC: 3.2 G/DL (ref 2–4)
GLUCOSE SERPL-MCNC: 103 MG/DL (ref 65–100)
HBA1C MFR BLD: 5.3 % (ref 4.2–6.3)
HCT VFR BLD AUTO: 39.2 % (ref 36.6–50.3)
HDLC SERPL-MCNC: 42 MG/DL
HDLC SERPL: 4.1 {RATIO} (ref 0–5)
HGB BLD-MCNC: 13.3 G/DL (ref 12.1–17)
IMM GRANULOCYTES # BLD: 0 K/UL (ref 0–0.04)
IMM GRANULOCYTES NFR BLD AUTO: 0 % (ref 0–0.5)
LDLC SERPL CALC-MCNC: 74.8 MG/DL (ref 0–100)
LIPID PROFILE,FLP: ABNORMAL
LYMPHOCYTES # BLD: 1.7 K/UL (ref 0.8–3.5)
LYMPHOCYTES NFR BLD: 21 % (ref 12–49)
MCH RBC QN AUTO: 34.8 PG (ref 26–34)
MCHC RBC AUTO-ENTMCNC: 33.9 G/DL (ref 30–36.5)
MCV RBC AUTO: 102.6 FL (ref 80–99)
MONOCYTES # BLD: 0.7 K/UL (ref 0–1)
MONOCYTES NFR BLD: 10 % (ref 5–13)
NEUTS SEG # BLD: 5 K/UL (ref 1.8–8)
NEUTS SEG NFR BLD: 64 % (ref 32–75)
NRBC # BLD: 0 K/UL (ref 0–0.01)
NRBC BLD-RTO: 0 PER 100 WBC
PLATELET # BLD AUTO: 209 K/UL (ref 150–400)
PMV BLD AUTO: 10.9 FL (ref 8.9–12.9)
POTASSIUM SERPL-SCNC: 3.7 MMOL/L (ref 3.5–5.1)
PROT SERPL-MCNC: 6.8 G/DL (ref 6.4–8.2)
RBC # BLD AUTO: 3.82 M/UL (ref 4.1–5.7)
SODIUM SERPL-SCNC: 139 MMOL/L (ref 136–145)
TRIGL SERPL-MCNC: 276 MG/DL (ref ?–150)
URATE SERPL-MCNC: 4.9 MG/DL (ref 3.5–7.2)
VLDLC SERPL CALC-MCNC: 55.2 MG/DL
WBC # BLD AUTO: 7.8 K/UL (ref 4.1–11.1)

## 2018-09-25 PROCEDURE — 36415 COLL VENOUS BLD VENIPUNCTURE: CPT | Performed by: INTERNAL MEDICINE

## 2018-09-25 PROCEDURE — 84550 ASSAY OF BLOOD/URIC ACID: CPT | Performed by: INTERNAL MEDICINE

## 2018-09-25 PROCEDURE — 74011000258 HC RX REV CODE- 258: Performed by: INTERNAL MEDICINE

## 2018-09-25 PROCEDURE — 84153 ASSAY OF PSA TOTAL: CPT | Performed by: INTERNAL MEDICINE

## 2018-09-25 PROCEDURE — 96413 CHEMO IV INFUSION 1 HR: CPT

## 2018-09-25 PROCEDURE — 83036 HEMOGLOBIN GLYCOSYLATED A1C: CPT | Performed by: INTERNAL MEDICINE

## 2018-09-25 PROCEDURE — 85025 COMPLETE CBC W/AUTO DIFF WBC: CPT | Performed by: INTERNAL MEDICINE

## 2018-09-25 PROCEDURE — 80061 LIPID PANEL: CPT | Performed by: INTERNAL MEDICINE

## 2018-09-25 PROCEDURE — 85652 RBC SED RATE AUTOMATED: CPT | Performed by: INTERNAL MEDICINE

## 2018-09-25 PROCEDURE — 96375 TX/PRO/DX INJ NEW DRUG ADDON: CPT

## 2018-09-25 PROCEDURE — 80053 COMPREHEN METABOLIC PANEL: CPT | Performed by: INTERNAL MEDICINE

## 2018-09-25 PROCEDURE — 86140 C-REACTIVE PROTEIN: CPT | Performed by: INTERNAL MEDICINE

## 2018-09-25 PROCEDURE — 74011250636 HC RX REV CODE- 250/636: Performed by: INTERNAL MEDICINE

## 2018-09-25 PROCEDURE — 74011250637 HC RX REV CODE- 250/637: Performed by: INTERNAL MEDICINE

## 2018-09-25 RX ADMIN — DIPHENHYDRAMINE HYDROCHLORIDE 25 MG: 50 INJECTION INTRAMUSCULAR; INTRAVENOUS at 13:04

## 2018-09-25 RX ADMIN — GOLIMUMAB 200 MG: 50 SOLUTION INTRAVENOUS at 14:00

## 2018-09-25 RX ADMIN — ACETAMINOPHEN 650 MG: 325 TABLET ORAL at 13:08

## 2018-09-25 RX ADMIN — METHYLPREDNISOLONE SODIUM SUCCINATE 125 MG: 125 INJECTION, POWDER, FOR SOLUTION INTRAMUSCULAR; INTRAVENOUS at 13:06

## 2018-09-25 NOTE — PROGRESS NOTES
Outpatient Infusion Center Short Visit Progress Note 1100 Patient admitted to [facility] for [address] [facility] ambulatory in stable condition. Assessment completed. No new concerns voiced. PIV #24 in right [facility] x1 attempt. Labs drawn. Patient Vitals for the past 12 hrs: 
 Temp Pulse BP  
09/25/18 1453 97.1 °F (36.2 °C) (!) 49 162/72  
09/25/18 1052 98.3 °F (36.8 °C) (!) 52 145/82 Recent Results (from the past 12 hour(s)) CBC WITH AUTOMATED DIFF Collection Time: 09/25/18 11:10 AM  
Result Value Ref Range WBC 7.8 4.1 - 11.1 K/uL  
 RBC 3.82 (L) 4.10 - 5.70 M/uL  
 HGB 13.3 12.1 - 17.0 g/dL HCT 39.2 36.6 - 50.3 % .6 (H) 80.0 - 99.0 FL  
 MCH 34.8 (H) 26.0 - 34.0 PG  
 MCHC 33.9 30.0 - 36.5 g/dL  
 RDW 12.6 11.5 - 14.5 % PLATELET 866 168 - 513 K/uL MPV 10.9 8.9 - 12.9 FL  
 NRBC 0.0 0  WBC ABSOLUTE NRBC 0.00 0.00 - 0.01 K/uL NEUTROPHILS 64 32 - 75 % LYMPHOCYTES 21 12 - 49 % MONOCYTES 10 5 - 13 % EOSINOPHILS 4 0 - 7 % BASOPHILS 0 0 - 1 % IMMATURE GRANULOCYTES 0 0.0 - 0.5 % ABS. NEUTROPHILS 5.0 1.8 - 8.0 K/UL  
 ABS. LYMPHOCYTES 1.7 0.8 - 3.5 K/UL  
 ABS. MONOCYTES 0.7 0.0 - 1.0 K/UL  
 ABS. EOSINOPHILS 0.3 0.0 - 0.4 K/UL  
 ABS. BASOPHILS 0.0 0.0 - 0.1 K/UL  
 ABS. IMM. GRANS. 0.0 0.00 - 0.04 K/UL  
 DF AUTOMATED    
SED RATE (ESR) Collection Time: 09/25/18 11:10 AM  
Result Value Ref Range Sed rate, automated 15 0 - 20 mm/hr METABOLIC PANEL, COMPREHENSIVE Collection Time: 09/25/18 11:10 AM  
Result Value Ref Range Sodium 139 136 - 145 mmol/L Potassium 3.7 3.5 - 5.1 mmol/L Chloride 105 97 - 108 mmol/L  
 CO2 23 21 - 32 mmol/L Anion gap 11 5 - 15 mmol/L Glucose 103 (H) 65 - 100 mg/dL BUN 16 6 - 20 MG/DL Creatinine 0.85 0.70 - 1.30 MG/DL  
 BUN/Creatinine ratio 19 12 - 20 GFR est AA >60 >60 ml/min/1.73m2 GFR est non-AA >60 >60 ml/min/1.73m2 Calcium 8.8 8.5 - 10.1 MG/DL  Bilirubin, total 0.5 0.2 - 1.0 MG/DL  
 ALT (SGPT) 51 12 - 78 U/L  
 AST (SGOT) 9 (L) 15 - 37 U/L Alk. phosphatase 81 45 - 117 U/L Protein, total 6.8 6.4 - 8.2 g/dL Albumin 3.6 3.5 - 5.0 g/dL Globulin 3.2 2.0 - 4.0 g/dL A-G Ratio 1.1 1.1 - 2.2 C REACTIVE PROTEIN, QT Collection Time: 09/25/18 11:10 AM  
Result Value Ref Range C-Reactive protein <0.29 <0.60 mg/dL URIC ACID Collection Time: 09/25/18 11:10 AM  
Result Value Ref Range Uric acid 4.9 3.5 - 7.2 MG/DL  
LIPID PANEL Collection Time: 09/25/18 11:10 AM  
Result Value Ref Range LIPID PROFILE Cholesterol, total 172 <200 MG/DL Triglyceride 276 (H) <150 MG/DL  
 HDL Cholesterol 42 MG/DL  
 LDL, calculated 74.8 0 - 100 MG/DL VLDL, calculated 55.2 MG/DL  
 CHOL/HDL Ratio 4.1 0 - 5.0 HEMOGLOBIN A1C WITH EAG Collection Time: 09/25/18 11:10 AM  
Result Value Ref Range Hemoglobin A1c 5.3 4.2 - 6.3 % Est. average glucose 105 mg/dL Medications: 
IV NS 
IV Simponi IV SoluMedrol PO Tylenol IV Benadryl 1455 Patient tolerated treatment well. Patient discharged from [facility] ambulatory in no distress. IV to right AC removed. Patient aware of next appointment scheduled for 11/20 @ 1100.

## 2018-09-26 LAB
PSA SERPL-MCNC: <0.1 NG/ML (ref 0–4)
REFLEX CRITERIA: NORMAL

## 2018-10-01 DIAGNOSIS — M06.9 RHEUMATOID ARTHRITIS WITH UNKNOWN RHEUMATOID FACTOR STATUS (HCC): ICD-10-CM

## 2018-10-01 RX ORDER — METHOTREXATE 2.5 MG/1
TABLET ORAL
Qty: 96 TAB | Refills: 0 | Status: SHIPPED | OUTPATIENT
Start: 2018-10-01 | End: 2019-03-04 | Stop reason: SDUPTHER

## 2018-11-08 DIAGNOSIS — M06.9 RHEUMATOID ARTHRITIS WITH UNKNOWN RHEUMATOID FACTOR STATUS (HCC): ICD-10-CM

## 2018-11-08 RX ORDER — FOLIC ACID 1 MG/1
TABLET ORAL
Qty: 90 TAB | Refills: 0 | Status: SHIPPED | OUTPATIENT
Start: 2018-11-08 | End: 2018-12-03 | Stop reason: SDUPTHER

## 2018-11-14 RX ORDER — DIPHENHYDRAMINE HCL 25 MG
25 CAPSULE ORAL ONCE
Status: COMPLETED | OUTPATIENT
Start: 2018-11-20 | End: 2018-11-20

## 2018-11-14 RX ORDER — ACETAMINOPHEN 325 MG/1
650 TABLET ORAL ONCE
Status: COMPLETED | OUTPATIENT
Start: 2018-11-20 | End: 2018-11-20

## 2018-11-20 ENCOUNTER — APPOINTMENT (OUTPATIENT)
Dept: INFUSION THERAPY | Age: 73
End: 2018-11-20
Payer: MEDICARE

## 2018-11-20 ENCOUNTER — HOSPITAL ENCOUNTER (OUTPATIENT)
Dept: INFUSION THERAPY | Age: 73
Discharge: HOME | End: 2018-11-20
Payer: MEDICARE

## 2018-11-20 VITALS
DIASTOLIC BLOOD PRESSURE: 70 MMHG | SYSTOLIC BLOOD PRESSURE: 154 MMHG | WEIGHT: 236 LBS | HEART RATE: 48 BPM | TEMPERATURE: 97.1 F | BODY MASS INDEX: 35.88 KG/M2

## 2018-11-20 PROCEDURE — 74011250637 HC RX REV CODE- 250/637: Performed by: INTERNAL MEDICINE

## 2018-11-20 PROCEDURE — 74011250636 HC RX REV CODE- 250/636: Performed by: INTERNAL MEDICINE

## 2018-11-20 PROCEDURE — 74011000258 HC RX REV CODE- 258: Performed by: INTERNAL MEDICINE

## 2018-11-20 PROCEDURE — 96365 THER/PROPH/DIAG IV INF INIT: CPT

## 2018-11-20 PROCEDURE — 96375 TX/PRO/DX INJ NEW DRUG ADDON: CPT

## 2018-11-20 RX ADMIN — DIPHENHYDRAMINE HYDROCHLORIDE 25 MG: 25 CAPSULE ORAL at 11:27

## 2018-11-20 RX ADMIN — ACETAMINOPHEN 650 MG: 325 TABLET ORAL at 11:27

## 2018-11-20 RX ADMIN — METHYLPREDNISOLONE SODIUM SUCCINATE 125 MG: 125 INJECTION, POWDER, FOR SOLUTION INTRAMUSCULAR; INTRAVENOUS at 11:28

## 2018-11-20 RX ADMIN — GOLIMUMAB 200 MG: 50 SOLUTION INTRAVENOUS at 12:15

## 2018-11-20 NOTE — PROGRESS NOTES
\[facility] [place] [facility]\"" VISIT NOTE 
 
1100 Pt arrived at [facility] ambulatory and in no distress for Simponi Aria Infusion. Assessment completed, no new complaints at this time. 24 G PIV placed in left AC, flushes easily with positive blood return. Medications received: 
Tylenol PO Benadryl PO Solumedrol IVP Simponi IV Patient Vitals for the past 12 hrs: 
 Temp Pulse BP  
11/20/18 1250 97.1 °F (36.2 °C) (!) 48 154/70  
11/20/18 1119 98.1 °F (36.7 °C) (!) 52 163/66 Tolerated treatment well, no adverse reaction noted. PIV flushed and removed. [address] D/C'd from [facility] ambulatory and in no distress accompanied by his wife. Next appointment is 1/15/18 at 1100.

## 2018-12-03 ENCOUNTER — OFFICE VISIT (OUTPATIENT)
Dept: RHEUMATOLOGY | Age: 73
End: 2018-12-03

## 2018-12-03 ENCOUNTER — HOSPITAL ENCOUNTER (OUTPATIENT)
Dept: LAB | Age: 73
Discharge: HOME | End: 2018-12-03
Payer: MEDICARE

## 2018-12-03 VITALS
WEIGHT: 235 LBS | HEART RATE: 63 BPM | SYSTOLIC BLOOD PRESSURE: 154 MMHG | DIASTOLIC BLOOD PRESSURE: 72 MMHG | HEIGHT: 68 IN | BODY MASS INDEX: 35.61 KG/M2 | RESPIRATION RATE: 18 BRPM | TEMPERATURE: 98.1 F

## 2018-12-03 DIAGNOSIS — M21.42 PES PLANUS OF BOTH FEET: ICD-10-CM

## 2018-12-03 DIAGNOSIS — E55.9 VITAMIN D DEFICIENCY: ICD-10-CM

## 2018-12-03 DIAGNOSIS — M1A.0720 CHRONIC IDIOPATHIC GOUT INVOLVING TOE OF LEFT FOOT WITHOUT TOPHUS: ICD-10-CM

## 2018-12-03 DIAGNOSIS — Z79.60 LONG-TERM USE OF IMMUNOSUPPRESSANT MEDICATION: ICD-10-CM

## 2018-12-03 DIAGNOSIS — M21.41 PES PLANUS OF BOTH FEET: ICD-10-CM

## 2018-12-03 DIAGNOSIS — M05.79 SEROPOSITIVE RHEUMATOID ARTHRITIS OF MULTIPLE SITES (HCC): Primary | ICD-10-CM

## 2018-12-03 PROCEDURE — 86140 C-REACTIVE PROTEIN: CPT

## 2018-12-03 PROCEDURE — 86803 HEPATITIS C AB TEST: CPT

## 2018-12-03 PROCEDURE — 85025 COMPLETE CBC W/AUTO DIFF WBC: CPT

## 2018-12-03 PROCEDURE — 85651 RBC SED RATE NONAUTOMATED: CPT

## 2018-12-03 PROCEDURE — 82306 VITAMIN D 25 HYDROXY: CPT

## 2018-12-03 PROCEDURE — 86480 TB TEST CELL IMMUN MEASURE: CPT

## 2018-12-03 PROCEDURE — 80053 COMPREHEN METABOLIC PANEL: CPT

## 2018-12-03 PROCEDURE — 36415 COLL VENOUS BLD VENIPUNCTURE: CPT

## 2018-12-03 NOTE — PROGRESS NOTES
REASON FOR VISIT This is a follow-up visit for Mr. Naman Berger for Seropositive Erosive Rheumatoid Arthritis. Inflammatory arthritis phenotype includes: Anti-CCP positive: yes (>250) Rheumatoid factor positive: yes (>650) Erosive disease: yes Extra-articular manifestations include: none Immunosuppression Screening: 
Quantiferon TB: negative (11/20/2017) PPD:  Not performed Hepatitis B: negative (12/20/2017) Hepatitis C: negative (12/20/2017) Therapy History includes: 
Current DMARD therapy include: methotrexate 20 mg every Wednesday, Simponi Aria (5/08/2018 to present) Prior DMARD therapy include: none Discontinued DMARDs because of inefficacy: None Discontinued DMARDs because of side effects: None Unaffordable DMARDs: Enbrel Immunizations:  
Immunization History Administered Date(s) Administered  Influenza High Dose Vaccine PF 10/14/2016  Influenza Vaccine Split 09/21/2011  Influenza Vaccine Whole 09/17/2010  Pneumococcal Conjugate (PCV-13) 01/01/2015  TDAP Vaccine 12/15/2010  Tdap 12/15/2010, 10/04/2017  ZZZ-RETIRED (DO NOT USE) Pneumococcal Vaccine (Unspecified Type) 10/21/2011  Zoster Vaccine, Live 10/21/2011 Active problems include: 
 
Patient Active Problem List  
Diagnosis Code  Mixed hyperlipidemia E78.2  Other abnormal glucose R73.09  
 Essential hypertension, benign I10  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  Allergic rhinitis, cause unspecified J30.9  Reflux esophagitis K21.0  Benign neoplasm of colon D12.6  Contact dermatitis and other eczema, due to unspecified cause L25.9  Bunion M21.619  
 History of prostate cancer Z85.46  
 Advanced care planning/counseling discussion Z71.89  Low testosterone R79.89  
 Primary osteoarthritis of both knees M17.0  Seropositive rheumatoid arthritis of multiple sites ([facility] [address] M05.79  Long-term use of immunosuppressant medication Z79.899  Vitamin D deficiency E55.9  Recurrent depression ([facility] [address] F33.9  Severe obesity (BMI 35.0-39. 9) E66.01  
 Gout M10.9  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 HISTORY OF PRESENT ILLNESS 
 
Mr. Darien Cain returns for a follow-up. On his last visit, I continued methotrexate 20 mg every Wednesday and Simponi Aria infusions. His most infusion recent was 11/20/2018. He notes unintented weight gain and reflux after his infusions. He is receiving Medrol as a pre-med. Today, he denies pain, swelling, or stiffness in his hands or wrists. Her reports intermittent aching in his ankles that is random and may be brought on by long walking. He denies stiffness, pain, or swelling in the morning. His symptoms are more at night. He denies any interval flares of gout. He endorses easy bruising after the infusions. He denies fever, weight loss, blurred vision, vision loss, oral ulcers, ankle swelling, dry cough, dyspnea, nausea, vomiting, dysphagia, abdominal pain, black or bloody stool, fall since last visit, rash and increased thirst. 
 
Mr. Darien Cain has continued his medications for arthritis and reports good tolerance without significant side effects. Last toxicity monitoring by blood work was done on 9/25/2018 and did not reveal any significant adverse effects. Most recent inflammatory markers from 9/25/2018 revealed a ESR 15 mm/hr (previously 3, 61, 22, 42 mm/hr) and CRP 0.29 mg/L (previously 27.7, 24.6, 10.7,  N/A mg/L). The patient has not had any interval hospital admissions, infections, or surgeries. REVIEW OF SYSTEMS A comprehensive review of systems was performed and pertinent results are documented in the HPI, review of systems is otherwise non-contributory. PAST MEDICAL HISTORY He has a past medical history of Allergic rhinitis, Arthritis, Basal cell carcinoma (BCC) in situ of skin, [facility] GERD (gastroesophageal reflux disease), Gout, Hypercholesterolemia, Hypertension, Melanoma in situ of back ([facility] [address] and Skin cancer (melanoma) ([facility] [address] FAMILY HISTORY His family history includes Cancer in his paternal grandfather and another family member; Dementia in his mother; Diabetes in his paternal grandmother; Heart Disease in his father; Kidney Disease in his father. SOCIAL HISTORY He reports that he has quit smoking. he has never used smokeless tobacco. He reports that he drinks about 2.4 oz of alcohol per week. He reports that he does not use drugs. MEDICATIONS Current Outpatient Medications Medication Sig Dispense Refill  methotrexate (RHEUMATREX) 2.5 mg tablet TAKE 8 TABLETS BY MOUTH ONCE WEEKLY ON WEDNESDAYS 96 Tab 0  
 0.9% sodium chloride solp 100 mL with golimumab 12.5 mg/mL soln 2 mg/kg 2 mg/kg by IntraVENous route once.  folic acid (FOLVITE) 1 mg tablet TAKE ONE TABLET BY MOUTH DAILY 90 Tab 0  
 lisinopril (PRINIVIL, ZESTRIL) 40 mg tablet Take 40 mg by mouth daily.  aspirin delayed-release 81 mg tablet Take  by mouth daily.  omeprazole (PRILOSEC) 20 mg capsule Take 20 mg by mouth daily.  albuterol (VENTOLIN HFA) 90 mcg/actuation inhaler Take  by inhalation.  allopurinol (ZYLOPRIM) 300 mg tablet Take 1 Tab by mouth daily. 30 Tab 11  
 omega-3 fatty acids-vitamin e (FISH OIL) 1,000 mg Cap Take 1 Cap by mouth.  citalopram (CELEXA) 20 mg tablet Take 1 Tab by mouth daily. 30 Tab 11  
 atorvastatin (LIPITOR) 40 mg tablet Take 1 Tab by mouth daily. 30 Tab 11  
 amlodipine (NORVASC) 2.5 mg tablet Take 1 Tab by mouth daily. 30 Tab 11 ALLERGIES Allergies Allergen Reactions  Stings [Sting, Bee] Swelling Swelling at site PHYSICAL EXAMINATION Visit Vitals /72 Pulse 63 Temp 98.1 °F (36.7 °C) Resp 18 Ht 5' 8\" (1.727 m) Wt 235 lb (106.6 kg) BMI 35.73 kg/m² Body mass index is 35.73 kg/m². General: Patient is alert, oriented x 3, not in acute distress, wife at bedside HEENT:  
Sclerae are not injected and appear moist. 
There is no alopecia. Neck is supple Cardiovascular: 
Heart is regular rate and rhythm, no murmurs. Chest: 
Lungs are clear to auscultation bilaterally. No rhonchi, wheezes, or crackles. Abdomen: 
Obese. Extremities: 
Free of clubbing, cyanosis, edema Neurological exam: Muscle strength is full in upper and lower extremities Skin exam: 
There are no rashes, no alopecia, no discoid lesions, no active Raynaud's, no livedo reticularis, no periungual erythema. Musculoskeletal exam: A comprehensive musculoskeletal exam was performed for all joints of each upper and lower extremity and assessed for swelling, tenderness and range of motion. Positive results are documented as below: 
 
Decreased active and passive ROM of left shoulder (RESOLVED) Bilateral Olivia and [place] nodes. Bilateral knee crepitus without effusion. Right ankle swelling with tenderness with decreased ROM (RESOLVED) Pes planus Bilateral hallux valgus (LEFT more than right) Z-Deformities:   no 
[place] Deformities:  no 
[place]out[place]ierre's Deformities:  no 
Ulnar Deviation:   Yes (bilateral) MCP Subluxation:  no 
  
Joint Count 12/3/2018 9/4/2018 6/4/2018 3/7/2018 12/20/2017 11/20/2017 Patient pain (0-100) 20 15 0 60 30 90 MHAQ 0 0 0 0.25 0.125 0.375 Left elbow - Tender - - - - 1 - Left wrist- Tender - 1 0 1 1 1 Left wrist- Swollen - 1 1 1 1 1 Left 1st MCP - Tender - - - 1 - 1 Left 1st MCP - Swollen - 1 - 1 1 1 Left 2nd MCP - Tender 0 - - 1 1 1 Left 2nd MCP - Swollen 1 1 1 1 1 1 Left 3rd MCP - Tender - - - 1 - 1 Left 3rd MCP - Swollen - 1 1 1 1 1 Left 4th MCP - Tender - - - 1 - - Left 5th MCP - Tender - - - 1 - - Left thumb IP - Tender - - - 1 - - Left thumb IP - Swollen - - - 1 - - Left 2nd PIP - Tender - - - 1 - - Left 3rd PIP - Tender - - - 1 - -  
 Left 4th PIP - Tender - - - 1 - - Left 5th PIP - Tender - - - 1 - - Right wrist- Tender - - - 1 1 1 Right wrist- Swollen - 1 1 1 1 1 Right 1st MCP - Tender - - - 1 1 1 Right 1st MCP - Swollen - 1 1 1 - 1 Right 2nd MCP - Tender - - - 1 1 1 Right 2nd MCP - Swollen - 1 1 1 1 1 Right 3rd MCP - Tender - - - 1 - - Right 3rd MCP - Swollen - 1 1 1 1 1 Right 4th MCP - Tender - - - 1 1 1 Right 4th MCP - Swollen - - 1 1 1 1 Right 5th MCP - Swollen - - - 1 - - Right thumb IP - Tender - - - - 1 - Right 2nd PIP - Tender - - - - 1 1 Right 2nd PIP - Swollen - - - 1 - 1 Right 3rd PIP - Tender - - - - 1 1 Right 3rd PIP - Swollen - - 1 1 1 1 Right 4th PIP - Swollen - - 1 1 - - Right knee - Tender - - - - - 1 Right knee - Swollen - - - - - 1 Tender Joint Count (Total) 0 1 0 16 10 11 Swollen Joint Count (Total) 1 8 10 14 9 12 Physician Assessment (0-10) 0 2 2 4 3 5 Patient Assessment (0-10) 2 1.5 0 6 5 9  
CDAI Total (calculated) 3 12.5 12 40 27 37 DATA REVIEW Laboratory Recent laboratory results were reviewed, summarized, and discussed with the patient. Imaging Musculoskeletal Ultrasound None Radiographs Bilateral Hand 11/20/2017: RIGHT: No fracture or dislocation on plain film. The bones are osteopenic. Mild narrowing of the radiocarpal joint. Probable erosion of the radius and lunate at the radiocarpal joint. No widening of the intercarpal spaces. Subtle erosion of the ulnar styloid. Mild narrowing of the triscaphe joint is age-appropriate. Irregular arthritis of the first and second CMC joints includes osteophytes and erosions. There are erosions of the first through fourth metacarpal heads at the MCP points. Moderate narrowing and anterior subluxation of the second MCP joint. IP joints are within normal limits. No chondrocalcinosis or periosteal reaction. LEFT: No fracture or dislocation on plain film. The bones are osteopenic. Moderate narrowing of the radiocarpal joint with subtle erosions of the lunate. Marrow DR UVJ with erosions. No definite ulnar styloid erosion. Triscap and first [facility] joint osteoarthritis are age-appropriate. Large erosions on both sides of the second MCP joint and in the third metacarpal head. IP joints are within normal limits. No chondrocalcinosis or periosteal reaction. Bilateral Foot 11/20/2017: RIGHT: No fracture or dislocation on plain film. Bones are osteopenic. Talonavicular joint space narrowing, osteophytes, and erosions. Subtalar arthritis is partially imaged. TMT joints are within normal limits. Severe narrowing of the first MTP joint with osteophytes and erosions. There is erosion and the fifth tarsal head. Mild narrowing of the second MTP joint without definable erosion. No periosteal reaction. LEFT: No fracture or dislocation on plain film. Bones are osteopenic. Intertarsal joints are within normal limits. Moderate narrowing of the first MTP joint with central and marginal erosions. 35 degrees hallux valgus angulation and fibular subluxation of the first proximal phalanx. There are also erosions in the laterally subluxed hallux sesamoids. Mild joint space narrowing and erosions of the third MTP joint. Fragmentation of the fourth middle phalanx is adjacent to the fourth PIP joint erosions. There are also erosions of the second and fifth PIP joints. First IP joint osteoarthritis is mild. CT Imaging CT Right Upper Extremity with contrast 10/19/2017: examination of the soft tissues demonstrates no drainable fluid collection. There is no pathologically enlarged nodes within the right axilla. Alignment is normal. There is no bone destruction or fracture. No significant fatty atrophy. MR Imaging None DXA None ASSESSMENT AND PLAN This is a follow-up visit for Mr. Magnus Garcia. 1) Seropositive Erosive Rheumatoid Arthritis.  He presented after activation of underlying Rheumatoid Arthritis which appears to have been quiescent for more than 30 years from the TDAP vaccine. He remembers being diagnosed with arthritis in his 30's that was treated with an injection in his finger. He has bilateral ulnar deviation which corresponds with the chronicity of his disease. Radiographs showed erosive disease confirming chronicity. He is maintained on methotrexate 20 mg every Wednesday and Simponi Aria infusions with good tolerance apart from unintended weight gain and reflux, which I suspect is from [facility]. I will discontinue Medrol and Benadryl pre-medications, since not needed with [facility]. He most recent infusion was 11/20/2018. He denies joint pain, stiffness or swelling today. His CDAI was 3 (previously 12.5, 12, 40, 27, 37) with 0 tender and 1 swollen joints, consistent with remission. He has had excellent clinical response with Simponi Aria. I will continue current therapy. Labs today. Follow up in 3 months. 2) Long Term Use of Immunosuppressants. The patient remains on immunomodulatory medications (methotrexate, Simponi Aria) and requires frequent toxicity monitoring by blood work. Respective labs were ordered (CBC and CMP). 3) Vitamin D Deficiency. His vitamin D level was 27.7 (previously 22.6). He is on weekly ergocalciferol 50,000. I will check his level today. 4) Bilateral Knee Osteoarthritis. This was not an active issue today. 5) Basal Cell Carcinoma. Lesion removed on his scalp 5/31/2018. Wound without secondary infection. He continues to have lesions burnt. 6) History of Melanoma. He had melanoma removed from his back 10/2017. He did not require chemotherapy. He has skin exams every 3 months. He has reviewed his medications, including [facility], with his dermatologist. He understands to continue close surveillance and inform us if he has any new melanoma lesions. 7) Chronic Gout. He has a history of gout in his left foot. He is on allopurinol 300 mg daily. His uric acid was 4.9 mg/dL. He denies interval flare.s I will refill it today. 8) Bilateral Pes Planus. He complains of ankle pain, which I suspect is from his pes planus. I recommended inserts. The patient voiced understanding of the aforementioned assessment and plan. Summary of plan was provided in the After Visit Summary patient instructions. TODAY'S ORDERS Orders Placed This Encounter  QUANTIFERON-TB GOLD PLUS  
 CHRONIC HEPATITIS PANEL  
 CBC WITH AUTOMATED DIFF  
 METABOLIC PANEL, COMPREHENSIVE  C REACTIVE PROTEIN, QT  
 SED RATE (ESR)  VITAMIN D, 25 HYDROXY Future Appointments Date Time Provider Raiza Laura 12/14/2018 11:00 AM Wilton Agee NP [address] [address]  
1/15/2019 11:00 AM [facility]  
3/4/2019  9:40 AM Carlos Nava MD [address] [place] [address]  
3/12/2019 11:00 AM [facility] Fern[facility] Osler Eugene Lands, MD, [facility] Adult Rheumatology Rheumatology Ultrasound Certified [place] [facility] Part of [address] Phone [phone] Fax [phone]

## 2018-12-05 LAB
25(OH)D3+25(OH)D2 SERPL-MCNC: 39.2 NG/ML (ref 30–100)
ALBUMIN SERPL-MCNC: 4.4 G/DL (ref 3.5–4.8)
ALBUMIN/GLOB SERPL: 2 {RATIO} (ref 1.2–2.2)
ALP SERPL-CCNC: 78 IU/L (ref 39–117)
ALT SERPL-CCNC: 37 IU/L (ref 0–44)
AST SERPL-CCNC: 11 IU/L (ref 0–40)
BASOPHILS # BLD AUTO: 0 X10E3/UL (ref 0–0.2)
BASOPHILS NFR BLD AUTO: 0 %
BILIRUB SERPL-MCNC: 0.4 MG/DL (ref 0–1.2)
BUN SERPL-MCNC: 15 MG/DL (ref 8–27)
BUN/CREAT SERPL: 17 (ref 10–24)
CALCIUM SERPL-MCNC: 9.1 MG/DL (ref 8.6–10.2)
CHLORIDE SERPL-SCNC: 109 MMOL/L (ref 96–106)
CO2 SERPL-SCNC: 21 MMOL/L (ref 20–29)
COMMENT, 144067: NORMAL
CREAT SERPL-MCNC: 0.86 MG/DL (ref 0.76–1.27)
CRP SERPL-MCNC: 0.4 MG/L (ref 0–4.9)
EOSINOPHIL # BLD AUTO: 0.3 X10E3/UL (ref 0–0.4)
EOSINOPHIL NFR BLD AUTO: 3 %
ERYTHROCYTE [DISTWIDTH] IN BLOOD BY AUTOMATED COUNT: 13.6 % (ref 12.3–15.4)
ERYTHROCYTE [SEDIMENTATION RATE] IN BLOOD BY WESTERGREN METHOD: 7 MM/HR (ref 0–30)
GAMMA INTERFERON BACKGROUND BLD IA-ACNC: 0.02 IU/ML
GLOBULIN SER CALC-MCNC: 2.2 G/DL (ref 1.5–4.5)
GLUCOSE SERPL-MCNC: 90 MG/DL (ref 65–99)
HBV CORE AB SERPL QL IA: NEGATIVE
HBV CORE IGM SERPL QL IA: NEGATIVE
HBV E AB SERPL QL IA: NEGATIVE
HBV E AG SERPL QL IA: NEGATIVE
HBV SURFACE AB SER QL: NON REACTIVE
HBV SURFACE AG SERPL QL IA: NEGATIVE
HCT VFR BLD AUTO: 40.5 % (ref 37.5–51)
HCV AB S/CO SERPL IA: <0.1 S/CO RATIO (ref 0–0.9)
HGB BLD-MCNC: 13.6 G/DL (ref 13–17.7)
IMM GRANULOCYTES # BLD: 0 X10E3/UL (ref 0–0.1)
IMM GRANULOCYTES NFR BLD: 0 %
LYMPHOCYTES # BLD AUTO: 2.3 X10E3/UL (ref 0.7–3.1)
LYMPHOCYTES NFR BLD AUTO: 24 %
M TB IFN-G BLD-IMP: NEGATIVE
M TB IFN-G CD4+ BCKGRND COR BLD-ACNC: 0.01 IU/ML
MCH RBC QN AUTO: 34.4 PG (ref 26.6–33)
MCHC RBC AUTO-ENTMCNC: 33.6 G/DL (ref 31.5–35.7)
MCV RBC AUTO: 103 FL (ref 79–97)
MITOGEN IGNF BLD-ACNC: >10 IU/ML
MONOCYTES # BLD AUTO: 0.6 X10E3/UL (ref 0.1–0.9)
MONOCYTES NFR BLD AUTO: 7 %
NEUTROPHILS # BLD AUTO: 6.2 X10E3/UL (ref 1.4–7)
NEUTROPHILS NFR BLD AUTO: 66 %
PLATELET # BLD AUTO: 238 X10E3/UL (ref 150–379)
POTASSIUM SERPL-SCNC: 4.4 MMOL/L (ref 3.5–5.2)
PROT SERPL-MCNC: 6.6 G/DL (ref 6–8.5)
QUANTIFERON INCUBATION, QF1T: NORMAL
QUANTIFERON TB2 AG: 0.02 IU/ML
RBC # BLD AUTO: 3.95 X10E6/UL (ref 4.14–5.8)
SERVICE CMNT-IMP: NORMAL
SODIUM SERPL-SCNC: 143 MMOL/L (ref 134–144)
WBC # BLD AUTO: 9.5 X10E3/UL (ref 3.4–10.8)

## 2018-12-14 ENCOUNTER — OFFICE VISIT (OUTPATIENT)
Dept: INTERNAL MEDICINE CLINIC | Age: 73
End: 2018-12-14

## 2018-12-14 VITALS
HEART RATE: 48 BPM | HEIGHT: 68 IN | WEIGHT: 238.6 LBS | SYSTOLIC BLOOD PRESSURE: 129 MMHG | BODY MASS INDEX: 36.16 KG/M2 | DIASTOLIC BLOOD PRESSURE: 63 MMHG | RESPIRATION RATE: 12 BRPM | TEMPERATURE: 98.4 F | OXYGEN SATURATION: 97 %

## 2018-12-14 DIAGNOSIS — Z01.818 PREOP GENERAL PHYSICAL EXAM: ICD-10-CM

## 2018-12-14 DIAGNOSIS — M05.79 SEROPOSITIVE RHEUMATOID ARTHRITIS OF MULTIPLE SITES (HCC): ICD-10-CM

## 2018-12-14 DIAGNOSIS — I10 ESSENTIAL HYPERTENSION, BENIGN: ICD-10-CM

## 2018-12-14 DIAGNOSIS — H25.9 AGE-RELATED CATARACT OF BOTH EYES, UNSPECIFIED AGE-RELATED CATARACT TYPE: Primary | ICD-10-CM

## 2018-12-14 NOTE — PROGRESS NOTES
HISTORY OF PRESENT ILLNESS  Mine Barraza was referred for evaluation by: Dr. Ashely Cazares for Pre- Op Evaluation. Please see encounter details and orders for consultative summary. Type of surgery : Cataract extraction with lens implant  Surgery site : Right eye 12/17/2018; Left eye 1/14/2019  Anesthesia type: Regional  Date of procedure:  As noted above    Allergies: Allergies   Allergen Reactions    Stings [Sting, Bee] Swelling     Swelling at site     Latex allergy: no  Prior reactions to anesthesia:  None    Functional status:  he is able to ambulate up 2 flights of step without shortness of breath, chest pain  Prior cardiac evaluation:   none    Current Outpatient Medications   Medication Sig    methotrexate (RHEUMATREX) 2.5 mg tablet TAKE 8 TABLETS BY MOUTH ONCE WEEKLY ON WEDNESDAYS    0.9% sodium chloride solp 100 mL with golimumab 12.5 mg/mL soln 2 mg/kg 2 mg/kg by IntraVENous route once.  folic acid (FOLVITE) 1 mg tablet TAKE ONE TABLET BY MOUTH DAILY    lisinopril (PRINIVIL, ZESTRIL) 40 mg tablet Take 40 mg by mouth daily.  aspirin delayed-release 81 mg tablet Take  by mouth daily.  omeprazole (PRILOSEC) 20 mg capsule Take 20 mg by mouth daily.  allopurinol (ZYLOPRIM) 300 mg tablet Take 1 Tab by mouth daily.  omega-3 fatty acids-vitamin e (FISH OIL) 1,000 mg Cap Take 1 Cap by mouth.  citalopram (CELEXA) 20 mg tablet Take 1 Tab by mouth daily.  atorvastatin (LIPITOR) 40 mg tablet Take 1 Tab by mouth daily.  amlodipine (NORVASC) 2.5 mg tablet Take 1 Tab by mouth daily.  albuterol (VENTOLIN HFA) 90 mcg/actuation inhaler Take  by inhalation. No current facility-administered medications for this visit.       Past Medical History:   Diagnosis Date    Allergic rhinitis     Arthritis     RA    Basal cell carcinoma (BCC) in situ of skin     shoudler and scalp    Cancer (HCC)     prostate    GERD (gastroesophageal reflux disease)     Gout     Hypercholesterolemia     Hypertension     [facility]oma [facility] situ of back ([facility] [address]     Skin cancer (melanoma) ([facility] [address]      Past Surgical History:   Procedure Laterality Date    ENDOSCOPY, COLON, DIAGNOSTIC      09, due 12, done 11, due 14    HX HEENT      wisdom teeth extraction    HX KNEE ARTHROSCOPY Left     HX KNEE REPLACEMENT Left 2012    HX OTHER SURGICAL N/A 05/31/2018    Basal cell removal of scalp    HX PROSTATECTOMY      HX TONSILLECTOMY       Social History     Tobacco Use    Smoking status: Former Smoker    Smokeless tobacco: Never Used   Substance Use Topics    Alcohol use: Yes     Alcohol/week: 2.4 oz     Types: 4 Cans of beer per week     Comment: 5-6 days per week    Drug use: No       Blood pressure 129/63, pulse (!) 44, temperature 98.4 °F (36.9 °C), temperature source Oral, resp. rate 12, height 5' 8\" (1.727 m), weight 238 lb 9.6 oz (108.2 kg), SpO2 97 %. Review of Systems   Constitutional: Negative for chills, fever and malaise/fatigue. HENT: Negative for congestion and sore throat. Eyes: Positive for blurred vision. Respiratory: Negative for cough and shortness of breath. Cardiovascular: Negative for chest pain and palpitations. Gastrointestinal: Negative for nausea and vomiting. Genitourinary: Negative for dysuria and frequency. Musculoskeletal: Positive for joint pain. Neurological: Negative for dizziness and headaches. Physical Exam   Constitutional: He is oriented to person, place, and time. He appears well-developed and well-nourished. HENT:   Head: Normocephalic and atraumatic. Right Ear: External ear normal.   Left Ear: External ear normal.   Nose: Nose normal.   Mouth/Throat: Oropharynx is clear and moist.   Eyes: Conjunctivae are normal. Pupils are equal, round, and reactive to light. Neck: Normal range of motion. Neck supple. No thyromegaly present. Cardiovascular: Normal rate and regular rhythm.    Pulmonary/Chest: Effort normal and breath sounds normal. He has no wheezes. Abdominal: Soft. Bowel sounds are normal. There is no tenderness. There is no rebound. Lymphadenopathy:     He has no cervical adenopathy. Neurological: He is alert and oriented to person, place, and time. Skin: Skin is warm and dry. Psychiatric: He has a normal mood and affect. His behavior is normal.   Nursing note and vitals reviewed. ASSESSMENT and PLAN  Diagnoses and all orders for this visit:    1. Age-related cataract of both eyes, unspecified age-related cataract type    2. Preop general physical exam -- considered medically stable for upcoming low risk Cataract extraction surgery with implantation of lens    3. Seropositive rheumatoid arthritis of multiple sites ([facility] [address]    4.  Essential hypertension, benign -- stable      lab results and schedule of future lab studies reviewed with patient  reviewed diet, exercise and weight control  reviewed medications and side effects in detail

## 2018-12-14 NOTE — PATIENT INSTRUCTIONS
Cataract Surgery: Before Your Surgery  What is cataract surgery? Cataracts are cloudy areas in the lens of your eye. Your lens is behind the colored part of your eye (iris). Its job is to focus light onto the back of your eye. In some people, cataracts prevent light from reaching the back of the eye. This can cause vision problems. Cataract surgery helps you see better. It replaces your natural lens, which has become cloudy, with a clear artificial one. There are two types of cataract surgery. Phacoemulsification (say \"hxph-kk-ud-tej-ldc-cgn-ROSIBEL-shun\") is the most common type. The doctor makes a small cut in your eye. This cut is called an incision. The doctor uses a special ultrasound tool to break your cloudy lens apart. Sometimes a laser is used too. Then he or she removes the small pieces of the lens through the incision. In most cases, the doctor then inserts an artificial lens through the incision. Most people do not need stitches, because the incision is so small. If the doctor is not able to put in an artificial lens, you can wear a contact lens or thick glasses in place of your natural lens. Extracapsular extraction is a less common type of cataract surgery. The doctor makes a larger incision to remove the whole lens at once. After the doctor removes the lens, he or she stitches up the incision. Recovery from this type of surgery takes longer. Before either surgery, the doctor puts numbing drops in your eye. Some doctors use a shot instead. You may also get medicine to make you feel relaxed. You probably will not feel much pain. The surgery takes about 20 to 40 minutes. After surgery, you may have a bandage or shield on your eye. You will probably go home from surgery after 1 hour in the recovery room. Most people see better in 1 to 3 days. You may be able to go back to work or your normal routine in a few days.  It could take 3 to 10 weeks for your eye to completely heal. After your eye heals, you may still need to wear glasses, especially for reading. Follow-up care is a key part of your treatment and safety. Be sure to make and go to all appointments, and call your doctor if you are having problems. It's also a good idea to know your test results and keep a list of the medicines you take. What happens before surgery?   Surgery can be stressful. This information will help you understand what you can expect. And it will help you safely prepare for surgery.   Preparing for surgery    · Understand exactly what surgery is planned, along with the risks, benefits, and other options. · Tell your doctors ALL the medicines, vitamins, supplements, and herbal remedies you take. Some of these can increase the risk of bleeding or interact with anesthesia.     · If you take blood thinners, such as warfarin (Coumadin), clopidogrel (Plavix), or aspirin, be sure to talk to your doctor. He or she will tell you if you should stop taking these medicines before your surgery. Make sure that you understand exactly what your doctor wants you to do.     · Your doctor will tell you which medicines to take or stop before your surgery. You may need to stop taking certain medicines a week or more before surgery. So talk to your doctor as soon as you can.     · If you have an advance directive, let your doctor know. It may include a living will and a durable power of  for health care. Bring a copy to the hospital. If you don't have one, you may want to prepare one. It lets your doctor and loved ones know your health care wishes. Doctors advise that everyone prepare these papers before any type of surgery or procedure. What happens on the day of surgery? · Follow the instructions exactly about when to stop eating and drinking. If you don't, your surgery may be canceled.  If your doctor told you to take your medicines on the day of surgery, take them with only a sip of water.     · Take a bath or shower before you come in for your surgery. Do not apply lotions, perfumes, deodorants, or nail polish.     · Take off all jewelry and piercings. And take out contact lenses, if you wear them.    At the hospital or surgery center   · Bring a picture ID.     · The area for surgery is often marked to make sure there are no errors.     · You will be kept comfortable and safe by your anesthesia provider. The anesthesia may make you sleep. Or it may just numb the area being worked on.     · The surgery will take about 20 to 40 minutes. Going home   · Be sure you have someone to drive you home. Anesthesia and pain medicine make it unsafe for you to drive.     · You will be given more specific instructions about recovering from your surgery. They will cover things like diet, wound care, follow-up care, driving, and getting back to your normal routine.     · You may have a bandage or patch over your eye. You may also have a clear shield over your eye. This prevents you from rubbing it. When should you call your doctor? · You have questions or concerns.     · You don't understand how to prepare for your surgery.     · You become ill before the surgery (such as fever, flu, or a cold).     · You need to reschedule or have changed your mind about having the surgery. Where can you learn more? Go to http://mira-tania.info/. Enter K474 in the search box to learn more about \"Cataract Surgery: Before Your Surgery. \"  Current as of: December 3, 2017  Content Version: 11.8  © 0468-8202 [employer]. Care instructions adapted under license by [employer] (which disclaims liability or warranty for this information). If you have questions about a medical condition or this instruction, always ask your healthcare professional. [place] [address] any warranty or liability for your use of this information.

## 2018-12-20 RX ORDER — ERGOCALCIFEROL 1.25 MG/1
CAPSULE ORAL
Qty: 12 CAP | Refills: 2 | Status: SHIPPED | OUTPATIENT
Start: 2018-12-20 | End: 2019-09-01 | Stop reason: SDUPTHER

## 2019-01-04 ENCOUNTER — TELEPHONE (OUTPATIENT)
Dept: INTERNAL MEDICINE CLINIC | Age: 74
End: 2019-01-04

## 2019-01-04 ENCOUNTER — OFFICE VISIT (OUTPATIENT)
Dept: INTERNAL MEDICINE CLINIC | Age: 74
End: 2019-01-04

## 2019-01-04 VITALS
HEIGHT: 68 IN | BODY MASS INDEX: 36.37 KG/M2 | OXYGEN SATURATION: 92 % | RESPIRATION RATE: 18 BRPM | TEMPERATURE: 98.6 F | SYSTOLIC BLOOD PRESSURE: 102 MMHG | WEIGHT: 240 LBS | HEART RATE: 56 BPM | DIASTOLIC BLOOD PRESSURE: 58 MMHG

## 2019-01-04 DIAGNOSIS — I10 ESSENTIAL HYPERTENSION, BENIGN: ICD-10-CM

## 2019-01-04 DIAGNOSIS — J32.0 MAXILLARY SINUSITIS, UNSPECIFIED CHRONICITY: ICD-10-CM

## 2019-01-04 DIAGNOSIS — E66.01 SEVERE OBESITY WITH BODY MASS INDEX (BMI) OF 35.0 TO 39.9 WITH SERIOUS COMORBIDITY (HCC): ICD-10-CM

## 2019-01-04 DIAGNOSIS — B35.6 TINEA CRURIS: Primary | ICD-10-CM

## 2019-01-04 DIAGNOSIS — M05.79 SEROPOSITIVE RHEUMATOID ARTHRITIS OF MULTIPLE SITES (HCC): ICD-10-CM

## 2019-01-04 RX ORDER — AMOXICILLIN AND CLAVULANATE POTASSIUM 875; 125 MG/1; MG/1
1 TABLET, FILM COATED ORAL EVERY 12 HOURS
Qty: 20 TAB | Refills: 0 | Status: SHIPPED | OUTPATIENT
Start: 2019-01-04 | End: 2022-08-04 | Stop reason: SDUPTHER

## 2019-01-04 RX ORDER — KETOCONAZOLE 20 MG/G
CREAM TOPICAL DAILY
Qty: 15 G | Refills: 0 | Status: SHIPPED | OUTPATIENT
Start: 2019-01-04 | End: 2019-03-04

## 2019-01-04 NOTE — TELEPHONE ENCOUNTER
----- Message from Lynn Lieberman sent at 1/4/2019  8:17 AM EST -----  Regarding: Dr. Helena Mead  Pt wife Paulina Jarrett requesting an appt for today. Pt has a rash in his groin area and possible sinus infection. Best contact [phone].

## 2019-01-04 NOTE — PROGRESS NOTES
Ivy Rowan is a 68 y.o. male who presents today for Groin Pain; Rash; Sinus Pain; and Nasal Congestion  . He has a history of   Patient Active Problem List   Diagnosis Code    Mixed hyperlipidemia E78.2    Other abnormal glucose R73.09    Essential hypertension, benign I10    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    Allergic rhinitis, cause unspecified J30.9    Reflux esophagitis K21.0    Benign neoplasm of colon D12.6    Contact dermatitis and other eczema, due to unspecified cause L25.9    Bunion M21.619    History of prostate cancer Z85.46    Advanced care planning/counseling discussion Z71.89    Low testosterone R79.89    Primary osteoarthritis of both knees M17.0    Seropositive rheumatoid arthritis of multiple sites ([facility] [address] M05.79    Long-term use of immunosuppressant medication Z79.899    Vitamin D deficiency E55.9    Recurrent depression (HCC) F33.9    Severe obesity with body mass index (BMI) of 35.0 to 39.9 with serious comorbidity (HCC) E66.01    Gout M10.9    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 Today patient is here for an acute visit. Patient still on methotrexate and receiving Simponi infusions. Doing very well from a rheumatologic standpoint    Upper respiratory illness:  Ivy Rowan presents with complaints of congestion, sore throat, post nasal drip, fever and chills for 2 weeks. no nausea and no vomiting . he has not had  hoarseness. Symptoms are moderate. Over-the-counter remedies including taking advil PRN. Drinking plenty of fluids: yes  Asthma?:  no  non-smoker  Contacts with similar infections: no     Hypertension - BP is stable overall. A bit low today. Notes that he checks his regularly and is been under pretty good control.   Systolic normally in the 728A and diastolic in 70B  Hypertension ROS: taking medications as instructed, no medication side effects noted, no TIA's, no chest pain on exertion, no dyspnea on exertion, no swelling of ankles     reports that he has quit smoking. he has never used smokeless tobacco.    reports that he drinks about 2.4 oz of alcohol per week. BP Readings from Last 2 Encounters:   01/04/19 102/58   12/14/18 129/63       Problem visit:  Lucrecia Loomis is here for complaint of rash. Problem began several week(s) ago. Severity is moderate  Character of problem: had new undergarment and played golf with this. Itching to the area. ROS  Review of Systems   Constitutional: Negative for chills, fever, malaise/fatigue and weight loss. HENT: Positive for congestion, sinus pain and sore throat. Negative for ear discharge, ear pain, hearing loss, nosebleeds and tinnitus. Eyes: Negative for blurred vision, double vision and photophobia. Respiratory: Positive for cough. Negative for hemoptysis, sputum production, shortness of breath, wheezing and stridor. Cardiovascular: Negative for chest pain, palpitations, orthopnea, claudication and leg swelling. Gastrointestinal: Negative for abdominal pain, diarrhea, heartburn, nausea and vomiting. Genitourinary: Negative for dysuria, frequency and urgency. Musculoskeletal: Negative for myalgias and neck pain. Skin: Positive for rash. Negative for itching. Neurological: Negative. Endo/Heme/Allergies: Does not bruise/bleed easily. Psychiatric/Behavioral: Negative for depression. The patient is not nervous/anxious. Visit Vitals  /58 (BP 1 Location: Left arm, BP Patient Position: Sitting)   Pulse (!) 56   Temp 98.6 °F (37 °C) (Oral)   Resp 18   Ht 5' 8\" (1.727 m)   Wt 240 lb (108.9 kg)   SpO2 92%   BMI 36.49 kg/m²       Physical Exam   Constitutional: He is oriented to person, place, and time. He appears well-developed and well-nourished. HENT:   Head: Normocephalic and atraumatic.    Fluid behind L TM   Eyes: EOM are normal. Pupils are equal, round, and reactive to light.   Neck: Normal range of motion. Cardiovascular: Normal rate and regular rhythm. No murmur heard. Pulmonary/Chest: Effort normal and breath sounds normal. No respiratory distress. He has no wheezes. No egophony no wheezing   Abdominal: Soft. Bowel sounds are normal. He exhibits no distension. There is no tenderness. There is no guarding. Musculoskeletal: Normal range of motion. He exhibits no edema. Legs:  Red irritated skin consistent with a fungal infection. No signs of active cellulitis. No pus no open wounds. Neurological: He is alert and oriented to person, place, and time. Skin: Skin is warm and dry. He is not diaphoretic. Psychiatric: He has a normal mood and affect. His behavior is normal.         Current Outpatient Medications   Medication Sig    ketoconazole (NIZORAL) 2 % topical cream Apply  to affected area daily.  amoxicillin-clavulanate (AUGMENTIN) 875-125 mg per tablet Take 1 Tab by mouth every twelve (12) hours for 10 days.  VITAMIN D2 50,000 unit capsule TAKE ONE CAPSULE BY MOUTH EVERY 7 DAYS    methotrexate (RHEUMATREX) 2.5 mg tablet TAKE 8 TABLETS BY MOUTH ONCE WEEKLY ON WEDNESDAYS    0.9% sodium chloride solp 100 mL with golimumab 12.5 mg/mL soln 2 mg/kg 2 mg/kg by IntraVENous route once.  folic acid (FOLVITE) 1 mg tablet TAKE ONE TABLET BY MOUTH DAILY    lisinopril (PRINIVIL, ZESTRIL) 40 mg tablet Take 40 mg by mouth daily.  aspirin delayed-release 81 mg tablet Take  by mouth daily.  omeprazole (PRILOSEC) 20 mg capsule Take 20 mg by mouth daily.  albuterol (VENTOLIN HFA) 90 mcg/actuation inhaler Take  by inhalation.  allopurinol (ZYLOPRIM) 300 mg tablet Take 1 Tab by mouth daily.  omega-3 fatty acids-vitamin e (FISH OIL) 1,000 mg Cap Take 1 Cap by mouth.  citalopram (CELEXA) 20 mg tablet Take 1 Tab by mouth daily.  atorvastatin (LIPITOR) 40 mg tablet Take 1 Tab by mouth daily.     amlodipine (NORVASC) 2.5 mg tablet Take 1 Tab by mouth daily. No current facility-administered medications for this visit. Past Medical History:   Diagnosis Date    Allergic rhinitis     Arthritis     RA    Basal cell carcinoma (BCC) in situ of skin     shoudler and scalp    Cancer (HCC)     prostate    GERD (gastroesophageal reflux disease)     Gout     Hypercholesterolemia     Hypertension     Melanoma in situ of back ([facility] [address]     Skin cancer (melanoma) ([facility] [address]       Past Surgical History:   Procedure Laterality Date    ENDOSCOPY, COLON, DIAGNOSTIC      09, due 12, done 11, due 14    HX HEENT      wisdom teeth extraction    HX KNEE ARTHROSCOPY Left     HX KNEE REPLACEMENT Left 2012    HX OTHER SURGICAL N/A 05/31/2018    Basal cell removal of scalp    HX PROSTATECTOMY      HX TONSILLECTOMY        Social History     Tobacco Use    Smoking status: Former Smoker    Smokeless tobacco: Never Used   Substance Use Topics    Alcohol use: Yes     Alcohol/week: 2.4 oz     Types: 4 Cans of beer per week     Comment: 5-6 days per week      Family History   Problem Relation Age of Onset    Dementia Mother     Heart Disease Father         CAD    Kidney Disease Father         renal failure    Diabetes Paternal Grandmother     Cancer Paternal Grandfather         colon    Cancer Other         family hx colon cancer        Allergies   Allergen Reactions    Stings [Sting, Bee] Swelling     Swelling at site        Assessment/Plan  Diagnoses and all orders for this visit:    1. Tinea cruris -due to increased sweating. Apply topical Azole. -     ketoconazole (NIZORAL) 2 % topical cream; Apply  to affected area daily. 2. Maxillary sinusitis, unspecified chronicity -patient urged to take Mucinex for the first couple days. -     amoxicillin-clavulanate (AUGMENTIN) 875-125 mg per tablet; Take 1 Tab by mouth every twelve (12) hours for 10 days.     3. Essential hypertension, benign -blood pressure low today may be due to the fact that he is not feeling well. Normally his blood pressure is well controlled    4. Seropositive rheumatoid arthritis of multiple sites [facility]) -seeing rheumatology and doing very well with current therapy    5. Severe obesity with body mass index (BMI) of 35.0 to 39.9 with serious comorbidity ([address] -counseled and continue doing physical activity and monitoring diet        Follow-up Disposition:  Return if symptoms worsen or fail to improve. Rox Freedman MD  1/4/2019        Discussed the patient's BMI with him. The BMI follow up plan is as follows:     dietary management education, guidance, and counseling  encourage exercise  monitor weight  prescribed dietary intake    An After Visit Summary was printed and given to the patient.

## 2019-01-04 NOTE — PATIENT INSTRUCTIONS
Sinusitis: Care Instructions  Your Care Instructions    Sinusitis is an infection of the lining of the sinus cavities in your head. Sinusitis often follows a cold. It causes pain and pressure in your head and face. In most cases, sinusitis gets better on its own in 1 to 2 weeks. But some mild symptoms may last for several weeks. Sometimes antibiotics are needed. Follow-up care is a key part of your treatment and safety. Be sure to make and go to all appointments, and call your doctor if you are having problems. It's also a good idea to know your test results and keep a list of the medicines you take. How can you care for yourself at home? · Take an over-the-counter pain medicine, such as acetaminophen (Tylenol), ibuprofen (Advil, Motrin), or naproxen (Aleve). Read and follow all instructions on the label. · If the doctor prescribed antibiotics, take them as directed. Do not stop taking them just because you feel better. You need to take the full course of antibiotics. · Be careful when taking over-the-counter cold or flu medicines and Tylenol at the same time. Many of these medicines have acetaminophen, which is Tylenol. Read the labels to make sure that you are not taking more than the recommended dose. Too much acetaminophen (Tylenol) can be harmful. · Breathe warm, moist air from a steamy shower, a hot bath, or a sink filled with hot water. Avoid cold, dry air. Using a humidifier in your home may help. Follow the directions for cleaning the machine. · Use saline (saltwater) nasal washes to help keep your nasal passages open and wash out mucus and bacteria. You can buy saline nose drops at a grocery store or drugstore. Or you can make your own at home by adding 1 teaspoon of salt and 1 teaspoon of baking soda to 2 cups of distilled water. If you make your own, fill a bulb syringe with the solution, insert the tip into your nostril, and squeeze gently. Pura Shames your nose.   · Put a hot, wet towel or a warm gel pack on your face 3 or 4 times a day for 5 to 10 minutes each time. · Try a decongestant nasal spray like oxymetazoline (Afrin). Do not use it for more than 3 days in a row. Using it for more than 3 days can make your congestion worse. When should you call for help? Call your doctor now or seek immediate medical care if:    · You have new or worse swelling or redness in your face or around your eyes.     · You have a new or higher fever.    Watch closely for changes in your health, and be sure to contact your doctor if:    · You have new or worse facial pain.     · The mucus from your nose becomes thicker (like pus) or has new blood in it.     · You are not getting better as expected. Where can you learn more? Go to http://mira-tania.info/. Enter E970 in the search box to learn more about \"Sinusitis: Care Instructions. \"  Current as of: March 28, 2018  Content Version: 11.8  © 1256-8319 [employer]. Care instructions adapted under license by [employer] (which disclaims liability or warranty for this information). If you have questions about a medical condition or this instruction, always ask your healthcare professional. [place] [address] any warranty or liability for your use of this information. Body Mass Index: Care Instructions  Your Care Instructions    Body mass index (BMI) can help you see if your weight is raising your risk for health problems. It uses a formula to compare how much you weigh with how tall you are. · A BMI lower than 18.5 is considered underweight. · A BMI between 18.5 and 24.9 is considered healthy. · A BMI between 25 and 29.9 is considered overweight. A BMI of 30 or higher is considered obese. If your BMI is in the normal range, it means that you have a lower risk for weight-related health problems.  If your BMI is in the overweight or obese range, you may be at increased risk for weight-related health problems, such as high blood pressure, heart disease, stroke, arthritis or joint pain, and diabetes. If your BMI is in the underweight range, you may be at increased risk for health problems such as fatigue, lower protection (immunity) against illness, muscle loss, bone loss, hair loss, and hormone problems. BMI is just one measure of your risk for weight-related health problems. You may be at higher risk for health problems if you are not active, you eat an unhealthy diet, or you drink too much alcohol or use tobacco products. Follow-up care is a key part of your treatment and safety. Be sure to make and go to all appointments, and call your doctor if you are having problems. It's also a good idea to know your test results and keep a list of the medicines you take. How can you care for yourself at home? · Practice healthy eating habits. This includes eating plenty of fruits, vegetables, whole grains, lean protein, and low-fat dairy. · If your doctor recommends it, get more exercise. Walking is a good choice. Bit by bit, increase the amount you walk every day. Try for at least 30 minutes on most days of the week. · Do not smoke. Smoking can increase your risk for health problems. If you need help quitting, talk to your doctor about stop-smoking programs and medicines. These can increase your chances of quitting for good. · Limit alcohol to 2 drinks a day for men and 1 drink a day for women. Too much alcohol can cause health problems. If you have a BMI higher than 25  · Your doctor may do other tests to check your risk for weight-related health problems. This may include measuring the distance around your waist. A waist measurement of more than 40 inches in men or 35 inches in women can increase the risk of weight-related health problems. · Talk with your doctor about steps you can take to stay healthy or improve your health.  You may need to make lifestyle changes to lose weight and stay healthy, such as changing your diet and getting regular exercise. If you have a BMI lower than 18.5  · Your doctor may do other tests to check your risk for health problems. · Talk with your doctor about steps you can take to stay healthy or improve your health. You may need to make lifestyle changes to gain or maintain weight and stay healthy, such as getting more healthy foods in your diet and doing exercises to build muscle. Where can you learn more? Go to http://mira-tania.info/. Enter S176 in the search box to learn more about \"Body Mass Index: Care Instructions. \"  Current as of: October 13, 2016  Content Version: 11.4  © 1203-1183 [employer]. Care instructions adapted under license by [employer] (which disclaims liability or warranty for this information). If you have questions about a medical condition or this instruction, always ask your healthcare professional. [address] any warranty or liability for your use of this information.

## 2019-01-15 ENCOUNTER — HOSPITAL ENCOUNTER (OUTPATIENT)
Dept: INFUSION THERAPY | Age: 74
Discharge: HOME | End: 2019-01-15
Payer: MEDICARE

## 2019-01-15 VITALS
BODY MASS INDEX: 36.08 KG/M2 | SYSTOLIC BLOOD PRESSURE: 113 MMHG | DIASTOLIC BLOOD PRESSURE: 58 MMHG | TEMPERATURE: 97.9 F | WEIGHT: 237.3 LBS | RESPIRATION RATE: 18 BRPM | HEART RATE: 46 BPM

## 2019-01-15 PROCEDURE — 74011250636 HC RX REV CODE- 250/636: Performed by: INTERNAL MEDICINE

## 2019-01-15 PROCEDURE — 74011000258 HC RX REV CODE- 258: Performed by: INTERNAL MEDICINE

## 2019-01-15 PROCEDURE — 96365 THER/PROPH/DIAG IV INF INIT: CPT

## 2019-01-15 RX ADMIN — GOLIMUMAB 200 MG: 50 SOLUTION INTRAVENOUS at 11:49

## 2019-01-15 NOTE — PROGRESS NOTES
[facility] VISIT NOTE 
 
1100. Pt arrived at [facility] and in no distress for Simponi q 8 weeks. Assessment completed, pt stated he had cataract surgery in left eye yesterday. Eye bandaged with eye patch and gauze. Patient and wife verbalized that Dr. Eleazar Wheeler was aware of surgery and infusion today. Eye surgeon also aware and gave ok to treat as well. 
 
24g PIV placed in Right Wrist. Positive blood return noted and flushes easily. Medications received: 
Blanche Marlena Simponi IV Tolerated treatment well, no adverse reaction noted. PIV removed at discharge, no s/s of infiltration noted. Patient Vitals for the past 12 hrs: 
 Temp Pulse Resp BP  
01/15/19 1222 97.9 °F (36.6 °C) (!) 46 18 113/58  
01/15/19 1103 97.8 °F (36.6 °C) (!) 55 18 131/62  
 
1219. D/C'd from [facility] and in no distress accompanied by wife.  Next appointment is 3/12/19 at 11:00 am.

## 2019-03-04 ENCOUNTER — OFFICE VISIT (OUTPATIENT)
Dept: RHEUMATOLOGY | Age: 74
End: 2019-03-04

## 2019-03-04 VITALS
DIASTOLIC BLOOD PRESSURE: 80 MMHG | OXYGEN SATURATION: 93 % | HEIGHT: 68 IN | SYSTOLIC BLOOD PRESSURE: 138 MMHG | HEART RATE: 45 BPM | WEIGHT: 237.6 LBS | BODY MASS INDEX: 36.01 KG/M2 | TEMPERATURE: 98 F | RESPIRATION RATE: 18 BRPM

## 2019-03-04 DIAGNOSIS — M05.79 SEROPOSITIVE RHEUMATOID ARTHRITIS OF MULTIPLE SITES (HCC): Primary | ICD-10-CM

## 2019-03-04 DIAGNOSIS — E55.9 VITAMIN D DEFICIENCY: ICD-10-CM

## 2019-03-04 DIAGNOSIS — M1A.0720 CHRONIC IDIOPATHIC GOUT INVOLVING TOE OF LEFT FOOT WITHOUT TOPHUS: ICD-10-CM

## 2019-03-04 DIAGNOSIS — I20.8 ATYPICAL ANGINA (HCC): ICD-10-CM

## 2019-03-04 DIAGNOSIS — Z79.60 LONG-TERM USE OF IMMUNOSUPPRESSANT MEDICATION: ICD-10-CM

## 2019-03-04 DIAGNOSIS — K21.9 GASTROESOPHAGEAL REFLUX DISEASE, ESOPHAGITIS PRESENCE NOT SPECIFIED: ICD-10-CM

## 2019-03-04 RX ORDER — METHOTREXATE 2.5 MG/1
TABLET ORAL
Qty: 96 TAB | Refills: 0 | Status: SHIPPED | OUTPATIENT
Start: 2019-03-04 | End: 2019-06-07 | Stop reason: SDUPTHER

## 2019-03-04 RX ORDER — FOLIC ACID 1 MG/1
TABLET ORAL
Qty: 90 TAB | Refills: 0 | Status: SHIPPED | OUTPATIENT
Start: 2019-03-04 | End: 2019-06-07 | Stop reason: SDUPTHER

## 2019-03-04 NOTE — PROGRESS NOTES
Chief Complaint Patient presents with  Arthritis 1. Have you been to the ER, urgent care clinic since your last visit? Hospitalized since your last visit? Yes Where: urgent care in [place] 2. Have you seen or consulted any other health care providers outside of the [address] [place] [address] since your last visit? Include any pap smears or colon screening.  No

## 2019-03-04 NOTE — PROGRESS NOTES
REASON FOR VISIT This is a follow-up visit for Mr. James Mckeon for Seropositive Erosive Rheumatoid Arthritis and Chronic Gout. Inflammatory arthritis phenotype includes: Anti-CCP positive: yes (>250) Rheumatoid factor positive: yes (>650) Erosive disease: yes Extra-articular manifestations include: none Immunosuppression Screening (12/03/2018): Quantiferon TB: negative PPD:  Not performed Hepatitis B: negative Hepatitis C: negative Therapy History includes: 
Current DMARD therapy include: methotrexate 20 mg every Wednesday, Simponi Aria (5/08/2018 to present) Prior DMARD therapy include: none Discontinued DMARDs because of inefficacy: None Discontinued DMARDs because of side effects: None Unaffordable DMARDs: Enbrel Immunizations:  
Immunization History Administered Date(s) Administered  Influenza High Dose Vaccine PF 10/14/2016, 10/16/2018  Influenza Vaccine Split 09/21/2011  Influenza Vaccine Whole 09/17/2010  Pneumococcal Conjugate (PCV-13) 01/01/2015  TDAP Vaccine 12/15/2010  Tdap 12/15/2010, 10/04/2017  ZZZ-RETIRED (DO NOT USE) Pneumococcal Vaccine (Unspecified Type) 10/21/2011  Zoster Vaccine, Live 10/21/2011 Active problems include: 
 
Patient Active Problem List  
Diagnosis Code  Mixed hyperlipidemia E78.2  Other abnormal glucose R73.09  
 Essential hypertension, benign I10  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  Allergic rhinitis, cause unspecified J30.9  Reflux esophagitis K21.0  Benign neoplasm of colon D12.6  Contact dermatitis and other eczema, due to unspecified cause L25.9  Bunion M21.619  
 History of prostate cancer Z85.46  
 Advanced care planning/counseling discussion Z71.89  Low testosterone R79.89  
 Primary osteoarthritis of both knees M17.0  Seropositive rheumatoid arthritis of multiple sites ([address] M05.79  Long-term use of immunosuppressant medication Z79.899  Vitamin D deficiency E55.9  Recurrent depression ([facility] [address] F33.9  Severe obesity with body mass index (BMI) of 35.0 to 39.9 with serious comorbidity ([facility]) E66.01  
 Gout M10.9  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  Primary localized osteoarthrosis, lower leg M17.10 HISTORY OF PRESENT ILLNESS 
 
Mr. Katerin Banegas returns for a follow-up. On his last visit, I continued methotrexate 20 mg every Wednesday and Simponi Aria infusions. His most infusion recent was 1/15/2019. Today, he denies pain, swelling, or stiffness in his hands or wrists. He denies interval gouty flares. He reports severe indigestion in the morning or while [place] that may last up to 10-20 minutes. Eating Tums or chewing ice will help. It is a sharp pain. He denies nausea, lightheadedness, palpitations, radiating pain, heartburn, blood in stools, black stools. It is not related to activity. He can eat normal without issues but at times, it can come on. This has been ongoing for the past 2 to 3 months. This happens 2-3 times a week. He denies fever, weight loss, blurred vision, vision loss, oral ulcers, ankle swelling, dry cough, dyspnea, nausea, vomiting, dysphagia, abdominal pain, black or bloody stool, fall since last visit, rash, easy bruising and increased thirst. 
 
Mr. Katerin Banegas has continued his medications for arthritis and reports good tolerance without significant side effects. Last toxicity monitoring by blood work was done on 12/03/2018 and did not reveal any significant adverse effects. Most recent inflammatory markers from 12/03/2018 revealed a ESR 7 mm/hr (previously 15, 3, 61, 22, 42 mm/hr) and CRP 0.4 mg/L (previously 0.29, 27.7, 24.6, 10.7,  N/A mg/L). The patient has not had any interval hospital admissions, infections, or surgeries. REVIEW OF SYSTEMS A comprehensive review of systems was performed and pertinent results are documented in the HPI, review of systems is otherwise non-contributory. PAST MEDICAL HISTORY He has a past medical history of Allergic rhinitis, Arthritis, Basal cell carcinoma (BCC) in situ of skin, Cancer ([facility] [address] GERD (gastroesophageal reflux disease), Gout, Hypercholesterolemia, Hypertension, Melanoma in situ of back ([facility] [address] and Skin cancer (melanoma) ([facility] [address] FAMILY HISTORY His family history includes Cancer in his paternal grandfather and another family member; Dementia in his mother; Diabetes in his paternal grandmother; Heart Disease in his father; Kidney Disease in his father. SOCIAL HISTORY He reports that he has quit smoking. he has never used smokeless tobacco. He reports that he drinks about 2.4 oz of alcohol per week. He reports that he does not use drugs. MEDICATIONS Current Outpatient Medications Medication Sig Dispense Refill  methotrexate (RHEUMATREX) 2.5 mg tablet TAKE 8 TABLETS BY MOUTH ONCE WEEKLY ON WEDNESDAYS 96 Tab 0  
 folic acid (FOLVITE) 1 mg tablet TAKE ONE TABLET BY MOUTH DAILY 90 Tab 0  
 VITAMIN D2 50,000 unit capsule TAKE ONE CAPSULE BY MOUTH EVERY 7 DAYS 12 Cap 2  
 0.9% sodium chloride solp 100 mL with golimumab 12.5 mg/mL soln 2 mg/kg 2 mg/kg by IntraVENous route once.  lisinopril (PRINIVIL, ZESTRIL) 40 mg tablet Take 40 mg by mouth daily.  aspirin delayed-release 81 mg tablet Take  by mouth daily.  omeprazole (PRILOSEC) 20 mg capsule Take 20 mg by mouth daily.  albuterol (VENTOLIN HFA) 90 mcg/actuation inhaler Take  by inhalation.  allopurinol (ZYLOPRIM) 300 mg tablet Take 1 Tab by mouth daily. 30 Tab 11  
 omega-3 fatty acids-vitamin e (FISH OIL) 1,000 mg Cap Take 1 Cap by mouth.  citalopram (CELEXA) 20 mg tablet Take 1 Tab by mouth daily. 30 Tab 11  
 atorvastatin (LIPITOR) 40 mg tablet Take 1 Tab by mouth daily. 30 Tab 11  
 amlodipine (NORVASC) 2.5 mg tablet Take 1 Tab by mouth daily. 30 Tab 11 ALLERGIES 
 
 Allergies Allergen Reactions  Stings [Sting, Bee] Swelling Swelling at site PHYSICAL EXAMINATION Visit Vitals /80 (BP 1 Location: Left arm, BP Patient Position: Sitting) Pulse (!) 45 Temp 98 °F (36.7 °C) Resp 18 Ht 5' 8\" (1.727 m) Wt 237 lb 9.6 oz (107.8 kg) SpO2 93% BMI 36.13 kg/m² Body mass index is 36.13 kg/m². General: Patient is alert, oriented x 3, not in acute distress, wife at bedside HEENT:  
Sclerae are not injected and appear moist. 
There is no alopecia. Neck is supple Cardiovascular: 
Heart is regular rate and rhythm, no murmurs. Chest: 
Lungs are clear to auscultation bilaterally. No rhonchi, wheezes, or crackles. Abdomen: 
Obese. Extremities: 
Free of clubbing, cyanosis, edema Neurological exam: Muscle strength is full in upper and lower extremities Skin exam: 
There are no rashes, no alopecia, no discoid lesions, no active Raynaud's, no livedo reticularis, no periungual erythema. Musculoskeletal exam: A comprehensive musculoskeletal exam was performed for all joints of each upper and lower extremity and assessed for swelling, tenderness and range of motion. Positive results are documented as below: 
 
Decreased active and passive ROM of left shoulder (RESOLVED) Bilateral Olivia and [place] nodes. Bilateral knee crepitus without effusion. Right ankle swelling with tenderness with decreased ROM (RESOLVED) Pes planus Bilateral hallux valgus (LEFT more than right) Z-Deformities:   no 
[place]ck Deformities:  no 
[place]'s Deformities:  no 
Ulnar Deviation:   Yes (bilateral) MCP Subluxation:  no 
  
Joint Count 3/4/2019 12/3/2018 9/4/2018 6/4/2018 3/7/2018 12/20/2017 11/20/2017 Patient pain (0-100) 0 20 15 0 60 30 90 MHAQ 0 0 0 0 0.25 0.125 0.375 Left elbow - Tender - - - - - 1 - Left wrist- Tender - - 1 0 1 1 1 Left wrist- Swollen - - 1 1 1 1 1 Left 1st MCP - Tender - - - - 1 - 1 Left 1st MCP - Swollen - - 1 - 1 1 1 Left 2nd MCP - Tender 0 0 - - 1 1 1 Left 2nd MCP - Swollen 1 1 1 1 1 1 1 Left 3rd MCP - Tender 0 - - - 1 - 1 Left 3rd MCP - Swollen 1 - 1 1 1 1 1 Left 4th MCP - Tender - - - - 1 - - Left 5th MCP - Tender - - - - 1 - - Left thumb IP - Tender - - - - 1 - - Left thumb IP - Swollen - - - - 1 - - Left 2nd PIP - Tender - - - - 1 - - Left 3rd PIP - Tender - - - - 1 - - Left 4th PIP - Tender - - - - 1 - - Left 5th PIP - Tender - - - - 1 - - Right wrist- Tender - - - - 1 1 1 Right wrist- Swollen - - 1 1 1 1 1 Right 1st MCP - Tender - - - - 1 1 1 Right 1st MCP - Swollen - - 1 1 1 - 1 Right 2nd MCP - Tender - - - - 1 1 1 Right 2nd MCP - Swollen - - 1 1 1 1 1 Right 3rd MCP - Tender - - - - 1 - - Right 3rd MCP - Swollen - - 1 1 1 1 1 Right 4th MCP - Tender 0 - - - 1 1 1 Right 4th MCP - Swollen 1 - - 1 1 1 1 Right 5th MCP - Swollen - - - - 1 - - Right thumb IP - Tender - - - - - 1 - Right 2nd PIP - Tender - - - - - 1 1 Right 2nd PIP - Swollen - - - - 1 - 1 Right 3rd PIP - Tender - - - - - 1 1 Right 3rd PIP - Swollen - - - 1 1 1 1 Right 4th PIP - Swollen - - - 1 1 - - Right knee - Tender - - - - - - 1 Right knee - Swollen - - - - - - 1 Tender Joint Count (Total) 0 0 1 0 16 10 11 Swollen Joint Count (Total) 3 1 8 10 14 9 12 Physician Assessment (0-10) - 0 2 2 4 3 5 Patient Assessment (0-10) 0 2 1.5 0 6 5 9  
CDAI Total (calculated) - 3 12.5 12 40 27 37 DATA REVIEW Laboratory Recent laboratory results were reviewed, summarized, and discussed with the patient. Imaging Musculoskeletal Ultrasound None Radiographs Bilateral Hand 11/20/2017: RIGHT: No fracture or dislocation on plain film. The bones are osteopenic. Mild narrowing of the radiocarpal joint. Probable erosion of the radius and lunate at the radiocarpal joint. No widening of the intercarpal spaces. Subtle erosion of the ulnar styloid. Mild narrowing of the triscaphe joint is age-appropriate. Irregular arthritis of the first and second CMC joints includes osteophytes and erosions. There are erosions of the first through fourth metacarpal heads at the MCP points. Moderate narrowing and anterior subluxation of the second MCP joint. IP joints are within normal limits. No chondrocalcinosis or periosteal reaction. LEFT: No fracture or dislocation on plain film. The bones are osteopenic. Moderate narrowing of the radiocarpal joint with subtle erosions of the lunate. Marrow DR UVJ with erosions. No definite ulnar styloid erosion. Triscaphe and first [facility] joint osteoarthritis are age-appropriate. Large erosions on both sides of the second MCP joint and in the third metacarpal head. IP joints are within normal limits. No chondrocalcinosis or periosteal reaction. Bilateral Foot 11/20/2017: RIGHT: No fracture or dislocation on plain film. Bones are osteopenic. Talonavicular joint space narrowing, osteophytes, and erosions. Subtalar arthritis is partially imaged. TMT joints are within normal limits. Severe narrowing of the first MTP joint with osteophytes and erosions. There is erosion and the fifth tarsal head. Mild narrowing of the second MTP joint without definable erosion. No periosteal reaction. LEFT: No fracture or dislocation on plain film. Bones are osteopenic. Intertarsal joints are within normal limits. Moderate narrowing of the first MTP joint with central and marginal erosions. 35 degrees hallux valgus angulation and fibular subluxation of the first proximal phalanx. There are also erosions in the laterally subluxed hallux sesamoids. Mild joint space narrowing and erosions of the third MTP joint. Fragmentation of the fourth middle phalanx is adjacent to the fourth PIP joint erosions. There are also erosions of the second and fifth PIP joints. First IP joint osteoarthritis is mild. CT Imaging CT Right Upper Extremity with contrast 10/19/2017: examination of the soft tissues demonstrates no drainable fluid collection. There is no pathologically enlarged nodes within the right axilla. Alignment is normal. There is no bone destruction or fracture. No significant fatty atrophy. MR Imaging None DXA None ASSESSMENT AND PLAN This is a follow-up visit for Mr. Antoine Cortez. 1) Seropositive Erosive Rheumatoid Arthritis. He presented after activation of underlying Rheumatoid Arthritis which appears to have been quiescent for more than 30 years from the TDAP vaccine. He remembers being diagnosed with arthritis in his 30's that was treated with an injection in his finger. He has bilateral ulnar deviation which corresponds with the chronicity of his disease. Radiographs showed erosive disease confirming chronicity. He is maintained on methotrexate 20 mg every Wednesday and Simponi Aria infusions with good tolerance. He most recent infusion was 1/15/2019. He denies joint pain, stiffness or swelling today. His CDAI was 3.5 (previously 3, 12.5, 12, 40, 27, 37) with 0 tender and 3  swollen joints, consistent with low disease activity. He has had excellent clinical response with Simponi Aria. I will continue current therapy. Follow up in 3 months. 2) Long Term Use of Immunosuppressants. The patient remains on immunomodulatory medications (methotrexate, Simponi Aria) and requires frequent toxicity monitoring by blood work. Respective labs were ordered (CBC and CMP). 3) Vitamin D Deficiency. His vitamin D level was 39.2 (previously 27.7, 22.6). He is on weekly ergocalciferol 50,000. I will check his level on follow up. 4) Bilateral Knee Osteoarthritis. This was not an active issue today. 5) Basal Cell Carcinoma. Lesion removed on his scalp 5/31/2018. Wound without secondary infection. He continues to have lesions burnt. 6) History of Melanoma. He had melanoma removed from his back 10/2017. He did not require chemotherapy. He has skin exams every 3 months. He has reviewed his medications, including Melvenia Pour, with his dermatologist. He understands to continue close surveillance and inform us if he has any new melanoma lesions. 7) Chronic Gout. He has a history of gout in his left foot. He is on allopurinol 300 mg daily. His uric acid was 4.9 mg/dL. He denies interval flares. 8) Bilateral Pes Planus. This is no longer an active issue. I suspect is from his pes planus. I recommended inserts. 9) Atypical Chest Pain. This is new and frequent, mostly at night. I referred him to cardiology and gastroenterology. I asked him to go to the ED if he has recurrence again. I informed his PCP, Dr. Kary Patrick. The patient voiced understanding of the aforementioned assessment and plan. Summary of plan was provided in the After Visit Summary patient instructions. TODAY'S ORDERS Orders Placed This Encounter Stephanie Bethea [facility]  Amelie Shila [facility]  methotrexate (RHEUMATREX) 2.5 mg tablet  folic acid (FOLVITE) 1 mg tablet Future Appointments Date Time Provider Raiza Sanchez 3/6/2019  9:00 AM Cheryl Luna MD [facility] SCHED  
3/12/2019 11:00 AM SS INF1 CH3 <4H [facility]  
5/7/2019 11:00 AM SS INF1 CH3 <4H [facility]  
6/7/2019 10:00 AM Michelle Gonsalves MD [id number] [facility] Sole Arevalo MD, [facility] Adult Rheumatology Rheumatology Ultrasound Certified [place] [facility] [employer] [facility], [address] [place] [address] Road Phone [phone] Fax [phone]

## 2019-03-06 ENCOUNTER — OFFICE VISIT (OUTPATIENT)
Dept: CARDIOLOGY CLINIC | Age: 74
End: 2019-03-06

## 2019-03-06 VITALS
RESPIRATION RATE: 18 BRPM | DIASTOLIC BLOOD PRESSURE: 62 MMHG | BODY MASS INDEX: 36.66 KG/M2 | WEIGHT: 241.9 LBS | SYSTOLIC BLOOD PRESSURE: 136 MMHG | HEIGHT: 68 IN

## 2019-03-06 DIAGNOSIS — E66.01 SEVERE OBESITY WITH BODY MASS INDEX (BMI) OF 35.0 TO 39.9 WITH SERIOUS COMORBIDITY (HCC): ICD-10-CM

## 2019-03-06 DIAGNOSIS — R07.9 CHEST PAIN, UNSPECIFIED TYPE: Primary | ICD-10-CM

## 2019-03-06 DIAGNOSIS — E78.2 MIXED HYPERLIPIDEMIA: ICD-10-CM

## 2019-03-06 DIAGNOSIS — I10 ESSENTIAL HYPERTENSION, BENIGN: ICD-10-CM

## 2019-03-06 NOTE — PROGRESS NOTES
Reason for Consult: Chest pain. HPI: Jessica Del Angel is a 68 y.o. male with past medical history significant for hypertension, dyslipidemia, sleep apnea on BiPAP, rheumatoid arthritis on active treatment is being referred for symptoms of chest pain. Symptoms started about 6 months ago and has been frequent nearly on a daily basis. He experiences pain what he describes as mostly epigastric in nature or sometimes retrosternal and occurs usually in the morning hours but can also occur during the daytime. Symptoms get better after some time either when he is sitting or if he chews on ice or antiacid medications. There is no associated symptoms or shortness of breath, lightheadedness, dizziness, presyncope or syncope. He is otherwise fairly active and plays golf about 4-5 times per week. He used to smoke tobacco but quit 45 years ago. He has not had a stress test or cardiac evaluation in recent years but had a stress test many years ago. Family history is significant for heart disease and consequent to other problems in his dad. He is known to have bradycardia and was actually taken off of certain medications by Dr. Shae Gann but continues to have baseline bradycardia and the heart rate 40 bpm to 50 bpm.    EKG today demonstrates normal sinus rhythm with sinus bradycardia with heart rate of 45 bpm with nonspecific ST changes in the lateral leads. Plan:    1. Chest pain: Symptoms are likely related to GI and probably reflux. GI evaluation will be needed however patient needs to rule out cardiac cause of chest pain. Further evaluation with a stress test is recommended given his personal risk factors as well as family history. Will do Exercise stress Nuc despite bradycardia but if his HR is not achieved then our nuclear lab can convert it to Alec Rising stress Nuclear. 2.  Hypertension: Blood pressure is controlled. Continue current medications.     3.  Bradycardia: Currently asymptomatic however heart rate being quite significantly low we will need to keep a close eye. May require pacemaker in the later years. Continue BiPAP use. 4.  Dyslipidemia: Continue Lipitor 40 mg p.o. Daily. 5.  Sleep apnea: Continue BiPAP. Past Medical History:   Diagnosis Date    Allergic rhinitis     Arthritis     RA    Basal cell carcinoma (BCC) in situ of skin     shoudler and scalp    Cancer (HCC)     prostate    GERD (gastroesophageal reflux disease)     Gout     Hypercholesterolemia     Hypertension     [facility]     Skin cancer (melanoma) ([facility]             Past Surgical History:   Procedure Laterality Date    ENDOSCOPY, COLON, DIAGNOSTIC      09, due 12, done 11, due 14    HX HEENT      wisdom teeth extraction    HX KNEE ARTHROSCOPY Left     HX KNEE REPLACEMENT Left 2012    HX OTHER SURGICAL N/A 05/31/2018    Basal cell removal of scalp    HX PROSTATECTOMY      HX TONSILLECTOMY               Family History   Problem Relation Age of Onset    Dementia Mother     Heart Disease Father         CAD    Kidney Disease Father         renal failure    Diabetes Paternal Grandmother     Cancer Paternal Grandfather         colon    Cancer Other         family hx colon cancer           Social History     Socioeconomic History    Marital status:      Spouse name: Not on file    Number of children: Not on file    Years of education: Not on file    Highest education level: Not on file   Social Needs    Financial resource strain: Not on file    Food insecurity - worry: Not on file    Food insecurity - inability: Not on file   Leostream needs - medical: Not on file   Leostream needs - non-medical: Not on file   Occupational History    Not on file   Tobacco Use    Smoking status: Former Smoker    Smokeless tobacco: Never Used   Substance and Sexual Activity    Alcohol use:  Yes     Alcohol/week: 2.4 oz     Types: 4 Cans of beer per week     Comment: 5-6 days per week    Drug use: No    Sexual activity: Yes   Other Topics Concern    Not on file   Social History Narrative    Not on file         Allergies   Allergen Reactions    Stings [Sting, Bee] Swelling     Swelling at site            Current Outpatient Medications   Medication Sig Dispense Refill    methotrexate (RHEUMATREX) 2.5 mg tablet TAKE 8 TABLETS BY MOUTH ONCE WEEKLY ON WEDNESDAYS 96 Tab 0    folic acid (FOLVITE) 1 mg tablet TAKE ONE TABLET BY MOUTH DAILY 90 Tab 0    VITAMIN D2 50,000 unit capsule TAKE ONE CAPSULE BY MOUTH EVERY 7 DAYS 12 Cap 2    0.9% sodium chloride solp 100 mL with golimumab 12.5 mg/mL soln 2 mg/kg 2 mg/kg by IntraVENous route once. Every six weeks      lisinopril (PRINIVIL, ZESTRIL) 40 mg tablet Take 40 mg by mouth daily.  aspirin delayed-release 81 mg tablet Take  by mouth daily.  omeprazole (PRILOSEC) 20 mg capsule Take 20 mg by mouth daily.  albuterol (VENTOLIN HFA) 90 mcg/actuation inhaler Take  by inhalation as needed.  allopurinol (ZYLOPRIM) 300 mg tablet Take 1 Tab by mouth daily. 30 Tab 11    omega-3 fatty acids-vitamin e (FISH OIL) 1,000 mg Cap Take 1 Cap by mouth.  citalopram (CELEXA) 20 mg tablet Take 1 Tab by mouth daily. 30 Tab 11    atorvastatin (LIPITOR) 40 mg tablet Take 1 Tab by mouth daily. 30 Tab 11    amlodipine (NORVASC) 2.5 mg tablet Take 1 Tab by mouth daily. 30 Tab 11        ROS:  12 point review of systems was performed.  All negative except for HPI     Physical Exam:  Visit Vitals  /62 (BP 1 Location: Left arm)   Resp 18   Ht 5' 8\" (1.727 m)   Wt 241 lb 14.4 oz (109.7 kg)   BMI 36.78 kg/m²       Gen:  Well-developed, well-nourished, in no acute distress  HEENT:  Pink conjunctivae, PERRL, hearing intact to voice, moist mucous membranes  Neck:  Supple, without masses, thyroid non-tender  Resp:  No accessory muscle use, clear breath sounds without wheezes rales or rhonchi  Card:  No murmurs, normal S1, S2 without thrills, bruits or peripheral edema  Abd:  Soft, non-tender, non-distended, normoactive bowel sounds are present, no palpable organomegaly and no detectable hernias  Lymph:  No cervical or inguinal adenopathy  Musc:  No cyanosis or clubbing  Skin:  No rashes or ulcers, skin turgor is good  Neuro:  Cranial nerves are grossly intact, no focal motor weakness, follows commands appropriately  Psych:  Good insight, oriented to person, place and time, alert     Labs:     Lab Results   Component Value Date/Time    WBC 9.5 12/03/2018 12:30 PM    HGB 13.6 12/03/2018 12:30 PM    HCT 40.5 12/03/2018 12:30 PM    PLATELET 694 51/52/5289 12:30 PM     (H) 12/03/2018 12:30 PM     Lab Results   Component Value Date/Time    Hemoglobin A1c 5.3 09/25/2018 11:10 AM    Hemoglobin A1c 5.9 (H) 05/22/2017 11:18 AM    Hemoglobin A1c 5.3 10/07/2011 02:44 AM    Glucose 90 12/03/2018 12:30 PM    LDL, calculated 74.8 09/25/2018 11:10 AM    Creatinine (POC) 0.8 10/19/2017 11:44 AM    Creatinine 0.86 12/03/2018 12:30 PM      Lab Results   Component Value Date/Time    Cholesterol, total 172 09/25/2018 11:10 AM    HDL Cholesterol 42 09/25/2018 11:10 AM    LDL, calculated 74.8 09/25/2018 11:10 AM    Triglyceride 276 (H) 09/25/2018 11:10 AM    CHOL/HDL Ratio 4.1 09/25/2018 11:10 AM     Lab Results   Component Value Date/Time    ALT (SGPT) 37 12/03/2018 12:30 PM    AST (SGOT) 11 12/03/2018 12:30 PM    Alk.  phosphatase 78 12/03/2018 12:30 PM    Bilirubin, direct 0.16 10/07/2011 02:44 AM    Bilirubin, total 0.4 12/03/2018 12:30 PM    Albumin 4.4 12/03/2018 12:30 PM    Protein, total 6.6 12/03/2018 12:30 PM    PLATELET 605 97/51/2574 12:30 PM     No results found for: INR, PTMR, PTP, PT1, PT2   Lab Results   Component Value Date/Time    GFR est non-AA 87 12/03/2018 12:30 PM    GFRNA, POC >60 10/19/2017 11:44 AM    GFR est  12/03/2018 12:30 PM    GFRAA, POC >60 10/19/2017 11:44 AM    Creatinine 0.86 12/03/2018 12:30 PM    Creatinine (POC) 0.8 10/19/2017 11:44 AM    BUN 15 12/03/2018 12:30 PM    Sodium 143 12/03/2018 12:30 PM    Potassium 4.4 12/03/2018 12:30 PM    Chloride 109 (H) 12/03/2018 12:30 PM    CO2 21 12/03/2018 12:30 PM     Lab Results   Component Value Date/Time    Prostate Specific Ag <0.1 09/25/2018 11:10 AM    Prostate Specific Ag <0.1 05/22/2017 11:18 AM    Prostate Specific Ag <0.1 10/07/2011 02:44 AM    Prostate Specific Ag 0.0 04/28/2010 09:23 AM    Prostate Specific Ag <0.1 01/14/2010 10:33 AM    Prostate Specific Ag <0.1 06/25/2009 08:35 AM     No results found for: TSH, TSH2, TSH3, TSHP, TSHELE, TSHEXT, TT3, T3U, T3UP, FRT3, FT3, FT4, FT4P, T4, T4P, FT4T, TT7, TSHEXT   Lab Results   Component Value Date/Time    Glucose 90 12/03/2018 12:30 PM      No results found for: CPK, RCK1, RCK2, RCK3, RCK4, CKMB, CKNDX, CKND1, TROPT, TROIQ, BNPP, BNP   No results found for: BNP, BNPP, BNPPPOC, XBNPT, BNPNT   Lab Results   Component Value Date/Time    Sodium 143 12/03/2018 12:30 PM    Potassium 4.4 12/03/2018 12:30 PM    Chloride 109 (H) 12/03/2018 12:30 PM    CO2 21 12/03/2018 12:30 PM    Anion gap 11 09/25/2018 11:10 AM    Glucose 90 12/03/2018 12:30 PM    BUN 15 12/03/2018 12:30 PM    Creatinine 0.86 12/03/2018 12:30 PM    BUN/Creatinine ratio 17 12/03/2018 12:30 PM    GFR est  12/03/2018 12:30 PM    GFR est non-AA 87 12/03/2018 12:30 PM    Calcium 9.1 12/03/2018 12:30 PM      Lab Results   Component Value Date/Time    Sodium 143 12/03/2018 12:30 PM    Potassium 4.4 12/03/2018 12:30 PM    Chloride 109 (H) 12/03/2018 12:30 PM    CO2 21 12/03/2018 12:30 PM    Anion gap 11 09/25/2018 11:10 AM    Glucose 90 12/03/2018 12:30 PM    BUN 15 12/03/2018 12:30 PM    Creatinine 0.86 12/03/2018 12:30 PM    BUN/Creatinine ratio 17 12/03/2018 12:30 PM    GFR est  12/03/2018 12:30 PM    GFR est non-AA 87 12/03/2018 12:30 PM    Calcium 9.1 12/03/2018 12:30 PM    Bilirubin, total 0.4 12/03/2018 12:30 PM    ALT (SGPT) 37 12/03/2018 12:30 PM    AST (SGOT) 11 12/03/2018 12:30 PM    Alk. phosphatase 78 12/03/2018 12:30 PM    Protein, total 6.6 12/03/2018 12:30 PM    Albumin 4.4 12/03/2018 12:30 PM    Globulin 3.2 09/25/2018 11:10 AM    A-G Ratio 2.0 12/03/2018 12:30 PM      Lab Results   Component Value Date/Time    Hemoglobin A1c 5.3 09/25/2018 11:10 AM         No results for input(s): CPK, CKMB, TROIQ in the last 72 hours. No lab exists for component: CKQMB, CPKMB        Problem List:     Problem List  Date Reviewed: 3/6/2019          Codes Class Noted    Chronic idiopathic gout involving toe of left foot without tophus ICD-10-CM: M1A.0720  ICD-9-CM: 274.02  12/3/2018        Severe obesity with body mass index (BMI) of 35.0 to 39.9 with serious comorbidity ([facility] ICD-10-CM: E66.01  ICD-9-CM: 278.01  8/30/2018        Gout ICD-10-CM: M10.9  ICD-9-CM: 274.9  8/30/2018        Recurrent depression ([facility] ICD-10-CM: F33.9  ICD-9-CM: 296.30  12/28/2017        Seropositive rheumatoid arthritis of multiple sites [facility]) ICD-10-CM: M05.79  ICD-9-CM: 714.0  12/20/2017        Long-term use of immunosuppressant medication ICD-10-CM: Z79.899  ICD-9-CM: V58.69  12/20/2017        Vitamin D deficiency ICD-10-CM: E55.9  ICD-9-CM: 268.9  12/20/2017        Primary osteoarthritis of both knees ICD-10-CM: M17.0  ICD-9-CM: 715.16  11/20/2017        Low testosterone ICD-10-CM: R79.89  ICD-9-CM: 790.99  11/16/2017        Advanced care planning/counseling discussion ICD-10-CM: Z71.89  ICD-9-CM: V65.49  8/15/2017    Overview Signed 8/15/2017  4:46 PM by Lana Rodriguez, RN     Patient states that a completed Advanced Medical Directive is at home. NN encouraged patient to bring a copy of the completed Advanced Medical Directive to the office for scanning into the medical record. Patient verbalized understanding & agreement.                History of prostate cancer ICD-10-CM: Z85.46  ICD-9-CM: V10.46  5/15/2017        Primary localized osteoarthrosis, lower leg ICD-10-CM: M17.10  ICD-9-CM: 715.16  5/14/2012        Bunion ICD-10-CM: M21.619  ICD-9-CM: 727.1  10/21/2011        Contact dermatitis and other eczema, due to unspecified cause ICD-10-CM: L25.9  ICD-9-CM: 692.9  12/15/2010        Mixed hyperlipidemia ICD-10-CM: E78.2  ICD-9-CM: 272.2  1/14/2010        Other abnormal glucose ICD-10-CM: R73.09  ICD-9-CM: 790.29  1/14/2010        Essential hypertension, benign ICD-10-CM: I10  ICD-9-CM: 401.1  1/14/2010        Dysthymic disorder ICD-10-CM: F34.1  ICD-9-CM: 300.4  1/14/2010        Allergic rhinitis, cause unspecified ICD-10-CM: J30.9  ICD-9-CM: 477.9  1/14/2010        Reflux esophagitis ICD-10-CM: K21.0  ICD-9-CM: 530.11  1/14/2010        Benign neoplasm of colon ICD-10-CM: D12.6  ICD-9-CM: 211.3  1/14/2010                Boby Cantor MD, [facility]

## 2019-03-06 NOTE — PROGRESS NOTES
Visit Vitals  /62 (BP 1 Location: Left arm)   Resp 18   Ht 5' 8\" (1.727 m)   Wt 241 lb 14.4 oz (109.7 kg)   BMI 36.78 kg/m²       New patient referred for chest pain.

## 2019-03-12 ENCOUNTER — HOSPITAL ENCOUNTER (OUTPATIENT)
Dept: INFUSION THERAPY | Age: 74
Discharge: HOME | End: 2019-03-12
Payer: MEDICARE

## 2019-03-12 VITALS
DIASTOLIC BLOOD PRESSURE: 67 MMHG | OXYGEN SATURATION: 95 % | BODY MASS INDEX: 36.49 KG/M2 | HEART RATE: 46 BPM | SYSTOLIC BLOOD PRESSURE: 152 MMHG | RESPIRATION RATE: 16 BRPM | TEMPERATURE: 96 F | WEIGHT: 240 LBS

## 2019-03-12 LAB
ALBUMIN SERPL-MCNC: 3.5 G/DL (ref 3.5–5)
ALBUMIN/GLOB SERPL: 1.1 {RATIO} (ref 1.1–2.2)
ALP SERPL-CCNC: 81 U/L (ref 45–117)
ALT SERPL-CCNC: 64 U/L (ref 12–78)
ANION GAP SERPL CALC-SCNC: 8 MMOL/L (ref 5–15)
AST SERPL-CCNC: 12 U/L (ref 15–37)
BASOPHILS # BLD: 0.1 K/UL (ref 0–0.1)
BASOPHILS NFR BLD: 1 % (ref 0–1)
BILIRUB SERPL-MCNC: 0.5 MG/DL (ref 0.2–1)
BUN SERPL-MCNC: 18 MG/DL (ref 6–20)
BUN/CREAT SERPL: 21 (ref 12–20)
CALCIUM SERPL-MCNC: 9 MG/DL (ref 8.5–10.1)
CHLORIDE SERPL-SCNC: 109 MMOL/L (ref 97–108)
CO2 SERPL-SCNC: 23 MMOL/L (ref 21–32)
CREAT SERPL-MCNC: 0.84 MG/DL (ref 0.7–1.3)
CRP SERPL-MCNC: <0.29 MG/DL (ref 0–0.6)
DIFFERENTIAL METHOD BLD: ABNORMAL
EOSINOPHIL # BLD: 0.3 K/UL (ref 0–0.4)
EOSINOPHIL NFR BLD: 5 % (ref 0–7)
ERYTHROCYTE [DISTWIDTH] IN BLOOD BY AUTOMATED COUNT: 13.3 % (ref 11.5–14.5)
ERYTHROCYTE [SEDIMENTATION RATE] IN BLOOD: 11 MM/HR (ref 0–20)
GLOBULIN SER CALC-MCNC: 3.1 G/DL (ref 2–4)
GLUCOSE SERPL-MCNC: 108 MG/DL (ref 65–100)
HCT VFR BLD AUTO: 38.8 % (ref 36.6–50.3)
HGB BLD-MCNC: 13.4 G/DL (ref 12.1–17)
IMM GRANULOCYTES # BLD AUTO: 0 K/UL (ref 0–0.04)
IMM GRANULOCYTES NFR BLD AUTO: 0 % (ref 0–0.5)
LYMPHOCYTES # BLD: 1.7 K/UL (ref 0.8–3.5)
LYMPHOCYTES NFR BLD: 24 % (ref 12–49)
MCH RBC QN AUTO: 35.6 PG (ref 26–34)
MCHC RBC AUTO-ENTMCNC: 34.5 G/DL (ref 30–36.5)
MCV RBC AUTO: 103.2 FL (ref 80–99)
MONOCYTES # BLD: 0.7 K/UL (ref 0–1)
MONOCYTES NFR BLD: 10 % (ref 5–13)
NEUTS SEG # BLD: 4.3 K/UL (ref 1.8–8)
NEUTS SEG NFR BLD: 60 % (ref 32–75)
NRBC # BLD: 0 K/UL (ref 0–0.01)
NRBC BLD-RTO: 0 PER 100 WBC
PLATELET # BLD AUTO: 204 K/UL (ref 150–400)
PMV BLD AUTO: 10.9 FL (ref 8.9–12.9)
POTASSIUM SERPL-SCNC: 4.4 MMOL/L (ref 3.5–5.1)
PROT SERPL-MCNC: 6.6 G/DL (ref 6.4–8.2)
RBC # BLD AUTO: 3.76 M/UL (ref 4.1–5.7)
SODIUM SERPL-SCNC: 140 MMOL/L (ref 136–145)
WBC # BLD AUTO: 7.1 K/UL (ref 4.1–11.1)

## 2019-03-12 PROCEDURE — 74011000258 HC RX REV CODE- 258: Performed by: INTERNAL MEDICINE

## 2019-03-12 PROCEDURE — 80053 COMPREHEN METABOLIC PANEL: CPT

## 2019-03-12 PROCEDURE — 86140 C-REACTIVE PROTEIN: CPT

## 2019-03-12 PROCEDURE — 36415 COLL VENOUS BLD VENIPUNCTURE: CPT

## 2019-03-12 PROCEDURE — 85025 COMPLETE CBC W/AUTO DIFF WBC: CPT

## 2019-03-12 PROCEDURE — 96365 THER/PROPH/DIAG IV INF INIT: CPT

## 2019-03-12 PROCEDURE — 85652 RBC SED RATE AUTOMATED: CPT

## 2019-03-12 PROCEDURE — 74011250636 HC RX REV CODE- 250/636: Performed by: INTERNAL MEDICINE

## 2019-03-12 RX ADMIN — GOLIMUMAB 200 MG: 50 SOLUTION INTRAVENOUS at 11:51

## 2019-03-12 NOTE — PROGRESS NOTES
Outpatient Infusion Center Short Visit Progress Note    Patient admitted to [facility] for Simponi q 8 week maintenance ambulatory in stable condition. Assessment completed. No new concerns voiced. Visit Vitals  /67   Pulse (!) 46   Temp 96 °F (35.6 °C)   Resp 16   Wt 108.9 kg (240 lb)   SpO2 95%   BMI 36.49 kg/m²       Labs drawn and in process. Verbal order from MD Gonsalves to start treatment without lab results. Order given to Efrain Mccoy, pharmacist.    Left arm PIV with positive blood return. Medications:  Simponi IV     Patient tolerated treatment well. Patient discharged from [place] [address] ambulatory in no distress. Patient aware of next appointment scheduled for 5/8/19.     [facility]

## 2019-03-28 ENCOUNTER — PATIENT MESSAGE (OUTPATIENT)
Dept: RHEUMATOLOGY | Age: 74
End: 2019-03-28

## 2019-03-29 RX ORDER — PREDNISONE 5 MG/1
TABLET ORAL
Qty: 91 TAB | Refills: 0 | Status: SHIPPED | OUTPATIENT
Start: 2019-03-29 | End: 2019-04-28

## 2019-03-29 NOTE — TELEPHONE ENCOUNTER
From: Marcia Celestin  Sent: 3/28/2019 5:46 PM EDT  To: Alexandria Wooten MD  Subject: RE: Non-Urgent Medical Question    ----- Message from [address] [place] [address] sent at 3/28/2019 5:46 PM EDT -----    I think that might be a good idea. Thanks Doc! Jamaica Scanlon  ----- Message -----  From: Alexandria Wooten MD  Sent: 3/28/2019 4:35 PM EDT  To: Marcia Celestin  Subject: RE: Non-Urgent Medical Question  I can send you prednisone.    ----- Message -----   From: Marcia Celestin   Sent: 3/28/2019 3:13 PM EDT   To: Alexandria Wooten MD  Subject: RE: Non-Urgent Medical Question    A couple of weeks. A little swollen. ----- Message -----  From: Alexandria Wooetn MD  Sent: 3/28/2019 3:05 PM EDT  To: Marcia Celestin  Subject: RE: Non-Urgent Medical Question  Oh no! Since where? Swollen?     ----- Message -----   From: Marcia Celestin   Sent: 3/28/2019 2:18 PM EDT   To: Alexandria Wooten MD  Subject: Non-Urgent Medical Question    Dr. Dejon Berry,     Dr. Dejon Berry, I am having quite a lot of pain in my ankles. Thought I would check with you to see if you have any suggestions? Everything else feels fine.      Best Regards,  Gerson Presley

## 2019-04-10 ENCOUNTER — TELEPHONE (OUTPATIENT)
Dept: CARDIOLOGY CLINIC | Age: 74
End: 2019-04-10

## 2019-04-10 NOTE — TELEPHONE ENCOUNTER
Call placed to discuss stress results with pt per VO of Dr. Josué Kent. Advised of normal stress results. Follow up with Dr. Josué Kent in 6 months. Keep GI procedural appt. Pt expressed understanding.

## 2019-05-03 NOTE — PERIOP NOTES
[address] [facility] Preprocedure Instructions 1. On the day of your surgery, please report to registration located on the 2nd floor of the  [facility]. yes 2. You must have a responsible adult to drive you to the hospital, stay at the hospital during your procedure and drive you home. If they leave your procedure will not be started (no exceptions). yes 3. Do not have anything to eat or drink (including water, gum, mints, coffee, and juice) after midnight. This does not apply to the medications you were instructed to take by your physician. yesIf you are currently taking Plavix, Coumadin, Aspirin, or other blood-thinning agents, contact your physician for special instructions. yes 4. If you are having a procedure that requires bowel prep: We recommend that you have only clear liquids the day before your procedure and begin your bowel prep by 5:00 pm.  You may continue to drink clear liquids until midnight. If for any reason you are not able to complete your prep please notify your physician immediately. yes 5. Have a list of all current medications, including vitamins, herbal supplements and any other over the counter medications. yes 6. If you wear glasses, contacts, dentures and/or hearing aids, they may be removed prior to procedure, please bring a case to store them in. yes 7. You should understand that if you do not follow these instructions your procedure may be cancelled. If your physical condition changes (I.e. fever, cold or flu) please contact your doctor as soon as possible. 8. It is important that you be on time. If for any reason you are unable to keep your appointment please call [phone] the day of or your physicians office prior to your scheduled procedure

## 2019-05-06 ENCOUNTER — ANESTHESIA (OUTPATIENT)
Dept: ENDOSCOPY | Age: 74
End: 2019-05-06
Payer: MEDICARE

## 2019-05-06 ENCOUNTER — ANESTHESIA EVENT (OUTPATIENT)
Dept: ENDOSCOPY | Age: 74
End: 2019-05-06
Payer: MEDICARE

## 2019-05-06 ENCOUNTER — HOSPITAL ENCOUNTER (OUTPATIENT)
Age: 74
Setting detail: OUTPATIENT SURGERY
Discharge: HOME | End: 2019-05-06
Attending: INTERNAL MEDICINE | Admitting: INTERNAL MEDICINE
Payer: MEDICARE

## 2019-05-06 VITALS
TEMPERATURE: 98.1 F | RESPIRATION RATE: 17 BRPM | OXYGEN SATURATION: 96 % | HEIGHT: 68 IN | DIASTOLIC BLOOD PRESSURE: 55 MMHG | BODY MASS INDEX: 35.46 KG/M2 | WEIGHT: 234 LBS | SYSTOLIC BLOOD PRESSURE: 141 MMHG | HEART RATE: 46 BPM

## 2019-05-06 PROCEDURE — 88305 TISSUE EXAM BY PATHOLOGIST: CPT

## 2019-05-06 PROCEDURE — 74011000250 HC RX REV CODE- 250

## 2019-05-06 PROCEDURE — 77030013992 HC SNR POLYP ENDOSC BSC -B: Performed by: INTERNAL MEDICINE

## 2019-05-06 PROCEDURE — 74011250636 HC RX REV CODE- 250/636

## 2019-05-06 PROCEDURE — 88304 TISSUE EXAM BY PATHOLOGIST: CPT

## 2019-05-06 PROCEDURE — 77030021593 HC FCPS BIOP ENDOSC BSC -A: Performed by: INTERNAL MEDICINE

## 2019-05-06 PROCEDURE — 76060000032 HC ANESTHESIA 0.5 TO 1 HR: Performed by: INTERNAL MEDICINE

## 2019-05-06 PROCEDURE — 76040000007: Performed by: INTERNAL MEDICINE

## 2019-05-06 PROCEDURE — 74011250636 HC RX REV CODE- 250/636: Performed by: INTERNAL MEDICINE

## 2019-05-06 RX ORDER — EPHEDRINE SULFATE 50 MG/ML
INJECTION, SOLUTION INTRAVENOUS AS NEEDED
Status: DISCONTINUED | OUTPATIENT
Start: 2019-05-06 | End: 2019-05-06 | Stop reason: HOSPADM

## 2019-05-06 RX ORDER — SODIUM CHLORIDE 9 MG/ML
50 INJECTION, SOLUTION INTRAVENOUS CONTINUOUS
Status: DISCONTINUED | OUTPATIENT
Start: 2019-05-06 | End: 2019-05-06 | Stop reason: HOSPADM

## 2019-05-06 RX ORDER — SODIUM CHLORIDE 9 MG/ML
INJECTION, SOLUTION INTRAVENOUS
Status: DISCONTINUED | OUTPATIENT
Start: 2019-05-06 | End: 2019-05-06 | Stop reason: HOSPADM

## 2019-05-06 RX ORDER — MIDAZOLAM HYDROCHLORIDE 1 MG/ML
.25-5 INJECTION, SOLUTION INTRAMUSCULAR; INTRAVENOUS
Status: DISCONTINUED | OUTPATIENT
Start: 2019-05-06 | End: 2019-05-06 | Stop reason: HOSPADM

## 2019-05-06 RX ORDER — DEXTROMETHORPHAN/PSEUDOEPHED 2.5-7.5/.8
1.2 DROPS ORAL
Status: DISCONTINUED | OUTPATIENT
Start: 2019-05-06 | End: 2019-05-06 | Stop reason: HOSPADM

## 2019-05-06 RX ORDER — EPINEPHRINE 0.1 MG/ML
1 INJECTION INTRACARDIAC; INTRAVENOUS
Status: DISCONTINUED | OUTPATIENT
Start: 2019-05-06 | End: 2019-05-06 | Stop reason: HOSPADM

## 2019-05-06 RX ORDER — ATROPINE SULFATE 0.1 MG/ML
0.4 INJECTION INTRAVENOUS
Status: DISCONTINUED | OUTPATIENT
Start: 2019-05-06 | End: 2019-05-06 | Stop reason: HOSPADM

## 2019-05-06 RX ORDER — NALOXONE HYDROCHLORIDE 0.4 MG/ML
0.4 INJECTION, SOLUTION INTRAMUSCULAR; INTRAVENOUS; SUBCUTANEOUS
Status: DISCONTINUED | OUTPATIENT
Start: 2019-05-06 | End: 2019-05-06 | Stop reason: HOSPADM

## 2019-05-06 RX ORDER — FLUMAZENIL 0.1 MG/ML
0.2 INJECTION INTRAVENOUS
Status: DISCONTINUED | OUTPATIENT
Start: 2019-05-06 | End: 2019-05-06 | Stop reason: HOSPADM

## 2019-05-06 RX ORDER — PROPOFOL 10 MG/ML
INJECTION, EMULSION INTRAVENOUS
Status: DISCONTINUED | OUTPATIENT
Start: 2019-05-06 | End: 2019-05-06 | Stop reason: HOSPADM

## 2019-05-06 RX ORDER — PROPOFOL 10 MG/ML
INJECTION, EMULSION INTRAVENOUS AS NEEDED
Status: DISCONTINUED | OUTPATIENT
Start: 2019-05-06 | End: 2019-05-06 | Stop reason: HOSPADM

## 2019-05-06 RX ADMIN — SODIUM CHLORIDE: 9 INJECTION, SOLUTION INTRAVENOUS at 13:36

## 2019-05-06 RX ADMIN — EPHEDRINE SULFATE 15 MG: 50 INJECTION, SOLUTION INTRAVENOUS at 14:13

## 2019-05-06 RX ADMIN — PROPOFOL 100 MG: 10 INJECTION, EMULSION INTRAVENOUS at 13:57

## 2019-05-06 RX ADMIN — SODIUM CHLORIDE 50 ML/HR: 900 INJECTION, SOLUTION INTRAVENOUS at 13:07

## 2019-05-06 RX ADMIN — PROPOFOL 120 MCG/KG/MIN: 10 INJECTION, EMULSION INTRAVENOUS at 13:57

## 2019-05-06 NOTE — PERIOP NOTES
1357 
Anesthesia staff at patient's bedside administering anesthesia and monitoring patients vital signs throughout procedure. See anesthesia note. [address] Endoscope was pre-cleaned at bedside immediately following procedure by [employer] tech. [address] Patient tolerated procedure without problems. Abdomen soft and patient arousable and voices no complaints Report received from CRNA, see anesthesia note. Patient transported to endoscopy recovery area.  
Report given to Sierra Marie RN, in post.

## 2019-05-06 NOTE — ROUTINE PROCESS
Gina Santa 1945 
[id number] Situation: 
Verbal report received from: [facility] Procedure: Procedure(s): ESOPHAGOGASTRODUODENOSCOPY (EGD) COLONOSCOPY 
ESOPHAGOGASTRODUODENAL (EGD) BIOPSY ENDOSCOPIC POLYPECTOMY Background: 
 
Preoperative diagnosis: RELUX, HISTORY OF COLON POLYPS Postoperative diagnosis: Hiatal hernia, barrotts esophagus, diverticulosis, polyps. :  Dr. Mac Allen(s): Endoscopy Technician-1: Isaiah Bence Endoscopy RN-1: Juan Lyle RN Specimens:  
ID Type Source Tests Collected by Time Destination 1 : level 37 esophageal biopsy Preservative   Em Aguillon MD 5/6/2019 1401 Pathology 2 : level 35 esophageal biopsy Preservative   Em Aguillon MD 5/6/2019 1403 Pathology 3 : ascending colon polyps Preservative Colon, Ascending  Em Aguillon MD 5/6/2019 1412 Pathology 4 : transverse colon polyps Preservative Colon, Transverse  Em Aguillon MD 5/6/2019 1421 Pathology H. Pylori  no Assessment: 
Intra-procedure medications Anesthesia gave intra-procedure sedation and medications, see anesthesia flow sheet yes Intravenous fluids: NS@ Orvel Baas Vital signs stable Abdominal assessment: round and soft Recommendation: 
Discharge patient per MD order. Family or Friend Permission to share finding with family or friend yes

## 2019-05-06 NOTE — ANESTHESIA POSTPROCEDURE EVALUATION
Procedure(s): ESOPHAGOGASTRODUODENOSCOPY (EGD) COLONOSCOPY 
ESOPHAGOGASTRODUODENAL (EGD) BIOPSY ENDOSCOPIC POLYPECTOMY. MAC Anesthesia Post Evaluation Multimodal analgesia: multimodal analgesia not used between 6 hours prior to anesthesia start to PACU discharge Patient location during evaluation: PACU Patient participation: complete - patient participated Level of consciousness: awake Pain management: adequate Airway patency: patent Anesthetic complications: no 
Cardiovascular status: acceptable, blood pressure returned to baseline and hemodynamically stable Respiratory status: acceptable Hydration status: acceptable Post anesthesia nausea and vomiting:  controlled No vitals data found for the desired time range.

## 2019-05-06 NOTE — PROCEDURES
Juanis Pina M.D.  [phone]           2019                EGD Operative Report  Herbert Caldwell  :  1945  [place] [facility] Medical Record Number:  [id number]      Indication:  GERD     : Monty Fournier MD    Referring Provider:  Usman Downey MD      Anesthesia/Sedation:  MAC anesthesia    Airway assessment: No airway problems anticipated    Pre-Procedural Exam:      Airway: clear, no airway problems anticipated  Heart: RRR, without gallops or rubs  Lungs: clear bilaterally without wheezes, crackles, or rhonchi  Abdomen: soft, nontender, nondistended, bowel sounds present  Mental Status: awake, alert and oriented to person, place and time       Procedure Details     After infomed consent was obtained for the procedure, with all risks and benefits of procedure explained the patient was taken to the endoscopy suite and placed in the left lateral decubitus position. Following sequential administration of sedation as per above, the endoscope was inserted into the mouth and advanced under direct vision to second portion of the duodenum. A careful inspection was made as the gastroscope was withdrawn, including a retroflexed view of the proximal stomach; findings and interventions are described below. Findings:   Esophagus:Evidence of salmon colored mucosa seen in the distal esophagus, arising as two wide mucosal tongues from the GE-junction which was seen at 37 cm from the gums up to about 35 cm from the gums. These were suggestive of Allen's esophagus. Biopsies were obtained. Stomach: Small size hiatal hernia, otherwise mucosa within normal  Duodenum/jejunum: normal    Therapies:  none    Specimens: 37 cm and 35 cm           Complications:   None; patient tolerated the procedure well. EBL:  None.            Impression:    Small size hiatal hernia                           Probable Allen's esophagus      Recommendations:    -Continue acid suppression.  -Follow-up on pathology results  -Continue with anti-reflux measures    Constantino Walton MD

## 2019-05-06 NOTE — PROCEDURES
Coretta Johnson M.D.  [phone]            2019          Colonoscopy Operative Report  Ethyl Aston  :  1945  [place] Medical Record Number:  [id number]      Indications:    Personal history of colon polyps (screening only)     :  Nguyen Vargas MD    Referring Provider: Josephine Mills MD    Sedation:  MAC anesthesia    Pre-Procedural Exam:      Airway: clear,  No airway problems anticipated  Heart: RRR, without gallops or rubs  Lungs: clear bilaterally without wheezes, crackles, or rhonchi  Abdomen: soft, nontender, nondistended, bowel sounds present  Mental Status: awake, alert and oriented to person, place and time     Procedure Details:  After informed consent was obtained with all risks and benefits of procedure explained and preoperative exam completed, the patient was taken to the endoscopy suite and placed in the left lateral decubitus position. Upon sequential sedation as per above, a digital rectal exam was performed. The Olympus videocolonoscope  was inserted in the rectum and carefully advanced to the cecum, which was identified by the ileocecal valve and appendiceal orifice. The quality of preparation was good. The colonoscope was slowly withdrawn with careful inspection and evaluation between folds. Retroflexion in the rectum was performed. Findings:   Terminal Ileum: not intubated  Cecum: normal  Ascending Colon: 3  Sessile polyp(s), the largest 2 mm in size;  Transverse Colon: 4  Sessile polyp(s), the largest 10 mm in size; Descending Colon: normal  Sigmoid: no mucosal lesion appreciated  mild diverticulosis;   Rectum: normal    Interventions:  7 complete polypectomy were performed using cold and hot snare  and the polyps were  retrieved    Specimen Removed:  specimen #1, 2 mm in size, located in the ascending colon removed by cold biopsy and sent for pathology  #2, 3, 4 and 10 mm in size, located in the transverse colon removed by snare cautery and retrieved for pathology    Complications: None. EBL:  None. Impression:  A total of 7 polyps were removed and sent to pathology, mild diverticulosis in the sigmoid colon, otherwise mucosa within normal    Recommendations:  -Repeat colonoscopy in 3 years.   -High fiber diet.    -Resume normal medication(s). Discharge Disposition:  Home in the company of a  when able to ambulate.     Tanya Gotti MD  5/6/2019  2:40 PM

## 2019-05-06 NOTE — H&P
The patient is a 68year old male who presents with a complaint of Indigestion. The patient presents due to new symptoms (Pt presents with complaints of indigestion.  Described substernal burning pain .  Has takes omeprazole and has recently increased his dose to BID which has helped some.  He denies black or bloody stools.  Takes ibuprofen several times per week. ). There has been no associated dysphagia, nausea or vomiting. Previous diagnostic tests have not included endoscopy. Problem List/Past Medical Nova Anaya; 3/28/2019 8:53 AM) Hypercholesterolemia   
Hypertension   
Rheumatoid Arthritis   
 
Past Surgical History Nova Anaya; 3/28/2019 8:53 AM) Prostatectomy; Transurethral   
Knee joint replacement status, left (V43.65  S51.498)  [2012]: Allergies Nova Anaya; 3/28/2019 8:53 AM) Other   bee stings Medication History Nova Anaya; 3/28/2019 8:52 AM) Omeprazole  (20MG Capsule DR, 1 Oral BID) Active. Allopurinol  (300MG Tablet, 1 Oral daily) Active. AmLODIPine Besylate  (2.5MG Tablet, 1 Oral daily) Active. Aspirin  (81MG Tablet DR, 1 Oral daily) Active. Atorvastatin Calcium  (40MG Tablet, 1 Oral daily) Active. Citalopram Hydrobromide  (20MG Tablet, 1 Oral daily) Active. Fish Oil  (1000MG Capsule, 1` Oral daily) Active. Iron  (325 (65 Fe)MG Tablet, 1 Oral daily) Active. Lisinopril  (40MG Tablet, 1 Oral daily) Active. Methotrexate  (2.5MG Tablet, 8 Oral once a week) Active. Folic Acid  (1MG Tablet, 1 Oral daily) Active. Simponi Aria  (50MG/4ML Solution, Intravenous every six weeks) Active. Medications Reconciled  
 
Family History Nova Anaya; 3/28/2019 8:53 AM) Colon cancer (153.9  C18.9)   First Degree Relatives. Social History Nova Anaya; 3/28/2019 8:53 AM) Blood Transfusion   No. 
Alcohol Use   Moderate alcohol use, Drinks beer. Employment status   Retired. Marital status   . Tobacco Use   Never smoker. Diagnostic Studies History Lawrence Ceballosx; 3/28/2019 8:53 AM) Colonoscopy   
 
Health Maintenance History Lawrence Ceballosx; 3/28/2019 8:53 AM) Flu Vaccine  [2018]: 
Pneumovax  [2013]: 
 
 
 
Review of Systems Lawrence Ceballosx; 3/28/2019 8:49 AM) General Present- Weight Gain. Not Present- Chronic Fatigue, Poor Appetite and Weight Loss. Skin Not Present- Itching, Rash and Skin Color Changes. HEENT Not Present- Hearing Loss and Vertigo. Respiratory Not Present- Difficulty Breathing and TB exposure. Cardiovascular Present- Use of Antibiotics before Dental Procedures. Not Present- Chest Pain and Use of Blood Thinners. Gastrointestinal Present- See HPI. Musculoskeletal Present- Arthritis. Not Present- Hip Replacement Surgery and Knee Replacement Surgery. Neurological Not Present- Weakness. Psychiatric Not Present- Depression. Endocrine Not Present- Diabetes and Thyroid Problems. Hematology Not Present- Anemia. Vitals Lawrence Ceballosx; 3/28/2019 8:54 AM) 
3/28/2019 8:45 AM 
Weight: 237 lb   Height: 68 in  
Body Surface Area: 2.2 m²   Body Mass Index: 36.04 kg/m²   
BP: 155/88 (Sitting, Left Arm, Standard) Physical Exam (Geronimo [facility]; 3/28/2019 9:49 AM) General 
Mental Status - Alert. General Appearance - Cooperative, Pleasant, Not in acute distress. Orientation - Oriented X3. Integumentary General Characteristics Color - normal coloration of skin. Head and Neck Neck Global Assessment - supple. Eye 
Sclera/Conjunctiva - Bilateral - No Jaundice. Chest and Lung Exam 
Chest and lung exam reveals  - quiet, even and easy respiratory effort with no use of accessory muscles. Auscultation Breath sounds - Normal. Adventitious sounds - No Adventitious sounds. Cardiovascular Auscultation Rhythm - Regular, No Tachycardia, No Bradycardia . Heart Sounds - Normal heart sounds , S1 WNL and S2 WNL, No S3, No Summation Gallop.  Murmurs & Other Heart Sounds - Auscultation of the heart reveals - No Murmurs. Abdomen Palpation/Percussion Tenderness - Non-Tender. Rebound tenderness - No rebound. Rigidity (guarding) - No Rigidity. Dullness to percussion - No abnormal dullness to percussion. Liver - No hepatosplenomegaly. Abdominal Mass Palpable - No masses. Other Characteristics - No Ascites. Auscultation Auscultation of the abdomen reveals - Bowel sounds normal, No Abdominal bruits and No Succussion splash. Rectal - Did not examine. Peripheral Vascular Lower Extremity Palpation - Edema - Left - No edema. Right - No edema. Neurologic Motor Involuntary Movements - Asterixis - not present. Assessment & Plan (Geronimo Gamez [facility]; 3/28/2019 9:48 AM) Chest pain due to GERD (786.50  R07.9) Story: Pt presents with complaints of burning chest pain in the setting of NSAID use. Impression: Diet and lifestyle modifications were explained in detail and reinforced. Advised him to abstain from NSAIDs, and discussed that if he absolutely must take them, to take them with food. He is scheduled for stress test next week, so I have asked him to have his cardiologist fax us clearance for sedation for procedures. He is unsure of last colonoscopy so he will check with his PCP about when the last one was done, and if he is due, will proceeed with it at the same time as EGD. Current Plans Endoscopy (67415) (Discussed risks and benefits with the patient to include: perforation, post polypectomy, or post biopsy bleeding, missed lesions, and sedation reactions.) Started Pantoprazole Sodium 40MG, 1 (one) Tablet daily, #90, 90 days starting 03/28/2019, Ref. x3. 
Started Carafate 1GM, 1 (one) Tablet before meals and at bedtime, #360, 90 days starting 03/28/2019, No Refill. Pt Education - How to access health information online: discussed with patient and provided information. Signed electronically by ROMANA Alaniz (3/28/2019 9:50 AM) Co-signed electronically by Stacie Chi MD (4/1/2019 8:32 AM)

## 2019-05-06 NOTE — DISCHARGE INSTRUCTIONS
[facility]. Sergio Fernandez M.D.  [phone]                 COLON and EGD DISCHARGE INSTRUCTIONS    2019    Lyndsey Wise  :  1945  [place] Medical Record Number:  [id number]      DISCOMFORT:  Sore throat- throat lozenges or warm salt water gargle  Redness at IV site- apply warm compress to area; if redness or soreness persist- contact your physician  There may be a slight amount of blood passed from the rectum  Gaseous discomfort- walking, belching will help relieve any discomfort  You may not operate a vehicle for 12 hours  You may not engage in an occupation involving machinery or appliances for rest of today  You may not drink alcoholic beverages for at least 12 hours  Avoid making any critical decisions for at least 24 hour  DIET:   High fiber diet. GERD diet: avoid fried and fatty foods. peppermint, chocolate, alcohol, coffee, citrus fruits and juices, tomoato products; avoid lying down for 2 to 3 hours after eating.   - however -  remember your colon is empty and a heavy meal will produce gas. Avoid these foods:  vegetables, fried / greasy foods, carbonated drinks for today     ACTIVITY:  You may  resume your normal daily activities it is recommended that you spend the remainder of the day resting -  avoid any strenuous activity and driving. CALL M.D. ANY SIGN OF:   Increasing pain, nausea, vomiting  Abdominal distension (swelling)  New increased bleeding (oral or rectal)  Fever (chills)  Pain in chest area  Bloody discharge from nose or mouth  Shortness of breath      Follow-up Instructions:   Call Dr. Devin Hagan if any questions at [phone]. Results of procedure / biopsy in 7 to 10 days, we will call you with these results. Your endoscopy showed acid reflux changes in the lower part of the esophagus and small hiatal hernia. Biopsies were obtained. Continue taking your medication and follow anti-reflux measures.    Your colonoscopy showed a total of 7 polyps that were removed and sent to pathology. Repeat colonoscopy in 3 years.

## 2019-05-06 NOTE — ANESTHESIA PREPROCEDURE EVALUATION
Relevant Problems No relevant active problems Anesthetic History No history of anesthetic complications Review of Systems / Medical History Patient summary reviewed, nursing notes reviewed and pertinent labs reviewed Pulmonary Within defined limits Sleep apnea Neuro/Psych Within defined limits Cardiovascular Within defined limits Hypertension Exercise tolerance: >4 METS 
  
GI/Hepatic/Renal 
Within defined limits GERD Endo/Other Within defined limits Obesity and arthritis Other Findings Physical Exam 
 
Airway Mallampati: II 
 
Neck ROM: normal range of motion Mouth opening: Normal 
 
 Cardiovascular Regular rate and rhythm,  S1 and S2 normal,  no murmur, click, rub, or gallop Rhythm: regular Rate: normal 
 
 
 
 Dental 
No notable dental hx Pulmonary Breath sounds clear to auscultation Abdominal 
GI exam deferred Other Findings Anesthetic Plan ASA: 2 Anesthesia type: MAC Induction: Intravenous Anesthetic plan and risks discussed with: Patient

## 2019-05-07 ENCOUNTER — HOSPITAL ENCOUNTER (OUTPATIENT)
Dept: INFUSION THERAPY | Age: 74
Discharge: HOME | End: 2019-05-07
Payer: MEDICARE

## 2019-05-07 VITALS
HEART RATE: 47 BPM | DIASTOLIC BLOOD PRESSURE: 65 MMHG | OXYGEN SATURATION: 95 % | BODY MASS INDEX: 36.17 KG/M2 | SYSTOLIC BLOOD PRESSURE: 138 MMHG | WEIGHT: 237.9 LBS | RESPIRATION RATE: 18 BRPM | TEMPERATURE: 97.3 F

## 2019-05-07 PROCEDURE — 74011250636 HC RX REV CODE- 250/636: Performed by: INTERNAL MEDICINE

## 2019-05-07 PROCEDURE — 96413 CHEMO IV INFUSION 1 HR: CPT

## 2019-05-07 PROCEDURE — 74011000258 HC RX REV CODE- 258: Performed by: INTERNAL MEDICINE

## 2019-05-07 RX ADMIN — GOLIMUMAB 200 MG: 50 SOLUTION INTRAVENOUS at 11:47

## 2019-05-07 NOTE — PROGRESS NOTES
[facility] Progress Note Date: May 7, 2019 Name: Tomi Smith MRN: [id number] : 1945 
 
1105. Mr. Bekah Marino Arrived ambulatory and in no distress for q8week Simponi Aria. Assessment was completed, no acute issues at this time, no new complaints voiced. 24 G PIV established to R arm without difficulty, + blood return. Chemotherapy Flowsheet 2019 Cycle q 8 week Date 2019 Drug / Regimen Simponi Alex Sloop Patient Vitals for the past 12 hrs: 
 Temp Pulse Resp BP SpO2  
19 1217 97.3 °F (36.3 °C) (!) 47 18 138/65 95 % 19 1108 99.2 °F (37.3 °C) (!) 53 18 141/64 96 % Medications: 
[address] [place] KVO 
 
1220. Mr. Bekah Marino tolerated treatment well and was discharged from [place] [address] in stable condition. Patient politely declined to stay 30 minutes for monitoring. PIV Flushed & removed. He is to return on 19 for his next appointment. Faina Rob, OMAR May 7, 2019

## 2019-06-07 ENCOUNTER — HOSPITAL ENCOUNTER (OUTPATIENT)
Dept: LAB | Age: 74
Discharge: HOME | End: 2019-06-07
Payer: MEDICARE

## 2019-06-07 ENCOUNTER — OFFICE VISIT (OUTPATIENT)
Dept: RHEUMATOLOGY | Age: 74
End: 2019-06-07

## 2019-06-07 VITALS
DIASTOLIC BLOOD PRESSURE: 60 MMHG | TEMPERATURE: 98.1 F | SYSTOLIC BLOOD PRESSURE: 98 MMHG | BODY MASS INDEX: 35.46 KG/M2 | HEIGHT: 68 IN | RESPIRATION RATE: 18 BRPM | WEIGHT: 234 LBS | HEART RATE: 67 BPM

## 2019-06-07 DIAGNOSIS — Z79.60 LONG-TERM USE OF IMMUNOSUPPRESSANT MEDICATION: ICD-10-CM

## 2019-06-07 DIAGNOSIS — E55.9 VITAMIN D DEFICIENCY: ICD-10-CM

## 2019-06-07 DIAGNOSIS — M1A.0720 CHRONIC IDIOPATHIC GOUT INVOLVING TOE OF LEFT FOOT WITHOUT TOPHUS: ICD-10-CM

## 2019-06-07 DIAGNOSIS — M05.79 SEROPOSITIVE RHEUMATOID ARTHRITIS OF MULTIPLE SITES (HCC): Primary | ICD-10-CM

## 2019-06-07 PROCEDURE — 86140 C-REACTIVE PROTEIN: CPT

## 2019-06-07 PROCEDURE — 85651 RBC SED RATE NONAUTOMATED: CPT

## 2019-06-07 PROCEDURE — 80053 COMPREHEN METABOLIC PANEL: CPT

## 2019-06-07 PROCEDURE — 85025 COMPLETE CBC W/AUTO DIFF WBC: CPT

## 2019-06-07 PROCEDURE — 36415 COLL VENOUS BLD VENIPUNCTURE: CPT

## 2019-06-07 PROCEDURE — 84550 ASSAY OF BLOOD/URIC ACID: CPT

## 2019-06-07 PROCEDURE — 82306 VITAMIN D 25 HYDROXY: CPT

## 2019-06-07 RX ORDER — METHOTREXATE 2.5 MG/1
TABLET ORAL
Qty: 96 TAB | Refills: 1 | Status: SHIPPED | OUTPATIENT
Start: 2019-06-07 | End: 2019-11-27 | Stop reason: SDUPTHER

## 2019-06-07 RX ORDER — FOLIC ACID 1 MG/1
TABLET ORAL
Qty: 90 TAB | Refills: 1 | Status: SHIPPED | OUTPATIENT
Start: 2019-06-07 | End: 2019-12-09 | Stop reason: SDUPTHER

## 2019-06-07 NOTE — LETTER
6/7/19 Patient: Madeline Lorenzo YOB: 1945 Date of Visit: 6/7/2019 Clearance MD Justin Richardson [address] [facility] [address] VIA In Basket Dear Clearance MD Dylan, Thank you for referring Mr. Rolena Hodgkins to [address] [place] [address] for evaluation. My notes for this consultation are attached. If you have questions, please do not hesitate to call me. I look forward to following your patient along with you.  
 
 
Sincerely, 
 
Tan Rojas MD

## 2019-06-07 NOTE — PROGRESS NOTES
Chief Complaint   Patient presents with    Arthritis     1. Have you been to the ER, urgent care clinic since your last visit? Hospitalized since your last visit? No    2. Have you seen or consulted any other health care providers outside of the [address] [place] [address] since your last visit? Include any pap smears or colon screening.  [facility]

## 2019-06-07 NOTE — PROGRESS NOTES
REASON FOR VISIT    This is a follow-up visit for Mr. Marilynn Coon for Seropositive Erosive Rheumatoid Arthritis and Chronic Gout. Inflammatory arthritis phenotype includes:  Anti-CCP positive: yes (>250)  Rheumatoid factor positive: yes (>650)  Erosive disease: yes  Extra-articular manifestations include: none    Immunosuppression Screening (12/03/2018):   Quantiferon TB: negative    PPD:  Not performed  Hepatitis B: negative   Hepatitis C: negative     Therapy History includes:  Current DMARD therapy include: methotrexate 20 mg every Wednesday, Simponi Aria (5/08/2018 to present)  Prior DMARD therapy include: none  Discontinued DMARDs because of inefficacy: None  Discontinued DMARDs because of side effects: None  Unaffordable DMARDs: Enbrel    Immunizations:   Immunization History   Administered Date(s) Administered    (RETIRED) Pneumococcal Vaccine (Unspecified Type) 10/21/2011    Influenza High Dose Vaccine PF 10/14/2016, 10/16/2018    Influenza Vaccine Split 09/21/2011    Influenza Vaccine Whole 09/17/2010    Pneumococcal Conjugate (PCV-13) 01/01/2015    TDAP Vaccine 12/15/2010    Tdap 12/15/2010, 10/04/2017    Zoster Vaccine, Live 10/21/2011     Active problems include:    Patient Active Problem List   Diagnosis Code    Mixed hyperlipidemia E78.2    Other abnormal glucose R73.09    Essential hypertension, benign I10    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    Allergic rhinitis, cause unspecified J30.9    Reflux esophagitis K21.0    Benign neoplasm of colon D12.6    Contact dermatitis and other eczema, due to unspecified cause L25.9    Bunion M21.619    History of prostate cancer Z85.46    Advanced care planning/counseling discussion Z71.89    Low testosterone R79.89    Primary osteoarthritis of both knees M17.0    Seropositive rheumatoid arthritis of multiple sites ([facility] [address] M05.79    Long-term use of immunosuppressant medication Z79.899    Vitamin D deficiency E55.9    Recurrent depression ([facility] [address] F33.9  Severe obesity with body mass index (BMI) of 35.0 to 39.9 with serious comorbidity ([facility]) E66.01    Gout M10.9    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    Primary localized osteoarthrosis, lower leg M17.10     HISTORY OF PRESENT ILLNESS    Mr. Dontae Terrazas returns for a follow-up. On his last visit, I continued methotrexate 20 mg every Wednesday and Simponi Aria infusions. His most infusion recent was 5/07/2019. I asked him to see his PCP regarding atypical chest pain. He was evaluated by cardiology who felt it was GI related. Stress test was negative. He saw GI, Dr. Edith Leventhal. 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 Biopsy intestinal metaplasia (Allen's mucosa) is present. Today, he denies pain, swelling, or stiffness in his hands or wrists. He denies interval gouty flares. He endorses rash for the past several months and is following with dermatology on his right arm, which has improved. He also notes easy bruising. He denies fever, weight loss, blurred vision, vision loss, oral ulcers, ankle swelling, dry cough, dyspnea, nausea, vomiting, dysphagia, abdominal pain, black or bloody stool, fall since last visit and increased thirst.    Mr. Dnotae Terrazas has continued his medications for arthritis and reports good tolerance without significant side effects. Last toxicity monitoring by blood work was done on 3/12/2019 and did not reveal any significant adverse effects. Most recent inflammatory markers from 3/12/2019 revealed a ESR 11 mm/hr (previously 7, 15, 3, 61, 22, 42 mm/hr) and CRP 0.29 mg/L (previously 0.4, 0.29, 27.7, 24.6, 10.7,  N/A mg/L). The patient has not had any interval hospital admissions, infections, or surgeries. REVIEW OF SYSTEMS    A comprehensive review of systems was performed and pertinent results are documented in the HPI, review of systems is otherwise non-contributory.     PAST MEDICAL HISTORY    He has a past medical history of Allergic rhinitis, Arthritis, Basal cell carcinoma (BCC) in situ of skin, Cancer ([facility] [address] GERD (gastroesophageal reflux disease), Gout, Hypercholesterolemia, Hypertension, Melanoma in situ of back ([facility] [address] Skin cancer (melanoma) ([facility] [address] and Sleep apnea. He also has no past medical history of Difficult intubation. FAMILY HISTORY    His family history includes Cancer in his paternal grandfather and another family member; Dementia in his mother; Diabetes in his paternal grandmother; Heart Disease in his father; Kidney Disease in his father. SOCIAL HISTORY    He reports that he has quit smoking. He has never used smokeless tobacco. He reports that he drinks about 2.4 oz of alcohol per week. He reports that he does not use drugs. MEDICATIONS    Current Outpatient Medications   Medication Sig Dispense Refill    folic acid (FOLVITE) 1 mg tablet TAKE ONE TABLET BY MOUTH DAILY 90 Tab 1    methotrexate (RHEUMATREX) 2.5 mg tablet TAKE 8 TABLETS BY MOUTH ONCE WEEKLY ON WEDNESDAYS 96 Tab 1    pantoprazole sodium (PANTOPRAZOLE PO) Take 20 mg by mouth daily.  allopurinol (ZYLOPRIM) 300 mg tablet TAKE ONE TABLET BY MOUTH ONE TIME DAILY 90 Tab 0    VITAMIN D2 50,000 unit capsule TAKE ONE CAPSULE BY MOUTH EVERY 7 DAYS 12 Cap 2    0.9% sodium chloride solp 100 mL with golimumab 12.5 mg/mL soln 2 mg/kg 2 mg/kg by IntraVENous route once. Every six weeks      lisinopril (PRINIVIL, ZESTRIL) 40 mg tablet Take 40 mg by mouth daily.  aspirin delayed-release 81 mg tablet Take  by mouth daily.  albuterol (VENTOLIN HFA) 90 mcg/actuation inhaler Take  by inhalation as needed.  omega-3 fatty acids-vitamin e (FISH OIL) 1,000 mg Cap Take 1 Cap by mouth.  citalopram (CELEXA) 20 mg tablet Take 1 Tab by mouth daily. 30 Tab 11    atorvastatin (LIPITOR) 40 mg tablet Take 1 Tab by mouth daily. 30 Tab 11    amlodipine (NORVASC) 2.5 mg tablet Take 1 Tab by mouth daily.  30 Tab 11 ALLERGIES    Allergies   Allergen Reactions    Stings [Sting, Bee] Swelling     Swelling at site       PHYSICAL EXAMINATION    Visit Vitals  BP 98/60   Pulse 67   Temp 98.1 °F (36.7 °C)   Resp 18   Ht 5' 8\" (1.727 m)   Wt 234 lb (106.1 kg)   BMI 35.58 kg/m²     Body mass index is 35.58 kg/m². General: Patient is alert, oriented x 3, not in acute distress, wife at bedside     HEENT:   Sclerae are not injected and appear moist.  There is no alopecia. Neck is supple     Cardiovascular:  Heart is regular rate and rhythm, no murmurs. Chest:  Lungs are clear to auscultation bilaterally. No rhonchi, wheezes, or crackles. Abdomen:  Obese. Extremities:  Free of clubbing, cyanosis, edema    Neurological exam:  Muscle strength is full in upper and lower extremities     Skin exam:  There are no rashes, no alopecia, no discoid lesions, no active Raynaud's, no livedo reticularis, no periungual erythema. Musculoskeletal exam:  A comprehensive musculoskeletal exam was performed for all joints of each upper and lower extremity and assessed for swelling, tenderness and range of motion. Positive results are documented as below:    Bilateral Olivia and [place]berden nodes. Bilateral knee crepitus without effusion.   Pes planus   Bilateral hallux valgus (LEFT more than right)    Z-Deformities:   no  Eldon Neck Deformities:  no  Boutonierre's Deformities:  no  Ulnar Deviation:   Yes (bilateral)  MCP Subluxation:  no     Joint Count 6/7/2019 3/4/2019 12/3/2018 9/4/2018 6/4/2018 3/7/2018 12/20/2017   Patient pain (0-100) 5 0 20 15 0 60 30   MHAQ 0 0 0 0 0 0.25 0.125   Left elbow - Tender - - - - - - 1   Left wrist- Tender 0 - - 1 0 1 1   Left wrist- Swollen 0 - - 1 1 1 1   Left 1st MCP - Tender - - - - - 1 -   Left 1st MCP - Swollen - - - 1 - 1 1   Left 2nd MCP - Tender - 0 0 - - 1 1   Left 2nd MCP - Swollen - 1 1 1 1 1 1   Left 3rd MCP - Tender - 0 - - - 1 -   Left 3rd MCP - Swollen - 1 - 1 1 1 1   Left 4th MCP - Tender - - - - - 1 -   Left 5th MCP - Tender - - - - - 1 -   Left thumb IP - Tender - - - - - 1 -   Left thumb IP - Swollen - - - - - 1 -   Left 2nd PIP - Tender - - - - - 1 -   Left 3rd PIP - Tender - - - - - 1 -   Left 4th PIP - Tender - - - - - 1 -   Left 5th PIP - Tender - - - - - 1 -   Right wrist- Tender - - - - - 1 1   Right wrist- Swollen - - - 1 1 1 1   Right 1st MCP - Tender - - - - - 1 1   Right 1st MCP - Swollen - - - 1 1 1 -   Right 2nd MCP - Tender - - - - - 1 1   Right 2nd MCP - Swollen - - - 1 1 1 1   Right 3rd MCP - Tender - - - - - 1 -   Right 3rd MCP - Swollen - - - 1 1 1 1   Right 4th MCP - Tender - 0 - - - 1 1   Right 4th MCP - Swollen - 1 - - 1 1 1   Right 5th MCP - Swollen - - - - - 1 -   Right thumb IP - Tender - - - - - - 1   Right 2nd PIP - Tender - - - - - - 1   Right 2nd PIP - Swollen - - - - - 1 -   Right 3rd PIP - Tender - - - - - - 1   Right 3rd PIP - Swollen - - - - 1 1 1   Right 4th PIP - Swollen - - - - 1 1 -   Right knee - Tender - - - - - - -   Right knee - Swollen - - - - - - -   Tender Joint Count (Total) 0 0 0 1 0 16 10   Swollen Joint Count (Total) 0 3 1 8 10 14 9   Physician Assessment (0-10) 0 0.5 0 2 2 4 3   Patient Assessment (0-10) 0.5 0 2 1.5 0 6 5   CDAI Total (calculated) 0.5 3.5 3 12.5 12 40 27       DATA REVIEW    Laboratory     Recent laboratory results were reviewed, summarized, and discussed with the patient. Imaging    Musculoskeletal Ultrasound    None    Radiographs    Bilateral Hand 11/20/2017: RIGHT: No fracture or dislocation on plain film. The bones are osteopenic. Mild narrowing of the radiocarpal joint. Probable erosion of the radius and lunate at the radiocarpal joint. No widening of the intercarpal spaces. Subtle erosion of the ulnar styloid. Mild narrowing of the triscaphe joint is age-appropriate. Irregular arthritis of the first and second CMC joints includes osteophytes and erosions.  There are erosions of the first through fourth metacarpal heads at the MCP points. Moderate narrowing and anterior subluxation of the second MCP joint. IP joints are within normal limits. No chondrocalcinosis or periosteal reaction. LEFT: No fracture or dislocation on plain film. The bones are osteopenic. Moderate narrowing of the radiocarpal joint with subtle erosions of the lunate. Marrow DR UVJ with erosions. No definite ulnar styloid erosion. [facility] and first [facility] joint osteoarthritis are age-appropriate. Large erosions on both sides of the second MCP joint and in the third metacarpal head. IP joints are within normal limits. No chondrocalcinosis or periosteal reaction. Bilateral Foot 11/20/2017: RIGHT: No fracture or dislocation on plain film. Bones are osteopenic. Talonavicular joint space narrowing, osteophytes, and erosions. Subtalar arthritis is partially imaged. TMT joints are within normal limits. Severe narrowing of the first MTP joint with osteophytes and erosions. There is erosion and the fifth tarsal head. Mild narrowing of the second MTP joint without definable erosion. No periosteal reaction. LEFT: No fracture or dislocation on plain film. Bones are osteopenic. Intertarsal joints are within normal limits. Moderate narrowing of the first MTP joint with central and marginal erosions. 35 degrees hallux valgus angulation and fibular subluxation of the first proximal phalanx. There are also erosions in the laterally subluxed hallux sesamoids. Mild joint space narrowing and erosions of the third MTP joint. Fragmentation of the fourth middle phalanx is adjacent to the fourth PIP joint erosions. There are also erosions of the second and fifth PIP joints. First IP joint osteoarthritis is mild. CT Imaging    CT Right Upper Extremity with contrast 10/19/2017: examination of the soft tissues demonstrates no drainable fluid collection. There is no pathologically enlarged nodes within the right axilla. Alignment is normal. There is no bone destruction or fracture.  No significant fatty atrophy. MR Imaging    None    DXA     None    ASSESSMENT AND PLAN    This is a follow-up visit for Mr. Arin Ace. 1) Seropositive Erosive Rheumatoid Arthritis. He presented after activation of underlying Rheumatoid Arthritis which appears to have been quiescent for more than 30 years from the TDAP vaccine. He remembers being diagnosed with arthritis in his 30's that was treated with an injection in his finger. He has bilateral ulnar deviation which corresponds with the chronicity of his disease. Radiographs showed erosive disease confirming chronicity. He is maintained on methotrexate 20 mg every Wednesday and Simponi Aria infusions with good tolerance. He most recent infusion was 5/07/2019. He denies joint pain, stiffness or swelling today. His CDAI was 0.5 (previously 3.5, 3, 12.5, 12, 40, 27, 37) with 0 tender and 0 swollen joints, consistent with remission. He has had excellent clinical response with Simponi Aria. I will continue current therapy. Follow up in 6 months. 2) Long Term Use of Immunosuppressants. The patient remains on immunomodulatory medications (methotrexate, Simponi Aria) and requires frequent toxicity monitoring by blood work. Respective labs were ordered (CBC and CMP). 3) Vitamin D Deficiency. His vitamin D level was 39.2 (previously 27.7, 22.6). He is on weekly ergocalciferol 50,000. I will check his level. 4) Bilateral Knee Osteoarthritis. This was not an active issue today. 5) Basal Cell Carcinoma. Lesion removed on his scalp 5/31/2018. Wound without secondary infection. He continues to have lesions burnt. 6) History of Melanoma. He had melanoma removed from his back 10/2017. He did not require chemotherapy. He has skin exams every 3 months. He has reviewed his medications, including Sherryn Postin, with his dermatologist. He understands to continue close surveillance and inform us if he has any new melanoma lesions. 7) Chronic Gout.  He has a history of gout in his left foot. He is on allopurinol 300 mg daily. His uric acid was 4.9 mg/dL. He denies interval flares. 8) Bilateral Pes Planus. This is no longer an active issue. I suspect is from his pes planus. I recommended inserts. 9) Atypical Chest Pain. This was evaluated by cardiology and felt to be GI related. Negative stress test. GI noted Allen's on EGD. The patient voiced understanding of the aforementioned assessment and plan. Summary of plan was provided in the After Visit Summary patient instructions.      TODAY'S ORDERS    Orders Placed This Encounter    URIC ACID    CBC WITH AUTOMATED DIFF    METABOLIC PANEL, COMPREHENSIVE    C REACTIVE PROTEIN, QT    SED RATE (ESR)    VITAMIN D, 25 HYDROXY    folic acid (FOLVITE) 1 mg tablet    methotrexate (RHEUMATREX) 2.5 mg tablet     Future Appointments   Date Time Provider Raiza Sanchez   7/1/2019 10:00 AM [facility] [place]   8/27/2019 10:00 AM [facility] [place]   10/22/2019 10:00 AM [facility]   12/9/2019 10:00 AM Adria Gonsalves MD [facility] SCHED   12/17/2019 10:00 AM SS INF1 CH3 <4H [id number] [facility]    Adult Rheumatology   Rheumatology Ultrasound Certified  [facility]  A Part of [address]   Phone [phone]  Fax [phone]

## 2019-06-08 LAB
25(OH)D3+25(OH)D2 SERPL-MCNC: 39.8 NG/ML (ref 30–100)
ALBUMIN SERPL-MCNC: 4.3 G/DL (ref 3.5–4.8)
ALBUMIN/GLOB SERPL: 2.2 {RATIO} (ref 1.2–2.2)
ALP SERPL-CCNC: 76 IU/L (ref 39–117)
ALT SERPL-CCNC: 66 IU/L (ref 0–44)
AST SERPL-CCNC: 22 IU/L (ref 0–40)
BASOPHILS # BLD AUTO: 0 X10E3/UL (ref 0–0.2)
BASOPHILS NFR BLD AUTO: 0 %
BILIRUB SERPL-MCNC: 0.8 MG/DL (ref 0–1.2)
BUN SERPL-MCNC: 13 MG/DL (ref 8–27)
BUN/CREAT SERPL: 17 (ref 10–24)
CALCIUM SERPL-MCNC: 9.5 MG/DL (ref 8.6–10.2)
CHLORIDE SERPL-SCNC: 105 MMOL/L (ref 96–106)
CO2 SERPL-SCNC: 20 MMOL/L (ref 20–29)
CREAT SERPL-MCNC: 0.78 MG/DL (ref 0.76–1.27)
CRP SERPL-MCNC: 0.7 MG/L (ref 0–4.9)
EOSINOPHIL # BLD AUTO: 0.2 X10E3/UL (ref 0–0.4)
EOSINOPHIL NFR BLD AUTO: 3 %
ERYTHROCYTE [DISTWIDTH] IN BLOOD BY AUTOMATED COUNT: 14.1 % (ref 12.3–15.4)
ERYTHROCYTE [SEDIMENTATION RATE] IN BLOOD BY WESTERGREN METHOD: 6 MM/HR (ref 0–30)
GLOBULIN SER CALC-MCNC: 2 G/DL (ref 1.5–4.5)
GLUCOSE SERPL-MCNC: 89 MG/DL (ref 65–99)
HCT VFR BLD AUTO: 41.8 % (ref 37.5–51)
HGB BLD-MCNC: 14.1 G/DL (ref 13–17.7)
IMM GRANULOCYTES # BLD AUTO: 0 X10E3/UL (ref 0–0.1)
IMM GRANULOCYTES NFR BLD AUTO: 0 %
LYMPHOCYTES # BLD AUTO: 2.1 X10E3/UL (ref 0.7–3.1)
LYMPHOCYTES NFR BLD AUTO: 30 %
MCH RBC QN AUTO: 35.2 PG (ref 26.6–33)
MCHC RBC AUTO-ENTMCNC: 33.7 G/DL (ref 31.5–35.7)
MCV RBC AUTO: 104 FL (ref 79–97)
MONOCYTES # BLD AUTO: 0.5 X10E3/UL (ref 0.1–0.9)
MONOCYTES NFR BLD AUTO: 8 %
NEUTROPHILS # BLD AUTO: 4.2 X10E3/UL (ref 1.4–7)
NEUTROPHILS NFR BLD AUTO: 59 %
PLATELET # BLD AUTO: 235 X10E3/UL (ref 150–450)
POTASSIUM SERPL-SCNC: 4.3 MMOL/L (ref 3.5–5.2)
PROT SERPL-MCNC: 6.3 G/DL (ref 6–8.5)
RBC # BLD AUTO: 4.01 X10E6/UL (ref 4.14–5.8)
SODIUM SERPL-SCNC: 140 MMOL/L (ref 134–144)
URATE SERPL-MCNC: 4.5 MG/DL (ref 3.7–8.6)
WBC # BLD AUTO: 6.9 X10E3/UL (ref 3.4–10.8)

## 2019-06-11 ENCOUNTER — HOSPITAL ENCOUNTER (OUTPATIENT)
Dept: LAB | Age: 74
Discharge: HOME | End: 2019-06-11
Payer: MEDICARE

## 2019-06-11 DIAGNOSIS — M05.79 SEROPOSITIVE RHEUMATOID ARTHRITIS OF MULTIPLE SITES (HCC): Primary | ICD-10-CM

## 2019-06-11 PROCEDURE — 80076 HEPATIC FUNCTION PANEL: CPT

## 2019-06-12 LAB
ALBUMIN SERPL-MCNC: 4.3 G/DL (ref 3.5–4.8)
ALP SERPL-CCNC: 80 IU/L (ref 39–117)
ALT SERPL-CCNC: 43 IU/L (ref 0–44)
AST SERPL-CCNC: 16 IU/L (ref 0–40)
BILIRUB DIRECT SERPL-MCNC: 0.15 MG/DL (ref 0–0.4)
BILIRUB SERPL-MCNC: 0.5 MG/DL (ref 0–1.2)
PROT SERPL-MCNC: 6.5 G/DL (ref 6–8.5)

## 2019-07-01 ENCOUNTER — HOSPITAL ENCOUNTER (OUTPATIENT)
Dept: INFUSION THERAPY | Age: 74
Discharge: HOME | End: 2019-07-01
Payer: MEDICARE

## 2019-07-01 VITALS
TEMPERATURE: 98.4 F | HEART RATE: 45 BPM | RESPIRATION RATE: 18 BRPM | DIASTOLIC BLOOD PRESSURE: 64 MMHG | WEIGHT: 242 LBS | BODY MASS INDEX: 36.8 KG/M2 | SYSTOLIC BLOOD PRESSURE: 132 MMHG

## 2019-07-01 PROCEDURE — 74011250636 HC RX REV CODE- 250/636: Performed by: INTERNAL MEDICINE

## 2019-07-01 PROCEDURE — 96413 CHEMO IV INFUSION 1 HR: CPT

## 2019-07-01 PROCEDURE — 74011000258 HC RX REV CODE- 258: Performed by: INTERNAL MEDICINE

## 2019-07-01 RX ADMIN — GOLIMUMAB 200 MG: 50 SOLUTION INTRAVENOUS at 10:34

## 2019-07-01 NOTE — PROGRESS NOTES
Outpatient Infusion Center - Chemotherapy Progress Note    9427 Pt admit to [facility] for [address] ambulatory in stable condition. Assessment completed. No new concerns voiced. PIV started in [facility] with positive blood return. Line flushed, clamped, Curos Cap applied to end clave. Medication ordered. Visit Vitals  /59 (BP 1 Location: Right arm, BP Patient Position: Sitting)   Pulse (!) 54   Temp 98.4 °F (36.9 °C)   Resp 18   Wt 109.8 kg (242 lb)   BMI 36.80 kg/m²       Medications:  NS @ KVO  Simponi Ronny    1110 Pt tolerated treatment well. PIV maintained positive blood return throughout treatment, flushed with positive blood return at conclusion and removed prior to discharge. D/c home ambulatory in no distress. Pt aware of next [facility] appointment scheduled for 8/27/19.

## 2019-08-06 ENCOUNTER — OFFICE VISIT (OUTPATIENT)
Dept: INTERNAL MEDICINE CLINIC | Age: 74
End: 2019-08-06

## 2019-08-06 VITALS
DIASTOLIC BLOOD PRESSURE: 62 MMHG | OXYGEN SATURATION: 96 % | WEIGHT: 240 LBS | SYSTOLIC BLOOD PRESSURE: 112 MMHG | BODY MASS INDEX: 36.37 KG/M2 | RESPIRATION RATE: 18 BRPM | HEART RATE: 54 BPM | HEIGHT: 68 IN | TEMPERATURE: 98.6 F

## 2019-08-06 DIAGNOSIS — J32.9 SINUSITIS, UNSPECIFIED CHRONICITY, UNSPECIFIED LOCATION: Primary | ICD-10-CM

## 2019-08-06 DIAGNOSIS — I10 ESSENTIAL HYPERTENSION: ICD-10-CM

## 2019-08-06 RX ORDER — GUAIFENESIN 600 MG/1
600 TABLET, EXTENDED RELEASE ORAL 2 TIMES DAILY
Qty: 6 TAB | Refills: 0 | Status: SHIPPED | OUTPATIENT
Start: 2019-08-06 | End: 2019-08-09

## 2019-08-06 RX ORDER — AMOXICILLIN AND CLAVULANATE POTASSIUM 875; 125 MG/1; MG/1
1 TABLET, FILM COATED ORAL EVERY 12 HOURS
Qty: 20 TAB | Refills: 0 | Status: SHIPPED | OUTPATIENT
Start: 2019-08-06 | End: 2019-08-16

## 2019-08-06 NOTE — PROGRESS NOTES
Reji Rivas is a 68 y.o. male who presents today for Nasal Congestion; Watery Eyes; and Cough  . He has a history of   Patient Active Problem List   Diagnosis Code    Mixed hyperlipidemia E78.2    Other abnormal glucose R73.09    Essential hypertension, benign I10    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    Allergic rhinitis, cause unspecified J30.9    Reflux esophagitis K21.0    Benign neoplasm of colon D12.6    Contact dermatitis and other eczema, due to unspecified cause L25.9    Bunion M21.619    History of prostate cancer Z85.46    Advanced care planning/counseling discussion Z71.89    Low testosterone R79.89    Primary osteoarthritis of both knees M17.0    Seropositive rheumatoid arthritis of multiple sites ([facility] [address] M05.79    Long-term use of immunosuppressant medication Z79.899    Vitamin D deficiency E55.9    Recurrent depression (HCC) F33.9    Severe obesity with body mass index (BMI) of 35.0 to 39.9 with serious comorbidity (HCC) E66.01    Gout M10.9    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 Today patient is here for an acute visit. .   he does not have other concerns. Upper respiratory illness:  Reji Rivas presents with complaints of coryza, congestion, post nasal drip, bilateral sinus pain and hoarseness for 2 weeks. no nausea and no vomiting . he has not had  fever and chills. Symptoms are moderate. Over-the-counter remedies including OTC allergy medication. Drinking plenty of fluids: yes  Asthma?:  no  non-smoker  Contacts with similar infections: no     Hypertension - BP stable  Hypertension ROS: taking medications as instructed, no medication side effects noted, no TIA's, no chest pain on exertion, no dyspnea on exertion, no swelling of ankles     reports that he has quit smoking.  He has never used smokeless tobacco.    reports that he drinks about 4.0 standard drinks of alcohol per week. BP Readings from Last 2 Encounters:   08/06/19 112/62   07/01/19 132/64     Inflammatory arthritis: Patient notes that overall he is been doing well. His joint pain is minimal or nonexistent he is back to being physically active. ROS  Review of Systems   Constitutional: Negative for chills, fever and weight loss. HENT: Positive for congestion and sinus pain. Negative for ear discharge, ear pain, hearing loss, nosebleeds, sore throat and tinnitus. Eyes: Negative for blurred vision, double vision and photophobia. Respiratory: Positive for cough. Negative for hemoptysis, sputum production, shortness of breath, wheezing and stridor. Cardiovascular: Negative for chest pain, palpitations, orthopnea, claudication and leg swelling. Gastrointestinal: Negative for abdominal pain, nausea and vomiting. Neurological: Negative. Endo/Heme/Allergies: Does not bruise/bleed easily. Psychiatric/Behavioral: Negative for depression. The patient is not nervous/anxious. Visit Vitals  /62 (BP 1 Location: Left arm, BP Patient Position: Sitting)   Pulse (!) 54   Temp 98.6 °F (37 °C) (Oral)   Resp 18   Ht 5' 8\" (1.727 m)   Wt 240 lb (108.9 kg)   SpO2 96%   BMI 36.49 kg/m²       Physical Exam   Constitutional: He is oriented to person, place, and time. He appears well-developed and well-nourished. HENT:   Head: Normocephalic and atraumatic. Right Ear: External ear normal.   Left Ear: External ear normal.   Fluid behind both TM. Eyes: Pupils are equal, round, and reactive to light. EOM are normal.   Cardiovascular: Normal rate and regular rhythm. No murmur heard. Pulmonary/Chest: Effort normal and breath sounds normal. No respiratory distress. No wheezing. Musculoskeletal: Normal range of motion. He exhibits no edema. Neurological: He is alert and oriented to person, place, and time. Skin: Skin is warm and dry. He is not diaphoretic.    Psychiatric: He has a normal mood and affect. His behavior is normal.         Current Outpatient Medications   Medication Sig    amoxicillin-clavulanate (AUGMENTIN) 875-125 mg per tablet Take 1 Tab by mouth every twelve (12) hours for 10 days.  guaiFENesin ER (MUCINEX) 600 mg ER tablet Take 1 Tab by mouth two (2) times a day for 3 days.  folic acid (FOLVITE) 1 mg tablet TAKE ONE TABLET BY MOUTH DAILY    methotrexate (RHEUMATREX) 2.5 mg tablet TAKE 8 TABLETS BY MOUTH ONCE WEEKLY ON WEDNESDAYS    pantoprazole sodium (PANTOPRAZOLE PO) Take 20 mg by mouth daily.  allopurinol (ZYLOPRIM) 300 mg tablet TAKE ONE TABLET BY MOUTH ONE TIME DAILY    VITAMIN D2 50,000 unit capsule TAKE ONE CAPSULE BY MOUTH EVERY 7 DAYS    0.9% sodium chloride solp 100 mL with golimumab 12.5 mg/mL soln 2 mg/kg 2 mg/kg by IntraVENous route once. Every six weeks    lisinopril (PRINIVIL, ZESTRIL) 40 mg tablet Take 40 mg by mouth daily.  aspirin delayed-release 81 mg tablet Take  by mouth daily.  albuterol (VENTOLIN HFA) 90 mcg/actuation inhaler Take  by inhalation as needed.  omega-3 fatty acids-vitamin e (FISH OIL) 1,000 mg Cap Take 1 Cap by mouth.  citalopram (CELEXA) 20 mg tablet Take 1 Tab by mouth daily.  atorvastatin (LIPITOR) 40 mg tablet Take 1 Tab by mouth daily.  amlodipine (NORVASC) 2.5 mg tablet Take 1 Tab by mouth daily. No current facility-administered medications for this visit.          Past Medical History:   Diagnosis Date    Allergic rhinitis     Arthritis     RA    Basal cell carcinoma (BCC) in situ of skin     shoudler and scalp    Cancer (HCC)     prostate    GERD (gastroesophageal reflux disease)     Gout     Hypercholesterolemia     Hypertension     Melanoma in situ of back ([address]     Skin cancer (melanoma) ([address]     Sleep apnea     Bipap at home      Past Surgical History:   Procedure Laterality Date    COLONOSCOPY N/A 5/6/2019    COL[facility] Matt [facility] at [facility] ENDOSCOPY    ENDOSCOPY, COLON, DIAGNOSTIC      09, due 12, done 11, due 14    HX CATARACT REMOVAL  04/2019    HX HEENT      wisdom teeth extraction    HX KNEE ARTHROSCOPY Left     HX KNEE REPLACEMENT Left 2012    HX OTHER SURGICAL N/A 05/31/2018    Basal cell removal of scalp    HX PROSTATECTOMY      HX TONSILLECTOMY        Social History     Tobacco Use    Smoking status: Former Smoker    Smokeless tobacco: Never Used   Substance Use Topics    Alcohol use: Yes     Alcohol/week: 4.0 standard drinks     Types: 4 Cans of beer per week     Comment: 4 days per week      Family History   Problem Relation Age of Onset    Dementia Mother     Heart Disease Father         CAD    Kidney Disease Father         renal failure    Diabetes Paternal Grandmother     Cancer Paternal Grandfather         colon    Cancer Other         family hx colon cancer        Allergies   Allergen Reactions    Stings [Sting, Bee] Swelling     Swelling at site        Assessment/Plan  Diagnoses and all orders for this visit:    1. Sinusitis, unspecified chronicity, unspecified location -information regarding other home remedies provided to patient  -     amoxicillin-clavulanate (AUGMENTIN) 875-125 mg per tablet; Take 1 Tab by mouth every twelve (12) hours for 10 days.  -     guaiFENesin ER (MUCINEX) 600 mg ER tablet; Take 1 Tab by mouth two (2) times a day for 3 days. 2. Essential hypertension -stable continue current therapy        Follow-up and Dispositions    · Return in about 3 months (around 11/6/2019) for Medicare Wellness. Dave Kemp MD  8/6/2019    This note was created with the help of speech recognition software Jesus Cherry) and may contain some 'sound alike' errors.

## 2019-08-27 ENCOUNTER — HOSPITAL ENCOUNTER (OUTPATIENT)
Dept: INFUSION THERAPY | Age: 74
Discharge: HOME | End: 2019-08-27
Payer: MEDICARE

## 2019-08-27 VITALS
HEART RATE: 44 BPM | RESPIRATION RATE: 18 BRPM | TEMPERATURE: 96.9 F | OXYGEN SATURATION: 93 % | SYSTOLIC BLOOD PRESSURE: 135 MMHG | BODY MASS INDEX: 35.73 KG/M2 | DIASTOLIC BLOOD PRESSURE: 63 MMHG | WEIGHT: 235 LBS

## 2019-08-27 PROCEDURE — 74011250636 HC RX REV CODE- 250/636: Performed by: INTERNAL MEDICINE

## 2019-08-27 PROCEDURE — 96413 CHEMO IV INFUSION 1 HR: CPT

## 2019-08-27 PROCEDURE — 74011000258 HC RX REV CODE- 258: Performed by: INTERNAL MEDICINE

## 2019-08-27 RX ADMIN — GOLIMUMAB 200 MG: 50 SOLUTION INTRAVENOUS at 11:10

## 2019-08-27 NOTE — PROGRESS NOTES
Reviewed upcoming appts with patient and family member at bedside. Pt stated he will be on vacation entire month of February 2020 and asked to cancel appt on 2/11/20. Instructed pt to call Dr. Shirley Jaramillo to advise he will miss scheduled dose of Simponi. Pt verbalized understanding.

## 2019-08-27 NOTE — PROGRESS NOTES
[facility] VISIT NOTE    Pt arrived at [facility] ambulatory and in no distress for q 8 week Simponi. Assessment completed, pt had no complaints. PIV accessed in left [facility] without difficulty and blood return noted. Patient Vitals for the past 12 hrs:   Temp Pulse Resp BP SpO2   08/27/19 1146 96.9 °F (36.1 °C) (!) 44 18 135/63    08/27/19 1011  (!) 54 18 108/60 93 %     Medications received:  NS KVO  Simponi IV    Tolerated treatment well, no adverse reaction noted. PIV de-accessed. D/C'd from [facility] ambulatory and in no distress accompanied by wife. Next appointment is 10/22/19 at 10:00.

## 2019-08-29 ENCOUNTER — TELEPHONE (OUTPATIENT)
Dept: INTERNAL MEDICINE CLINIC | Age: 74
End: 2019-08-29

## 2019-08-29 NOTE — TELEPHONE ENCOUNTER
Received request for face to face note within the last 6 months and sleep study results from [address] for parts replacement for Pt's CPAP machine. Contacted Pt and advised we do not have a sleep study result on file. Pt states he will f/u with sleep specialist.   Form has been placed in my To Do folder.

## 2019-09-02 RX ORDER — ERGOCALCIFEROL 1.25 MG/1
CAPSULE ORAL
Qty: 12 CAP | Refills: 1 | Status: SHIPPED | OUTPATIENT
Start: 2019-09-02 | End: 2020-03-02

## 2019-09-12 ENCOUNTER — OFFICE VISIT (OUTPATIENT)
Dept: INTERNAL MEDICINE CLINIC | Age: 74
End: 2019-09-12

## 2019-09-12 ENCOUNTER — HOSPITAL ENCOUNTER (OUTPATIENT)
Dept: GENERAL RADIOLOGY | Age: 74
Discharge: HOME | End: 2019-09-12
Attending: INTERNAL MEDICINE
Payer: MEDICARE

## 2019-09-12 VITALS
WEIGHT: 243 LBS | BODY MASS INDEX: 36.83 KG/M2 | DIASTOLIC BLOOD PRESSURE: 70 MMHG | HEIGHT: 68 IN | HEART RATE: 58 BPM | SYSTOLIC BLOOD PRESSURE: 98 MMHG | TEMPERATURE: 98.1 F | OXYGEN SATURATION: 98 % | RESPIRATION RATE: 16 BRPM

## 2019-09-12 DIAGNOSIS — F33.9 RECURRENT DEPRESSION (HCC): ICD-10-CM

## 2019-09-12 DIAGNOSIS — M79.671 PAIN OF RIGHT HEEL: ICD-10-CM

## 2019-09-12 DIAGNOSIS — I10 ESSENTIAL HYPERTENSION, BENIGN: ICD-10-CM

## 2019-09-12 DIAGNOSIS — M79.671 PAIN OF RIGHT HEEL: Primary | ICD-10-CM

## 2019-09-12 PROBLEM — I73.9 PERIPHERAL VASCULAR DISEASE (HCC): Status: ACTIVE | Noted: 2019-09-12

## 2019-09-12 PROCEDURE — 73630 X-RAY EXAM OF FOOT: CPT

## 2019-09-12 RX ORDER — DICLOFENAC SODIUM 75 MG/1
75 TABLET, DELAYED RELEASE ORAL 2 TIMES DAILY
Qty: 28 TAB | Refills: 0 | Status: SHIPPED | OUTPATIENT
Start: 2019-09-12 | End: 2019-09-26

## 2019-09-12 NOTE — PROGRESS NOTES
Tonia Downing is a 68 y.o. male who presents today for Foot Pain  . He has a history of   Patient Active Problem List   Diagnosis Code    Mixed hyperlipidemia E78.2    Other abnormal glucose R73.09    Essential hypertension, benign I10    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    Allergic rhinitis, cause unspecified J30.9    Reflux esophagitis K21.0    Benign neoplasm of colon D12.6    Contact dermatitis and other eczema, due to unspecified cause L25.9    Bunion M21.619    History of prostate cancer Z85.46    Advanced care planning/counseling discussion Z71.89    Low testosterone R79.89    Primary osteoarthritis of both knees M17.0    Seropositive rheumatoid arthritis of multiple sites ([facility] [address] M05.79    Long-term use of immunosuppressant medication Z79.899    Vitamin D deficiency E55.9    Recurrent depression (HCC) F33.9    Severe obesity with body mass index (BMI) of 35.0 to 39.9 with serious comorbidity (HCC) E66.01    Gout M10.9    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 Today patient is here for an acute visit. he does not have other concerns. Problem visit:  Tonia Downing is here for complaint of R heel pain. Problem began 1 month(s) ago. Severity is moderate  Character of problem: Pain with pressure  Pressure and walking makes the problem worse. Associated symptoms include: no swelling and warmth. Treatments tried include: PRN Ibuprofen. Hypertension - very well controlled. He denies any orthostatic type symptoms. Hypertension ROS: taking medications as instructed, no medication side effects noted, no TIA's, no chest pain on exertion, no dyspnea on exertion, no swelling of ankles     reports that he has quit smoking. He has never used smokeless tobacco.    reports that he drinks about 4.0 standard drinks of alcohol per week.    BP Readings from Last 2 Encounters:   09/12/19 98/70   08/27/19 135/63     Patient notes that his mental health overall is stable. He is still on low-dose Celexa. ROS  Review of Systems   Constitutional: Negative for chills, fever and weight loss. HENT: Negative for congestion and sore throat. Eyes: Negative for blurred vision, double vision and photophobia. Respiratory: Negative for cough and shortness of breath. Cardiovascular: Negative for chest pain, palpitations and leg swelling. Gastrointestinal: Negative for abdominal pain, constipation, diarrhea, heartburn, nausea and vomiting. Genitourinary: Negative for dysuria, frequency and urgency. Musculoskeletal: Positive for joint pain. Negative for myalgias. Skin: Negative for rash. Neurological: Negative. Negative for headaches. Endo/Heme/Allergies: Does not bruise/bleed easily. Psychiatric/Behavioral: Negative for memory loss and suicidal ideas. Visit Vitals  BP 98/70 (BP 1 Location: Left arm, BP Patient Position: Sitting)   Pulse (!) 58   Temp 98.1 °F (36.7 °C) (Oral)   Resp 16   Ht 5' 8\" (1.727 m)   Wt 243 lb (110.2 kg)   SpO2 98%   BMI 36.95 kg/m²       Physical Exam   Constitutional: He is oriented to person, place, and time. He appears well-developed and well-nourished. HENT:   Head: Normocephalic and atraumatic. Eyes: Pupils are equal, round, and reactive to light. EOM are normal.   Cardiovascular: Normal rate and regular rhythm. No murmur heard. Pulmonary/Chest: Effort normal and breath sounds normal. No respiratory distress. Musculoskeletal: Normal range of motion. He exhibits no edema. Feet:    Pain and area were noted. No skin changes no redness no swelling. Neurological: He is alert and oriented to person, place, and time. Skin: Skin is warm and dry. He is not diaphoretic. Psychiatric: He has a normal mood and affect.  His behavior is normal.         Current Outpatient Medications   Medication Sig    diclofenac EC (VOLTAREN) 75 mg EC tablet Take 1 Tab by mouth two (2) times a day for 14 days.  ergocalciferol (ERGOCALCIFEROL) 50,000 unit capsule TAKE ONE TABLET BY MOUTH EVERY 7 DAYS    folic acid (FOLVITE) 1 mg tablet TAKE ONE TABLET BY MOUTH DAILY    methotrexate (RHEUMATREX) 2.5 mg tablet TAKE 8 TABLETS BY MOUTH ONCE WEEKLY ON WEDNESDAYS    pantoprazole sodium (PANTOPRAZOLE PO) Take 20 mg by mouth daily.  allopurinol (ZYLOPRIM) 300 mg tablet TAKE ONE TABLET BY MOUTH ONE TIME DAILY    0.9% sodium chloride solp 100 mL with golimumab 12.5 mg/mL soln 2 mg/kg 2 mg/kg by IntraVENous route once. Every six weeks    lisinopril (PRINIVIL, ZESTRIL) 40 mg tablet Take 40 mg by mouth daily.  aspirin delayed-release 81 mg tablet Take  by mouth daily.  albuterol (VENTOLIN HFA) 90 mcg/actuation inhaler Take  by inhalation as needed.  omega-3 fatty acids-vitamin e (FISH OIL) 1,000 mg Cap Take 1 Cap by mouth.  citalopram (CELEXA) 20 mg tablet Take 1 Tab by mouth daily.  atorvastatin (LIPITOR) 40 mg tablet Take 1 Tab by mouth daily.  amlodipine (NORVASC) 2.5 mg tablet Take 1 Tab by mouth daily. No current facility-administered medications for this visit.          Past Medical History:   Diagnosis Date    Allergic rhinitis     Arthritis     RA    Basal cell carcinoma (BCC) in situ of skin     shoudler and scalp    Cancer (HCC)     prostate    GERD (gastroesophageal reflux disease)     Gout     Hypercholesterolemia     Hypertension     Melanoma in situ of back ([address]     Skin cancer (melanoma) ([facility] [address]     Sleep apnea     Bipap at home      Past Surgical History:   Procedure Laterality Date    COLONOSCOPY N/A 5/6/2019    COLONOSCOPY performed by Yeison Braun MD at [address] [place] [address] [place], COLON, DIAGNOSTIC      09, due 12, done 11, due 14    HX CATARACT REMOVAL  04/2019    HX HEENT      wisdom teeth extraction    HX KNEE ARTHROSCOPY Left     HX KNEE REPLACEMENT Left 2012    HX OTHER SURGICAL N/A 05/31/2018    Basal cell removal of scalp    HX PROSTATECTOMY      HX TONSILLECTOMY        Social History     Tobacco Use    Smoking status: Former Smoker    Smokeless tobacco: Never Used   Substance Use Topics    Alcohol use: Yes     Alcohol/week: 4.0 standard drinks     Types: 4 Cans of beer per week     Comment: 4 days per week      Family History   Problem Relation Age of Onset    Dementia Mother     Heart Disease Father         CAD    Kidney Disease Father         renal failure    Diabetes Paternal Grandmother     Cancer Paternal Grandfather         colon    Cancer Other         family hx colon cancer        Allergies   Allergen Reactions    Stings [Sting, Bee] Swelling     Swelling at site        Assessment/Plan  Diagnoses and all orders for this visit:    1. Pain of right heel -plantar fasciitis versus bone spurs. Will place on a 2-week course of anti-inflammatories. Will get x-ray today. Patient instructed to wear good padded shoes. If this continues to be this painful would refer to orthopedist for evaluation for injection. -     XR FOOT RT MIN 3 V; Future  -     diclofenac EC (VOLTAREN) 75 mg EC tablet; Take 1 Tab by mouth two (2) times a day for 14 days. 2. Essential hypertension, benign -stable denies any orthostatic symptoms. 3. Recurrent depression ([address] -notes that his mental health is doing well. Jaquelin Almendarez MD  9/12/2019    This note was created with the help of speech recognition software Zebedee Simmonds) and may contain some 'sound alike' errors. Discussed the patient's BMI with him. The BMI follow up plan is as follows:     dietary management education, guidance, and counseling  encourage exercise  monitor weight  prescribed dietary intake    An After Visit Summary was printed and given to the patient.

## 2019-09-12 NOTE — PROGRESS NOTES
Pt is in the office for right foot pain that started about a month ago. Pain is in the heel and goes up into ankle. No known trauma.

## 2019-09-12 NOTE — PATIENT INSTRUCTIONS
Plantar Fasciitis: Exercises  Introduction  Here are some examples of exercises for you to try. The exercises may be suggested for a condition or for rehabilitation. Start each exercise slowly. Ease off the exercises if you start to have pain. You will be told when to start these exercises and which ones will work best for you. How to do the exercises  Towel stretch    1. Sit with your legs extended and knees straight. 2. Place a towel around your foot just under the toes. 3. Hold each end of the towel in each hand, with your hands above your knees. 4. Pull back with the towel so that your foot stretches toward you. 5. Hold the position for at least 15 to 30 seconds. 6. Repeat 2 to 4 times a session, up to 5 sessions a day. Calf stretch    1. Stand facing a wall with your hands on the wall at about eye level. Put the leg you want to stretch about a step behind your other leg. 2. Keeping your back heel on the floor, bend your front knee until you feel a stretch in the back leg. 3. Hold the stretch for 15 to 30 seconds. Repeat 2 to 4 times. Plantar fascia and calf stretch    1. Stand on a step as shown above. Be sure to hold on to the banister. 2. Slowly let your heels down over the edge of the step as you relax your calf muscles. You should feel a gentle stretch across the bottom of your foot and up the back of your leg to your knee. 3. Hold the stretch about 15 to 30 seconds, and then tighten your calf muscle a little to bring your heel back up to the level of the step. Repeat 2 to 4 times. Towel curls    1. While sitting, place your foot on a towel on the floor and scrunch the towel toward you with your toes. 2. Then, also using your toes, push the towel away from you. [place] pickups    1. Put marbles on the floor next to a cup.  2. Using your toes, try to lift the marbles up from the floor and put them in the cup. Follow-up care is a key part of your treatment and safety.  Be sure to make and go to all appointments, and call your doctor if you are having problems. It's also a good idea to know your test results and keep a list of the medicines you take. Where can you learn more? Go to http://mira-tania.info/. Keyonna Patel in the search box to learn more about \"Plantar Fasciitis: Exercises. \"  Current as of: September 20, 2018  Content Version: 12.1  © 8060-3543 [employer]. Care instructions adapted under license by [employer] (which disclaims liability or warranty for this information). If you have questions about a medical condition or this instruction, always ask your healthcare professional. [address] any warranty or liability for your use of this information. Body Mass Index: Care Instructions  Your Care Instructions    Body mass index (BMI) can help you see if your weight is raising your risk for health problems. It uses a formula to compare how much you weigh with how tall you are. · A BMI lower than 18.5 is considered underweight. · A BMI between 18.5 and 24.9 is considered healthy. · A BMI between 25 and 29.9 is considered overweight. A BMI of 30 or higher is considered obese. If your BMI is in the normal range, it means that you have a lower risk for weight-related health problems. If your BMI is in the overweight or obese range, you may be at increased risk for weight-related health problems, such as high blood pressure, heart disease, stroke, arthritis or joint pain, and diabetes. If your BMI is in the underweight range, you may be at increased risk for health problems such as fatigue, lower protection (immunity) against illness, muscle loss, bone loss, hair loss, and hormone problems. BMI is just one measure of your risk for weight-related health problems.  You may be at higher risk for health problems if you are not active, you eat an unhealthy diet, or you drink too much alcohol or use tobacco products. Follow-up care is a key part of your treatment and safety. Be sure to make and go to all appointments, and call your doctor if you are having problems. It's also a good idea to know your test results and keep a list of the medicines you take. How can you care for yourself at home? · Practice healthy eating habits. This includes eating plenty of fruits, vegetables, whole grains, lean protein, and low-fat dairy. · If your doctor recommends it, get more exercise. Walking is a good choice. Bit by bit, increase the amount you walk every day. Try for at least 30 minutes on most days of the week. · Do not smoke. Smoking can increase your risk for health problems. If you need help quitting, talk to your doctor about stop-smoking programs and medicines. These can increase your chances of quitting for good. · Limit alcohol to 2 drinks a day for men and 1 drink a day for women. Too much alcohol can cause health problems. If you have a BMI higher than 25  · Your doctor may do other tests to check your risk for weight-related health problems. This may include measuring the distance around your waist. A waist measurement of more than 40 inches in men or 35 inches in women can increase the risk of weight-related health problems. · Talk with your doctor about steps you can take to stay healthy or improve your health. You may need to make lifestyle changes to lose weight and stay healthy, such as changing your diet and getting regular exercise. If you have a BMI lower than 18.5  · Your doctor may do other tests to check your risk for health problems. · Talk with your doctor about steps you can take to stay healthy or improve your health. You may need to make lifestyle changes to gain or maintain weight and stay healthy, such as getting more healthy foods in your diet and doing exercises to build muscle. Where can you learn more? Go to http://mira-tania.info/.   Enter S176 in the search box to learn more about \"Body Mass Index: Care Instructions. \"  Current as of: October 13, 2016  Content Version: 11.4  © 3299-0038 [employer], [employer]. Care instructions adapted under license by [employer] (which disclaims liability or warranty for this information). If you have questions about a medical condition or this instruction, always ask your healthcare professional. [address] any warranty or liability for your use of this information.

## 2019-09-12 NOTE — PROGRESS NOTES
Please inform patient that no bone spurs were seen and this is likely plantar fasciitis. Continue plan as we discussed with the anti-inflammatories and the stretches provided.

## 2019-10-17 ENCOUNTER — TELEPHONE (OUTPATIENT)
Dept: INTERNAL MEDICINE CLINIC | Age: 74
End: 2019-10-17

## 2019-10-17 NOTE — TELEPHONE ENCOUNTER
Received request for face to face note within the last 6 months and detailed written order from [address] for parts replacement for Pt's CPAP machine. Contacted Pt and advised we do not have a sleep study result / face to face on file. Pt states he will f/u with sleep specialist on 10/24/19. Form has been faxed back to [address] indicating this.

## 2019-10-22 ENCOUNTER — HOSPITAL ENCOUNTER (OUTPATIENT)
Dept: INFUSION THERAPY | Age: 74
Discharge: HOME | End: 2019-10-22
Payer: MEDICARE

## 2019-10-22 VITALS
BODY MASS INDEX: 36.64 KG/M2 | DIASTOLIC BLOOD PRESSURE: 67 MMHG | OXYGEN SATURATION: 94 % | RESPIRATION RATE: 18 BRPM | SYSTOLIC BLOOD PRESSURE: 119 MMHG | WEIGHT: 241 LBS | HEART RATE: 45 BPM | TEMPERATURE: 97.8 F

## 2019-10-22 LAB
ALBUMIN SERPL-MCNC: 3.5 G/DL (ref 3.5–5)
ALBUMIN/GLOB SERPL: 1.1 {RATIO} (ref 1.1–2.2)
ALP SERPL-CCNC: 82 U/L (ref 45–117)
ALT SERPL-CCNC: 65 U/L (ref 12–78)
ANION GAP SERPL CALC-SCNC: 9 MMOL/L (ref 5–15)
AST SERPL-CCNC: 11 U/L (ref 15–37)
BASOPHILS # BLD: 0 K/UL (ref 0–0.1)
BASOPHILS NFR BLD: 1 % (ref 0–1)
BILIRUB SERPL-MCNC: 0.5 MG/DL (ref 0.2–1)
BUN SERPL-MCNC: 17 MG/DL (ref 6–20)
BUN/CREAT SERPL: 20 (ref 12–20)
CALCIUM SERPL-MCNC: 8.7 MG/DL (ref 8.5–10.1)
CHLORIDE SERPL-SCNC: 111 MMOL/L (ref 97–108)
CO2 SERPL-SCNC: 21 MMOL/L (ref 21–32)
CREAT SERPL-MCNC: 0.85 MG/DL (ref 0.7–1.3)
CRP SERPL-MCNC: <0.29 MG/DL (ref 0–0.6)
DIFFERENTIAL METHOD BLD: ABNORMAL
EOSINOPHIL # BLD: 0.3 K/UL (ref 0–0.4)
EOSINOPHIL NFR BLD: 4 % (ref 0–7)
ERYTHROCYTE [DISTWIDTH] IN BLOOD BY AUTOMATED COUNT: 12.9 % (ref 11.5–14.5)
ERYTHROCYTE [SEDIMENTATION RATE] IN BLOOD: 13 MM/HR (ref 0–20)
GLOBULIN SER CALC-MCNC: 3.3 G/DL (ref 2–4)
GLUCOSE SERPL-MCNC: 100 MG/DL (ref 65–100)
HCT VFR BLD AUTO: 40.5 % (ref 36.6–50.3)
HGB BLD-MCNC: 13.8 G/DL (ref 12.1–17)
IMM GRANULOCYTES # BLD AUTO: 0 K/UL (ref 0–0.04)
IMM GRANULOCYTES NFR BLD AUTO: 1 % (ref 0–0.5)
LYMPHOCYTES # BLD: 1.7 K/UL (ref 0.8–3.5)
LYMPHOCYTES NFR BLD: 22 % (ref 12–49)
MCH RBC QN AUTO: 34.8 PG (ref 26–34)
MCHC RBC AUTO-ENTMCNC: 34.1 G/DL (ref 30–36.5)
MCV RBC AUTO: 102.3 FL (ref 80–99)
MONOCYTES # BLD: 0.8 K/UL (ref 0–1)
MONOCYTES NFR BLD: 10 % (ref 5–13)
NEUTS SEG # BLD: 5 K/UL (ref 1.8–8)
NEUTS SEG NFR BLD: 62 % (ref 32–75)
NRBC # BLD: 0 K/UL (ref 0–0.01)
NRBC BLD-RTO: 0 PER 100 WBC
PLATELET # BLD AUTO: 204 K/UL (ref 150–400)
PMV BLD AUTO: 10.4 FL (ref 8.9–12.9)
POTASSIUM SERPL-SCNC: 4 MMOL/L (ref 3.5–5.1)
PROT SERPL-MCNC: 6.8 G/DL (ref 6.4–8.2)
RBC # BLD AUTO: 3.96 M/UL (ref 4.1–5.7)
SODIUM SERPL-SCNC: 141 MMOL/L (ref 136–145)
WBC # BLD AUTO: 7.8 K/UL (ref 4.1–11.1)

## 2019-10-22 PROCEDURE — 85652 RBC SED RATE AUTOMATED: CPT

## 2019-10-22 PROCEDURE — 74011250636 HC RX REV CODE- 250/636: Performed by: INTERNAL MEDICINE

## 2019-10-22 PROCEDURE — 86140 C-REACTIVE PROTEIN: CPT

## 2019-10-22 PROCEDURE — 36415 COLL VENOUS BLD VENIPUNCTURE: CPT

## 2019-10-22 PROCEDURE — 80053 COMPREHEN METABOLIC PANEL: CPT

## 2019-10-22 PROCEDURE — 96413 CHEMO IV INFUSION 1 HR: CPT

## 2019-10-22 PROCEDURE — 85025 COMPLETE CBC W/AUTO DIFF WBC: CPT

## 2019-10-22 PROCEDURE — 74011000258 HC RX REV CODE- 258: Performed by: INTERNAL MEDICINE

## 2019-10-22 RX ADMIN — GOLIMUMAB 200 MG: 50 SOLUTION INTRAVENOUS at 10:40

## 2019-10-22 NOTE — PROGRESS NOTES
[facility] - Chemotherapy Progress Note    1000 Pt admit to [facility] for q8week Simponi ambulatory in stable condition. Assessment completed. No new concerns voiced. RAC PIV with positive blood return. Labs drawn and sent for processing. Chemotherapy Flowsheet 10/22/2019   Cycle q 8 week   Date 10/22/2019   Drug / Regimen Simponi         Patient Vitals for the past 12 hrs:   Temp Pulse Resp BP SpO2   10/22/19 1115 97.8 °F (36.6 °C) (!) 45 18 119/67    10/22/19 1015 99.1 °F (37.3 °C) (!) 48 18 109/61 94 %     Medications:  Simponi    1120 Pt tolerated treatment well. PIV maintained positive blood return throughout treatment, flushed with positive blood return at conclusion. Pt declined to remain for post infusion observation. D/c home ambulatory in no distress. Pt aware of next appointment scheduled for 12/17 at 10. Additional labs to result in [address] [facility] when available. Recent Results (from the past 12 hour(s))   CBC WITH AUTOMATED DIFF    Collection Time: 10/22/19 10:09 AM   Result Value Ref Range    WBC 7.8 4.1 - 11.1 K/uL    RBC 3.96 (L) 4.10 - 5.70 M/uL    HGB 13.8 12.1 - 17.0 g/dL    HCT 40.5 36.6 - 50.3 %    .3 (H) 80.0 - 99.0 FL    MCH 34.8 (H) 26.0 - 34.0 PG    MCHC 34.1 30.0 - 36.5 g/dL    RDW 12.9 11.5 - 14.5 %    PLATELET 993 711 - 972 K/uL    MPV 10.4 8.9 - 12.9 FL    NRBC 0.0 0  WBC    ABSOLUTE NRBC 0.00 0.00 - 0.01 K/uL    NEUTROPHILS 62 32 - 75 %    LYMPHOCYTES 22 12 - 49 %    MONOCYTES 10 5 - 13 %    EOSINOPHILS 4 0 - 7 %    BASOPHILS 1 0 - 1 %    IMMATURE GRANULOCYTES 1 (H) 0.0 - 0.5 %    ABS. NEUTROPHILS 5.0 1.8 - 8.0 K/UL    ABS. LYMPHOCYTES 1.7 0.8 - 3.5 K/UL    ABS. MONOCYTES 0.8 0.0 - 1.0 K/UL    ABS. EOSINOPHILS 0.3 0.0 - 0.4 K/UL    ABS. BASOPHILS 0.0 0.0 - 0.1 K/UL    ABS. IMM.  GRANS. 0.0 0.00 - 0.04 K/UL    DF AUTOMATED     METABOLIC PANEL, COMPREHENSIVE    Collection Time: 10/22/19 10:09 AM   Result Value Ref Range    Sodium 141 136 - 145 mmol/L Potassium 4.0 3.5 - 5.1 mmol/L    Chloride 111 (H) 97 - 108 mmol/L    CO2 21 21 - 32 mmol/L    Anion gap 9 5 - 15 mmol/L    Glucose 100 65 - 100 mg/dL    BUN 17 6 - 20 MG/DL    Creatinine 0.85 0.70 - 1.30 MG/DL    BUN/Creatinine ratio 20 12 - 20      GFR est AA >60 >60 ml/min/1.73m2    GFR est non-AA >60 >60 ml/min/1.73m2    Calcium 8.7 8.5 - 10.1 MG/DL    Bilirubin, total 0.5 0.2 - 1.0 MG/DL    ALT (SGPT) 65 12 - 78 U/L    AST (SGOT) 11 (L) 15 - 37 U/L    Alk.  phosphatase 82 45 - 117 U/L    Protein, total 6.8 6.4 - 8.2 g/dL    Albumin 3.5 3.5 - 5.0 g/dL    Globulin 3.3 2.0 - 4.0 g/dL    A-G Ratio 1.1 1.1 - 2.2     SED RATE (ESR)    Collection Time: 10/22/19 10:09 AM   Result Value Ref Range    Sed rate, automated 13 0 - 20 mm/hr   C REACTIVE PROTEIN, QT    Collection Time: 10/22/19 10:09 AM   Result Value Ref Range    C-Reactive protein <0.29 0.00 - 0.60 mg/dL

## 2019-11-08 DIAGNOSIS — M05.79 SEROPOSITIVE RHEUMATOID ARTHRITIS OF MULTIPLE SITES (HCC): ICD-10-CM

## 2019-11-08 RX ORDER — FOLIC ACID 1 MG/1
TABLET ORAL
Qty: 90 TAB | Refills: 0 | Status: SHIPPED | OUTPATIENT
Start: 2019-11-08 | End: 2019-12-09 | Stop reason: SDUPTHER

## 2019-11-27 DIAGNOSIS — M05.79 SEROPOSITIVE RHEUMATOID ARTHRITIS OF MULTIPLE SITES (HCC): ICD-10-CM

## 2019-11-27 RX ORDER — METHOTREXATE 2.5 MG/1
TABLET ORAL
Qty: 96 TAB | Refills: 1 | Status: SHIPPED | OUTPATIENT
Start: 2019-11-27 | End: 2019-12-09 | Stop reason: SDUPTHER

## 2019-12-09 ENCOUNTER — OFFICE VISIT (OUTPATIENT)
Dept: RHEUMATOLOGY | Age: 74
End: 2019-12-09

## 2019-12-09 VITALS
HEIGHT: 68 IN | WEIGHT: 239 LBS | RESPIRATION RATE: 18 BRPM | BODY MASS INDEX: 36.22 KG/M2 | HEART RATE: 51 BPM | TEMPERATURE: 98.1 F | SYSTOLIC BLOOD PRESSURE: 121 MMHG | DIASTOLIC BLOOD PRESSURE: 63 MMHG

## 2019-12-09 DIAGNOSIS — E55.9 VITAMIN D DEFICIENCY, UNSPECIFIED: ICD-10-CM

## 2019-12-09 DIAGNOSIS — R79.9 ABNORMAL FINDING OF BLOOD CHEMISTRY, UNSPECIFIED: ICD-10-CM

## 2019-12-09 DIAGNOSIS — R21 RASH AND NONSPECIFIC SKIN ERUPTION: ICD-10-CM

## 2019-12-09 DIAGNOSIS — M1A.0720 CHRONIC IDIOPATHIC GOUT INVOLVING TOE OF LEFT FOOT WITHOUT TOPHUS: ICD-10-CM

## 2019-12-09 DIAGNOSIS — M05.79 SEROPOSITIVE RHEUMATOID ARTHRITIS OF MULTIPLE SITES (HCC): Primary | ICD-10-CM

## 2019-12-09 RX ORDER — ALLOPURINOL 300 MG/1
TABLET ORAL
Qty: 90 TAB | Refills: 1 | Status: SHIPPED | OUTPATIENT
Start: 2019-12-09 | End: 2020-06-12 | Stop reason: SDUPTHER

## 2019-12-09 RX ORDER — METHOTREXATE 2.5 MG/1
TABLET ORAL
Qty: 96 TAB | Refills: 1 | Status: SHIPPED | OUTPATIENT
Start: 2019-12-09 | End: 2020-06-12 | Stop reason: SDUPTHER

## 2019-12-09 RX ORDER — FOLIC ACID 1 MG/1
TABLET ORAL
Qty: 90 TAB | Refills: 1 | Status: SHIPPED | OUTPATIENT
Start: 2019-12-09 | End: 2020-06-12 | Stop reason: SDUPTHER

## 2019-12-09 RX ORDER — MELOXICAM 7.5 MG/1
TABLET ORAL DAILY
COMMUNITY
End: 2021-02-08

## 2019-12-09 RX ORDER — CLOBETASOL PROPIONATE 0.5 MG/G
OINTMENT TOPICAL 2 TIMES DAILY
COMMUNITY

## 2019-12-09 NOTE — PROGRESS NOTES
Chief Complaint   Patient presents with    Joint Pain     1. Have you been to the ER, urgent care clinic since your last visit? Hospitalized since your last visit? No    2. Have you seen or consulted any other health care providers outside of the [address] [place] [address] since your last visit? Include any pap smears or colon screening.  Yes, Dermatologist

## 2019-12-09 NOTE — LETTER
12/9/19 Patient: León Castillo YOB: 1945 Date of Visit: 12/9/2019 Mario Alberto Hung MD 
[address] [facility] [address] [facility] [id number] VIA In Basket Dear Mario Alberto Hung MD, Thank you for referring Mr. Shira Roldan to [address] [place] [address] for evaluation. My notes for this consultation are attached. If you have questions, please do not hesitate to call me. I look forward to following your patient along with you.  
 
 
Sincerely, 
 
Hipolito Farmer MD

## 2019-12-09 NOTE — PROGRESS NOTES
REASON FOR VISIT    This is a follow-up visit for Mr. Blu Higginbotham for     ICD-10-CM   1. Seropositive rheumatoid arthritis of multiple sites ([facility] M05.79   2. Chronic idiopathic gout involving toe of left foot without tophus M1A.0791     Inflammatory arthritis phenotype includes:  Anti-CCP positive: yes (>250)  Rheumatoid factor positive: yes (>650)  Erosive disease: yes  Extra-articular manifestations include: none    Immunosuppression Screening (12/03/2018):   Quantiferon TB: negative    PPD:  Not performed  Hepatitis B: negative   Hepatitis C: negative     Therapy History includes:  Current DMARD therapy include: methotrexate 20 mg every Wednesday, Simponi Aria every 8 weeks (5/08/2018 to present)  Prior DMARD therapy include: none  Discontinued DMARDs because of inefficacy: None  Discontinued DMARDs because of side effects: None  Unaffordable DMARDs: Enbrel    Immunizations:   Immunization History   Administered Date(s) Administered    (RETIRED) Pneumococcal Vaccine (Unspecified Type) 10/21/2011    Influenza High Dose Vaccine PF 10/14/2016, 10/16/2018    Influenza Vaccine 10/08/2019    Influenza Vaccine Split 09/21/2011    Influenza Vaccine Whole 09/17/2010    Pneumococcal Conjugate (PCV-13) 01/01/2015    TDAP Vaccine 12/15/2010    Tdap 12/15/2010, 10/04/2017    Zoster Vaccine, Live 10/21/2011     Active problems include:    Patient Active Problem List   Diagnosis Code    Mixed hyperlipidemia E78.2    Other abnormal glucose R73.09    Essential hypertension, benign I10    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    Allergic rhinitis, cause unspecified J30.9    Reflux esophagitis K21.0    Benign neoplasm of colon D12.6    Contact dermatitis and other eczema, due to unspecified cause L25.9    Bunion M21.619    History of prostate cancer Z85.46    Advanced care planning/counseling discussion Z71.89    Low testosterone R79.89    Primary osteoarthritis of both knees M17.0    Seropositive rheumatoid arthritis of multiple sites ([facility] M05.79    Long-term use of immunosuppressant medication Z79.899    Vitamin D deficiency E55.9    Recurrent depression ([facility] F33.9    Severe obesity with body mass index (BMI) of 35.0 to 39.9 with serious comorbidity (HCC) E66.01    Gout M10.9    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    Primary localized osteoarthrosis, lower leg M17.10    Peripheral vascular disease (HCC) I73.9     HISTORY OF PRESENT ILLNESS    Mr. Miguel Stacy returns for a follow-up. On his last visit, I continued methotrexate 20 mg every Wednesday and Simponi Aria infusions. His most infusion recent was 10/22/2019. Today, he denies pain, swelling, or stiffness in his hands or wrists. He denies interval gouty flares. He endorses rash for the past year that comes and goes and is following with dermatology on his right arm, which has improved. He has not had a biopsy. He denies fever, weight loss, blurred vision, vision loss, oral ulcers, ankle swelling, dry cough, dyspnea, nausea, vomiting, dysphagia, abdominal pain, black or bloody stool, fall since last visit, easy bruising and increased thirst.    Mr. Miguel Stacy has continued his medications for arthritis and reports good tolerance without significant side effects. Last toxicity monitoring by blood work was done on 10/22/2019 and did not reveal any significant adverse effects. Most recent inflammatory markers from 10/22/2019 revealed a ESR 13 mm/hr and CRP 0.29 mg/L. The patient has not had any interval hospital admissions, infections, or surgeries. REVIEW OF SYSTEMS    A comprehensive review of systems was performed and pertinent results are documented in the HPI, review of systems is otherwise non-contributory.     PAST MEDICAL HISTORY    He has a past medical history of Allergic rhinitis, Arthritis, Basal cell carcinoma (BCC) in situ of skin, Cancer ([facility] GERD (gastroesophageal reflux disease), Gout, Hypercholesterolemia, Hypertension, Melanoma in situ of back ([facility] Skin cancer (melanoma) ([facility] and Sleep apnea. He also has no past medical history of Difficult intubation. FAMILY HISTORY    His family history includes Cancer in his paternal grandfather and another family member; Dementia in his mother; Diabetes in his paternal grandmother; Heart Disease in his father; Kidney Disease in his father. SOCIAL HISTORY    He reports that he has quit smoking. He has never used smokeless tobacco. He reports current alcohol use of about 4.0 standard drinks of alcohol per week. He reports that he does not use drugs. MEDICATIONS    Current Outpatient Medications   Medication Sig Dispense Refill    meloxicam (MOBIC) 7.5 mg tablet Take  by mouth daily.  clobetasol (TEMOVATE) 0.05 % ointment Apply  to affected area two (2) times a day.  folic acid (FOLVITE) 1 mg tablet TAKE ONE TABLET BY MOUTH DAILY 90 Tab 1    methotrexate (RHEUMATREX) 2.5 mg tablet TAKE 8 TABLETS BY MOUTH WEEKLY ON WEDNESDAYS 96 Tab 1    allopurinol (ZYLOPRIM) 300 mg tablet TAKE ONE TABLET BY MOUTH ONE TIME DAILY 90 Tab 1    ergocalciferol (ERGOCALCIFEROL) 50,000 unit capsule TAKE ONE TABLET BY MOUTH EVERY 7 DAYS 12 Cap 1    pantoprazole sodium (PANTOPRAZOLE PO) Take 20 mg by mouth daily.  0.9% sodium chloride solp 100 mL with golimumab 12.5 mg/mL soln 2 mg/kg 2 mg/kg by IntraVENous route once. Every six weeks      lisinopril (PRINIVIL, ZESTRIL) 40 mg tablet Take 40 mg by mouth daily.  aspirin delayed-release 81 mg tablet Take  by mouth daily.  albuterol (VENTOLIN HFA) 90 mcg/actuation inhaler Take  by inhalation as needed.  omega-3 fatty acids-vitamin e (FISH OIL) 1,000 mg Cap Take 1 Cap by mouth.  citalopram (CELEXA) 20 mg tablet Take 1 Tab by mouth daily. 30 Tab 11    atorvastatin (LIPITOR) 40 mg tablet Take 1 Tab by mouth daily. 30 Tab 11    amlodipine (NORVASC) 2.5 mg tablet Take 1 Tab by mouth daily. 30 Tab 11        ALLERGIES    Allergies   Allergen Reactions    Stings [Sting, Bee] Swelling     Swelling at site       PHYSICAL EXAMINATION    Visit Vitals  /63   Pulse (!) 51   Temp 98.1 °F (36.7 °C)   Resp 18   Ht 5' 8\" (1.727 m)   Wt 239 lb (108.4 kg)   BMI 36.34 kg/m²     Body mass index is 36.34 kg/m². General: Patient is alert, oriented x 3, not in acute distress, wife at bedside     HEENT:   Sclerae are not injected and appear moist.  There is no alopecia. Neck is supple     Cardiovascular:  Heart is regular rate and rhythm, no murmurs. Chest:  Lungs are clear to auscultation bilaterally. No rhonchi, wheezes, or crackles. Abdomen:  Obese. Extremities:  Free of clubbing, cyanosis, edema    Neurological exam:  Muscle strength is full in upper and lower extremities     Skin exam:  There are no alopecia, no discoid lesions, no active Raynaud's, no livedo reticularis, no periungual erythema. Right rash on antecubital fossa and distal arm    Musculoskeletal exam:  A comprehensive musculoskeletal exam was performed for all joints of each upper and lower extremity and assessed for swelling, tenderness and range of motion. Positive results are documented as below:    Bilateral Olivia and [place] nodes. Bilateral knee crepitus without effusion.   Pes planus   Bilateral hallux valgus (LEFT more than right)    Z-Deformities:   no  [place] Neck Deformities:  no  Boutonierre's Deformities:  no  Ulnar Deviation:   Yes (bilateral)  MCP Subluxation:  no     Joint Count 12/9/2019 6/7/2019 3/4/2019 12/3/2018 9/4/2018 6/4/2018 3/7/2018   Patient pain (0-100) 20 5 0 20 15 0 60   MHAQ 0 0 0 0 0 0 0.25   Left elbow - Tender - - - - - - -   Left wrist- Tender 0 0 - - 1 0 1   Left wrist- Swollen 0 0 - - 1 1 1   Left 1st MCP - Tender - - - - - - 1   Left 1st MCP - Swollen - - - - 1 - 1   Left 2nd MCP - Tender - - 0 0 - - 1   Left 2nd MCP - Swollen - - 1 1 1 1 1   Left 3rd MCP - Tender - - 0 - - - 1   Left 3rd MCP - Swollen - - 1 - 1 1 1   Left 4th MCP - Tender - - - - - - 1   Left 5th MCP - Tender - - - - - - 1   Left thumb IP - Tender - - - - - - 1   Left thumb IP - Swollen - - - - - - 1   Left 2nd PIP - Tender - - - - - - 1   Left 3rd PIP - Tender - - - - - - 1   Left 4th PIP - Tender - - - - - - 1   Left 5th PIP - Tender - - - - - - 1   Right wrist- Tender - - - - - - 1   Right wrist- Swollen - - - - 1 1 1   Right 1st MCP - Tender - - - - - - 1   Right 1st MCP - Swollen - - - - 1 1 1   Right 2nd MCP - Tender - - - - - - 1   Right 2nd MCP - Swollen - - - - 1 1 1   Right 3rd MCP - Tender - - - - - - 1   Right 3rd MCP - Swollen - - - - 1 1 1   Right 4th MCP - Tender - - 0 - - - 1   Right 4th MCP - Swollen - - 1 - - 1 1   Right 5th MCP - Swollen - - - - - - 1   Right thumb IP - Tender - - - - - - -   Right 2nd PIP - Tender - - - - - - -   Right 2nd PIP - Swollen - - - - - - 1   Right 3rd PIP - Tender - - - - - - -   Right 3rd PIP - Swollen - - - - - 1 1   Right 4th PIP - Swollen - - - - - 1 1   Right knee - Tender - - - - - - -   Right knee - Swollen - - - - - - -   Tender Joint Count (Total) 0 0 0 0 1 0 16   Swollen Joint Count (Total) 0 0 3 1 8 10 14   Physician Assessment (0-10) 0 0 0.5 0 2 2 4   Patient Assessment (0-10) 0.5 0.5 0 2 1.5 0 6   CDAI Total (calculated) 0.5 0.5 3.5 3 12.5 12 40       DATA REVIEW    Laboratory     Recent laboratory results were reviewed, summarized, and discussed with the patient. Imaging    Musculoskeletal Ultrasound    None    Radiographs    Bilateral Hand 11/20/2017: RIGHT: No fracture or dislocation on plain film. The bones are osteopenic. Mild narrowing of the radiocarpal joint. Probable erosion of the radius and lunate at the radiocarpal joint. No widening of the intercarpal spaces. Subtle erosion of the ulnar styloid. Mild narrowing of the triscaphe joint is age-appropriate. Irregular arthritis of the first and second CMC joints includes osteophytes and erosions.  There are erosions of the first through fourth metacarpal heads at the MCP points. Moderate narrowing and anterior subluxation of the second MCP joint. IP joints are within normal limits. No chondrocalcinosis or periosteal reaction. LEFT: No fracture or dislocation on plain film. The bones are osteopenic. Moderate narrowing of the radiocarpal joint with subtle erosions of the lunate. Marrow DR UVJ with erosions. No definite ulnar styloid erosion. Triscaphe and first Aia 16 joint osteoarthritis are age-appropriate. Large erosions on both sides of the second MCP joint and in the third metacarpal head. IP joints are within normal limits. No chondrocalcinosis or periosteal reaction. Bilateral Foot 11/20/2017: RIGHT: No fracture or dislocation on plain film. Bones are osteopenic. Talonavicular joint space narrowing, osteophytes, and erosions. Subtalar arthritis is partially imaged. TMT joints are within normal limits. Severe narrowing of the first MTP joint with osteophytes and erosions. There is erosion and the fifth tarsal head. Mild narrowing of the second MTP joint without definable erosion. No periosteal reaction. LEFT: No fracture or dislocation on plain film. Bones are osteopenic. Intertarsal joints are within normal limits. Moderate narrowing of the first MTP joint with central and marginal erosions. 35 degrees hallux valgus angulation and fibular subluxation of the first proximal phalanx. There are also erosions in the laterally subluxed hallux sesamoids. Mild joint space narrowing and erosions of the third MTP joint. Fragmentation of the fourth middle phalanx is adjacent to the fourth PIP joint erosions. There are also erosions of the second and fifth PIP joints. First IP joint osteoarthritis is mild. CT Imaging    CT Right Upper Extremity with contrast 10/19/2017: examination of the soft tissues demonstrates no drainable fluid collection. There is no pathologically enlarged nodes within the right axilla.  Alignment is normal. There is no bone destruction or fracture. No significant fatty atrophy. MR Imaging    None    DXA     None    ASSESSMENT AND PLAN    This is a follow-up visit for Mr. Coco Johnson. 1) Seropositive Erosive Rheumatoid Arthritis. He presented after activation of underlying Rheumatoid Arthritis which appears to have been quiescent for more than 30 years from the TDAP vaccine. He remembers being diagnosed with arthritis in his 30's that was treated with an injection in his finger. He has bilateral ulnar deviation which corresponds with the chronicity of his disease. Radiographs showed erosive disease confirming chronicity. He is maintained on methotrexate 20 mg every Wednesday and Simponi Aria infusions with good tolerance. He most recent infusion was 10/22/2019. He feels well. He denies joint pain, stiffness or swelling today. His CDAI was 0.5 (previously 0.5, 3.5, 3, 12.5, 12, 40, 27, 37) with 0 tender and 0 swollen joints, consistent with remission. I will continue current therapy. Follow up in 6 months. 2) Long Term Use of Immunosuppressants. The patient remains on immunomodulatory medications (methotrexate, Simponi Aria) and requires frequent toxicity monitoring by blood work. Respective labs were ordered (CBC and CMP). 3) Vitamin D Deficiency. His vitamin D level was 39.2 (previously 27.7, 22.6). He is on weekly ergocalciferol 50,000. I will check his level. 4) Bilateral Knee Osteoarthritis. This was not an active issue today. 5) Basal Cell Carcinoma. Lesion removed on his scalp 5/31/2018. Wound without secondary infection. He continues to have lesions burnt. 6) History of Melanoma. He had melanoma removed from his back 10/2017. He did not require chemotherapy. He has skin exams every 3 months. He has reviewed his medications, including Chelo Evita, with his dermatologist. He understands to continue close surveillance and inform us if he has any new melanoma lesions.     7) Chronic Gout. He has a history of gout in his left foot. He is on allopurinol 300 mg daily. His uric acid was 4.5 mg/dL (previously 4.9 mg/dL). He denies interval flares. I will check his level today. 8) Bilateral Pes Planus. This is no longer an active issue. I suspect is from his pes planus. I recommended inserts. 9) Atypical Chest Pain. This was evaluated by cardiology and felt to be GI related. Negative stress test. GI noted Allen's on EGD. 10) Skin Rash. I recommended he have a biopsy with DIF since this has been present for almost a year. The patient voiced understanding of the aforementioned assessment and plan. Summary of plan was provided in the After Visit Summary patient instructions.      TODAY'S ORDERS    Orders Placed This Encounter    QUANTIFERON-TB GOLD PLUS    CHRONIC HEPATITIS PANEL    URIC ACID    HEMOGLOBIN A1C W/O EAG    VITAMIN D, 25 HYDROXY    folic acid (FOLVITE) 1 mg tablet    methotrexate (RHEUMATREX) 2.5 mg tablet    allopurinol (ZYLOPRIM) 300 mg tablet     Future Appointments   Date Time Provider Raiza Laura   12/16/2019  9:30 AM Jessica Kern MD [address]   12/17/2019 10:00 AM SS INF1 CH3 <4H [facility]   2/11/2020  1:00 PM SS INF1 CH3 <4H [facility]   4/7/2020  1:00 PM SS INF1 CH3 <4H [facility]   6/2/2020 10:00 [facility]   6/15/2020 10:20 AM Adelaida Gonsalves MD [address] [facility]   7/28/2020 10:00 AM SS INF1 CH3 <4H Clinton Conrad MD, [address] [facility]    Adult Rheumatology   Rheumatology Ultrasound Certified  [facility], [address]   Phone [phone]  Fax [phone]

## 2019-12-12 RX ORDER — SODIUM CHLORIDE 9 MG/ML
25 INJECTION, SOLUTION INTRAVENOUS CONTINUOUS
Status: DISPENSED | OUTPATIENT
Start: 2019-12-17 | End: 2019-12-17

## 2019-12-15 PROBLEM — M72.2 PLANTAR FASCIITIS OF RIGHT FOOT: Status: ACTIVE | Noted: 2019-10-28

## 2019-12-16 ENCOUNTER — OFFICE VISIT (OUTPATIENT)
Dept: INTERNAL MEDICINE CLINIC | Age: 74
End: 2019-12-16

## 2019-12-16 ENCOUNTER — HOSPITAL ENCOUNTER (OUTPATIENT)
Dept: LAB | Age: 74
Discharge: HOME | End: 2019-12-16

## 2019-12-16 VITALS
TEMPERATURE: 98 F | DIASTOLIC BLOOD PRESSURE: 70 MMHG | HEIGHT: 68 IN | WEIGHT: 243 LBS | SYSTOLIC BLOOD PRESSURE: 116 MMHG | OXYGEN SATURATION: 96 % | BODY MASS INDEX: 36.83 KG/M2 | HEART RATE: 48 BPM | RESPIRATION RATE: 18 BRPM

## 2019-12-16 DIAGNOSIS — Z12.5 PROSTATE CANCER SCREENING: ICD-10-CM

## 2019-12-16 DIAGNOSIS — Z71.89 ADVANCED DIRECTIVES, COUNSELING/DISCUSSION: ICD-10-CM

## 2019-12-16 DIAGNOSIS — R15.9 INCONTINENCE OF FECES, UNSPECIFIED FECAL INCONTINENCE TYPE: ICD-10-CM

## 2019-12-16 DIAGNOSIS — I10 ESSENTIAL HYPERTENSION, BENIGN: ICD-10-CM

## 2019-12-16 DIAGNOSIS — E78.2 MIXED HYPERLIPIDEMIA: ICD-10-CM

## 2019-12-16 DIAGNOSIS — J32.9 SINUSITIS, UNSPECIFIED CHRONICITY, UNSPECIFIED LOCATION: ICD-10-CM

## 2019-12-16 DIAGNOSIS — F34.1 DYSTHYMIC DISORDER: ICD-10-CM

## 2019-12-16 DIAGNOSIS — E66.01 SEVERE OBESITY WITH BODY MASS INDEX (BMI) OF 35.0 TO 39.9 WITH SERIOUS COMORBIDITY (HCC): ICD-10-CM

## 2019-12-16 DIAGNOSIS — M05.79 SEROPOSITIVE RHEUMATOID ARTHRITIS OF MULTIPLE SITES (HCC): ICD-10-CM

## 2019-12-16 DIAGNOSIS — Z13.31 SCREENING FOR DEPRESSION: ICD-10-CM

## 2019-12-16 DIAGNOSIS — M10.9 GOUT, UNSPECIFIED CAUSE, UNSPECIFIED CHRONICITY, UNSPECIFIED SITE: ICD-10-CM

## 2019-12-16 DIAGNOSIS — Z00.00 MEDICARE ANNUAL WELLNESS VISIT, SUBSEQUENT: Primary | ICD-10-CM

## 2019-12-16 LAB
25(OH)D3+25(OH)D2 SERPL-MCNC: 40.4 NG/ML (ref 30–100)
CHOLEST SERPL-MCNC: 191 MG/DL
COMMENT, 144067: NORMAL
GAMMA INTERFERON BACKGROUND BLD IA-ACNC: 0.08 IU/ML
HBA1C MFR BLD: 5.4 % (ref 4.8–5.6)
HBV CORE AB SERPL QL IA: NEGATIVE
HBV CORE IGM SERPL QL IA: NEGATIVE
HBV E AB SERPL QL IA: NEGATIVE
HBV E AG SERPL QL IA: NEGATIVE
HBV SURFACE AB SER QL: NON REACTIVE
HBV SURFACE AG SERPL QL IA: NEGATIVE
HCV AB S/CO SERPL IA: <0.1 S/CO RATIO (ref 0–0.9)
HDLC SERPL-MCNC: 47 MG/DL
HDLC SERPL: 4.1 {RATIO} (ref 0–5)
LDLC SERPL CALC-MCNC: 96.8 MG/DL (ref 0–100)
LIPID PROFILE,FLP: ABNORMAL
M TB IFN-G BLD-IMP: NEGATIVE
M TB IFN-G CD4+ BCKGRND COR BLD-ACNC: 0.09 IU/ML
MITOGEN IGNF BLD-ACNC: >10 IU/ML
PSA SERPL-MCNC: 0 NG/ML (ref 0.01–4)
QUANTIFERON INCUBATION, QF1T: NORMAL
QUANTIFERON TB2 AG: 0.09 IU/ML
SERVICE CMNT-IMP: NORMAL
TRIGL SERPL-MCNC: 236 MG/DL (ref ?–150)
URATE SERPL-MCNC: 4.5 MG/DL (ref 3.7–8.6)
VLDLC SERPL CALC-MCNC: 47.2 MG/DL

## 2019-12-16 RX ORDER — MONTELUKAST SODIUM 4 MG/1
2 TABLET, CHEWABLE ORAL 2 TIMES DAILY
Qty: 60 TAB | Refills: 5 | Status: SHIPPED | OUTPATIENT
Start: 2019-12-16 | End: 2021-01-01

## 2019-12-16 RX ORDER — GUAIFENESIN 600 MG/1
600 TABLET, EXTENDED RELEASE ORAL AS NEEDED
COMMUNITY

## 2019-12-16 NOTE — PATIENT INSTRUCTIONS
Medicare Wellness Visit, Male The best way to live healthy is to have a lifestyle where you eat a well-balanced diet, exercise regularly, limit alcohol use, and quit all forms of tobacco/nicotine, if applicable. Regular preventive services are another way to keep healthy. Preventive services (vaccines, screening tests, monitoring & exams) can help personalize your care plan, which helps you manage your own care. Screening tests can find health problems at the earliest stages, when they are easiest to treat. [facility] follows the current, evidence-based guidelines published by the [facility] Jaron Darlene ([facility]) when recommending preventive services for our patients. Because we follow these guidelines, sometimes recommendations change over time as research supports it. (For example, a prostate screening blood test is no longer routinely recommended for men with no symptoms). Of course, you and your doctor may decide to screen more often for some diseases, based on your risk and co-morbidities (chronic disease you are already diagnosed with). Preventive services for you include: - Medicare offers their members a free annual wellness visit, which is time for you and your primary care provider to discuss and plan for your preventive service needs. Take advantage of this benefit every year! 
-All adults over age 72 should receive the recommended pneumonia vaccines. Current USPSTF guidelines recommend a series of two vaccines for the best pneumonia protection.  
-All adults should have a flu vaccine yearly and tetanus vaccine every 10 years. 
-All adults age 48 and older should receive the shingles vaccines (series of two vaccines).       
-All adults age 38-68 who are overweight should have a diabetes screening test once every three years.  
-Other screening tests & preventive services for persons with diabetes include: an eye exam to screen for diabetic retinopathy, a kidney function test, a foot exam, and stricter control over your cholesterol.  
-Cardiovascular screening for adults with routine risk involves an electrocardiogram (ECG) at intervals determined by the provider.  
-Colorectal cancer screening should be done for adults age 54-65 with no increased risk factors for colorectal cancer. There are a number of acceptable methods of screening for this type of cancer. Each test has its own benefits and drawbacks. Discuss with your provider what is most appropriate for you during your annual wellness visit. The different tests include: colonoscopy (considered the best screening method), a fecal occult blood test, a fecal DNA test, and sigmoidoscopy. 
-All adults born between [facility] should be screened once for Hepatitis C. 
-An Abdominal Aortic Aneurysm (AAA) Screening is recommended for men age 73-68 who has ever smoked in their lifetime. Here is a list of your current Health Maintenance items (your personalized list of preventive services) with a due date: 
Health Maintenance Due Topic Date Due  Shingles Vaccine (1 of 2) 10/11/1996  Glaucoma Screening   06/23/2019 [address] [place] [address] Annual Well Visit  08/31/2019

## 2019-12-16 NOTE — ACP (ADVANCE CARE PLANNING)
Advance Care Planning    Advance Care Planning (ACP) Provider Conversation Snapshot    Date of ACP Conversation: 12/16/19  Persons included in Conversation:  patient and family  Length of ACP Conversation in minutes:  <16 minutes (Non-Billable)    Authorized Decision Maker (if patient is incapable of making informed decisions):    This person is:   Healthcare Agent/Medical Power of  under Advance Directive            For Patients with Decision Making Capacity:   Values/Goals: Exploration of values, goals, and preferences if recovery is not expected, even with continued medical treatment in the event of:  Imminent death  Severe, permanent brain injury    Conversation Outcomes / Follow-Up Plan:   Recommended communicating the plan and making copies for the healthcare agent, personal physician, and others as appropriate (e.g., health system)

## 2019-12-16 NOTE — PROGRESS NOTES
Nithya Rivera is a 69 y.o. male who presents today for Annual Wellness Visit  . He has a history of   Patient Active Problem List   Diagnosis Code    Mixed hyperlipidemia E78.2    Other abnormal glucose R73.09    Essential hypertension, benign I10    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    Allergic rhinitis, cause unspecified J30.9    Reflux esophagitis K21.0    Benign neoplasm of colon D12.6    Contact dermatitis and other eczema, due to unspecified cause L25.9    Bunion M21.619    History of prostate cancer Z85.46    Advanced care planning/counseling discussion Z71.89    Low testosterone R79.89    Primary osteoarthritis of both knees M17.0    Seropositive rheumatoid arthritis of multiple sites ([facility] [address] [facility] M05.79    Long-term use of immunosuppressant medication Z79.899    Vitamin D deficiency E55.9    Recurrent depression (HCC) F33.9    Severe obesity with body mass index (BMI) of 35.0 to 39.9 with serious comorbidity (HCC) E66.01    Gout M10.9    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 Today patient is here for Medicare wellness exam.. Has an ongoing rash to his right upper arm. He is working with dermatology to try to figure out what this is. He recently had a biopsy done. Has been coughing over the last several days. Denies any significant fevers or chills. Very mild pressure over sinuses. Hypertension - on low dose CCB. Blood pressure stable. Hypertension ROS: taking medications as instructed, no medication side effects noted, no TIA's, no chest pain on exertion, no dyspnea on exertion, no swelling of ankles     reports that he has quit smoking. He has never used smokeless tobacco.    reports current alcohol use of about 4.0 standard drinks of alcohol per week.    BP Readings from Last 2 Encounters: 12/16/19 116/70   12/09/19 121/63     Hyperlipidemia  Currently he takes 40 mg of lipitor  ROS: taking medications as instructed, no medication side effects noted  No new myalgias, no joint pains, no weakness  No TIA's, no chest pain on exertion, no dyspnea on exertion, no swelling of ankles. Lab Results   Component Value Date/Time    Cholesterol, total 191 12/16/2019 10:45 AM    HDL Cholesterol 47 12/16/2019 10:45 AM    LDL, calculated 96.8 12/16/2019 10:45 AM    VLDL, calculated 47.2 12/16/2019 10:45 AM    Triglyceride 236 (H) 12/16/2019 10:45 AM    CHOL/HDL Ratio 4.1 12/16/2019 10:45 AM     Continues to take his allopurinol for his gout. Last uric acid of 4.5. Continues low-dose SSRI notes that his mental health is stable overall. Patient reports more episodes of fecal incontinence. He notes that he does not have any numbness but notes that his stools are often soft and on occasions he has had accidents. Denies any pain denies any blood. His recent colonoscopy did not show any abnormalities including hemorrhoids. His weight has trended up just a couple pounds. His weight overall has trended up a bit over the last several years. Health maintenance hx includes:  Exercise: moderately active. Form of exercise: Golf and walking. Diet: generally follows a low fat low cholesterol diet, nonsmoker, alcohol intake beer  Social: Retired from [employer]. Enjoys golfing. Lives with wife,  2 adult children. Cancer screening:    Colon cancer screening:  Last Colonoscopy: 2019 and was abnormal: 3 yr f/u   Prostate cancer screening: PSA and/or TITO: Due.       Immunizations:     Immunization History   Administered Date(s) Administered    (RETIRED) Pneumococcal Vaccine (Unspecified Type) 10/21/2011    Influenza High Dose Vaccine PF 10/09/2014, 11/04/2015, 10/14/2016, 10/16/2018    Influenza Vaccine 10/08/2019    Influenza Vaccine (Tri) Adjuvanted 10/08/2019    Influenza Vaccine Split 09/21/2011    Influenza Vaccine Whole 09/17/2010    Pneumococcal Conjugate (PCV-13) 01/01/2015    Pneumococcal Polysaccharide (PPSV-23) 10/09/2014    TDAP Vaccine 12/15/2010    Tdap 12/15/2010, 10/04/2017    Zoster Vaccine, Live 10/21/2011      Immunization status: up to date and documented. ROS  Review of Systems   Constitutional: Negative for chills, fever and weight loss. HENT: Negative for congestion, hearing loss, sore throat and tinnitus. Eyes: Negative for blurred vision, double vision, photophobia and pain. Respiratory: Negative for cough and shortness of breath. Cardiovascular: Negative for chest pain, palpitations, orthopnea and leg swelling. Gastrointestinal: Negative for abdominal pain, constipation, diarrhea, heartburn, nausea and vomiting. Genitourinary: Negative for dysuria, frequency and urgency. Musculoskeletal: Negative for joint pain and myalgias. Skin: Positive for rash. Neurological: Negative. Negative for headaches. Endo/Heme/Allergies: Does not bruise/bleed easily. Psychiatric/Behavioral: Negative for memory loss and suicidal ideas. Visit Vitals  /70 (BP 1 Location: Left arm, BP Patient Position: Sitting)   Pulse (!) 48   Temp 98 °F (36.7 °C) (Oral)   Resp 18   Ht 5' 8\" (1.727 m)   Wt 243 lb (110.2 kg)   SpO2 96%   BMI 36.95 kg/m²       Physical Exam  Constitutional:       Appearance: He is well-developed. He is not diaphoretic. HENT:      Head: Normocephalic and atraumatic. Right Ear: Tympanic membrane normal.      Left Ear: Tympanic membrane normal.      Ears:      Comments: Signs of pressure behind both tympanic membranes. Eyes:      Pupils: Pupils are equal, round, and reactive to light. Cardiovascular:      Rate and Rhythm: Normal rate and regular rhythm. Heart sounds: No murmur. Pulmonary:      Effort: Pulmonary effort is normal. No respiratory distress. Breath sounds: Normal breath sounds.       Comments: Lungs clear no wheezing no egophony. Abdominal:      General: Abdomen is flat. There is no distension. Palpations: Abdomen is soft. There is no mass. Musculoskeletal: Normal range of motion. Skin:     General: Skin is warm and dry. Neurological:      Mental Status: He is alert and oriented to person, place, and time. Psychiatric:         Behavior: Behavior normal.           Current Outpatient Medications   Medication Sig    colestipol (COLESTID) 1 gram tablet Take 2 Tabs by mouth two (2) times a day.  guaiFENesin ER (MUCINEX) 600 mg ER tablet Take 600 mg by mouth two (2) times a day.  meloxicam (MOBIC) 7.5 mg tablet Take  by mouth daily.  clobetasol (TEMOVATE) 0.05 % ointment Apply  to affected area two (2) times a day.  folic acid (FOLVITE) 1 mg tablet TAKE ONE TABLET BY MOUTH DAILY    methotrexate (RHEUMATREX) 2.5 mg tablet TAKE 8 TABLETS BY MOUTH WEEKLY ON WEDNESDAYS    allopurinol (ZYLOPRIM) 300 mg tablet TAKE ONE TABLET BY MOUTH ONE TIME DAILY    ergocalciferol (ERGOCALCIFEROL) 50,000 unit capsule TAKE ONE TABLET BY MOUTH EVERY 7 DAYS    pantoprazole sodium (PANTOPRAZOLE PO) Take 20 mg by mouth daily.  0.9% sodium chloride solp 100 mL with golimumab 12.5 mg/mL soln 2 mg/kg 2 mg/kg by IntraVENous route once. Every six weeks    lisinopril (PRINIVIL, ZESTRIL) 40 mg tablet Take 40 mg by mouth daily.  aspirin delayed-release 81 mg tablet Take  by mouth daily.  albuterol (VENTOLIN HFA) 90 mcg/actuation inhaler Take  by inhalation as needed.  omega-3 fatty acids-vitamin e (FISH OIL) 1,000 mg Cap Take 1 Cap by mouth.  citalopram (CELEXA) 20 mg tablet Take 1 Tab by mouth daily.  atorvastatin (LIPITOR) 40 mg tablet Take 1 Tab by mouth daily.  amlodipine (NORVASC) 2.5 mg tablet Take 1 Tab by mouth daily. No current facility-administered medications for this visit.       Facility-Administered Medications Ordered in Other Visits   Medication Dose Route Frequency    [START ON 12/17/2019] golimumab (SIMPONI ARIA) 200 mg in 0.9% sodium chloride, overfill volume 126 mL infusion  200 mg IntraVENous ONCE    [START ON 12/17/2019] 0.9% sodium chloride infusion  25 mL/hr IntraVENous CONTINUOUS        Past Medical History:   Diagnosis Date    Allergic rhinitis     Arthritis     RA    Basal cell carcinoma (BCC) in situ of skin     shoudler and scalp    Cancer (HCC)     prostate    GERD (gastroesophageal reflux disease)     Gout     Hypercholesterolemia     Hypertension     Melanoma in situ of back ([facility] [address]     Skin cancer (melanoma) ([facility] [facility]     Sleep apnea     Bipap at home      Past Surgical History:   Procedure Laterality Date    COLONOSCOPY N/A 5/6/2019    COLONOSCOPY performed by Parag Watts MD at [address] [place] [address] [place], COLON, DIAGNOSTIC      09, due 12, done 11, due 14    HX CATARACT REMOVAL  04/2019    HX HEENT      wisdom teeth extraction    HX KNEE ARTHROSCOPY Left     HX KNEE REPLACEMENT Left 2012    HX OTHER SURGICAL N/A 05/31/2018    Basal cell removal of scalp    HX PROSTATECTOMY      HX TONSILLECTOMY        Social History     Tobacco Use    Smoking status: Former Smoker    Smokeless tobacco: Never Used   Substance Use Topics    Alcohol use: Yes     Alcohol/week: 4.0 standard drinks     Types: 4 Cans of beer per week     Comment: 4 days per week      Family History   Problem Relation Age of Onset    Dementia Mother     Heart Disease Father         CAD    Kidney Disease Father         renal failure    Diabetes Paternal Grandmother     Cancer Paternal Grandfather         colon    Cancer Other         family hx colon cancer        Allergies   Allergen Reactions    Stings [Sting, Bee] Swelling     Swelling at site        Assessment/Plan  Diagnoses and all orders for this visit:    1. Medicare annual wellness visit, victor manuel Augustine was counseled on age-appropriate/ guideline-based risk prevention behaviors and screening for a 69 y.o. year old   male . We also discussed adjustments in screening based on family history if necessary. Printed instructions for preventative screening guidelines and healthy behaviors given to patient with after visit summary. 2. Advanced directives, counseling/discussion    3. Screening for depression  -     DEPRESSION SCREEN ANNUAL    4. Prostate cancer screening-patient almost 20 years out from prostate cancer. Continue to check annual PSAs. -     PSA SCREENING (SCREENING); Future     5. Essential hypertension, benign-stable with current therapy    6. Seropositive rheumatoid arthritis of multiple sites (HCC)-well-controlled with current therapy with rheumatologist.    7. Dysthymic disorder-patient wishes to continue SSRI. 8. Gout, unspecified cause, unspecified chronicity, unspecified site-uric acid at goal.    9. Mixed hyperlipidemia-check lipids today. -     LIPID PANEL; Future    10. Severe obesity with body mass index (BMI) of 35.0 to 39.9 with serious comorbidity (HCC)-weight has trended up over the last several years. We discussed diet and exercise. 11. Incontinence of feces, unspecified fecal incontinence type-we will try bile acid sequestrant to bulk up his stool and lessen mild diarrhea that he has. Other options would include fiber supplement. -     colestipol (COLESTID) 1 gram tablet; Take 2 Tabs by mouth two (2) times a day. 12. Sinusitis, unspecified chronicity, unspecified location-patient with congestive symptoms. We discussed using Mucinex twice daily for the next several days and otherwise we will place him on a course of antibiotics. Follow-up and Dispositions    · Return in about 6 months (around 6/16/2020). Liz Byers MD  12/16/2019    This note was created with the help of speech recognition software [employer]) and may contain some 'sound alike' errors.    This is the Subsequent Medicare Annual Wellness Exam, performed 12 months or more after the Initial AWV or the last Subsequent AWV    I have reviewed the patient's medical history in detail and updated the computerized patient record.      History     Patient Active Problem List   Diagnosis Code    Mixed hyperlipidemia E78.2    Other abnormal glucose R73.09    Essential hypertension, benign I10    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    Allergic rhinitis, cause unspecified J30.9    Reflux esophagitis K21.0    Benign neoplasm of colon D12.6    Contact dermatitis and other eczema, due to unspecified cause L25.9    Bunion M21.619    History of prostate cancer Z85.46    Advanced care planning/counseling discussion Z71.89    Low testosterone R79.89    Primary osteoarthritis of both knees M17.0    Seropositive rheumatoid arthritis of multiple sites ([address] M05.79    Long-term use of immunosuppressant medication Z79.899    Vitamin D deficiency E55.9    Recurrent depression (HCC) F33.9    Severe obesity with body mass index (BMI) of 35.0 to 39.9 with serious comorbidity (HCC) E66.01    Gout M10.9    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    Primary localized osteoarthrosis, lower leg M17.10    Peripheral vascular disease (HCC) I73.9    Plantar fasciitis of right foot M72.2     Past Medical History:   Diagnosis Date    Allergic rhinitis     Arthritis     RA    Basal cell carcinoma (BCC) in situ of skin     shoudler and scalp    Cancer (HCC)     prostate    GERD (gastroesophageal reflux disease)     Gout     Hypercholesterolemia     Hypertension     Melanoma in situ of back ([address]     Skin cancer (melanoma) ([address]     Sleep apnea     Bipap at home      Past Surgical History:   Procedure Laterality Date    COLONOSCOPY N/A 5/6/2019    COL[facility]abon [facility] at [facility] ENDOSCOPY    ENDOSCOPY, COLON, DIAGNOSTIC      09, due 12, done 11, due 14    HX CATARACT REMOVAL  04/2019    HX HEENT      wisdom teeth extraction    HX KNEE ARTHROSCOPY Left     HX KNEE REPLACEMENT Left 2012    HX OTHER SURGICAL N/A 05/31/2018    Basal cell removal of scalp    HX PROSTATECTOMY      HX TONSILLECTOMY       Current Outpatient Medications   Medication Sig Dispense Refill    colestipol (COLESTID) 1 gram tablet Take 2 Tabs by mouth two (2) times a day. 60 Tab 5    guaiFENesin ER (MUCINEX) 600 mg ER tablet Take 600 mg by mouth two (2) times a day.  meloxicam (MOBIC) 7.5 mg tablet Take  by mouth daily.  clobetasol (TEMOVATE) 0.05 % ointment Apply  to affected area two (2) times a day.  folic acid (FOLVITE) 1 mg tablet TAKE ONE TABLET BY MOUTH DAILY 90 Tab 1    methotrexate (RHEUMATREX) 2.5 mg tablet TAKE 8 TABLETS BY MOUTH WEEKLY ON WEDNESDAYS 96 Tab 1    allopurinol (ZYLOPRIM) 300 mg tablet TAKE ONE TABLET BY MOUTH ONE TIME DAILY 90 Tab 1    ergocalciferol (ERGOCALCIFEROL) 50,000 unit capsule TAKE ONE TABLET BY MOUTH EVERY 7 DAYS 12 Cap 1    pantoprazole sodium (PANTOPRAZOLE PO) Take 20 mg by mouth daily.  0.9% sodium chloride solp 100 mL with golimumab 12.5 mg/mL soln 2 mg/kg 2 mg/kg by IntraVENous route once. Every six weeks      lisinopril (PRINIVIL, ZESTRIL) 40 mg tablet Take 40 mg by mouth daily.  aspirin delayed-release 81 mg tablet Take  by mouth daily.  albuterol (VENTOLIN HFA) 90 mcg/actuation inhaler Take  by inhalation as needed.  omega-3 fatty acids-vitamin e (FISH OIL) 1,000 mg Cap Take 1 Cap by mouth.  citalopram (CELEXA) 20 mg tablet Take 1 Tab by mouth daily. 30 Tab 11    atorvastatin (LIPITOR) 40 mg tablet Take 1 Tab by mouth daily. 30 Tab 11    amlodipine (NORVASC) 2.5 mg tablet Take 1 Tab by mouth daily.  30 Tab 11     Facility-Administered Medications Ordered in Other Visits   Medication Dose Route Frequency Provider Last Rate Last Dose    [START ON 12/17/2019] golimumab (SIMPONI ARIA) 200 mg in 0.9% sodium chloride, overfill volume 126 mL infusion  200 mg IntraVENous ONCE Masri, Sammy Boast, MD  Doree Bosworth ON 12/17/2019] 0.9% sodium chloride infusion  25 mL/hr IntraVENous CONTINUOUS Konstantin Gonsalves MD         Allergies   Allergen Reactions    Stings [Sting, Bee] Swelling     Swelling at site       Family History   Problem Relation Age of Onset    Dementia Mother     Heart Disease Father         CAD    Kidney Disease Father         renal failure    Diabetes Paternal Grandmother     Cancer Paternal Grandfather         colon    Cancer Other         family hx colon cancer     Social History     Tobacco Use    Smoking status: Former Smoker    Smokeless tobacco: Never Used   Substance Use Topics    Alcohol use: Yes     Alcohol/week: 4.0 standard drinks     Types: 4 Cans of beer per week     Comment: 4 days per week       Depression Risk Factor Screening:     3 most recent PHQ Screens 12/16/2019   Little interest or pleasure in doing things Not at all   Feeling down, depressed, irritable, or hopeless Not at all   Total Score PHQ 2 0   Trouble falling or staying asleep, or sleeping too much Not at all   Feeling tired or having little energy Not at all   Poor appetite, weight loss, or overeating Not at all   Feeling bad about yourself - or that you are a failure or have let yourself or your family down Not at all   Trouble concentrating on things such as school, work, reading, or watching TV Not at all   Moving or speaking so slowly that other people could have noticed; or the opposite being so fidgety that others notice Not at all   Thoughts of being better off dead, or hurting yourself in some way Not at all   PHQ 9 Score 0   How difficult have these problems made it for you to do your work, take care of your home and get along with others Not difficult at all       Alcohol Risk Factor Screening (MALE > 65):    Do you average more 1 drink per night or more than 7 drinks a week: No    In the past three months have you have had more than 4 drinks containing alcohol on one occasion: No      Functional Ability and Level of Safety:   Hearing: Hearing is good. Activities of Daily Living: The home contains: no safety equipment. Patient does total self care    Ambulation: with no difficulty    Fall Risk:  Fall Risk Assessment, last 12 mths 12/16/2019   Able to walk? Yes   Fall in past 12 months? No       Abuse Screen:  Patient is not abused    Cognitive Screening   Has your family/caregiver stated any concerns about your memory: no      Patient Care Team   Patient Care Team:  Willy Ortiz MD as PCP - General (Internal Medicine)  Willy Ortiz MD as PCP - [facility] Provider  Mary Alice Roe MD as Physician (Cardiology)    Assessment/Plan   Education and counseling provided:  Are appropriate based on today's review and evaluation  End-of-Life planning (with patient's consent)  Pneumococcal Vaccine  Prostate cancer screening tests (PSA, covered annually)  Colorectal cancer screening tests  Cardiovascular screening blood test    Diagnoses and all orders for this visit:    1. Medicare annual wellness visit, subsequent    2. Advanced directives, counseling/discussion    3. Screening for depression  -     DEPRESSION SCREEN ANNUAL    4. Prostate cancer screening  -     PSA SCREENING (SCREENING); Future    5. Essential hypertension, benign    6. Seropositive rheumatoid arthritis of multiple sites ([email or web address]    7. Dysthymic disorder    8. Gout, unspecified cause, unspecified chronicity, unspecified site    9. Mixed hyperlipidemia  -     LIPID PANEL; Future    10. Severe obesity with body mass index (BMI) of 35.0 to 39.9 with serious comorbidity ([facility]    11. Incontinence of feces, unspecified fecal incontinence type  -     colestipol (COLESTID) 1 gram tablet; Take 2 Tabs by mouth two (2) times a day.     12. Sinusitis, unspecified chronicity, unspecified location        Health Maintenance Due   Topic Date Due    Shingrix Vaccine Age 49> (1 of 2) 10/11/1996    GLAUCOMA SCREENING Q2Y  06/23/2019

## 2019-12-17 ENCOUNTER — HOSPITAL ENCOUNTER (OUTPATIENT)
Dept: INFUSION THERAPY | Age: 74
Discharge: HOME | End: 2019-12-17
Payer: MEDICARE

## 2019-12-17 VITALS
TEMPERATURE: 98.6 F | HEART RATE: 52 BPM | RESPIRATION RATE: 18 BRPM | DIASTOLIC BLOOD PRESSURE: 64 MMHG | WEIGHT: 241.1 LBS | OXYGEN SATURATION: 92 % | BODY MASS INDEX: 36.66 KG/M2 | SYSTOLIC BLOOD PRESSURE: 144 MMHG

## 2019-12-17 PROCEDURE — 74011250636 HC RX REV CODE- 250/636: Performed by: INTERNAL MEDICINE

## 2019-12-17 PROCEDURE — 96413 CHEMO IV INFUSION 1 HR: CPT

## 2019-12-17 PROCEDURE — 74011000258 HC RX REV CODE- 258: Performed by: INTERNAL MEDICINE

## 2019-12-17 RX ADMIN — GOLIMUMAB 200 MG: 50 SOLUTION INTRAVENOUS at 11:02

## 2019-12-17 RX ADMIN — SODIUM CHLORIDE 25 ML/HR: 900 INJECTION, SOLUTION INTRAVENOUS at 10:53

## 2019-12-17 NOTE — PROGRESS NOTES
[facility] VISIT NOTE  Date: 2019    Name: Elen Kim    MRN: [id number]         : 1945    1005  Mr. José Miguel Celis Arrived ambulatory and in no distress for Simponi Regimen. Assessment was completed, no acute issues at this time, no new complaints voiced. 24 G PIV placed in left AC with positive blood return noted. Vitals:  /64   Pulse (!) 52   Temp 98.6 °F (37 °C)   Resp 18   Wt 109.4 kg (241 lb 1.6 oz)   SpO2 92%   BMI 36.66 kg/m²      Lab results were obtained and reviewed. No results found for this or any previous visit (from the past 12 hour(s)). Medications received:  Medications Administered     0.9% sodium chloride infusion     Admin Date  2019 Action  New Bag Dose  25 mL/hr Rate  25 mL/hr Route  IntraVENous Administered By  Shana Velázquez RN          golimumab (SIMPONI ARIA) 200 mg in 0.9% sodium chloride, overfill volume 126 mL infusion     Admin Date  2019 Action  Given Dose  200 mg Rate  252 mL/hr Route  IntraVENous Administered By  Shana Velázquez RN                Mr. José Miguel Celis tolerated treatment well and was discharged from [place] [address] in stable condition at 1145. PIV removed per protocol. He is to return on 3/5/19 at 10:00 for his next appointment.     Rosario Kraft RN  2019    Future Appointments:  Future Appointments   Date Time Provider Raiza Martini   3/5/2020 10:00 AM SS INF3 CH4 <2H [facility]   2020 10:00 AM SS INF3 CH4 <2H [facility]   6/15/2020 10:20 AM Carlos Obrien MD [address] [facility]   2020  9:15 AM Chelsea Rodriguez MD [address] [place] [address]   2020 10:00 AM SS INF3 CH4 <2H [facility]   2020 10:00 AM SS INF3 CH4 <2H [facility] Lu[facility] Khan

## 2020-01-09 RX ORDER — AMOXICILLIN AND CLAVULANATE POTASSIUM 875; 125 MG/1; MG/1
1 TABLET, FILM COATED ORAL EVERY 12 HOURS
Qty: 20 TAB | Refills: 0 | Status: SHIPPED | OUTPATIENT
Start: 2020-01-09 | End: 2020-01-19

## 2020-02-06 ENCOUNTER — APPOINTMENT (OUTPATIENT)
Dept: INFUSION THERAPY | Age: 75
End: 2020-02-06

## 2020-02-11 ENCOUNTER — APPOINTMENT (OUTPATIENT)
Dept: INFUSION THERAPY | Age: 75
End: 2020-02-11

## 2020-03-02 RX ORDER — ERGOCALCIFEROL 1.25 MG/1
CAPSULE ORAL
Qty: 12 CAP | Refills: 1 | Status: SHIPPED | OUTPATIENT
Start: 2020-03-02 | End: 2020-08-16

## 2020-03-02 RX ORDER — SODIUM CHLORIDE 9 MG/ML
25 INJECTION, SOLUTION INTRAVENOUS CONTINUOUS
Status: DISPENSED | OUTPATIENT
Start: 2020-03-05 | End: 2020-03-05

## 2020-03-05 ENCOUNTER — HOSPITAL ENCOUNTER (OUTPATIENT)
Dept: INFUSION THERAPY | Age: 75
Discharge: HOME | End: 2020-03-05
Payer: MEDICARE

## 2020-03-05 VITALS
HEIGHT: 68 IN | DIASTOLIC BLOOD PRESSURE: 60 MMHG | HEART RATE: 45 BPM | WEIGHT: 238.3 LBS | RESPIRATION RATE: 18 BRPM | BODY MASS INDEX: 36.12 KG/M2 | OXYGEN SATURATION: 94 % | TEMPERATURE: 97.3 F | SYSTOLIC BLOOD PRESSURE: 134 MMHG

## 2020-03-05 LAB
ALBUMIN SERPL-MCNC: 3.8 G/DL (ref 3.5–5)
ALBUMIN/GLOB SERPL: 1.2 {RATIO} (ref 1.1–2.2)
ALP SERPL-CCNC: 84 U/L (ref 45–117)
ALT SERPL-CCNC: 61 U/L (ref 12–78)
ANION GAP SERPL CALC-SCNC: 6 MMOL/L (ref 5–15)
AST SERPL-CCNC: 19 U/L (ref 15–37)
BASO+EOS+MONOS # BLD AUTO: 0.5 K/UL (ref 0.2–1.2)
BASO+EOS+MONOS NFR BLD AUTO: 8 % (ref 3.2–16.9)
BILIRUB SERPL-MCNC: 0.7 MG/DL (ref 0.2–1)
BUN SERPL-MCNC: 17 MG/DL (ref 6–20)
BUN/CREAT SERPL: 18 (ref 12–20)
CALCIUM SERPL-MCNC: 8.9 MG/DL (ref 8.5–10.1)
CHLORIDE SERPL-SCNC: 108 MMOL/L (ref 97–108)
CO2 SERPL-SCNC: 24 MMOL/L (ref 21–32)
CREAT SERPL-MCNC: 0.93 MG/DL (ref 0.7–1.3)
CRP SERPL-MCNC: <0.29 MG/DL (ref 0–0.6)
DIFFERENTIAL METHOD BLD: ABNORMAL
ERYTHROCYTE [DISTWIDTH] IN BLOOD BY AUTOMATED COUNT: 13.4 % (ref 11.8–15.8)
ERYTHROCYTE [SEDIMENTATION RATE] IN BLOOD: 14 MM/HR (ref 0–20)
GLOBULIN SER CALC-MCNC: 3.2 G/DL (ref 2–4)
GLUCOSE SERPL-MCNC: 108 MG/DL (ref 65–100)
HCT VFR BLD AUTO: 40.9 % (ref 36.6–50.3)
HGB BLD-MCNC: 13.8 G/DL (ref 12.1–17)
LYMPHOCYTES # BLD: 1.3 K/UL (ref 0.8–3.5)
LYMPHOCYTES NFR BLD: 19 % (ref 12–49)
MCH RBC QN AUTO: 35 PG (ref 26–34)
MCHC RBC AUTO-ENTMCNC: 33.7 G/DL (ref 30–36.5)
MCV RBC AUTO: 103.8 FL (ref 80–99)
NEUTS SEG # BLD: 4.9 K/UL (ref 1.8–8)
NEUTS SEG NFR BLD: 74 % (ref 32–75)
PLATELET # BLD AUTO: 197 K/UL (ref 150–400)
POTASSIUM SERPL-SCNC: 4 MMOL/L (ref 3.5–5.1)
PROT SERPL-MCNC: 7 G/DL (ref 6.4–8.2)
RBC # BLD AUTO: 3.94 M/UL (ref 4.1–5.7)
SODIUM SERPL-SCNC: 138 MMOL/L (ref 136–145)
WBC # BLD AUTO: 6.7 K/UL (ref 4.1–11.1)

## 2020-03-05 PROCEDURE — 85652 RBC SED RATE AUTOMATED: CPT

## 2020-03-05 PROCEDURE — 74011000258 HC RX REV CODE- 258: Performed by: INTERNAL MEDICINE

## 2020-03-05 PROCEDURE — 36415 COLL VENOUS BLD VENIPUNCTURE: CPT

## 2020-03-05 PROCEDURE — 86140 C-REACTIVE PROTEIN: CPT

## 2020-03-05 PROCEDURE — 96413 CHEMO IV INFUSION 1 HR: CPT

## 2020-03-05 PROCEDURE — 80053 COMPREHEN METABOLIC PANEL: CPT

## 2020-03-05 PROCEDURE — 74011250636 HC RX REV CODE- 250/636: Performed by: INTERNAL MEDICINE

## 2020-03-05 PROCEDURE — 85025 COMPLETE CBC W/AUTO DIFF WBC: CPT

## 2020-03-05 RX ADMIN — GOLIMUMAB 200 MG: 50 SOLUTION INTRAVENOUS at 10:34

## 2020-03-05 RX ADMIN — SODIUM CHLORIDE 25 ML/HR: 900 INJECTION, SOLUTION INTRAVENOUS at 10:30

## 2020-03-05 NOTE — PROGRESS NOTES
[facility]   Visit Progress Note    6235 Patient admitted to [facility] for [address] ambulatory in stable condition. Assessment completed. No new concerns voiced. Patient Vitals for the past 12 hrs:   Temp Pulse Resp BP SpO2   03/05/20 1114  (!) 45  134/60    03/05/20 0951 97.3 °F (36.3 °C) (!) 53 18 127/73 94 %       PIV with positive blood return. Medications:  Medications Administered     0.9% sodium chloride infusion     Admin Date  03/05/2020 Action  New Bag Dose  25 mL/hr Rate  25 mL/hr Route  IntraVENous Administered By  Marichuy Castro RN          golimumab (SIMPONI ARIA) 200 mg in 0.9% sodium chloride, overfill volume 126 mL infusion     Admin Date  03/05/2020 Action  Given Dose  200 mg Rate  252 mL/hr Route  IntraVENous Administered By  Marichuy Castro RN                0186 Patient tolerated treatment well. Patient discharged from [place] [address] ambulatory in no distress. Patient aware of next appointment on 4/30. Recent Results (from the past 12 hour(s))   CBC WITH 3 PART DIFF    Collection Time: 03/05/20  9:55 AM   Result Value Ref Range    WBC 6.7 4.1 - 11.1 K/uL    RBC 3.94 (L) 4.10 - 5.70 M/uL    HGB 13.8 12.1 - 17.0 g/dL    HCT 40.9 36.6 - 50.3 %    .8 (H) 80.0 - 99.0 FL    MCH 35.0 (H) 26.0 - 34.0 PG    MCHC 33.7 30.0 - 36.5 g/dL    RDW 13.4 11.8 - 15.8 %    PLATELET 667 431 - 467 K/uL    NEUTROPHILS 74 32 - 75 %    MIXED CELLS 8 3.2 - 16.9 %    LYMPHOCYTES 19 12 - 49 %    ABS. NEUTROPHILS 4.9 1.8 - 8.0 K/UL    ABS. MIXED CELLS 0.5 0.2 - 1.2 K/uL    ABS.  LYMPHOCYTES 1.3 0.8 - 3.5 K/UL    DF AUTOMATED     METABOLIC PANEL, COMPREHENSIVE    Collection Time: 03/05/20  9:55 AM   Result Value Ref Range    Sodium 138 136 - 145 mmol/L    Potassium 4.0 3.5 - 5.1 mmol/L    Chloride 108 97 - 108 mmol/L    CO2 24 21 - 32 mmol/L    Anion gap 6 5 - 15 mmol/L    Glucose 108 (H) 65 - 100 mg/dL    BUN 17 6 - 20 MG/DL    Creatinine 0.93 0.70 - 1.30 MG/DL    BUN/Creatinine ratio 18 12 - 20      GFR est AA >60 >60 ml/min/1.73m2    GFR est non-AA >60 >60 ml/min/1.73m2    Calcium 8.9 8.5 - 10.1 MG/DL    Bilirubin, total 0.7 0.2 - 1.0 MG/DL    ALT (SGPT) 61 12 - 78 U/L    AST (SGOT) 19 15 - 37 U/L    Alk.  phosphatase 84 45 - 117 U/L    Protein, total 7.0 6.4 - 8.2 g/dL    Albumin 3.8 3.5 - 5.0 g/dL    Globulin 3.2 2.0 - 4.0 g/dL    A-G Ratio 1.2 1.1 - 2.2     SED RATE (ESR)    Collection Time: 03/05/20  9:55 AM   Result Value Ref Range    Sed rate, automated 14 0 - 20 mm/hr   C REACTIVE PROTEIN, QT    Collection Time: 03/05/20  9:55 AM   Result Value Ref Range    C-Reactive protein <0.29 0.00 - 0.60 mg/dL

## 2020-03-10 ENCOUNTER — PATIENT MESSAGE (OUTPATIENT)
Dept: RHEUMATOLOGY | Age: 75
End: 2020-03-10

## 2020-03-10 RX ORDER — OSELTAMIVIR PHOSPHATE 75 MG/1
75 CAPSULE ORAL DAILY
Qty: 7 CAP | Refills: 0 | Status: SHIPPED | OUTPATIENT
Start: 2020-03-10 | End: 2020-03-15

## 2020-03-10 NOTE — TELEPHONE ENCOUNTER
From: Mitchellelbert Patrick  Sent: 3/10/2020 2:29 PM EDT  To: Janie Callahan MD  Subject: RE: Non-Urgent Medical Question    Will you send an RX to the [facility] for me please. Thanks,  Shavon Patterson  ----- Message -----  From: Janie Callahan MD  Sent: 3/10/2020 2:22 PM EDT  To: Mitchell Patrick  Subject: RE: Non-Urgent Medical Question  Yes, take prevention.      ----- Message -----   From: Mitchell Patrick   Sent: 3/10/2020 12:40 PM EDT   To: Janie Callahan MD  Subject: RE: Non-Urgent Medical Question    Dr. Ernst Vizcaino,     My wife was diagnosed with Influenza A this morning & her Doc wanted me to ask you if I should start a preventative? The have given her Tamiflu & instructed her to stay in our [address] [place] [address] for a week. I slept in the guest bed last night & also am using the guest shower. Please advise ASAP.      Thanks in advance,  Lisa Hu

## 2020-03-17 ENCOUNTER — PATIENT MESSAGE (OUTPATIENT)
Dept: RHEUMATOLOGY | Age: 75
End: 2020-03-17

## 2020-03-18 RX ORDER — OSELTAMIVIR PHOSPHATE 75 MG/1
75 CAPSULE ORAL 2 TIMES DAILY
Qty: 14 CAP | Refills: 0 | Status: SHIPPED | OUTPATIENT
Start: 2020-03-18 | End: 2020-03-25

## 2020-03-18 NOTE — TELEPHONE ENCOUNTER
From: Jose Schulte  Sent: 3/18/2020 9:46 AM EDT  To: Geri Hannah MD  Subject: RE: Non-Urgent Medical Question    Bad news, fever is up now that I have gotten up & stirred around. It is 100.1. Do you think you should send me Tamiflu since I more than likely have the same flu my wife had?      Sean Willams

## 2020-04-07 ENCOUNTER — APPOINTMENT (OUTPATIENT)
Dept: INFUSION THERAPY | Age: 75
End: 2020-04-07
Payer: MEDICARE

## 2020-04-23 RX ORDER — SODIUM CHLORIDE 9 MG/ML
25 INJECTION, SOLUTION INTRAVENOUS CONTINUOUS
Status: DISPENSED | OUTPATIENT
Start: 2020-04-30 | End: 2020-04-30

## 2020-04-30 ENCOUNTER — HOSPITAL ENCOUNTER (OUTPATIENT)
Dept: INFUSION THERAPY | Age: 75
Discharge: HOME | End: 2020-04-30
Payer: MEDICARE

## 2020-04-30 VITALS
BODY MASS INDEX: 36.42 KG/M2 | RESPIRATION RATE: 15 BRPM | TEMPERATURE: 98.7 F | HEART RATE: 53 BPM | DIASTOLIC BLOOD PRESSURE: 59 MMHG | WEIGHT: 239.5 LBS | SYSTOLIC BLOOD PRESSURE: 115 MMHG

## 2020-04-30 PROCEDURE — 74011000258 HC RX REV CODE- 258: Performed by: INTERNAL MEDICINE

## 2020-04-30 PROCEDURE — 74011250636 HC RX REV CODE- 250/636: Performed by: INTERNAL MEDICINE

## 2020-04-30 PROCEDURE — 96413 CHEMO IV INFUSION 1 HR: CPT

## 2020-04-30 RX ADMIN — SODIUM CHLORIDE 200 MG: 9 INJECTION, SOLUTION INTRAVENOUS at 10:37

## 2020-04-30 RX ADMIN — SODIUM CHLORIDE 25 ML/HR: 900 INJECTION, SOLUTION INTRAVENOUS at 10:01

## 2020-04-30 NOTE — PROGRESS NOTES
[facility] Progress Note    Date: 2020    Name: Benigno Knight    MRN: [id number]         : 1945    Mr. Shawn Hernandez Arrived ambulatory and in no distress for Simponi Regimen. Assessment was completed, no acute issues at this time, no new complaints voiced. PIV placed without difficulty, + blood. Mr. Sarabia's vitals were reviewed. Visit Vitals  /58   Pulse (!) 56   Temp 98.4 °F (36.9 °C)   Resp 16   Wt 108.6 kg (239 lb 8 oz)   BMI 36.42 kg/m²       Medications:  Simponi IV    Mr. Shawn Hernandez tolerated treatment well and was discharged from [place] [address] in stable condition. PIV flushed and removed per protocol. He is to return on 20 at 10:00 for his next appointment.     Cece Trevizo RN  2020

## 2020-06-02 ENCOUNTER — APPOINTMENT (OUTPATIENT)
Dept: INFUSION THERAPY | Age: 75
End: 2020-06-02
Payer: MEDICARE

## 2020-06-15 ENCOUNTER — VIRTUAL VISIT (OUTPATIENT)
Dept: RHEUMATOLOGY | Age: 75
End: 2020-06-15

## 2020-06-15 DIAGNOSIS — Z79.60 LONG-TERM USE OF IMMUNOSUPPRESSANT MEDICATION: ICD-10-CM

## 2020-06-15 DIAGNOSIS — M05.79 SEROPOSITIVE RHEUMATOID ARTHRITIS OF MULTIPLE SITES (HCC): Primary | ICD-10-CM

## 2020-06-15 DIAGNOSIS — M1A.0720 CHRONIC IDIOPATHIC GOUT INVOLVING TOE OF LEFT FOOT WITHOUT TOPHUS: ICD-10-CM

## 2020-06-15 RX ORDER — ALLOPURINOL 300 MG/1
TABLET ORAL
Qty: 90 TAB | Refills: 1 | Status: SHIPPED | OUTPATIENT
Start: 2020-06-15 | End: 2020-10-14 | Stop reason: SDUPTHER

## 2020-06-15 RX ORDER — METHOTREXATE 2.5 MG/1
TABLET ORAL
Qty: 96 TAB | Refills: 1 | Status: SHIPPED | OUTPATIENT
Start: 2020-06-15 | End: 2020-10-14 | Stop reason: SDUPTHER

## 2020-06-15 RX ORDER — FOLIC ACID 1 MG/1
TABLET ORAL
Qty: 90 TAB | Refills: 1 | Status: SHIPPED | OUTPATIENT
Start: 2020-06-15 | End: 2020-10-14 | Stop reason: SDUPTHER

## 2020-06-15 NOTE — PROGRESS NOTES
REASON FOR VISIT    This is a follow-up visit for Mr. Cheri García for     ICD-10-CM   1. Seropositive rheumatoid arthritis of multiple sites ([facility] M05.79   2. 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     The patient has consented for synchronous (real-time) Telemedicine (audio-video technology) on 6/15/2020 for their care to be delivered over telemedicine in place of their regularly scheduled office visit pursuant to the emergency declaration under the [facility] waiver authority and the [employer] Act, this Virtual  Visit was conducted, with patient's consent, to reduce the patient's risk of exposure to COVID-19 and provide continuity of care for an established patient. Services were provided through a video synchronous discussion virtually to substitute for in-person clinic visit. Inflammatory arthritis phenotype includes:  Anti-CCP positive: yes (>250)  Rheumatoid factor positive: yes (>650)  Erosive disease: yes  Extra-articular manifestations include: none    Immunosuppression Screening (12/09/2019):   Quantiferon TB: negative    PPD:  Not performed  Hepatitis B: negative   Hepatitis C: negative     Therapy History includes:  Current DMARD therapy include: methotrexate 20 mg every Wednesday (11/20/2017 to present), Simponi Aria every 8 weeks (5/08/2018 to present)  Prior DMARD therapy include: none  Discontinued DMARDs because of inefficacy: None  Discontinued DMARDs because of side effects: None  Unaffordable DMARDs: Enbrel    Immunizations:   Immunization History   Administered Date(s) Administered    (RETIRED) Pneumococcal Vaccine (Unspecified Type) 10/21/2011    Influenza High Dose Vaccine PF 10/09/2014, 11/04/2015, 10/14/2016, 10/16/2018    Influenza Vaccine 10/08/2019    Influenza Vaccine (Tri) Adjuvanted 10/08/2019    Influenza Vaccine Split 09/21/2011    Influenza Vaccine Whole 09/17/2010    Pneumococcal Conjugate (PCV-13) 01/01/2015    Pneumococcal Polysaccharide (PPSV-23) 10/09/2014    TDAP Vaccine 12/15/2010    Tdap 12/15/2010, 10/04/2017    Zoster Vaccine, Live 10/21/2011     Active problems include:    Patient Active Problem List   Diagnosis Code    Mixed hyperlipidemia E78.2    Other abnormal glucose R73.09    Essential hypertension, benign I10    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    Allergic rhinitis, cause unspecified J30.9    Reflux esophagitis K21.0    Benign neoplasm of colon D12.6    Contact dermatitis and other eczema, due to unspecified cause L25.9    Bunion M21.619    History of prostate cancer Z85.46    Advanced care planning/counseling discussion Z71.89    Low testosterone R79.89    Primary osteoarthritis of both knees M17.0    Seropositive rheumatoid arthritis of multiple sites ([facility] M05.79    Long-term use of immunosuppressant medication Z79.899    Vitamin D deficiency E55.9    Recurrent depression (HCC) F33.9    Severe obesity with body mass index (BMI) of 35.0 to 39.9 with serious comorbidity (HCC) E66.01    Gout M10.9    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    Primary localized osteoarthrosis, lower leg M17.10    Peripheral vascular disease (HCC) I73.9    Plantar fasciitis of right foot M72.2     HISTORY OF PRESENT ILLNESS    Mr. Caprice Lynn returns for a follow-up. On his last visit, I continued methotrexate 20 mg every Wednesday and Simponi Aria infusions. His most infusion recent was 4/30/2020, which he has taken with good tolerance. Today, he denies pain, swelling, or stiffness in his hands or wrists. He denies interval gouty flares.       He denies fever, weight loss, blurred vision, vision loss, oral ulcers, ankle swelling, dry cough, dyspnea, nausea, vomiting, dysphagia, abdominal pain, black or bloody stool, fall since last visit, rash, easy bruising and increased thirst.    Last toxicity monitoring by blood work was done on 3/05/2020 and did not reveal any significant adverse effects. Most recent inflammatory markers from 3/05/2020 revealed a ESR 14 mm/hr and CRP 0.29 mg/L. The patient has not had any interval hospital admissions, infections, or surgeries. REVIEW OF SYSTEMS    A comprehensive review of systems was performed and pertinent results are documented in the HPI, review of systems is otherwise non-contributory. PAST MEDICAL HISTORY    He has a past medical history of Allergic rhinitis, Arthritis, Basal cell carcinoma (BCC) in situ of skin, Cancer ([address] GERD (gastroesophageal reflux disease), Gout, Hypercholesterolemia, Hypertension, Melanoma in situ of back ([address] Skin cancer (melanoma) ([facility] and Sleep apnea. FAMILY HISTORY    His family history includes Cancer in his paternal grandfather and another family member; Dementia in his mother; Diabetes in his paternal grandmother; Heart Disease in his father; Kidney Disease in his father. SOCIAL HISTORY    He reports that he has quit smoking. He has never used smokeless tobacco. He reports current alcohol use of about 4.0 standard drinks of alcohol per week. He reports that he does not use drugs. MEDICATIONS    Current Outpatient Medications   Medication Sig Dispense Refill    allopurinoL (ZYLOPRIM) 300 mg tablet TAKE ONE TABLET BY MOUTH ONE TIME DAILY 90 Tab 1    methotrexate (RHEUMATREX) 2.5 mg tablet TAKE 8 TABLETS BY MOUTH WEEKLY ON WEDNESDAYS 96 Tab 1    folic acid (FOLVITE) 1 mg tablet TAKE ONE TABLET BY MOUTH DAILY 90 Tab 1    ergocalciferol (ERGOCALCIFEROL) 1,250 mcg (50,000 unit) capsule TAKE ONE TABLET BY MOUTH EVERY 7 DAYS 12 Cap 1    colestipol (COLESTID) 1 gram tablet Take 2 Tabs by mouth two (2) times a day. 60 Tab 5    guaiFENesin ER (MUCINEX) 600 mg ER tablet Take 600 mg by mouth two (2) times a day.  meloxicam (MOBIC) 7.5 mg tablet Take  by mouth daily.       clobetasol (TEMOVATE) 0.05 % ointment Apply  to affected area two (2) times a day.  pantoprazole sodium (PANTOPRAZOLE PO) Take 20 mg by mouth daily.  0.9% sodium chloride solp 100 mL with golimumab 12.5 mg/mL soln 2 mg/kg 2 mg/kg by IntraVENous route once. Every six weeks      lisinopril (PRINIVIL, ZESTRIL) 40 mg tablet Take 40 mg by mouth daily.  aspirin delayed-release 81 mg tablet Take  by mouth daily.  albuterol (VENTOLIN HFA) 90 mcg/actuation inhaler Take  by inhalation as needed.  omega-3 fatty acids-vitamin e (FISH OIL) 1,000 mg Cap Take 1 Cap by mouth.  citalopram (CELEXA) 20 mg tablet Take 1 Tab by mouth daily. 30 Tab 11    atorvastatin (LIPITOR) 40 mg tablet Take 1 Tab by mouth daily. 30 Tab 11    amlodipine (NORVASC) 2.5 mg tablet Take 1 Tab by mouth daily. 30 Tab 11        ALLERGIES    Allergies   Allergen Reactions    Stings [Sting, Bee] Swelling     Swelling at site       PHYSICAL EXAMINATION    There were no vitals taken for this visit. There is no height or weight on file to calculate BMI. General: Patient is alert, oriented x 3, not in acute distress    HEENT:   Sclerae are not injected and appear moist.  There is no alopecia. Neck is supple     Chest:  Breathing comfortably at room air    Musculoskeletal exam:  A comprehensive musculoskeletal exam was NOT performed for all joints of each upper and lower extremity and assessed for swelling, tenderness and range of motion. Positive results are documented as below:    Previous Exam    Bilateral Olivia and Heberden nodes. Bilateral knee crepitus without effusion.   Pes planus   Bilateral hallux valgus (LEFT more than right)    Z-Deformities:   no  Dixon Neck Deformities:  no  Boutonierre's Deformities:  no  Ulnar Deviation:   Yes (bilateral)  MCP Subluxation:  no     Joint Count 12/9/2019 6/7/2019 3/4/2019 12/3/2018 9/4/2018 6/4/2018 3/7/2018   Patient pain (0-100) 20 5 0 20 15 0 60   MHAQ 0 0 0 0 0 0 0.25   Left elbow - Tender - - - - - - -   Left wrist- Tender 0 0 - - 1 0 1   Left wrist- Swollen 0 0 - - 1 1 1   Left 1st MCP - Tender - - - - - - 1   Left 1st MCP - Swollen - - - - 1 - 1   Left 2nd MCP - Tender - - 0 0 - - 1   Left 2nd MCP - Swollen - - 1 1 1 1 1   Left 3rd MCP - Tender - - 0 - - - 1   Left 3rd MCP - Swollen - - 1 - 1 1 1   Left 4th MCP - Tender - - - - - - 1   Left 5th MCP - Tender - - - - - - 1   Left thumb IP - Tender - - - - - - 1   Left thumb IP - Swollen - - - - - - 1   Left 2nd PIP - Tender - - - - - - 1   Left 3rd PIP - Tender - - - - - - 1   Left 4th PIP - Tender - - - - - - 1   Left 5th PIP - Tender - - - - - - 1   Right wrist- Tender - - - - - - 1   Right wrist- Swollen - - - - 1 1 1   Right 1st MCP - Tender - - - - - - 1   Right 1st MCP - Swollen - - - - 1 1 1   Right 2nd MCP - Tender - - - - - - 1   Right 2nd MCP - Swollen - - - - 1 1 1   Right 3rd MCP - Tender - - - - - - 1   Right 3rd MCP - Swollen - - - - 1 1 1   Right 4th MCP - Tender - - 0 - - - 1   Right 4th MCP - Swollen - - 1 - - 1 1   Right 5th MCP - Swollen - - - - - - 1   Right thumb IP - Tender - - - - - - -   Right 2nd PIP - Tender - - - - - - -   Right 2nd PIP - Swollen - - - - - - 1   Right 3rd PIP - Tender - - - - - - -   Right 3rd PIP - Swollen - - - - - 1 1   Right 4th PIP - Swollen - - - - - 1 1   Right knee - Tender - - - - - - -   Right knee - Swollen - - - - - - -   Tender Joint Count (Total) 0 0 0 0 1 0 16   Swollen Joint Count (Total) 0 0 3 1 8 10 14   Physician Assessment (0-10) 0 0 0.5 0 2 2 4   Patient Assessment (0-10) 0.5 0.5 0 2 1.5 0 6   CDAI Total (calculated) 0.5 0.5 3.5 3 12.5 12 40       DATA REVIEW    Laboratory     Recent laboratory results were reviewed, summarized, and discussed with the patient. Imaging    Musculoskeletal Ultrasound    None    Radiographs    Bilateral Hand 11/20/2017: RIGHT: No fracture or dislocation on plain film. The bones are osteopenic. Mild narrowing of the radiocarpal joint.  Probable erosion of the radius and lunate at the radiocarpal joint. No widening of the intercarpal spaces. Subtle erosion of the ulnar styloid. Mild narrowing of the triscaphe joint is age-appropriate. Irregular arthritis of the first and second CMC joints includes osteophytes and erosions. There are erosions of the first through fourth metacarpal heads at the MCP points. Moderate narrowing and anterior subluxation of the second MCP joint. IP joints are within normal limits. No chondrocalcinosis or periosteal reaction. LEFT: No fracture or dislocation on plain film. The bones are osteopenic. Moderate narrowing of the radiocarpal joint with subtle erosions of the lunate. Marrow DR UVJ with erosions. No definite ulnar styloid erosion. Triscaphe and first [facility] joint osteoarthritis are age-appropriate. Large erosions on both sides of the second MCP joint and in the third metacarpal head. IP joints are within normal limits. No chondrocalcinosis or periosteal reaction. Bilateral Foot 11/20/2017: RIGHT: No fracture or dislocation on plain film. Bones are osteopenic. Talonavicular joint space narrowing, osteophytes, and erosions. Subtalar arthritis is partially imaged. TMT joints are within normal limits. Severe narrowing of the first MTP joint with osteophytes and erosions. There is erosion and the fifth tarsal head. Mild narrowing of the second MTP joint without definable erosion. No periosteal reaction. LEFT: No fracture or dislocation on plain film. Bones are osteopenic. Intertarsal joints are within normal limits. Moderate narrowing of the first MTP joint with central and marginal erosions. 35 degrees hallux valgus angulation and fibular subluxation of the first proximal phalanx. There are also erosions in the laterally subluxed hallux sesamoids. Mild joint space narrowing and erosions of the third MTP joint. Fragmentation of the fourth middle phalanx is adjacent to the fourth PIP joint erosions.  There are also erosions of the second and fifth PIP joints. First IP joint osteoarthritis is mild. CT Imaging    CT Right Upper Extremity with contrast 10/19/2017: examination of the soft tissues demonstrates no drainable fluid collection. There is no pathologically enlarged nodes within the right axilla. Alignment is normal. There is no bone destruction or fracture. No significant fatty atrophy. MR Imaging    None    DXA     None    ASSESSMENT AND PLAN    This is a follow-up visit for Mr. Ita Young. 1) Seropositive Erosive Rheumatoid Arthritis. He presented after activation of underlying Rheumatoid Arthritis which appears to have been quiescent for more than 30 years from the TDAP vaccine. He remembers being diagnosed with arthritis in his 30's that was treated with an injection in his finger. He has bilateral ulnar deviation which corresponds with the chronicity of his disease. Radiographs showed erosive disease confirming chronicity. He is maintained on methotrexate 20 mg every Wednesday and Simponi Aria infusions with good tolerance. He most recent infusion was 2020. He feels well. He denies joint pain, stiffness or swelling today. His CDAI was 0.5 (previously 0.5, 3.5, 3, 12.5, 12, 40, 27, 37) with 0 tender and 0 swollen joints, consistent with remission. I will  his methotrexate to 17.5 mg weekly for 2 months and then to 15 mg weekly until follow up in 4 months. I will continue Keagan Gaona. Follow up in 4 months. 2)Chronic Gout. He has a history of gout in his left foot. He is on allopurinol 300 mg daily. His uric acid was 4.5 mg/dL (previously 4.5, 4.9 mg/dL). He denies interval flares. I refilled it. 3)  Long Term Use of Immunosuppressants. The patient remains on immunomodulatory medications (methotrexate, Simponi Aria) and requires frequent toxicity monitoring by blood work. 4) Vitamin D Deficiency. His vitamin D level was 40.4 (bdofuqzyce07.2, 27.7, 22.6).  He is on weekly ergocalciferol 50,000.    5) Basal Cell Carcinoma. Lesion removed on his scalp 5/31/2018. Wound without secondary infection. He continues to have lesions burnt. 6) History of Melanoma. He had melanoma removed from his back 10/2017. He did not require chemotherapy. He has skin exams every 3 months. He has reviewed his medications, including Neo Busman, with his dermatologist. He understands to continue close surveillance and inform us if he has any new melanoma lesions. 7) Bilateral Knee Osteoarthritis. This was not an active issue today. 8) Bilateral Pes Planus. This is no longer an active issue. I suspect is from his pes planus. I recommended inserts. 9) Atypical Chest Pain. This was evaluated by cardiology and felt to be GI related. Negative stress test. GI noted Allen's on EGD. 10) Skin Rash. This resolved. The patient voiced understanding of the aforementioned assessment and plan. The patient has consented for synchronous (real-time) Telemedicine (audio-video technology) on 6/15/2020 for their care to be delivered over telemedicine in place of their regularly scheduled office visit pursuant to the emergency declaration under the [facility] waiver authority and the [employer] Act, this Virtual  Visit was conducted, with patient's consent, to reduce the patient's risk of exposure to COVID-19 and provide continuity of care for an established patient. Services were provided through a video synchronous discussion virtually to substitute for in-person clinic visit.     TODAY'S ORDERS    Orders Placed This Encounter    allopurinoL (ZYLOPRIM) 300 mg tablet    methotrexate (RHEUMATREX) 2.5 mg tablet    folic acid (FOLVITE) 1 mg tablet       Future Appointments   Date Time Provider Raiza Sanchez   6/16/2020  9:15 AM Rosie Bennett MD [address]   6/25/2020 10:00 AM [facility]   8/20/2020 10:00 AM [facility]   10/15/2020 10:00 [facility]   12/10/2020 10:00 AM [facility], [address] [place] [address]    Adult Rheumatology   Rheumatology Ultrasound Certified  General acute hospital  A Part of [facility], [address] [place] [address] Road   Phone [phone]  Fax [phone]

## 2020-06-16 ENCOUNTER — OFFICE VISIT (OUTPATIENT)
Dept: INTERNAL MEDICINE CLINIC | Age: 75
End: 2020-06-16

## 2020-06-16 ENCOUNTER — HOSPITAL ENCOUNTER (OUTPATIENT)
Dept: LAB | Age: 75
Discharge: HOME | End: 2020-06-16

## 2020-06-16 VITALS
RESPIRATION RATE: 16 BRPM | HEIGHT: 68 IN | WEIGHT: 240 LBS | OXYGEN SATURATION: 98 % | HEART RATE: 48 BPM | TEMPERATURE: 98.2 F | DIASTOLIC BLOOD PRESSURE: 70 MMHG | BODY MASS INDEX: 36.37 KG/M2 | SYSTOLIC BLOOD PRESSURE: 116 MMHG

## 2020-06-16 DIAGNOSIS — I10 ESSENTIAL HYPERTENSION, BENIGN: ICD-10-CM

## 2020-06-16 DIAGNOSIS — C43.9 MALIGNANT MELANOMA, UNSPECIFIED SITE (HCC): ICD-10-CM

## 2020-06-16 DIAGNOSIS — E66.01 SEVERE OBESITY WITH BODY MASS INDEX (BMI) OF 35.0 TO 39.9 WITH SERIOUS COMORBIDITY (HCC): ICD-10-CM

## 2020-06-16 DIAGNOSIS — E78.2 MIXED HYPERLIPIDEMIA: ICD-10-CM

## 2020-06-16 DIAGNOSIS — R73.01 IFG (IMPAIRED FASTING GLUCOSE): ICD-10-CM

## 2020-06-16 DIAGNOSIS — M05.79 SEROPOSITIVE RHEUMATOID ARTHRITIS OF MULTIPLE SITES (HCC): ICD-10-CM

## 2020-06-16 DIAGNOSIS — I10 ESSENTIAL HYPERTENSION, BENIGN: Primary | ICD-10-CM

## 2020-06-16 DIAGNOSIS — F33.9 RECURRENT DEPRESSION (HCC): ICD-10-CM

## 2020-06-16 DIAGNOSIS — Z85.46 HISTORY OF PROSTATE CANCER: ICD-10-CM

## 2020-06-16 DIAGNOSIS — K21.9 GASTROESOPHAGEAL REFLUX DISEASE WITHOUT ESOPHAGITIS: ICD-10-CM

## 2020-06-16 LAB
ANION GAP SERPL CALC-SCNC: 6 MMOL/L (ref 5–15)
BUN SERPL-MCNC: 15 MG/DL (ref 6–20)
BUN/CREAT SERPL: 16 (ref 12–20)
CALCIUM SERPL-MCNC: 8.6 MG/DL (ref 8.5–10.1)
CHLORIDE SERPL-SCNC: 107 MMOL/L (ref 97–108)
CO2 SERPL-SCNC: 26 MMOL/L (ref 21–32)
CREAT SERPL-MCNC: 0.94 MG/DL (ref 0.7–1.3)
EST. AVERAGE GLUCOSE BLD GHB EST-MCNC: 105 MG/DL
GLUCOSE SERPL-MCNC: 104 MG/DL (ref 65–100)
HBA1C MFR BLD: 5.3 % (ref 4–5.6)
POTASSIUM SERPL-SCNC: 4.2 MMOL/L (ref 3.5–5.1)
SODIUM SERPL-SCNC: 139 MMOL/L (ref 136–145)

## 2020-06-16 RX ORDER — PANTOPRAZOLE SODIUM 40 MG/1
40 TABLET, DELAYED RELEASE ORAL DAILY
Qty: 90 TAB | Refills: 1 | Status: SHIPPED | OUTPATIENT
Start: 2020-06-16 | End: 2020-08-09 | Stop reason: SDUPTHER

## 2020-06-16 RX ORDER — MINERAL OIL
ENEMA (ML) RECTAL DAILY
COMMUNITY

## 2020-06-16 NOTE — PROGRESS NOTES
Mary Valencia is a 69 y.o. male who presents today for Medication Evaluation and Hypertension  . He has a history of   Patient Active Problem List   Diagnosis Code    Mixed hyperlipidemia E78.2    Other abnormal glucose R73.09    Essential hypertension, benign I10    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    Allergic rhinitis, cause unspecified J30.9    Reflux esophagitis K21.0    Benign neoplasm of colon D12.6    Contact dermatitis and other eczema, due to unspecified cause L25.9    Bunion M21.619    History of prostate cancer Z85.46    Advanced care planning/counseling discussion Z71.89    Low testosterone R79.89    Primary osteoarthritis of both knees M17.0    Seropositive rheumatoid arthritis of multiple sites ([facility] [address] M05.79    Long-term use of immunosuppressant medication Z79.899    Vitamin D deficiency E55.9    Recurrent depression (HCC) F33.9    Severe obesity with body mass index (BMI) of 35.0 to 39.9 with serious comorbidity (HCC) E66.01    Gout M10.9    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 Today patient is here for follow-up. .     Today experiencing a bit more back pain. He does note that he has been doing more golfing recently due to a recent trip to trip to the [place]. Hypertension  Hypertension ROS: taking medications as instructed, no medication side effects noted, no TIA's, no chest pain on exertion, no dyspnea on exertion, no swelling of ankles     reports that he has quit smoking. He has never used smokeless tobacco.    reports current alcohol use of about 4.0 standard drinks of alcohol per week. BP Readings from Last 2 Encounters:   06/16/20 116/70   04/30/20 115/59     Hyperlipidemia  Taking atorvastatin 40 mg.   ROS: taking medications as instructed, no medication side effects noted  No new myalgias, no joint pains, no weakness  No TIA's, no chest pain on exertion, no dyspnea on exertion, no swelling of ankles. Lab Results   Component Value Date/Time    Cholesterol, total 191 12/16/2019 10:45 AM    HDL Cholesterol 47 12/16/2019 10:45 AM    LDL, calculated 96.8 12/16/2019 10:45 AM    VLDL, calculated 47.2 12/16/2019 10:45 AM    Triglyceride 236 (H) 12/16/2019 10:45 AM    CHOL/HDL Ratio 4.1 12/16/2019 10:45 AM     Patient continues to follow with rheumatology. Recent blood work in late winter showed stable kidney function. Inflammatory markers were normal.    Patient continues to take his SSRI and notes that his mental health is overall stable. He has continued to golf during this pandemic. Notes that his heartburn is been a bit worse. He is seen find himself taking more Tums. He believes he is only taking 20 mg of pantoprazole. We discussed increasing this dose to 40 mg. ROS  Review of Systems   Constitutional: Negative for chills, fever and weight loss. HENT: Negative for congestion and sore throat. Eyes: Negative for blurred vision, double vision and photophobia. Respiratory: Negative for cough and shortness of breath. Cardiovascular: Negative for chest pain, palpitations and leg swelling. Gastrointestinal: Positive for heartburn. Negative for abdominal pain, constipation, diarrhea, nausea and vomiting. Genitourinary: Negative for dysuria, frequency and urgency. Musculoskeletal: Positive for back pain. Negative for joint pain and myalgias. Skin: Negative for rash. Neurological: Negative. Negative for headaches. Endo/Heme/Allergies: Does not bruise/bleed easily. Psychiatric/Behavioral: Negative for memory loss and suicidal ideas.        Visit Vitals  /70 (BP 1 Location: Left arm, BP Patient Position: Sitting)   Pulse (!) 48   Temp 98.2 °F (36.8 °C) (Oral)   Resp 16   Ht 5' 8\" (1.727 m)   Wt 240 lb (108.9 kg)   SpO2 98%   BMI 36.49 kg/m²       Physical Exam  Constitutional:       Appearance: He is well-developed. He is not diaphoretic. HENT:      Head: Normocephalic and atraumatic. Eyes:      Pupils: Pupils are equal, round, and reactive to light. Cardiovascular:      Rate and Rhythm: Normal rate and regular rhythm. Heart sounds: No murmur. Pulmonary:      Effort: Pulmonary effort is normal. No respiratory distress. Breath sounds: Normal breath sounds. Musculoskeletal: Normal range of motion. Skin:     General: Skin is warm and dry. Neurological:      Mental Status: He is alert and oriented to person, place, and time. Psychiatric:         Behavior: Behavior normal.           Current Outpatient Medications   Medication Sig    allopurinoL (ZYLOPRIM) 300 mg tablet TAKE ONE TABLET BY MOUTH ONE TIME DAILY    methotrexate (RHEUMATREX) 2.5 mg tablet TAKE 8 TABLETS BY MOUTH WEEKLY ON WEDNESDAYS    folic acid (FOLVITE) 1 mg tablet TAKE ONE TABLET BY MOUTH DAILY    ergocalciferol (ERGOCALCIFEROL) 1,250 mcg (50,000 unit) capsule TAKE ONE TABLET BY MOUTH EVERY 7 DAYS    colestipol (COLESTID) 1 gram tablet Take 2 Tabs by mouth two (2) times a day.  guaiFENesin ER (MUCINEX) 600 mg ER tablet Take 600 mg by mouth two (2) times a day.  meloxicam (MOBIC) 7.5 mg tablet Take  by mouth daily.  clobetasol (TEMOVATE) 0.05 % ointment Apply  to affected area two (2) times a day.  pantoprazole sodium (PANTOPRAZOLE PO) Take 20 mg by mouth daily.  0.9% sodium chloride solp 100 mL with golimumab 12.5 mg/mL soln 2 mg/kg 2 mg/kg by IntraVENous route once. Every six weeks    lisinopril (PRINIVIL, ZESTRIL) 40 mg tablet Take 40 mg by mouth daily.  aspirin delayed-release 81 mg tablet Take  by mouth daily.  albuterol (VENTOLIN HFA) 90 mcg/actuation inhaler Take  by inhalation as needed.  omega-3 fatty acids-vitamin e (FISH OIL) 1,000 mg Cap Take 1 Cap by mouth.  citalopram (CELEXA) 20 mg tablet Take 1 Tab by mouth daily.     atorvastatin (LIPITOR) 40 mg tablet Take 1 Tab by mouth daily.  amlodipine (NORVASC) 2.5 mg tablet Take 1 Tab by mouth daily. No current facility-administered medications for this visit. Past Medical History:   Diagnosis Date    Allergic rhinitis     Arthritis     RA    Basal cell carcinoma (BCC) in situ of skin     shoudler and scalp    Cancer (HCC)     prostate    GERD (gastroesophageal reflux disease)     Gout     Hypercholesterolemia     Hypertension     Melanoma in situ of back ([address]     Skin cancer (melanoma) ([facility] [address]     Sleep apnea     Bipap at home      Past Surgical History:   Procedure Laterality Date    COLONOSCOPY N/A 5/6/2019    COLONOSCOPY performed by Clarice Booker MD at [facility]. [address], COLON, DIAGNOSTIC      09, due 12, done 11, due 14    HX CATARACT REMOVAL  04/2019    HX HEENT      wisdom teeth extraction    HX KNEE ARTHROSCOPY Left     HX KNEE REPLACEMENT Left 2012    HX OTHER SURGICAL N/A 05/31/2018    Basal cell removal of scalp    HX PROSTATECTOMY      HX TONSILLECTOMY        Social History     Tobacco Use    Smoking status: Former Smoker    Smokeless tobacco: Never Used   Substance Use Topics    Alcohol use: Yes     Alcohol/week: 4.0 standard drinks     Types: 4 Cans of beer per week     Comment: 4 days per week      Family History   Problem Relation Age of Onset    Dementia Mother     Heart Disease Father         CAD    Kidney Disease Father         renal failure    Diabetes Paternal Grandmother     Cancer Paternal Grandfather         colon    Cancer Other         family hx colon cancer        Allergies   Allergen Reactions    Stings [Sting, Bee] Swelling     Swelling at site        Assessment/Plan  Diagnoses and all orders for this visit:    1. Essential hypertension, benign-blood pressure well controlled  -     METABOLIC PANEL, BASIC; Future  -     HEMOGLOBIN A1C WITH EAG; Future    2. Recurrent depression (HCC)-mental health stable.     3. Severe obesity with body mass index (BMI) of 35.0 to 39.9 with serious comorbidity (HCC)-counseled on weight loss. 4. Malignant melanoma, unspecified site (HCC)-follows with dermatology regularly    5. Seropositive rheumatoid arthritis of multiple sites (HCC)-inflammatory markers are stable. 6. History of prostate cancer-we will repeat his PSA in the wintertime    7. Mixed hyperlipidemia-continue current therapy    8. Gastroesophageal reflux disease without esophagitis-this is been a bit worse recently. We will increase his PPI to 40 mg daily. -     pantoprazole (PROTONIX) 40 mg tablet; Take 1 Tab by mouth daily. 9. IFG (impaired fasting glucose)- repeat A1C.   -     HEMOGLOBIN A1C WITH EAG; Future            Kumar Campos MD  6/16/2020    This note was created with the help of speech recognition software Satish Smith) and may contain some 'sound alike' errors.

## 2020-06-18 RX ORDER — SODIUM CHLORIDE 9 MG/ML
25 INJECTION, SOLUTION INTRAVENOUS CONTINUOUS
Status: DISPENSED | OUTPATIENT
Start: 2020-06-25 | End: 2020-06-25

## 2020-06-25 ENCOUNTER — HOSPITAL ENCOUNTER (OUTPATIENT)
Dept: INFUSION THERAPY | Age: 75
Discharge: HOME | End: 2020-06-25
Payer: MEDICARE

## 2020-06-25 VITALS
DIASTOLIC BLOOD PRESSURE: 58 MMHG | HEART RATE: 56 BPM | RESPIRATION RATE: 18 BRPM | OXYGEN SATURATION: 96 % | WEIGHT: 232.4 LBS | TEMPERATURE: 98.8 F | BODY MASS INDEX: 35.34 KG/M2 | SYSTOLIC BLOOD PRESSURE: 122 MMHG

## 2020-06-25 LAB
ALBUMIN SERPL-MCNC: 3.7 G/DL (ref 3.5–5)
ALBUMIN/GLOB SERPL: 1.2 {RATIO} (ref 1.1–2.2)
ALP SERPL-CCNC: 82 U/L (ref 45–117)
ALT SERPL-CCNC: 62 U/L (ref 12–78)
ANION GAP SERPL CALC-SCNC: 6 MMOL/L (ref 5–15)
AST SERPL-CCNC: 16 U/L (ref 15–37)
BASO+EOS+MONOS # BLD AUTO: 0.7 K/UL (ref 0.2–1.2)
BASO+EOS+MONOS NFR BLD AUTO: 10 % (ref 3.2–16.9)
BILIRUB SERPL-MCNC: 0.9 MG/DL (ref 0.2–1)
BUN SERPL-MCNC: 22 MG/DL (ref 6–20)
BUN/CREAT SERPL: 23 (ref 12–20)
CALCIUM SERPL-MCNC: 8.7 MG/DL (ref 8.5–10.1)
CHLORIDE SERPL-SCNC: 110 MMOL/L (ref 97–108)
CO2 SERPL-SCNC: 25 MMOL/L (ref 21–32)
CREAT SERPL-MCNC: 0.94 MG/DL (ref 0.7–1.3)
DIFFERENTIAL METHOD BLD: ABNORMAL
ERYTHROCYTE [DISTWIDTH] IN BLOOD BY AUTOMATED COUNT: 13.7 % (ref 11.8–15.8)
ERYTHROCYTE [SEDIMENTATION RATE] IN BLOOD: 15 MM/HR (ref 0–20)
GLOBULIN SER CALC-MCNC: 3.1 G/DL (ref 2–4)
GLUCOSE SERPL-MCNC: 114 MG/DL (ref 65–100)
HCT VFR BLD AUTO: 39.1 % (ref 36.6–50.3)
HGB BLD-MCNC: 13.9 G/DL (ref 12.1–17)
LYMPHOCYTES # BLD: 1.6 K/UL (ref 0.8–3.5)
LYMPHOCYTES NFR BLD: 22 % (ref 12–49)
MCH RBC QN AUTO: 37 PG (ref 26–34)
MCHC RBC AUTO-ENTMCNC: 35.5 G/DL (ref 30–36.5)
MCV RBC AUTO: 104 FL (ref 80–99)
NEUTS SEG # BLD: 5 K/UL (ref 1.8–8)
NEUTS SEG NFR BLD: 69 % (ref 32–75)
PLATELET # BLD AUTO: 193 K/UL (ref 150–400)
POTASSIUM SERPL-SCNC: 4.1 MMOL/L (ref 3.5–5.1)
PROT SERPL-MCNC: 6.8 G/DL (ref 6.4–8.2)
RBC # BLD AUTO: 3.76 M/UL (ref 4.1–5.7)
SODIUM SERPL-SCNC: 141 MMOL/L (ref 136–145)
WBC # BLD AUTO: 7.3 K/UL (ref 4.1–11.1)

## 2020-06-25 PROCEDURE — 74011000258 HC RX REV CODE- 258: Performed by: INTERNAL MEDICINE

## 2020-06-25 PROCEDURE — 80053 COMPREHEN METABOLIC PANEL: CPT

## 2020-06-25 PROCEDURE — 74011250636 HC RX REV CODE- 250/636: Performed by: INTERNAL MEDICINE

## 2020-06-25 PROCEDURE — 96413 CHEMO IV INFUSION 1 HR: CPT

## 2020-06-25 PROCEDURE — 36415 COLL VENOUS BLD VENIPUNCTURE: CPT

## 2020-06-25 PROCEDURE — 85025 COMPLETE CBC W/AUTO DIFF WBC: CPT

## 2020-06-25 PROCEDURE — 85652 RBC SED RATE AUTOMATED: CPT

## 2020-06-25 RX ADMIN — SODIUM CHLORIDE 212 MG: 9 INJECTION, SOLUTION INTRAVENOUS at 10:32

## 2020-06-25 RX ADMIN — SODIUM CHLORIDE 25 ML/HR: 900 INJECTION, SOLUTION INTRAVENOUS at 10:30

## 2020-07-09 ENCOUNTER — PATIENT MESSAGE (OUTPATIENT)
Dept: RHEUMATOLOGY | Age: 75
End: 2020-07-09

## 2020-07-09 RX ORDER — PREDNISONE 20 MG/1
20 TABLET ORAL
Qty: 7 TAB | Refills: 0 | Status: SHIPPED | OUTPATIENT
Start: 2020-07-09 | End: 2020-07-16

## 2020-07-09 NOTE — TELEPHONE ENCOUNTER
Nathan Moore, RN 7/9/2020 10:12 AM EDT      ----- Message -----  From: Gian Sue  Sent: 7/9/2020 9:01 AM EDT  To: [facility] Nurse Pool  Subject: Non-Urgent Medical Question     Good morning Dr. Sami Elliott,     I am having some significant pain in my right foot & hip. Was wondering if you should send me some Prednisone to knock it out fast. It has been going on for about 2 weeks, tried over the counter pain relief but to no avail. Thanks in advance.     Alex Sorenson

## 2020-07-28 ENCOUNTER — APPOINTMENT (OUTPATIENT)
Dept: INFUSION THERAPY | Age: 75
End: 2020-07-28
Payer: MEDICARE

## 2020-08-09 DIAGNOSIS — K21.9 GASTROESOPHAGEAL REFLUX DISEASE WITHOUT ESOPHAGITIS: ICD-10-CM

## 2020-08-09 RX ORDER — PANTOPRAZOLE SODIUM 40 MG/1
40 TABLET, DELAYED RELEASE ORAL 2 TIMES DAILY
Qty: 180 TAB | Refills: 1 | Status: SHIPPED | OUTPATIENT
Start: 2020-08-09 | End: 2021-03-18

## 2020-08-13 RX ORDER — SODIUM CHLORIDE 9 MG/ML
25 INJECTION, SOLUTION INTRAVENOUS CONTINUOUS
Status: DISPENSED | OUTPATIENT
Start: 2020-08-20 | End: 2020-08-20

## 2020-08-16 RX ORDER — ERGOCALCIFEROL 1.25 MG/1
CAPSULE ORAL
Qty: 12 CAP | Refills: 1 | Status: SHIPPED | OUTPATIENT
Start: 2020-08-16 | End: 2020-10-14 | Stop reason: SDUPTHER

## 2020-08-20 ENCOUNTER — HOSPITAL ENCOUNTER (OUTPATIENT)
Dept: INFUSION THERAPY | Age: 75
Discharge: HOME | End: 2020-08-20
Payer: MEDICARE

## 2020-08-20 VITALS
OXYGEN SATURATION: 91 % | TEMPERATURE: 98 F | SYSTOLIC BLOOD PRESSURE: 134 MMHG | BODY MASS INDEX: 35 KG/M2 | RESPIRATION RATE: 18 BRPM | DIASTOLIC BLOOD PRESSURE: 58 MMHG | HEART RATE: 46 BPM | WEIGHT: 230.2 LBS

## 2020-08-20 PROCEDURE — 74011250636 HC RX REV CODE- 250/636: Performed by: INTERNAL MEDICINE

## 2020-08-20 PROCEDURE — 74011000258 HC RX REV CODE- 258: Performed by: INTERNAL MEDICINE

## 2020-08-20 PROCEDURE — 96413 CHEMO IV INFUSION 1 HR: CPT

## 2020-08-20 RX ADMIN — SODIUM CHLORIDE 25 ML/HR: 900 INJECTION, SOLUTION INTRAVENOUS at 10:19

## 2020-08-20 RX ADMIN — GOLIMUMAB 212 MG: 50 SOLUTION INTRAVENOUS at 10:21

## 2020-08-20 NOTE — PROGRESS NOTES
Outpatient Infusion Center Progress Note    4240 Pt admit to [facility] for [facility] ambulatory in stable condition. Assessment completed. No new concerns voiced. Peripheral IV 24 G established in left AC with positive blood return. Patient did not want to stay post treatment observation. Visit Vitals  /58 (BP 1 Location: Right arm, BP Patient Position: Sitting)   Pulse (!) 46   Temp 98 °F (36.7 °C)   Resp 18   Wt 104.4 kg (230 lb 3.2 oz)   SpO2 91%   BMI 35.00 kg/m²     Patient Vitals for the past 12 hrs:   Temp Pulse Resp BP SpO2   08/20/20 1053 98 °F (36.7 °C) (!) 46 18 134/58 91 %   08/20/20 0958 98.1 °F (36.7 °C) (!) 50 18 135/64 94 %     Medications:  Medications Administered     0.9% sodium chloride infusion     Admin Date  08/20/2020 Action  New Bag Dose  25 mL/hr Rate  25 mL/hr Route  IntraVENous Administered By  Vesta Mckinley RN          golimumab (SIMPONI ARIA) 212 mg in 0.9% sodium chloride, overfill volume 127 mL infusion     Admin Date  08/20/2020 Action  Given Dose  212 mg Rate  254 mL/hr Route  IntraVENous Administered By  Vesta Mckinley, OMAR              1100 Pt tolerated treatment well. Peripheral IV taken out with no issues, pressure applied, wrapped in 2x2 gauze and coban. D/c home ambulatory in no distress.  Pt aware of next appointment scheduled for     Future Appointments   Date Time Provider Raiza De Santiagoisti   10/14/2020  9:40 AM MD [facility] BS AMB   10/15/2020  9:40 AM Brooke Mace MD [facility] BS AMB   10/15/2020 10:00 AM SS INF3 CH4 <2H [facility]   12/10/2020 10:00 AM SS INF3 CH4 <2H [facility]   12/16/2020  8:30 AM Merna Brooks MD [facility] BS AMB   2/4/2021 10:00 AM SS INF3 CH4 <2H [facility]

## 2020-10-08 RX ORDER — SODIUM CHLORIDE 9 MG/ML
25 INJECTION, SOLUTION INTRAVENOUS CONTINUOUS
Status: DISPENSED | OUTPATIENT
Start: 2020-10-15 | End: 2020-10-15

## 2020-10-14 ENCOUNTER — PATIENT MESSAGE (OUTPATIENT)
Dept: INTERNAL MEDICINE CLINIC | Age: 75
End: 2020-10-14

## 2020-10-14 ENCOUNTER — OFFICE VISIT (OUTPATIENT)
Dept: RHEUMATOLOGY | Age: 75
End: 2020-10-14
Payer: MEDICARE

## 2020-10-14 VITALS
SYSTOLIC BLOOD PRESSURE: 124 MMHG | HEART RATE: 48 BPM | TEMPERATURE: 98.1 F | WEIGHT: 227 LBS | DIASTOLIC BLOOD PRESSURE: 68 MMHG | HEIGHT: 68 IN | BODY MASS INDEX: 34.4 KG/M2 | RESPIRATION RATE: 18 BRPM

## 2020-10-14 DIAGNOSIS — M05.79 SEROPOSITIVE RHEUMATOID ARTHRITIS OF MULTIPLE SITES (HCC): Primary | ICD-10-CM

## 2020-10-14 DIAGNOSIS — Z79.60 LONG-TERM USE OF IMMUNOSUPPRESSANT MEDICATION: ICD-10-CM

## 2020-10-14 DIAGNOSIS — M1A.0720 CHRONIC IDIOPATHIC GOUT INVOLVING TOE OF LEFT FOOT WITHOUT TOPHUS: ICD-10-CM

## 2020-10-14 DIAGNOSIS — E55.9 VITAMIN D DEFICIENCY: ICD-10-CM

## 2020-10-14 PROCEDURE — G8536 NO DOC ELDER MAL SCRN: HCPCS | Performed by: INTERNAL MEDICINE

## 2020-10-14 PROCEDURE — G9717 DOC PT DX DEP/BP F/U NT REQ: HCPCS | Performed by: INTERNAL MEDICINE

## 2020-10-14 PROCEDURE — G8427 DOCREV CUR MEDS BY ELIG CLIN: HCPCS | Performed by: INTERNAL MEDICINE

## 2020-10-14 PROCEDURE — 99215 OFFICE O/P EST HI 40 MIN: CPT | Performed by: INTERNAL MEDICINE

## 2020-10-14 PROCEDURE — G8752 SYS BP LESS 140: HCPCS | Performed by: INTERNAL MEDICINE

## 2020-10-14 PROCEDURE — G0463 HOSPITAL OUTPT CLINIC VISIT: HCPCS | Performed by: INTERNAL MEDICINE

## 2020-10-14 PROCEDURE — G8754 DIAS BP LESS 90: HCPCS | Performed by: INTERNAL MEDICINE

## 2020-10-14 PROCEDURE — 1101F PT FALLS ASSESS-DOCD LE1/YR: CPT | Performed by: INTERNAL MEDICINE

## 2020-10-14 PROCEDURE — 3017F COLORECTAL CA SCREEN DOC REV: CPT | Performed by: INTERNAL MEDICINE

## 2020-10-14 PROCEDURE — G8417 CALC BMI ABV UP PARAM F/U: HCPCS | Performed by: INTERNAL MEDICINE

## 2020-10-14 RX ORDER — ERGOCALCIFEROL 1.25 MG/1
CAPSULE ORAL
Qty: 12 CAP | Refills: 1 | Status: SHIPPED | OUTPATIENT
Start: 2020-10-14 | End: 2021-09-07

## 2020-10-14 RX ORDER — FOLIC ACID 1 MG/1
TABLET ORAL
Qty: 90 TAB | Refills: 5 | Status: SHIPPED | OUTPATIENT
Start: 2020-10-14 | End: 2020-12-28 | Stop reason: SDUPTHER

## 2020-10-14 RX ORDER — METHOTREXATE 2.5 MG/1
20 TABLET ORAL
Qty: 96 TAB | Refills: 0 | Status: SHIPPED | OUTPATIENT
Start: 2020-10-14 | End: 2020-12-28 | Stop reason: SDUPTHER

## 2020-10-14 RX ORDER — ALLOPURINOL 300 MG/1
TABLET ORAL
Qty: 90 TAB | Refills: 5 | Status: SHIPPED | OUTPATIENT
Start: 2020-10-14 | End: 2021-09-14 | Stop reason: SDUPTHER

## 2020-10-14 NOTE — TELEPHONE ENCOUNTER
From: Bev Naik  To: Neli Russell MD  Sent: 10/14/2020 1:25 PM EDT  Subject: Non-Urgent Medical Question    Dr. George River,     I saw Dr. Sheila Gale this morning & I told him that I have been having some substantial back pain & right hamstring tightness & pain. He suggested that I see a back specialist. Do you have someone that you could suggest that I go to? I would really appreciate your opinion on who to see as I have never had the need in the past to see anyone in this field.      Sincerely,  Brenda Burch no

## 2020-10-14 NOTE — LETTER
10/14/20 Patient: Marah Matos YOB: 1945 Date of Visit: 10/14/2020 Ginette Caban MD 
[address] [facility] [address] [facility] [id number] VIA In Basket Dear Ginette Caban MD, Thank you for referring Mr. Marichuy Mueller to [address] [place] [address] for evaluation. My notes for this consultation are attached. If you have questions, please do not hesitate to call me. I look forward to following your patient along with you.  
 
 
Sincerely, 
 
Анна Gutierrez MD

## 2020-10-14 NOTE — PROGRESS NOTES
REASON FOR VISIT    This is a follow-up visit for Mr. Nabil Quintero for     ICD-10-CM   1. Seropositive rheumatoid arthritis of multiple sites ([facility] [address] M05.79   2. 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     Inflammatory arthritis phenotype includes:  Anti-CCP positive: yes (>250)  Rheumatoid factor positive: yes (>650)  Erosive disease: yes  Extra-articular manifestations include: none    Immunosuppression Screening (12/09/2019):   Quantiferon TB: negative    PPD:  Not performed  Hepatitis B: negative   Hepatitis C: negative     Therapy History includes:  Current DMARD therapy include: methotrexate 17.5 mg every Wednesday (6/15/2020 to present), Simponi Aria every 8 weeks (5/08/2018 to present)  Prior DMARD therapy include:  methotrexate 20 mg every Wednesday (11/20/2017 to 6/15/2020)  Discontinued DMARDs because of inefficacy: None  Discontinued DMARDs because of side effects: None  Unaffordable DMARDs: Enbrel    Immunizations:   Immunization History   Administered Date(s) Administered    (RETIRED) Pneumococcal Vaccine (Unspecified Type) 10/21/2011    Influenza High Dose Vaccine PF 10/09/2014, 11/04/2015, 10/14/2016, 10/16/2018    Influenza Vaccine 10/08/2019    Influenza Vaccine (Tri) Adjuvanted (>65 Yrs FLUAD TRI 84303) 10/08/2019    Influenza Vaccine Split 09/21/2011    Influenza Vaccine Whole 09/17/2010    Pneumococcal Conjugate (PCV-13) 01/01/2015    Pneumococcal Polysaccharide (PPSV-23) 10/09/2014    TDAP Vaccine 12/15/2010    Tdap 12/15/2010, 10/04/2017    Zoster Vaccine, Live 10/21/2011     Active problems include:    Patient Active Problem List   Diagnosis Code    Mixed hyperlipidemia E78.2    Other abnormal glucose R73.09    Essential hypertension, benign I10    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    Allergic rhinitis, cause unspecified J30.9    Reflux esophagitis K21.00    Benign neoplasm of colon D12.6    Contact dermatitis and other eczema, due to unspecified cause L25.9    Bunion M21.619    History of prostate cancer Z85.46    Advanced care planning/counseling discussion Z71.89    Low testosterone R79.89    Primary osteoarthritis of both knees M17.0    Seropositive rheumatoid arthritis of multiple sites ([facility]) M05.79    Long-term use of immunosuppressant medication Z79.899    Vitamin D deficiency E55.9    Recurrent depression ([facility]) F33.9    Severe obesity with body mass index (BMI) of 35.0 to 39.9 with serious comorbidity ([facility]) E66.01    Gout M10.9    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    Primary localized osteoarthrosis, lower leg M17.10    Peripheral vascular disease ([facility]) I73.9    Plantar fasciitis of right foot M72.2     HISTORY OF PRESENT ILLNESS    Mr. Zain Johnson returns for a follow-up. On his last visit, I started to taper his methotrexate from 20 mg to 17.5 mg weekly for 2 months and then to 15 mg weekly until follow up in 4 months. I continued Simponi Aria infusions every 8 weeks. His most infusion recent was 8/20/2020, which he has taken with good tolerance. His next infusion is tomorrow 10/15/2020. He messaged me on [facility] on 7/09/2020 complaining of pain in his right foot and hip, so gave him 7 days of prednisone 20 mg daily. He is currently on 17.5 mg weekly and did not reduce his dose due forgetting. He has noticed breakthrough described as fatigue from Simponi around 2 weeks and does not feel his lower dose of methotrexate is related to this. He notices that when he golf's (3 days a week, unchanged), he feels fatigued during that 2 week time before his next infusion. Today, he feels well. He denies pain, swelling, or stiffness in his hands or wrists. He denies interval gouty flares. He complains of intermittent back pain that goes down to his right hamstring. He endorses easy bruising.      He denies fever, weight loss, blurred vision, vision loss, oral ulcers, ankle swelling, dry cough, dyspnea, nausea, vomiting, dysphagia, abdominal pain, black or bloody stool, fall since last visit, rash and increased thirst.    Last toxicity monitoring by blood work was done on 6/25/2020 and did not reveal any significant adverse effects. Most recent inflammatory markers from 6/25/2020 revealed a ESR 15 mm/hr and CRP N/A mg/L. The patient has not had any interval hospital admissions, infections, or surgeries. REVIEW OF SYSTEMS    A comprehensive review of systems was performed and pertinent results are documented in the HPI, review of systems is otherwise non-contributory. PAST MEDICAL HISTORY    He has a past medical history of Allergic rhinitis, Arthritis, Basal cell carcinoma (BCC) in situ of skin, Cancer ([facility] [address] GERD (gastroesophageal reflux disease), Gout, Hypercholesterolemia, Hypertension, Melanoma in situ of back ([facility] [address] [facility] and Sleep apnea. FAMILY HISTORY    His family history includes Cancer in his paternal grandfather and another family member; Dementia in his mother; Diabetes in his paternal grandmother; Heart Disease in his father; Kidney Disease in his father. SOCIAL HISTORY    He reports that he has quit smoking. He has never used smokeless tobacco. He reports current alcohol use of about 4.0 standard drinks of alcohol per week. He reports that he does not use drugs. MEDICATIONS    Current Outpatient Medications   Medication Sig Dispense Refill    allopurinoL (ZYLOPRIM) 300 mg tablet TAKE ONE TABLET BY MOUTH ONE TIME DAILY 90 Tab 5    folic acid (FOLVITE) 1 mg tablet TAKE ONE TABLET BY MOUTH DAILY 90 Tab 5    ergocalciferol (ERGOCALCIFEROL) 1,250 mcg (50,000 unit) capsule TAKE ONE CAPSULE BY MOUTH EVERY WEEK (7 DAYS) 12 Cap 1    methotrexate (RHEUMATREX) 2.5 mg tablet Take 8 Tabs by mouth every Wednesday. TAKE 8 TABLETS BY MOUTH WEEKLY ON WEDNESDAYS 96 Tab 0    pantoprazole (PROTONIX) 40 mg tablet Take 1 Tab by mouth two (2) times a day.  180 Tab 1  fexofenadine (ALLEGRA) 180 mg tablet Take  by mouth.  colestipol (COLESTID) 1 gram tablet Take 2 Tabs by mouth two (2) times a day. 60 Tab 5    guaiFENesin ER (MUCINEX) 600 mg ER tablet Take 600 mg by mouth as needed.  meloxicam (MOBIC) 7.5 mg tablet Take  by mouth daily.  clobetasol (TEMOVATE) 0.05 % ointment Apply  to affected area two (2) times a day.  0.9% sodium chloride solp 100 mL with golimumab 12.5 mg/mL soln 2 mg/kg 2 mg/kg by IntraVENous route once. Every six weeks      lisinopril (PRINIVIL, ZESTRIL) 40 mg tablet Take 40 mg by mouth daily.  aspirin delayed-release 81 mg tablet Take  by mouth daily.  albuterol (VENTOLIN HFA) 90 mcg/actuation inhaler Take  by inhalation as needed.  omega-3 fatty acids-vitamin e (FISH OIL) 1,000 mg Cap Take 1 Cap by mouth.  citalopram (CELEXA) 20 mg tablet Take 1 Tab by mouth daily. 30 Tab 11    atorvastatin (LIPITOR) 40 mg tablet Take 1 Tab by mouth daily. 30 Tab 11    amlodipine (NORVASC) 2.5 mg tablet Take 1 Tab by mouth daily. 30 Tab 11     Facility-Administered Medications Ordered in Other Visits   Medication Dose Route Frequency Provider Last Rate Last Dose    [START ON 10/15/2020] golimumab (SIMPONI ARIA) 212 mg in 0.9% sodium chloride, overfill volume 127 mL infusion  212 mg IntraVENous ONCE Masri, Severo Roan, MD       [facility] ON 10/15/2020] 0.9% sodium chloride infusion  25 mL/hr IntraVENous CONTINUOUS Masri, Severo Roan, MD            ALLERGIES    Allergies   Allergen Reactions    Stings [Sting, Bee] Swelling     Swelling at site       PHYSICAL EXAMINATION    Visit Vitals  /68   Pulse (!) 48   Temp 98.1 °F (36.7 °C)   Resp 18   Ht 5' 8\" (1.727 m)   Wt 227 lb (103 kg)   BMI 34.52 kg/m²     Body mass index is 34.52 kg/m². General: Patient is alert, oriented x 3, not in acute distress, wife at bedside    Cardiovascular:  Heart is regular rate and rhythm, no murmurs.     Chest:  Lungs are clear to auscultation bilaterally. No rhonchi, wheezes, or crackles. Abdomen:  Obese. Extremities:  Free of clubbing, cyanosis, edema    Neurological exam:  Muscle strength is full in upper and lower extremities     Skin exam:  There are no alopecia, no discoid lesions, no active Raynaud's, no livedo reticularis, no periungual erythema. Musculoskeletal exam:  A comprehensive musculoskeletal exam was performed for all joints of each upper and lower extremity and assessed for swelling, tenderness and range of motion. Positive results are documented as below:    Bilateral Olivia and [place] nodes. Bilateral knee crepitus without effusion.   Pes planus   Bilateral hallux valgus (LEFT more than right)    Z-Deformities:   no  [place] Deformities:  no  [place]ierre's Deformities:  no  Ulnar Deviation:   Yes (bilateral)  MCP Subluxation:  no     Joint Count 10/14/2020 12/9/2019 6/7/2019 3/4/2019 12/3/2018 9/4/2018 6/4/2018   Patient pain (0-100) 50 20 5 0 20 15 0   MHAQ 0 0 0 0 0 0 0   Left elbow - Tender - - - - - - -   Left wrist- Tender - 0 0 - - 1 0   Left wrist- Swollen - 0 0 - - 1 1   Left 1st MCP - Tender - - - - - - -   Left 1st MCP - Swollen - - - - - 1 -   Left 2nd MCP - Tender - - - 0 0 - -   Left 2nd MCP - Swollen - - - 1 1 1 1   Left 3rd MCP - Tender - - - 0 - - -   Left 3rd MCP - Swollen - - - 1 - 1 1   Left 4th MCP - Tender - - - - - - -   Left 5th MCP - Tender - - - - - - -   Left thumb IP - Tender - - - - - - -   Left thumb IP - Swollen - - - - - - -   Left 2nd PIP - Tender - - - - - - -   Left 3rd PIP - Tender - - - - - - -   Left 4th PIP - Tender - - - - - - -   Left 5th PIP - Tender - - - - - - -   Right wrist- Tender - - - - - - -   Right wrist- Swollen - - - - - 1 1   Right 1st MCP - Tender - - - - - - -   Right 1st MCP - Swollen - - - - - 1 1   Right 2nd MCP - Tender - - - - - - -   Right 2nd MCP - Swollen - - - - - 1 1   Right 3rd MCP - Tender - - - - - - -   Right 3rd MCP - Swollen - - - - - 1 1   Right 4th MCP - Tender - - - 0 - - -   Right 4th MCP - Swollen - - - 1 - - 1   Right 5th MCP - Swollen - - - - - - -   Right thumb IP - Tender - - - - - - -   Right 2nd PIP - Tender - - - - - - -   Right 2nd PIP - Swollen - - - - - - -   Right 3rd PIP - Tender - - - - - - -   Right 3rd PIP - Swollen - - - - - - 1   Right 4th PIP - Swollen - - - - - - 1   Right knee - Tender - - - - - - -   Right knee - Swollen - - - - - - -   Tender Joint Count (Total) - 0 0 0 0 1 0   Swollen Joint Count (Total) - 0 0 3 1 8 10   Physician Assessment (0-10) - 0 0 0.5 0 2 2   Patient Assessment (0-10) 4 0.5 0.5 0 2 1.5 0   CDAI Total (calculated) - 0.5 0.5 3.5 3 12.5 12       DATA REVIEW    Laboratory     Recent laboratory results were reviewed, summarized, and discussed with the patient. Imaging    Musculoskeletal Ultrasound    None    Radiographs    Bilateral Hand 11/20/2017: RIGHT: No fracture or dislocation on plain film. The bones are osteopenic. Mild narrowing of the radiocarpal joint. Probable erosion of the radius and lunate at the radiocarpal joint. No widening of the intercarpal spaces. Subtle erosion of the ulnar styloid. Mild narrowing of the triscaphe joint is age-appropriate. Irregular arthritis of the first and second CMC joints includes osteophytes and erosions. There are erosions of the first through fourth metacarpal heads at the MCP points. Moderate narrowing and anterior subluxation of the second MCP joint. IP joints are within normal limits. No chondrocalcinosis or periosteal reaction. LEFT: No fracture or dislocation on plain film. The bones are osteopenic. Moderate narrowing of the radiocarpal joint with subtle erosions of the lunate. Marrow DR UVJ with erosions. No definite ulnar styloid erosion. Triscaphe and first [facility] joint osteoarthritis are age-appropriate. Large erosions on both sides of the second MCP joint and in the third metacarpal head. IP joints are within normal limits.  No chondrocalcinosis or periosteal reaction. Bilateral Foot 11/20/2017: RIGHT: No fracture or dislocation on plain film. Bones are osteopenic. Talonavicular joint space narrowing, osteophytes, and erosions. Subtalar arthritis is partially imaged. TMT joints are within normal limits. Severe narrowing of the first MTP joint with osteophytes and erosions. There is erosion and the fifth tarsal head. Mild narrowing of the second MTP joint without definable erosion. No periosteal reaction. LEFT: No fracture or dislocation on plain film. Bones are osteopenic. Intertarsal joints are within normal limits. Moderate narrowing of the first MTP joint with central and marginal erosions. 35 degrees hallux valgus angulation and fibular subluxation of the first proximal phalanx. There are also erosions in the laterally subluxed hallux sesamoids. Mild joint space narrowing and erosions of the third MTP joint. Fragmentation of the fourth middle phalanx is adjacent to the fourth PIP joint erosions. There are also erosions of the second and fifth PIP joints. First IP joint osteoarthritis is mild. CT Imaging    CT Right Upper Extremity with contrast 10/19/2017: examination of the soft tissues demonstrates no drainable fluid collection. There is no pathologically enlarged nodes within the right axilla. Alignment is normal. There is no bone destruction or fracture. No significant fatty atrophy. MR Imaging    None    DXA     None    ASSESSMENT AND PLAN    This is a follow-up visit for Mr. Supirya Koroma. 1) Seropositive Erosive Rheumatoid Arthritis. He presented after activation of underlying Rheumatoid Arthritis which appears to have been quiescent for more than 30 years from the TDAP vaccine. He remembers being diagnosed with arthritis in his 30's that was treated with an injection in his finger. He has bilateral ulnar deviation which corresponds with the chronicity of his disease. Radiographs showed erosive disease confirming chronicity.      He is maintained on methotrexate 17.5 mg (part of taper) every Wednesday and Simponi Aria infusions with good tolerance. He most recent infusion was 8/20/2020. He feels well but has developed breakthrough described as fatigue, 2 weeks before his infusions. This is new since his last visit. He is not sure if lowering the dose of methotrexate is responsible for this. .    He denies joint pain, stiffness or swelling today. He is scheduled for his infusion tomorrow. His CDAI was 4 (previously 0.5, 0.5, 3.5, 3, 12.5, 12, 40, 27, 37) with 0 tender and 0 swollen joints, consistent with low disease activity. His PtGlobal is influenced by his back issues. I asked him to resume methotrexate 20 mg weekly and to evaluate for breakthrough on his dose. I will have him follow up with me in 2 months, around the time of his scheduled infusion after tomorrow's dose. If he has breakthrough, I will change his dosing frequency. 2) Chronic Gout. He has a history of gout in his left foot. He is on allopurinol 300 mg daily. His uric acid was 4.5 mg/dL (previously 4.5, 4.9 mg/dL). He denies interval flares. I refilled it. 3)  Long Term Use of Immunosuppressants. The patient remains on immunomodulatory medications (methotrexate, Simponi Aria) and requires frequent toxicity monitoring by blood work. Respective labs ordered (Quantiferon TB, chronic hepatitis panel)    4) Vitamin D Deficiency. His vitamin D level was 40.4 (previously 39.2, 27.7, 22.6). He is on weekly ergocalciferol 50,000. I will check his level. 5) Basal Cell Carcinoma. Lesion removed on his scalp 5/31/2018. Wound without secondary infection. He continues to have lesions burnt. 6) History of Melanoma. He had melanoma removed from his back 10/2017. He did not require chemotherapy. He has skin exams every 3 months.  He has reviewed his medications, including [place], with his dermatologist. He understands to continue close surveillance and inform us if he has any new melanoma lesions. 7) Bilateral Knee Osteoarthritis. This was not an active issue today. 8) Bilateral Pes Planus. This is no longer an active issue. I suspect is from his pes planus. I recommended inserts. 9) Atypical Chest Pain. This was evaluated by cardiology and felt to be GI related. Negative stress test. GI noted Allen's on EGD. 10) Right Back Pain with Right Hamstring Pain. This is intermittent. I asked him to see orthopedics. The patient voiced understanding of the aforementioned assessment and plan.     TODAY'S ORDERS    Orders Placed This Encounter    QUANTIFERON-TB GOLD PLUS    CHRONIC HEPATITIS PANEL    VITAMIN D, 25 HYDROXY    allopurinoL (ZYLOPRIM) 300 mg tablet    folic acid (FOLVITE) 1 mg tablet    ergocalciferol (ERGOCALCIFEROL) 1,250 mcg (50,000 unit) capsule    methotrexate (RHEUMATREX) 2.5 mg tablet       Future Appointments   Date Time Provider [facility]   10/15/2020 10:00 AM SS INF3 CH4 <2H [facility]   12/10/2020 10:00 AM SS INF3 CH4 <2H [facility]   12/16/2020  8:30 AM Lilliam Meraz MD [facility] BS AMB   12/22/2020  9:40 AM Rox Matos MD [facility] BS AMB   2/4/2021 10:00 [facility], [facility]    Adult Rheumatology   Rheumatology Ultrasound Certified  [facility] of [address]   Phone [phone]  Fax [phone]

## 2020-10-15 ENCOUNTER — HOSPITAL ENCOUNTER (OUTPATIENT)
Dept: INFUSION THERAPY | Age: 75
Discharge: HOME | End: 2020-10-15
Payer: MEDICARE

## 2020-10-15 VITALS
RESPIRATION RATE: 18 BRPM | DIASTOLIC BLOOD PRESSURE: 62 MMHG | HEART RATE: 48 BPM | HEIGHT: 68 IN | WEIGHT: 231.4 LBS | OXYGEN SATURATION: 97 % | SYSTOLIC BLOOD PRESSURE: 130 MMHG | BODY MASS INDEX: 35.07 KG/M2 | TEMPERATURE: 98.2 F

## 2020-10-15 LAB
25(OH)D3 SERPL-MCNC: 70.4 NG/ML (ref 30–100)
ALBUMIN SERPL-MCNC: 3.6 G/DL (ref 3.5–5)
ALBUMIN/GLOB SERPL: 1.1 {RATIO} (ref 1.1–2.2)
ALP SERPL-CCNC: 72 U/L (ref 45–117)
ALT SERPL-CCNC: 46 U/L (ref 12–78)
ANION GAP SERPL CALC-SCNC: 7 MMOL/L (ref 5–15)
AST SERPL-CCNC: 11 U/L (ref 15–37)
BASO+EOS+MONOS # BLD AUTO: 0.9 K/UL (ref 0.2–1.2)
BASO+EOS+MONOS NFR BLD AUTO: 9 % (ref 3.2–16.9)
BILIRUB SERPL-MCNC: 0.8 MG/DL (ref 0.2–1)
BUN SERPL-MCNC: 22 MG/DL (ref 6–20)
BUN/CREAT SERPL: 21 (ref 12–20)
CALCIUM SERPL-MCNC: 9.1 MG/DL (ref 8.5–10.1)
CHLORIDE SERPL-SCNC: 112 MMOL/L (ref 97–108)
CO2 SERPL-SCNC: 22 MMOL/L (ref 21–32)
CREAT SERPL-MCNC: 1.06 MG/DL (ref 0.7–1.3)
CRP SERPL-MCNC: <0.29 MG/DL (ref 0–0.6)
DIFFERENTIAL METHOD BLD: ABNORMAL
ERYTHROCYTE [DISTWIDTH] IN BLOOD BY AUTOMATED COUNT: 13.5 % (ref 11.8–15.8)
ERYTHROCYTE [SEDIMENTATION RATE] IN BLOOD: 10 MM/HR (ref 0–20)
GLOBULIN SER CALC-MCNC: 3.2 G/DL (ref 2–4)
GLUCOSE SERPL-MCNC: 107 MG/DL (ref 65–100)
HCT VFR BLD AUTO: 42.1 % (ref 36.6–50.3)
HGB BLD-MCNC: 15 G/DL (ref 12.1–17)
LYMPHOCYTES # BLD: 1.7 K/UL (ref 0.8–3.5)
LYMPHOCYTES NFR BLD: 18 % (ref 12–49)
MCH RBC QN AUTO: 37 PG (ref 26–34)
MCHC RBC AUTO-ENTMCNC: 35.6 G/DL (ref 30–36.5)
MCV RBC AUTO: 104 FL (ref 80–99)
NEUTS SEG # BLD: 7 K/UL (ref 1.8–8)
NEUTS SEG NFR BLD: 73 % (ref 32–75)
PLATELET # BLD AUTO: 205 K/UL (ref 150–400)
POTASSIUM SERPL-SCNC: 3.9 MMOL/L (ref 3.5–5.1)
PROT SERPL-MCNC: 6.8 G/DL (ref 6.4–8.2)
RBC # BLD AUTO: 4.05 M/UL (ref 4.1–5.7)
SODIUM SERPL-SCNC: 141 MMOL/L (ref 136–145)
WBC # BLD AUTO: 9.6 K/UL (ref 4.1–11.1)

## 2020-10-15 PROCEDURE — 80053 COMPREHEN METABOLIC PANEL: CPT

## 2020-10-15 PROCEDURE — 86480 TB TEST CELL IMMUN MEASURE: CPT

## 2020-10-15 PROCEDURE — 74011250636 HC RX REV CODE- 250/636: Performed by: INTERNAL MEDICINE

## 2020-10-15 PROCEDURE — 85025 COMPLETE CBC W/AUTO DIFF WBC: CPT

## 2020-10-15 PROCEDURE — 36415 COLL VENOUS BLD VENIPUNCTURE: CPT

## 2020-10-15 PROCEDURE — 85652 RBC SED RATE AUTOMATED: CPT

## 2020-10-15 PROCEDURE — 74011000258 HC RX REV CODE- 258: Performed by: INTERNAL MEDICINE

## 2020-10-15 PROCEDURE — 82306 VITAMIN D 25 HYDROXY: CPT

## 2020-10-15 PROCEDURE — 86704 HEP B CORE ANTIBODY TOTAL: CPT

## 2020-10-15 PROCEDURE — 86140 C-REACTIVE PROTEIN: CPT

## 2020-10-15 PROCEDURE — 96413 CHEMO IV INFUSION 1 HR: CPT

## 2020-10-15 RX ADMIN — SODIUM CHLORIDE 25 ML/HR: 900 INJECTION, SOLUTION INTRAVENOUS at 11:10

## 2020-10-15 RX ADMIN — GOLIMUMAB 212 MG: 50 SOLUTION INTRAVENOUS at 11:10

## 2020-10-15 NOTE — PROGRESS NOTES
[facility] Progress Note    Date: October 15, 2020    Name: Doris Calvillo    MRN: [id number]         : 1945    Mr. Fortino Posada Arrived ambulatory and in no distress for Simponi Infusion. Assessment was completed, no acute issues at this time, no new complaints voiced. 24 G PIV established to left hand, + blood return. Patient has written order from MD for add on labs. Order scanned into media. Labs drawn and sent for processing. Mr. Sarabia's vitals were reviewed. Visit Vitals  /62   Pulse (!) 50   Temp 97.8 °F (36.6 °C)   Resp 18   Ht 5' 8\" (1.727 m)   Wt 105 kg (231 lb 6.4 oz)   SpO2 96%   BMI 35.18 kg/m²       Labs pending in CC. Medications:  Medications Administered     0.9% sodium chloride infusion     Admin Date  10/15/2020 Action  New Bag Dose  25 mL/hr Rate  25 mL/hr Route  IntraVENous Administered By  Sherin Christian RN          golimumab (SIMPONI ARIA) 212 mg in 0.9% sodium chloride, overfill volume 127 mL infusion     Admin Date  10/15/2020 Action  Given Dose  212 mg Rate  254 mL/hr Route  IntraVENous Administered By  Sherin Christian RN                  Mr. Fortino Posada tolerated treatment well and was discharged from [place] [address] in stable condition. PIV flushed & removed. He is to return on December 10 2020 for his next appointment.     Dusty Martínez RN  October 15, 2020

## 2020-10-19 LAB
M TB IFN-G BLD-IMP: NEGATIVE
QUANTIFERON CRITERIA, QFI1T: NORMAL
QUANTIFERON MITOGEN VALUE: >10 IU/ML
QUANTIFERON NIL VALUE: 0.01 IU/ML
QUANTIFERON TB1 AG: 0.02 IU/ML
QUANTIFERON TB2 AG: 0.01 IU/ML

## 2020-10-20 LAB
COMMENT, 144067: NORMAL
HBV CORE AB SERPL QL IA: NEGATIVE
HBV CORE IGM SERPL QL IA: NEGATIVE
HBV E AB SERPL QL IA: NEGATIVE
HBV E AG SERPL QL IA: NEGATIVE
HBV SURFACE AB SER QL: NON REACTIVE
HBV SURFACE AG SERPL QL IA: NEGATIVE
HCV AB S/CO SERPL IA: <0.1 S/CO RATIO (ref 0–0.9)

## 2020-10-22 NOTE — PROGRESS NOTES
The results were reviewed. Vitamin D is 70.4 --> change dosing to every 14 days. All remaining labs are normal/negative.

## 2020-12-03 RX ORDER — ACETAMINOPHEN 325 MG/1
650 TABLET ORAL AS NEEDED
Status: CANCELLED
Start: 2020-12-10

## 2020-12-03 RX ORDER — ALBUTEROL SULFATE 0.83 MG/ML
2.5 SOLUTION RESPIRATORY (INHALATION) AS NEEDED
Status: CANCELLED
Start: 2020-12-10

## 2020-12-03 RX ORDER — ONDANSETRON 2 MG/ML
8 INJECTION INTRAMUSCULAR; INTRAVENOUS AS NEEDED
Status: CANCELLED | OUTPATIENT
Start: 2020-12-10

## 2020-12-03 RX ORDER — DIPHENHYDRAMINE HYDROCHLORIDE 50 MG/ML
50 INJECTION, SOLUTION INTRAMUSCULAR; INTRAVENOUS AS NEEDED
Status: CANCELLED
Start: 2020-12-10

## 2020-12-03 RX ORDER — HYDROCORTISONE SODIUM SUCCINATE 100 MG/2ML
100 INJECTION, POWDER, FOR SOLUTION INTRAMUSCULAR; INTRAVENOUS AS NEEDED
Status: CANCELLED | OUTPATIENT
Start: 2020-12-10

## 2020-12-03 RX ORDER — SODIUM CHLORIDE 0.9 % (FLUSH) 0.9 %
10 SYRINGE (ML) INJECTION AS NEEDED
Status: CANCELLED | OUTPATIENT
Start: 2020-12-10

## 2020-12-03 RX ORDER — SODIUM CHLORIDE 9 MG/ML
10 INJECTION INTRAMUSCULAR; INTRAVENOUS; SUBCUTANEOUS AS NEEDED
Status: CANCELLED | OUTPATIENT
Start: 2020-12-10

## 2020-12-03 RX ORDER — EPINEPHRINE 1 MG/ML
0.3 INJECTION, SOLUTION, CONCENTRATE INTRAVENOUS AS NEEDED
Status: CANCELLED | OUTPATIENT
Start: 2020-12-10

## 2020-12-03 RX ORDER — SODIUM CHLORIDE 9 MG/ML
25 INJECTION, SOLUTION INTRAVENOUS CONTINUOUS
Status: CANCELLED | OUTPATIENT
Start: 2020-12-10

## 2020-12-03 RX ORDER — DIPHENHYDRAMINE HYDROCHLORIDE 50 MG/ML
25 INJECTION, SOLUTION INTRAMUSCULAR; INTRAVENOUS AS NEEDED
Status: CANCELLED
Start: 2020-12-10

## 2020-12-03 RX ORDER — HEPARIN 100 UNIT/ML
300-500 SYRINGE INTRAVENOUS AS NEEDED
Status: CANCELLED
Start: 2020-12-10

## 2020-12-10 ENCOUNTER — HOSPITAL ENCOUNTER (OUTPATIENT)
Dept: INFUSION THERAPY | Age: 75
Discharge: HOME | End: 2020-12-10
Payer: MEDICARE

## 2020-12-10 VITALS
WEIGHT: 236.3 LBS | HEART RATE: 49 BPM | SYSTOLIC BLOOD PRESSURE: 146 MMHG | DIASTOLIC BLOOD PRESSURE: 85 MMHG | RESPIRATION RATE: 18 BRPM | OXYGEN SATURATION: 95 % | BODY MASS INDEX: 35.93 KG/M2 | TEMPERATURE: 98.5 F

## 2020-12-10 DIAGNOSIS — M05.79 SEROPOSITIVE RHEUMATOID ARTHRITIS OF MULTIPLE SITES (HCC): Primary | ICD-10-CM

## 2020-12-10 DIAGNOSIS — M05.79 SEROPOSITIVE RHEUMATOID ARTHRITIS OF MULTIPLE SITES (HCC): ICD-10-CM

## 2020-12-10 LAB
ALBUMIN SERPL-MCNC: 3.7 G/DL (ref 3.5–5)
ALBUMIN/GLOB SERPL: 1.2 {RATIO} (ref 1.1–2.2)
ALP SERPL-CCNC: 84 U/L (ref 45–117)
ALT SERPL-CCNC: 83 U/L (ref 12–78)
ANION GAP SERPL CALC-SCNC: 2 MMOL/L (ref 5–15)
AST SERPL-CCNC: 22 U/L (ref 15–37)
BASO+EOS+MONOS # BLD AUTO: 0.4 K/UL (ref 0.2–1.2)
BASO+EOS+MONOS NFR BLD AUTO: 6 % (ref 3.2–16.9)
BILIRUB SERPL-MCNC: 1 MG/DL (ref 0.2–1)
BUN SERPL-MCNC: 19 MG/DL (ref 6–20)
BUN/CREAT SERPL: 20 (ref 12–20)
CALCIUM SERPL-MCNC: 9.2 MG/DL (ref 8.5–10.1)
CHLORIDE SERPL-SCNC: 110 MMOL/L (ref 97–108)
CO2 SERPL-SCNC: 28 MMOL/L (ref 21–32)
CREAT SERPL-MCNC: 0.94 MG/DL (ref 0.7–1.3)
CRP SERPL-MCNC: <0.29 MG/DL (ref 0–0.6)
DIFFERENTIAL METHOD BLD: ABNORMAL
ERYTHROCYTE [DISTWIDTH] IN BLOOD BY AUTOMATED COUNT: 13.7 % (ref 11.8–15.8)
ERYTHROCYTE [SEDIMENTATION RATE] IN BLOOD: 10 MM/HR (ref 0–20)
GLOBULIN SER CALC-MCNC: 3.1 G/DL (ref 2–4)
GLUCOSE SERPL-MCNC: 78 MG/DL (ref 65–100)
HCT VFR BLD AUTO: 40.9 % (ref 36.6–50.3)
HGB BLD-MCNC: 14 G/DL (ref 12.1–17)
LYMPHOCYTES # BLD: 1.6 K/UL (ref 0.8–3.5)
LYMPHOCYTES NFR BLD: 23 % (ref 12–49)
MCH RBC QN AUTO: 35.4 PG (ref 26–34)
MCHC RBC AUTO-ENTMCNC: 34.2 G/DL (ref 30–36.5)
MCV RBC AUTO: 103.3 FL (ref 80–99)
NEUTS SEG # BLD: 5.1 K/UL (ref 1.8–8)
NEUTS SEG NFR BLD: 71 % (ref 32–75)
PLATELET # BLD AUTO: 204 K/UL (ref 150–400)
POTASSIUM SERPL-SCNC: 4.3 MMOL/L (ref 3.5–5.1)
PROT SERPL-MCNC: 6.8 G/DL (ref 6.4–8.2)
RBC # BLD AUTO: 3.96 M/UL (ref 4.1–5.7)
SODIUM SERPL-SCNC: 140 MMOL/L (ref 136–145)
WBC # BLD AUTO: 7.1 K/UL (ref 4.1–11.1)

## 2020-12-10 PROCEDURE — 96365 THER/PROPH/DIAG IV INF INIT: CPT

## 2020-12-10 PROCEDURE — 74011000258 HC RX REV CODE- 258: Performed by: INTERNAL MEDICINE

## 2020-12-10 PROCEDURE — 80053 COMPREHEN METABOLIC PANEL: CPT

## 2020-12-10 PROCEDURE — 36415 COLL VENOUS BLD VENIPUNCTURE: CPT

## 2020-12-10 PROCEDURE — 85652 RBC SED RATE AUTOMATED: CPT

## 2020-12-10 PROCEDURE — 85025 COMPLETE CBC W/AUTO DIFF WBC: CPT

## 2020-12-10 PROCEDURE — 74011250636 HC RX REV CODE- 250/636: Performed by: INTERNAL MEDICINE

## 2020-12-10 PROCEDURE — 86140 C-REACTIVE PROTEIN: CPT

## 2020-12-10 RX ORDER — SODIUM CHLORIDE 0.9 % (FLUSH) 0.9 %
10 SYRINGE (ML) INJECTION AS NEEDED
Status: DISPENSED | OUTPATIENT
Start: 2020-12-10 | End: 2020-12-10

## 2020-12-10 RX ORDER — SODIUM CHLORIDE 9 MG/ML
10 INJECTION INTRAMUSCULAR; INTRAVENOUS; SUBCUTANEOUS AS NEEDED
Status: CANCELLED | OUTPATIENT
Start: 2021-02-04

## 2020-12-10 RX ORDER — ACETAMINOPHEN 325 MG/1
650 TABLET ORAL AS NEEDED
Status: CANCELLED
Start: 2021-02-04

## 2020-12-10 RX ORDER — HEPARIN 100 UNIT/ML
300-500 SYRINGE INTRAVENOUS AS NEEDED
Status: CANCELLED
Start: 2021-02-04

## 2020-12-10 RX ORDER — DIPHENHYDRAMINE HYDROCHLORIDE 50 MG/ML
25 INJECTION, SOLUTION INTRAMUSCULAR; INTRAVENOUS AS NEEDED
Status: CANCELLED
Start: 2021-02-04

## 2020-12-10 RX ORDER — HYDROCORTISONE SODIUM SUCCINATE 100 MG/2ML
100 INJECTION, POWDER, FOR SOLUTION INTRAMUSCULAR; INTRAVENOUS AS NEEDED
Status: CANCELLED | OUTPATIENT
Start: 2021-02-04

## 2020-12-10 RX ORDER — SODIUM CHLORIDE 9 MG/ML
25 INJECTION, SOLUTION INTRAVENOUS CONTINUOUS
Status: DISPENSED | OUTPATIENT
Start: 2020-12-10 | End: 2020-12-10

## 2020-12-10 RX ORDER — ALBUTEROL SULFATE 0.83 MG/ML
2.5 SOLUTION RESPIRATORY (INHALATION) AS NEEDED
Status: CANCELLED
Start: 2021-02-04

## 2020-12-10 RX ORDER — SODIUM CHLORIDE 9 MG/ML
25 INJECTION, SOLUTION INTRAVENOUS CONTINUOUS
Status: CANCELLED | OUTPATIENT
Start: 2021-02-04

## 2020-12-10 RX ORDER — ONDANSETRON 2 MG/ML
8 INJECTION INTRAMUSCULAR; INTRAVENOUS AS NEEDED
Status: CANCELLED | OUTPATIENT
Start: 2021-02-04

## 2020-12-10 RX ORDER — DIPHENHYDRAMINE HYDROCHLORIDE 50 MG/ML
50 INJECTION, SOLUTION INTRAMUSCULAR; INTRAVENOUS AS NEEDED
Status: CANCELLED
Start: 2021-02-04

## 2020-12-10 RX ORDER — SODIUM CHLORIDE 0.9 % (FLUSH) 0.9 %
10 SYRINGE (ML) INJECTION AS NEEDED
Status: CANCELLED | OUTPATIENT
Start: 2021-02-04

## 2020-12-10 RX ORDER — EPINEPHRINE 1 MG/ML
0.3 INJECTION, SOLUTION, CONCENTRATE INTRAVENOUS AS NEEDED
Status: CANCELLED | OUTPATIENT
Start: 2021-02-04

## 2020-12-10 RX ADMIN — GOLIMUMAB 212.5 MG: 50 SOLUTION INTRAVENOUS at 11:00

## 2020-12-10 RX ADMIN — SODIUM CHLORIDE 25 ML/HR: 900 INJECTION, SOLUTION INTRAVENOUS at 10:57

## 2020-12-10 RX ADMIN — Medication 10 ML: at 10:37

## 2020-12-10 NOTE — PROGRESS NOTES
Outpatient Infusion Center   Visit Progress Note    7629 Patient admitted to [facility] for Iam Nguyen in stable condition. Assessment completed. No new concerns voiced. Covid Screening Questions:  1) Do you have any symptoms of COVID-19? SOB, coughing, fever, or generally not feeling well? no  2) Have you been exposed to COVID-19 recently? no  3) Have you had any recent contact with family/friend that has a pending COVID test? no    VS  Patient Vitals for the past 12 hrs:   Temp Pulse Resp BP SpO2   12/10/20 1145 -- (!) 49 -- (!) 146/85 --   12/10/20 1022 98.5 °F (36.9 °C) (!) 54 18 132/63 95 %         L arm PIV with positive blood return. Medications:  Medications Administered     0.9% sodium chloride infusion     Admin Date  12/10/2020 Action  New Bag Dose  25 mL/hr Rate  25 mL/hr Route  IntraVENous Administered By  Yoon Maza RN          golimumab (SIMPONI ARIA) 212.5 mg in 0.9% sodium chloride, overfill volume 127 mL infusion     Admin Date  12/10/2020 Action  Given Dose  212.5 mg Rate  254 mL/hr Route  IntraVENous Administered By  Yoon Maza RN          sodium chloride (NS) flush 10 mL     Admin Date  12/10/2020 Action  Given Dose  10 mL Route  IntraVENous Administered By  Yoon Maza RN                  9816 Patient tolerated treatment well. Patient discharged from [place] [address] in no distress. Patient aware of next appointment on 2/4 at 8. Labs  Recent Results (from the past 12 hour(s))   CBC WITH 3 PART DIFF    Collection Time: 12/10/20 10:29 AM   Result Value Ref Range    WBC 7.1 4.1 - 11.1 K/uL    RBC 3.96 (L) 4.10 - 5.70 M/uL    HGB 14.0 12.1 - 17.0 g/dL    HCT 40.9 36.6 - 50.3 %    .3 (H) 80.0 - 99.0 FL    MCH 35.4 (H) 26.0 - 34.0 PG    MCHC 34.2 30.0 - 36.5 g/dL    RDW 13.7 11.8 - 15.8 %    PLATELET 833 384 - 017 K/uL    NEUTROPHILS 71 32 - 75 %    MIXED CELLS 6 3.2 - 16.9 %    LYMPHOCYTES 23 12 - 49 %    ABS.  NEUTROPHILS 5.1 1.8 - 8.0 K/UL    ABS. MIXED CELLS 0.4 0.2 - 1.2 K/uL    ABS. LYMPHOCYTES 1.6 0.8 - 3.5 K/UL    DF AUTOMATED     METABOLIC PANEL, COMPREHENSIVE    Collection Time: 12/10/20 10:29 AM   Result Value Ref Range    Sodium 140 136 - 145 mmol/L    Potassium 4.3 3.5 - 5.1 mmol/L    Chloride 110 (H) 97 - 108 mmol/L    CO2 28 21 - 32 mmol/L    Anion gap 2 (L) 5 - 15 mmol/L    Glucose 78 65 - 100 mg/dL    BUN 19 6 - 20 MG/DL    Creatinine 0.94 0.70 - 1.30 MG/DL    BUN/Creatinine ratio 20 12 - 20      GFR est AA >60 >60 ml/min/1.73m2    GFR est non-AA >60 >60 ml/min/1.73m2    Calcium 9.2 8.5 - 10.1 MG/DL    Bilirubin, total 1.0 0.2 - 1.0 MG/DL    ALT (SGPT) 83 (H) 12 - 78 U/L    AST (SGOT) 22 15 - 37 U/L    Alk.  phosphatase 84 45 - 117 U/L    Protein, total 6.8 6.4 - 8.2 g/dL    Albumin 3.7 3.5 - 5.0 g/dL    Globulin 3.1 2.0 - 4.0 g/dL    A-G Ratio 1.2 1.1 - 2.2     SED RATE (ESR)    Collection Time: 12/10/20 10:29 AM   Result Value Ref Range    Sed rate, automated 10 0 - 20 mm/hr   C REACTIVE PROTEIN, QT    Collection Time: 12/10/20 10:29 AM   Result Value Ref Range    C-Reactive protein <0.29 0.00 - 0.60 mg/dL

## 2020-12-10 NOTE — PROGRESS NOTES
The results were reviewed. Slightly elevated liver function test (ALT 83). Will monitor. Remaining are good.

## 2020-12-16 ENCOUNTER — OFFICE VISIT (OUTPATIENT)
Dept: INTERNAL MEDICINE CLINIC | Age: 75
End: 2020-12-16
Payer: MEDICARE

## 2020-12-16 VITALS
DIASTOLIC BLOOD PRESSURE: 68 MMHG | WEIGHT: 236 LBS | HEART RATE: 52 BPM | HEIGHT: 68 IN | SYSTOLIC BLOOD PRESSURE: 118 MMHG | BODY MASS INDEX: 35.77 KG/M2 | OXYGEN SATURATION: 95 % | TEMPERATURE: 97.7 F | RESPIRATION RATE: 14 BRPM

## 2020-12-16 DIAGNOSIS — Z00.00 MEDICARE ANNUAL WELLNESS VISIT, SUBSEQUENT: Primary | ICD-10-CM

## 2020-12-16 DIAGNOSIS — Z71.89 ADVANCED DIRECTIVES, COUNSELING/DISCUSSION: ICD-10-CM

## 2020-12-16 DIAGNOSIS — Z12.5 PROSTATE CANCER SCREENING: ICD-10-CM

## 2020-12-16 DIAGNOSIS — I10 ESSENTIAL HYPERTENSION, BENIGN: ICD-10-CM

## 2020-12-16 DIAGNOSIS — M05.79 SEROPOSITIVE RHEUMATOID ARTHRITIS OF MULTIPLE SITES (HCC): ICD-10-CM

## 2020-12-16 DIAGNOSIS — Z85.46 HISTORY OF PROSTATE CANCER: ICD-10-CM

## 2020-12-16 DIAGNOSIS — F33.9 RECURRENT DEPRESSION (HCC): ICD-10-CM

## 2020-12-16 DIAGNOSIS — E78.2 MIXED HYPERLIPIDEMIA: ICD-10-CM

## 2020-12-16 LAB
CHOLEST SERPL-MCNC: 174 MG/DL
HDLC SERPL-MCNC: 54 MG/DL
HDLC SERPL: 3.2 {RATIO} (ref 0–5)
LDLC SERPL CALC-MCNC: 85.2 MG/DL (ref 0–100)
LIPID PROFILE,FLP: ABNORMAL
PSA SERPL-MCNC: 0 NG/ML (ref 0.01–4)
TRIGL SERPL-MCNC: 174 MG/DL (ref ?–150)
VLDLC SERPL CALC-MCNC: 34.8 MG/DL

## 2020-12-16 PROCEDURE — G8536 NO DOC ELDER MAL SCRN: HCPCS | Performed by: INTERNAL MEDICINE

## 2020-12-16 PROCEDURE — G8427 DOCREV CUR MEDS BY ELIG CLIN: HCPCS | Performed by: INTERNAL MEDICINE

## 2020-12-16 PROCEDURE — G0439 PPPS, SUBSEQ VISIT: HCPCS | Performed by: INTERNAL MEDICINE

## 2020-12-16 PROCEDURE — G8752 SYS BP LESS 140: HCPCS | Performed by: INTERNAL MEDICINE

## 2020-12-16 PROCEDURE — 3017F COLORECTAL CA SCREEN DOC REV: CPT | Performed by: INTERNAL MEDICINE

## 2020-12-16 PROCEDURE — G9717 DOC PT DX DEP/BP F/U NT REQ: HCPCS | Performed by: INTERNAL MEDICINE

## 2020-12-16 PROCEDURE — 1101F PT FALLS ASSESS-DOCD LE1/YR: CPT | Performed by: INTERNAL MEDICINE

## 2020-12-16 PROCEDURE — G8417 CALC BMI ABV UP PARAM F/U: HCPCS | Performed by: INTERNAL MEDICINE

## 2020-12-16 PROCEDURE — G8754 DIAS BP LESS 90: HCPCS | Performed by: INTERNAL MEDICINE

## 2020-12-16 NOTE — PATIENT INSTRUCTIONS
Medicare Wellness Visit, Male The best way to live healthy is to have a lifestyle where you eat a well-balanced diet, exercise regularly, limit alcohol use, and quit all forms of tobacco/nicotine, if applicable. Regular preventive services are another way to keep healthy. Preventive services (vaccines, screening tests, monitoring & exams) can help personalize your care plan, which helps you manage your own care. Screening tests can find health problems at the earliest stages, when they are easiest to treat. [facility] follows the current, evidence-based guidelines published by the [facility] Darl[facility] ([facility]) when recommending preventive services for our patients. Because we follow these guidelines, sometimes recommendations change over time as research supports it. (For example, a prostate screening blood test is no longer routinely recommended for men with no symptoms). Of course, you and your doctor may decide to screen more often for some diseases, based on your risk and co-morbidities (chronic disease you are already diagnosed with). Preventive services for you include: - Medicare offers their members a free annual wellness visit, which is time for you and your primary care provider to discuss and plan for your preventive service needs. Take advantage of this benefit every year! 
-All adults over age 72 should receive the recommended pneumonia vaccines. Current USPSTF guidelines recommend a series of two vaccines for the best pneumonia protection.  
-All adults should have a flu vaccine yearly and tetanus vaccine every 10 years. 
-All adults age 48 and older should receive the shingles vaccines (series of two vaccines). -All adults age 38-68 who are overweight should have a diabetes screening test once every three years. -Other screening tests & preventive services for persons with diabetes include: an eye exam to screen for diabetic retinopathy, a kidney function test, a foot exam, and stricter control over your cholesterol.  
-Cardiovascular screening for adults with routine risk involves an electrocardiogram (ECG) at intervals determined by the provider.  
-Colorectal cancer screening should be done for adults age 54-65 with no increased risk factors for colorectal cancer. There are a number of acceptable methods of screening for this type of cancer. Each test has its own benefits and drawbacks. Discuss with your provider what is most appropriate for you during your annual wellness visit. The different tests include: colonoscopy (considered the best screening method), a fecal occult blood test, a fecal DNA test, and sigmoidoscopy. 
-All adults born between [facility] should be screened once for Hepatitis C. 
-An Abdominal Aortic Aneurysm (AAA) Screening is recommended for men age 73-68 who has ever smoked in their lifetime. Here is a list of your current Health Maintenance items (your personalized list of preventive services) with a due date: 
Health Maintenance Due Topic Date Due  Shingles Vaccine (1 of 2) 10/11/1996  Glaucoma Screening   06/23/2019 [address] [place] [address] Annual Well Visit  12/16/2020  Cholesterol Test   12/16/2020

## 2020-12-16 NOTE — PROGRESS NOTES
Nanette Tinoco is a 77 y.o. male who presents today for Annual Wellness Visit  . He has a history of   Patient Active Problem List   Diagnosis Code    Mixed hyperlipidemia E78.2    Other abnormal glucose R73.09    Essential hypertension, benign I10    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    Allergic rhinitis, cause unspecified J30.9    Reflux esophagitis K21.00    Benign neoplasm of colon D12.6    Contact dermatitis and other eczema, due to unspecified cause L25.9    Bunion M21.619    History of prostate cancer Z85.46    Advanced care planning/counseling discussion Z71.89    Low testosterone R79.89    Primary osteoarthritis of both knees M17.0    Seropositive rheumatoid arthritis of multiple sites ([facility] [address] M05.79    Long-term use of immunosuppressant medication Z79.899    Vitamin D deficiency E55.9    Recurrent depression (HCC) F33.9    Severe obesity with body mass index (BMI) of 35.0 to 39.9 with serious comorbidity (HCC) E66.01    Gout M10.9    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 Today patient is here for his Medicare wellness exam.. Hypertension  Hypertension ROS: taking medications as instructed, no medication side effects noted, no TIA's, no chest pain on exertion, no dyspnea on exertion, no swelling of ankles     reports that he has quit smoking. He has never used smokeless tobacco.    reports current alcohol use of about 4.0 standard drinks of alcohol per week. BP Readings from Last 2 Encounters:   12/16/20 118/68   12/10/20 (!) 146/85     Hyperlipidemia  Statin therapy  ROS: taking medications as instructed, no medication side effects noted  No new myalgias, no joint pains, no weakness  No TIA's, no chest pain on exertion, no dyspnea on exertion, no swelling of ankles.    Lab Results   Component Value Date/Time    Cholesterol, total 191 12/16/2019 10:45 AM    HDL Cholesterol 47 12/16/2019 10:45 AM    LDL, calculated 96.8 12/16/2019 10:45 AM    VLDL, calculated 47.2 12/16/2019 10:45 AM    Triglyceride 236 (H) 12/16/2019 10:45 AM    CHOL/HDL Ratio 4.1 12/16/2019 10:45 AM     RA: follows with Dr. Khadijah Huizar. Symptoms are controlled. Health maintenance hx includes:  Exercise: moderately active. Form of exercise: Golf and walking. Diet: generally follows a low fat low cholesterol diet, nonsmoker, alcohol intake beer  Social: Retired from [employer]. Enjoys golfing.               FXXGM with wife,  2 adult children. 4 grandchildren. Cancer screening:    Colon cancer screening:  Last Colonoscopy: 2019 and was abnormal: 3 yrs   Prostate cancer screening: Undetectable. History of prostatectomy. Immunizations:     Immunization History   Administered Date(s) Administered    (RETIRED) Pneumococcal Vaccine (Unspecified Type) 10/21/2011    Influenza High Dose Vaccine PF 10/09/2014, 11/04/2015, 10/14/2016, 10/16/2018    Influenza Vaccine 10/08/2019, 09/12/2020    Influenza Vaccine (Tri) Adjuvanted (>65 Yrs FLUAD TRI [id number]) 10/08/2019    Influenza Vaccine Split 09/21/2011    Influenza Vaccine Whole 09/17/2010    Influenza, Quadrivalent, Adjuvanted (>65 Yrs FLUAD QUAD [id number]) 09/26/2020    Pneumococcal Conjugate (PCV-13) 01/01/2015    Pneumococcal Polysaccharide (PPSV-23) 10/09/2014    TDAP Vaccine 12/15/2010    Tdap 12/15/2010, 10/04/2017    Zoster Vaccine, Live 10/21/2011      Immunization status: up to date and documented. Discussed new shingles vaccine. ROS  Review of Systems   Constitutional: Negative for chills, fever and weight loss. HENT: Negative for congestion and sore throat. Eyes: Negative for blurred vision, double vision and photophobia. Respiratory: Negative for cough and shortness of breath. Cardiovascular: Negative for chest pain, palpitations and leg swelling. Gastrointestinal: Negative for abdominal pain, constipation, diarrhea, heartburn, nausea and vomiting. Genitourinary: Negative for dysuria, frequency and urgency. Musculoskeletal: Negative for joint pain and myalgias. Skin: Negative for rash. Neurological: Negative. Negative for headaches. Endo/Heme/Allergies: Does not bruise/bleed easily. Psychiatric/Behavioral: Negative for memory loss and suicidal ideas. Visit Vitals  /68 (BP 1 Location: Left arm, BP Patient Position: Sitting)   Pulse (!) 52   Temp 97.7 °F (36.5 °C) (Oral)   Resp 14   Ht 5' 8\" (1.727 m)   Wt 236 lb (107 kg)   SpO2 95%   BMI 35.88 kg/m²       Physical Exam  Constitutional:       Appearance: He is well-developed. He is not diaphoretic. HENT:      Head: Normocephalic and atraumatic. Eyes:      Pupils: Pupils are equal, round, and reactive to light. Neck:      Musculoskeletal: Normal range of motion and neck supple. Vascular: No JVD. Cardiovascular:      Rate and Rhythm: Normal rate and regular rhythm. Heart sounds: No murmur. Pulmonary:      Effort: Pulmonary effort is normal. No respiratory distress. Breath sounds: Normal breath sounds. No wheezing. Abdominal:      General: Bowel sounds are normal. There is no distension. Palpations: Abdomen is soft. Tenderness: There is no abdominal tenderness. Musculoskeletal: Normal range of motion. Skin:     General: Skin is warm and dry. Neurological:      Mental Status: He is alert and oriented to person, place, and time. Cranial Nerves: No cranial nerve deficit.    Psychiatric:         Behavior: Behavior normal.           Current Outpatient Medications   Medication Sig    allopurinoL (ZYLOPRIM) 300 mg tablet TAKE ONE TABLET BY MOUTH ONE TIME DAILY    folic acid (FOLVITE) 1 mg tablet TAKE ONE TABLET BY MOUTH DAILY    ergocalciferol (ERGOCALCIFEROL) 1,250 mcg (50,000 unit) capsule TAKE ONE CAPSULE BY MOUTH EVERY WEEK (7 DAYS)    methotrexate (RHEUMATREX) 2.5 mg tablet Take 8 Tabs by mouth every Wednesday. TAKE 8 TABLETS BY MOUTH WEEKLY ON WEDNESDAYS    pantoprazole (PROTONIX) 40 mg tablet Take 1 Tab by mouth two (2) times a day.  fexofenadine (ALLEGRA) 180 mg tablet Take  by mouth.  colestipol (COLESTID) 1 gram tablet Take 2 Tabs by mouth two (2) times a day.  guaiFENesin ER (MUCINEX) 600 mg ER tablet Take 600 mg by mouth as needed.  meloxicam (MOBIC) 7.5 mg tablet Take  by mouth daily.  0.9% sodium chloride solp 100 mL with golimumab 12.5 mg/mL soln 2 mg/kg 2 mg/kg by IntraVENous route once. Every eight weeks    lisinopril (PRINIVIL, ZESTRIL) 40 mg tablet Take 40 mg by mouth daily.  aspirin delayed-release 81 mg tablet Take  by mouth daily.  albuterol (VENTOLIN HFA) 90 mcg/actuation inhaler Take  by inhalation as needed.  omega-3 fatty acids-vitamin e (FISH OIL) 1,000 mg Cap Take 1 Cap by mouth.  citalopram (CELEXA) 20 mg tablet Take 1 Tab by mouth daily.  atorvastatin (LIPITOR) 40 mg tablet Take 1 Tab by mouth daily.  amlodipine (NORVASC) 2.5 mg tablet Take 1 Tab by mouth daily.  clobetasol (TEMOVATE) 0.05 % ointment Apply  to affected area two (2) times a day. No current facility-administered medications for this visit.          Past Medical History:   Diagnosis Date    Allergic rhinitis     Arthritis     RA    Basal cell carcinoma (BCC) in situ of skin     shoudler and scalp    Cancer (HCC)     prostate    GERD (gastroesophageal reflux disease)     Gout     Hypercholesterolemia     Hypertension     Melanoma in situ of back ([address]     Skin cancer (melanoma) ([address]     Sleep apnea     Bipap at home      Past Surgical History:   Procedure Laterality Date    COLONOSCOPY N/A 5/6/2019    COL[facility] at [facility] ENDOSCOPY    ENDOSCOPY, COLON, DIAGNOSTIC      09, due 12, done 11, due 14    HX CATARACT REMOVAL  04/2019    HX HEENT      wisdom teeth extraction    HX KNEE ARTHROSCOPY Left     HX KNEE REPLACEMENT Left 2012    HX OTHER SURGICAL N/A 05/31/2018    Basal cell removal of scalp    HX PROSTATECTOMY      HX TONSILLECTOMY        Social History     Tobacco Use    Smoking status: Former Smoker    Smokeless tobacco: Never Used   Substance Use Topics    Alcohol use: Yes     Alcohol/week: 4.0 standard drinks     Types: 4 Cans of beer per week     Comment: 4 days per week      Family History   Problem Relation Age of Onset    Dementia Mother     Heart Disease Father         CAD    Kidney Disease Father         renal failure    Diabetes Paternal Grandmother     Cancer Paternal Grandfather         colon    Cancer Other         family hx colon cancer        Allergies   Allergen Reactions    Stings [Sting, Bee] Swelling     Swelling at site        Assessment/Plan  Diagnoses and all orders for this visit:    1. Medicare annual wellness visit, subsequent- Kevin Morales was counseled on age-appropriate/ guideline-based risk prevention behaviors and screening for a 77y.o. year old   male . We also discussed adjustments in screening based on family history if necessary. Printed instructions for preventative screening guidelines and healthy behaviors given to patient with after visit summary. 2. Advanced directives, counseling/discussion    3. Recurrent depression ([facility] [address]- stable    4. Essential hypertension, benign- stable    5. Mixed hyperlipidemia- stable  -     LIPID PANEL; Future    6. History of prostate cancer  -     PSA SCREENING (SCREENING); Future    7. Seropositive rheumatoid arthritis of multiple sites ([facility] [address]- stable. Seeing Rheum. Sarah Dorsey MD  12/16/2020    This note was created with the help of speech recognition software Dwan Boas) and may contain some 'sound alike' errors.    This is the Subsequent Medicare Annual Wellness Exam, performed 12 months or more after the Initial AWV or the last Subsequent AWV    I have reviewed the patient's medical history in detail and updated the computerized patient record. Depression Risk Factor Screening:     3 most recent PHQ Screens 12/16/2020   Little interest or pleasure in doing things Not at all   Feeling down, depressed, irritable, or hopeless Not at all   Total Score PHQ 2 0   Trouble falling or staying asleep, or sleeping too much -   Feeling tired or having little energy -   Poor appetite, weight loss, or overeating -   Feeling bad about yourself - or that you are a failure or have let yourself or your family down -   Trouble concentrating on things such as school, work, reading, or watching TV -   Moving or speaking so slowly that other people could have noticed; or the opposite being so fidgety that others notice -   Thoughts of being better off dead, or hurting yourself in some way -   PHQ 9 Score -   How difficult have these problems made it for you to do your work, take care of your home and get along with others -       Alcohol Risk Screen    Do you average more than 1 drink per night or more than 7 drinks a week: No    In the past three months have you have had more than 4 drinks containing alcohol on one occasion: No        Functional Ability and Level of Safety:    Hearing: Hearing is good. Activities of Daily Living: The home contains: no safety equipment. Patient does total self care      Ambulation: with no difficulty     Fall Risk:  Fall Risk Assessment, last 12 mths 12/16/2020   Able to walk? Yes   Fall in past 12 months? No      Abuse Screen:  Patient is not abused       Cognitive Screening    Has your family/caregiver stated any concerns about your memory: no      Assessment/Plan   Education and counseling provided:  Are appropriate based on today's review and evaluation  End-of-Life planning (with patient's consent)  Prostate cancer screening tests (PSA, covered annually)  Colorectal cancer screening tests    Diagnoses and all orders for this visit:    1.  Medicare annual wellness visit, subsequent    2.  Advanced directives, counseling/discussion        Health Maintenance Due     Health Maintenance Due   Topic Date Due    Shingrix Vaccine Age 49> (1 of 2) 10/11/1996    GLAUCOMA SCREENING Q2Y  06/23/2019    Medicare Yearly Exam  12/16/2020    Lipid Screen  12/16/2020       Patient Care Team   Patient Care Team:  Gala Burns MD as PCP - General (Internal Medicine)  Gala Burns MD as PCP - [facility]  Brannon Slater MD as Physician (Cardiology)    History     Patient Active Problem List   Diagnosis Code    Mixed hyperlipidemia E78.2    Other abnormal glucose R73.09    Essential hypertension, benign I10    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    Allergic rhinitis, cause unspecified J30.9    Reflux esophagitis K21.00    Benign neoplasm of colon D12.6    Contact dermatitis and other eczema, due to unspecified cause L25.9    Bunion M21.619    History of prostate cancer Z85.46    Advanced care planning/counseling discussion Z71.89    Low testosterone R79.89    Primary osteoarthritis of both knees M17.0    Seropositive rheumatoid arthritis of multiple sites ([address] M05.79    Long-term use of immunosuppressant medication Z79.899    Vitamin D deficiency E55.9    Recurrent depression ([address] F33.9    Severe obesity with body mass index (BMI) of 35.0 to 39.9 with serious comorbidity ([address] E66.01    Gout M10.9    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 [address]     Sleep apnea     Bipap at home      Past Surgical History:   Procedure Laterality Date    COLONOSCOPY N/A 5/6/2019    COLONOSCOPY performed by Kierra Johnson MD at [address] [place] [address] [place], COLON, DIAGNOSTIC      09, due 12, done 11, due 14    HX CATARACT REMOVAL  04/2019    HX HEENT      wisdom teeth extraction    HX KNEE ARTHROSCOPY Left     HX KNEE REPLACEMENT Left 2012    HX OTHER SURGICAL N/A 05/31/2018    Basal cell removal of scalp    HX PROSTATECTOMY      HX TONSILLECTOMY       Current Outpatient Medications   Medication Sig Dispense Refill    allopurinoL (ZYLOPRIM) 300 mg tablet TAKE ONE TABLET BY MOUTH ONE TIME DAILY 90 Tab 5    folic acid (FOLVITE) 1 mg tablet TAKE ONE TABLET BY MOUTH DAILY 90 Tab 5    ergocalciferol (ERGOCALCIFEROL) 1,250 mcg (50,000 unit) capsule TAKE ONE CAPSULE BY MOUTH EVERY WEEK (7 DAYS) 12 Cap 1    methotrexate (RHEUMATREX) 2.5 mg tablet Take 8 Tabs by mouth every Wednesday. TAKE 8 TABLETS BY MOUTH WEEKLY ON WEDNESDAYS 96 Tab 0    pantoprazole (PROTONIX) 40 mg tablet Take 1 Tab by mouth two (2) times a day. 180 Tab 1    fexofenadine (ALLEGRA) 180 mg tablet Take  by mouth.  colestipol (COLESTID) 1 gram tablet Take 2 Tabs by mouth two (2) times a day. 60 Tab 5    guaiFENesin ER (MUCINEX) 600 mg ER tablet Take 600 mg by mouth as needed.  meloxicam (MOBIC) 7.5 mg tablet Take  by mouth daily.  0.9% sodium chloride solp 100 mL with golimumab 12.5 mg/mL soln 2 mg/kg 2 mg/kg by IntraVENous route once. Every eight weeks      lisinopril (PRINIVIL, ZESTRIL) 40 mg tablet Take 40 mg by mouth daily.  aspirin delayed-release 81 mg tablet Take  by mouth daily.  albuterol (VENTOLIN HFA) 90 mcg/actuation inhaler Take  by inhalation as needed.  omega-3 fatty acids-vitamin e (FISH OIL) 1,000 mg Cap Take 1 Cap by mouth.  citalopram (CELEXA) 20 mg tablet Take 1 Tab by mouth daily.  30 Tab 11    atorvastatin (LIPITOR) 40 mg tablet Take 1 Tab by mouth daily. 30 Tab 11    amlodipine (NORVASC) 2.5 mg tablet Take 1 Tab by mouth daily. 30 Tab 11    clobetasol (TEMOVATE) 0.05 % ointment Apply  to affected area two (2) times a day. Allergies   Allergen Reactions    Stings [Sting, Bee] Swelling     Swelling at site       Family History   Problem Relation Age of Onset    Dementia Mother     Heart Disease Father         CAD    Kidney Disease Father         renal failure    Diabetes Paternal Grandmother     Cancer Paternal Grandfather         colon    Cancer Other         family hx colon cancer     Social History     Tobacco Use    Smoking status: Former Smoker    Smokeless tobacco: Never Used   Substance Use Topics    Alcohol use:  Yes     Alcohol/week: 4.0 standard drinks     Types: 4 Cans of beer per week     Comment: 4 days per week

## 2020-12-16 NOTE — ACP (ADVANCE CARE PLANNING)
Advance Care Planning       Advance Care Planning (ACP) Physician/NP/PA (Provider) Conversation        Date of ACP Conversation: 12/16/2020    Conversation Conducted with:   Patient with Decision Making Capacity      Conversation Outcomes / Follow-Up Plan:   ACP complete - no further action today      Length of Voluntary ACP Conversation in minutes:  <16 minutes (Non-Billable)      Adrian Barton MD

## 2020-12-29 ENCOUNTER — VIRTUAL VISIT (OUTPATIENT)
Dept: RHEUMATOLOGY | Age: 75
End: 2020-12-29
Payer: MEDICARE

## 2020-12-29 DIAGNOSIS — M1A.0720 CHRONIC IDIOPATHIC GOUT INVOLVING TOE OF LEFT FOOT WITHOUT TOPHUS: ICD-10-CM

## 2020-12-29 DIAGNOSIS — M05.79 SEROPOSITIVE RHEUMATOID ARTHRITIS OF MULTIPLE SITES (HCC): ICD-10-CM

## 2020-12-29 DIAGNOSIS — Z79.60 LONG-TERM USE OF IMMUNOSUPPRESSANT MEDICATION: Primary | ICD-10-CM

## 2020-12-29 DIAGNOSIS — E55.9 VITAMIN D DEFICIENCY: ICD-10-CM

## 2020-12-29 PROCEDURE — G8428 CUR MEDS NOT DOCUMENT: HCPCS | Performed by: INTERNAL MEDICINE

## 2020-12-29 PROCEDURE — 99215 OFFICE O/P EST HI 40 MIN: CPT | Performed by: INTERNAL MEDICINE

## 2020-12-29 PROCEDURE — G8756 NO BP MEASURE DOC: HCPCS | Performed by: INTERNAL MEDICINE

## 2020-12-29 PROCEDURE — 3017F COLORECTAL CA SCREEN DOC REV: CPT | Performed by: INTERNAL MEDICINE

## 2020-12-29 PROCEDURE — 1101F PT FALLS ASSESS-DOCD LE1/YR: CPT | Performed by: INTERNAL MEDICINE

## 2020-12-29 PROCEDURE — G0463 HOSPITAL OUTPT CLINIC VISIT: HCPCS | Performed by: INTERNAL MEDICINE

## 2020-12-29 PROCEDURE — G9717 DOC PT DX DEP/BP F/U NT REQ: HCPCS | Performed by: INTERNAL MEDICINE

## 2020-12-29 RX ORDER — METHOTREXATE 2.5 MG/1
20 TABLET ORAL
Qty: 96 TAB | Refills: 1 | Status: SHIPPED | OUTPATIENT
Start: 2020-12-30 | End: 2021-07-13 | Stop reason: ALTCHOICE

## 2020-12-29 RX ORDER — FOLIC ACID 1 MG/1
TABLET ORAL
Qty: 90 TAB | Refills: 5 | Status: SHIPPED | OUTPATIENT
Start: 2020-12-29 | End: 2021-09-14 | Stop reason: SDUPTHER

## 2020-12-29 NOTE — PROGRESS NOTES
REASON FOR VISIT    This is a follow-up visit for Mr. Corrina Escobar for     ICD-10-CM   1. Seropositive rheumatoid arthritis of multiple sites ([facility] M05.79   2. 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     The patient has consented for synchronous (real-time) Telemedicine (audio-video technology) on 12/29/2020 for their care to be delivered over telemedicine in place of their regularly scheduled office visit pursuant to the emergency declaration under the [facility] [place] [address] waiver authority and the [employer] Act, this Virtual  Visit was conducted, with patient's consent, to reduce the patient's risk of exposure to COVID-19 and provide continuity of care for an established patient. Services were provided through a video synchronous discussion virtually to substitute for in-person clinic visit. Inflammatory arthritis phenotype includes:  Anti-CCP positive: yes (>250)  Rheumatoid factor positive: yes (>650)  Erosive disease: yes  Extra-articular manifestations include: none    Immunosuppression Screening (10/15/2020):  Quantiferon TB: negative    PPD:  Not performed  Hepatitis B: negative   Hepatitis C: negative     Therapy History includes:  Current DMARD therapy include: methotrexate 20 mg every Wednesday (11/20/2017 to 6/15/2020; 10/15/2020), Simponi Aria every 8 weeks (5/08/2018 to present)  Prior DMARD therapy include: methotrexate 17.5 mg every Wednesday (6/15/2020 to 10/14/2020)  Discontinued DMARDs because of inefficacy: None  Discontinued DMARDs because of side effects: None  Unaffordable DMARDs: Enbrel    HISTORY OF PRESENT ILLNESS    Mr. Corrina Escobar returns for a follow-up.      On his last visit, due to breakthrough with Kalani Garcia around 2 weeks which was an itnerval development after dropping his methotrexate from 20 mg weekly to 17.5 mg weekly, I resumed 20 mg weekly and continued Katii Aria infusions every 8 weeks. His most infusion recent was 12/10/2020, which he has taken with good tolerance. The plan was to change his infusion frequency, if he continued to have breakthrough with resumption of 20 mg methotrexate dosing. Today, he feels much better. He no longer has breakthrough (2 weeks of fatigue prior to the infusion). He denies pain, swelling, or stiffness in his hands or wrists. He denies interval gouty flares. He denies fever, weight loss, blurred vision, vision loss, oral ulcers, ankle swelling, dry cough, dyspnea, nausea, vomiting, dysphagia, abdominal pain, black or bloody stool, fall since last visit, rash, easy bruising and increased thirst.    Last toxicity monitoring by blood work was done on 12/10/2020 and did not reveal any significant adverse effects, except ALT 83 (12-78). Most recent inflammatory markers from 12/10/2020 revealed a ESR 10 mm/hr and CRP <0.29 mg/L. The patient has not had any interval hospital admissions, infections, or surgeries. REVIEW OF SYSTEMS    A comprehensive review of systems was performed and pertinent results are documented in the HPI, review of systems is otherwise non-contributory. PAST MEDICAL HISTORY    He has a past medical history of Allergic rhinitis, Arthritis, Basal cell carcinoma (BCC) in situ of skin, Cancer ([facility] GERD (gastroesophageal reflux disease), Gout, Hypercholesterolemia, Hypertension, Melanoma in situ of back ([facility] Skin cancer (melanoma) ([facility] and Sleep apnea. FAMILY HISTORY    His family history includes Cancer in his paternal grandfather and another family member; Dementia in his mother; Diabetes in his paternal grandmother; Heart Disease in his father; Kidney Disease in his father. SOCIAL HISTORY    He reports that he has quit smoking. He has never used smokeless tobacco. He reports current alcohol use of about 4.0 standard drinks of alcohol per week.  He reports that he does not use drugs.    MEDICATIONS    Current Outpatient Medications   Medication Sig    folic acid (FOLVITE) 1 mg tablet TAKE ONE TABLET BY MOUTH DAILY    [START ON 12/30/2020] methotrexate (RHEUMATREX) 2.5 mg tablet Take 8 Tabs by mouth every Wednesday. TAKE 8 TABLETS BY MOUTH WEEKLY ON WEDNESDAYS    allopurinoL (ZYLOPRIM) 300 mg tablet TAKE ONE TABLET BY MOUTH ONE TIME DAILY    ergocalciferol (ERGOCALCIFEROL) 1,250 mcg (50,000 unit) capsule TAKE ONE CAPSULE BY MOUTH EVERY WEEK (7 DAYS)    0.9% sodium chloride solp 100 mL with golimumab 12.5 mg/mL soln 2 mg/kg 2 mg/kg by IntraVENous route once. Every eight weeks    pantoprazole (PROTONIX) 40 mg tablet Take 1 Tab by mouth two (2) times a day.  fexofenadine (ALLEGRA) 180 mg tablet Take  by mouth.  colestipol (COLESTID) 1 gram tablet Take 2 Tabs by mouth two (2) times a day.  guaiFENesin ER (MUCINEX) 600 mg ER tablet Take 600 mg by mouth as needed.  meloxicam (MOBIC) 7.5 mg tablet Take  by mouth daily.  clobetasol (TEMOVATE) 0.05 % ointment Apply  to affected area two (2) times a day.  lisinopril (PRINIVIL, ZESTRIL) 40 mg tablet Take 40 mg by mouth daily.  aspirin delayed-release 81 mg tablet Take  by mouth daily.  albuterol (VENTOLIN HFA) 90 mcg/actuation inhaler Take  by inhalation as needed.  omega-3 fatty acids-vitamin e (FISH OIL) 1,000 mg Cap Take 1 Cap by mouth.  citalopram (CELEXA) 20 mg tablet Take 1 Tab by mouth daily.  atorvastatin (LIPITOR) 40 mg tablet Take 1 Tab by mouth daily.  amlodipine (NORVASC) 2.5 mg tablet Take 1 Tab by mouth daily. No current facility-administered medications for this visit. ALLERGIES    Allergies   Allergen Reactions    Stings [Sting, Bee] Swelling     Swelling at site       PHYSICAL EXAMINATION    There were no vitals taken for this visit. There is no height or weight on file to calculate BMI.     General: Patient is alert, oriented x 3, not in acute distress, wife at bedside    Chest:  Breathing comfortably at room air    Musculoskeletal exam:  A comprehensive musculoskeletal exam was NOT performed for all joints of each upper and lower extremity and assessed for swelling, tenderness and range of motion. Positive results are documented as below:    Previous Exam    Bilateral Olivia and [place]berden nodes. Bilateral knee crepitus without effusion.   Pes planus   Bilateral hallux valgus (LEFT more than right)    Z-Deformities:   no  [place] Neck Deformities:  no  Boutonierre's Deformities:  no  Ulnar Deviation:   Yes (bilateral)  MCP Subluxation:  no     Joint Count 10/14/2020 12/9/2019 6/7/2019 3/4/2019 12/3/2018 9/4/2018 6/4/2018   Patient pain (0-100) 50 20 5 0 20 15 0   MHAQ 0 0 0 0 0 0 0   Left elbow - Tender - - - - - - -   Left wrist- Tender 0 0 0 - - 1 0   Left wrist- Swollen 0 0 0 - - 1 1   Left 1st MCP - Tender - - - - - - -   Left 1st MCP - Swollen - - - - - 1 -   Left 2nd MCP - Tender - - - 0 0 - -   Left 2nd MCP - Swollen - - - 1 1 1 1   Left 3rd MCP - Tender - - - 0 - - -   Left 3rd MCP - Swollen - - - 1 - 1 1   Left 4th MCP - Tender - - - - - - -   Left 5th MCP - Tender - - - - - - -   Left thumb IP - Tender - - - - - - -   Left thumb IP - Swollen - - - - - - -   Left 2nd PIP - Tender - - - - - - -   Left 3rd PIP - Tender - - - - - - -   Left 4th PIP - Tender - - - - - - -   Left 5th PIP - Tender - - - - - - -   Right wrist- Tender - - - - - - -   Right wrist- Swollen - - - - - 1 1   Right 1st MCP - Tender - - - - - - -   Right 1st MCP - Swollen - - - - - 1 1   Right 2nd MCP - Tender - - - - - - -   Right 2nd MCP - Swollen - - - - - 1 1   Right 3rd MCP - Tender - - - - - - -   Right 3rd MCP - Swollen - - - - - 1 1   Right 4th MCP - Tender - - - 0 - - -   Right 4th MCP - Swollen - - - 1 - - 1   Right 5th MCP - Swollen - - - - - - -   Right thumb IP - Tender - - - - - - -   Right 2nd PIP - Tender - - - - - - -   Right 2nd PIP - Swollen - - - - - - -   Right 3rd PIP - Tender - - - - - - -   Right 3rd PIP - Swollen - - - - - - 1   Right 4th PIP - Swollen - - - - - - 1   Right knee - Tender - - - - - - -   Right knee - Swollen - - - - - - -   Tender Joint Count (Total) 0 0 0 0 0 1 0   Swollen Joint Count (Total) 0 0 0 3 1 8 10   Physician Assessment (0-10) 0 0 0 0.5 0 2 2   Patient Assessment (0-10) 4 0.5 0.5 0 2 1.5 0   CDAI Total (calculated) 4 0.5 0.5 3.5 3 12.5 12       DATA REVIEW    Laboratory     Recent laboratory results were reviewed, summarized, and discussed with the patient. Imaging    Musculoskeletal Ultrasound    None    Radiographs    Bilateral Hand 11/20/2017: RIGHT: No fracture or dislocation on plain film. The bones are osteopenic. Mild narrowing of the radiocarpal joint. Probable erosion of the radius and lunate at the radiocarpal joint. No widening of the intercarpal spaces. Subtle erosion of the ulnar styloid. Mild narrowing of the triscaphe joint is age-appropriate. Irregular arthritis of the first and second CMC joints includes osteophytes and erosions. There are erosions of the first through fourth metacarpal heads at the MCP points. Moderate narrowing and anterior subluxation of the second MCP joint. IP joints are within normal limits. No chondrocalcinosis or periosteal reaction. LEFT: No fracture or dislocation on plain film. The bones are osteopenic. Moderate narrowing of the radiocarpal joint with subtle erosions of the lunate. Marrow DR UVJ with erosions. No definite ulnar styloid erosion. Triscaphe and first [facility] joint osteoarthritis are age-appropriate. Large erosions on both sides of the second MCP joint and in the third metacarpal head. IP joints are within normal limits. No chondrocalcinosis or periosteal reaction. Bilateral Foot 11/20/2017: RIGHT: No fracture or dislocation on plain film. Bones are osteopenic. Talonavicular joint space narrowing, osteophytes, and erosions. Subtalar arthritis is partially imaged.  TMT joints are within normal limits. Severe narrowing of the first MTP joint with osteophytes and erosions. There is erosion and the fifth tarsal head. Mild narrowing of the second MTP joint without definable erosion. No periosteal reaction. LEFT: No fracture or dislocation on plain film. Bones are osteopenic. Intertarsal joints are within normal limits. Moderate narrowing of the first MTP joint with central and marginal erosions. 35 degrees hallux valgus angulation and fibular subluxation of the first proximal phalanx. There are also erosions in the laterally subluxed hallux sesamoids. Mild joint space narrowing and erosions of the third MTP joint. Fragmentation of the fourth middle phalanx is adjacent to the fourth PIP joint erosions. There are also erosions of the second and fifth PIP joints. First IP joint osteoarthritis is mild. CT Imaging    CT Right Upper Extremity with contrast 10/19/2017: examination of the soft tissues demonstrates no drainable fluid collection. There is no pathologically enlarged nodes within the right axilla. Alignment is normal. There is no bone destruction or fracture. No significant fatty atrophy. MR Imaging    None    DXA     None    ASSESSMENT AND PLAN    This is a follow-up visit for Mr. Tahira Benson. 1) Seropositive Erosive Rheumatoid Arthritis. He presented after activation of underlying Rheumatoid Arthritis which appears to have been quiescent for more than 30 years from the TDAP vaccine. He remembers being diagnosed with arthritis in his 30's that was treated with an injection in his finger. He has bilateral ulnar deviation which corresponds with the chronicity of his disease. Radiographs showed erosive disease confirming chronicity. He is maintained on methotrexate 20 mg every Wednesday and Simponi Aria infusions with good tolerance. He most recent infusion was 12/10/2020. He no longer has breakthrough. He denies joint pain, stiffness or swelling today.       His CDAI was previously 4 (previously 0.5, 0.5, 3.5, 3, 12.5, 12, 40, 27, 37) with 0 tender and 0 swollen joints, consistent with low disease activity. His PtGlobal is influenced by his back issues. I will therefore continue methotrexate 20 mg weekly and Simponi Aria every 8 weeks. 2) Chronic Gout. He has a history of gout in his left foot. He is on allopurinol 300 mg daily. His uric acid was 4.5 mg/dL (previously 4.5, 4.9 mg/dL). He denies interval flares. 3)  Long Term Use of Immunosuppressants. The patient remains on immunomodulatory medications (methotrexate, Simponi Aria) and requires frequent toxicity monitoring by blood work. 4) Vitamin D Deficiency. His vitamin D level was 70.4 (previously 40.4, 39.2, 27.7, 22.6). His dose was changed from weekly ergocalciferol 50,000 to biweekly. 5) Basal Cell Carcinoma. Lesion removed on his scalp 5/31/2018. Wound without secondary infection. He continues to have lesions burnt. 6) History of Melanoma. He had melanoma removed from his back 10/2017. He did not require chemotherapy. He has skin exams every 3 months. He has reviewed his medications, including Yoel Cornell, with his dermatologist. He understands to continue close surveillance and inform us if he has any new melanoma lesions. 7) Bilateral Knee Osteoarthritis. This was not an active issue today. 8) Bilateral Pes Planus. This is no longer an active issue. I suspect is from his pes planus. I recommended inserts. 9) Atypical Chest Pain. This was evaluated by cardiology and felt to be GI related. Negative stress test. GI noted Allen's on EGD. 10) Right Back Pain with Right Hamstring Pain. This is intermittent. I asked him to see orthopedics. The patient voiced understanding of the aforementioned assessment and plan.     The patient has consented for synchronous (real-time) Telemedicine (audio-video technology) on 12/29/2020 for their care to be delivered over telemedicine in place of their regularly scheduled office visit pursuant to the emergency declaration under the [facility] waiver authority and the [employer] Act, this Virtual  Visit was conducted, with patient's consent, to reduce the patient's risk of exposure to COVID-19 and provide continuity of care for an established patient. Services were provided through a video synchronous discussion virtually to substitute for in-person clinic visit.     TODAY'S ORDERS    Orders Placed This Encounter    folic acid (FOLVITE) 1 mg tablet    methotrexate (RHEUMATREX) 2.5 mg tablet       Future Appointments   Date Time Provider Raiza Sanchez   2/4/2021 10:00 AM [facility]   4/1/2021 10:00 AM SS INF3 CH4 [facility]2H [facility]   5/27/2021 10:00 AM SS INF3 CH4 <2H [facility]   7/13/2021 10:00 AM Maisha Woody MD [facility] BS AMB   7/22/2021 10:00 AM SS INF3 CH4 <2H [facility]   12/17/2021  8:30 AM Mallory Vallejo MD [facility] BS AMB     Abdirahman Vasquez MD, [address]    Adult Rheumatology   Rheumatology Ultrasound Certified  [facility]  A Part of [facility], [address] [place] [address]   Phone [phone]  Fax [phone]

## 2021-01-01 DIAGNOSIS — R15.9 INCONTINENCE OF FECES, UNSPECIFIED FECAL INCONTINENCE TYPE: ICD-10-CM

## 2021-01-01 RX ORDER — MONTELUKAST SODIUM 4 MG/1
TABLET, CHEWABLE ORAL
Qty: 60 TAB | Refills: 5 | Status: SHIPPED | OUTPATIENT
Start: 2021-01-01 | End: 2021-12-03

## 2021-01-31 DIAGNOSIS — M05.79 SEROPOSITIVE RHEUMATOID ARTHRITIS OF MULTIPLE SITES (HCC): ICD-10-CM

## 2021-02-04 ENCOUNTER — HOSPITAL ENCOUNTER (OUTPATIENT)
Dept: INFUSION THERAPY | Age: 76
Discharge: HOME | End: 2021-02-04
Payer: MEDICARE

## 2021-02-04 ENCOUNTER — HOSPITAL ENCOUNTER (OUTPATIENT)
Dept: LAB | Age: 76
Discharge: HOME | End: 2021-02-04
Payer: MEDICARE

## 2021-02-04 ENCOUNTER — TRANSCRIBE ORDER (OUTPATIENT)
Dept: REGISTRATION | Age: 76
End: 2021-02-04

## 2021-02-04 VITALS
RESPIRATION RATE: 18 BRPM | TEMPERATURE: 96.1 F | BODY MASS INDEX: 36.53 KG/M2 | DIASTOLIC BLOOD PRESSURE: 58 MMHG | HEIGHT: 68 IN | WEIGHT: 241 LBS | HEART RATE: 48 BPM | SYSTOLIC BLOOD PRESSURE: 125 MMHG

## 2021-02-04 DIAGNOSIS — Z01.812 PRE-PROCEDURAL LABORATORY EXAMINATIONS: Primary | ICD-10-CM

## 2021-02-04 DIAGNOSIS — Z01.812 PRE-PROCEDURAL LABORATORY EXAMINATIONS: ICD-10-CM

## 2021-02-04 DIAGNOSIS — M05.79 SEROPOSITIVE RHEUMATOID ARTHRITIS OF MULTIPLE SITES (HCC): Primary | ICD-10-CM

## 2021-02-04 PROCEDURE — U0003 INFECTIOUS AGENT DETECTION BY NUCLEIC ACID (DNA OR RNA); SEVERE ACUTE RESPIRATORY SYNDROME CORONAVIRUS 2 (SARS-COV-2) (CORONAVIRUS DISEASE [COVID-19]), AMPLIFIED PROBE TECHNIQUE, MAKING USE OF HIGH THROUGHPUT TECHNOLOGIES AS DESCRIBED BY CMS-2020-01-R: HCPCS

## 2021-02-04 PROCEDURE — 74011000258 HC RX REV CODE- 258: Performed by: INTERNAL MEDICINE

## 2021-02-04 PROCEDURE — 74011250636 HC RX REV CODE- 250/636: Performed by: INTERNAL MEDICINE

## 2021-02-04 PROCEDURE — 96413 CHEMO IV INFUSION 1 HR: CPT

## 2021-02-04 RX ORDER — ONDANSETRON 2 MG/ML
8 INJECTION INTRAMUSCULAR; INTRAVENOUS AS NEEDED
Status: CANCELLED | OUTPATIENT
Start: 2021-03-28

## 2021-02-04 RX ORDER — ALBUTEROL SULFATE 0.83 MG/ML
2.5 SOLUTION RESPIRATORY (INHALATION) AS NEEDED
Status: CANCELLED
Start: 2021-03-28

## 2021-02-04 RX ORDER — HEPARIN 100 UNIT/ML
300-500 SYRINGE INTRAVENOUS AS NEEDED
Status: CANCELLED
Start: 2021-03-28

## 2021-02-04 RX ORDER — SODIUM CHLORIDE 9 MG/ML
10 INJECTION INTRAMUSCULAR; INTRAVENOUS; SUBCUTANEOUS AS NEEDED
Status: CANCELLED | OUTPATIENT
Start: 2021-03-28

## 2021-02-04 RX ORDER — DIPHENHYDRAMINE HYDROCHLORIDE 50 MG/ML
25 INJECTION, SOLUTION INTRAMUSCULAR; INTRAVENOUS AS NEEDED
Status: CANCELLED
Start: 2021-03-28

## 2021-02-04 RX ORDER — SODIUM CHLORIDE 9 MG/ML
25 INJECTION, SOLUTION INTRAVENOUS CONTINUOUS
Status: CANCELLED | OUTPATIENT
Start: 2021-03-28

## 2021-02-04 RX ORDER — SODIUM CHLORIDE 9 MG/ML
25 INJECTION, SOLUTION INTRAVENOUS CONTINUOUS
Status: DISPENSED | OUTPATIENT
Start: 2021-02-04 | End: 2021-02-04

## 2021-02-04 RX ORDER — SODIUM CHLORIDE 0.9 % (FLUSH) 0.9 %
10 SYRINGE (ML) INJECTION AS NEEDED
Status: CANCELLED | OUTPATIENT
Start: 2021-03-28

## 2021-02-04 RX ORDER — DIPHENHYDRAMINE HYDROCHLORIDE 50 MG/ML
50 INJECTION, SOLUTION INTRAMUSCULAR; INTRAVENOUS AS NEEDED
Status: CANCELLED
Start: 2021-03-28

## 2021-02-04 RX ORDER — ACETAMINOPHEN 325 MG/1
650 TABLET ORAL AS NEEDED
Status: CANCELLED
Start: 2021-03-28

## 2021-02-04 RX ORDER — HYDROCORTISONE SODIUM SUCCINATE 100 MG/2ML
100 INJECTION, POWDER, FOR SOLUTION INTRAMUSCULAR; INTRAVENOUS AS NEEDED
Status: CANCELLED | OUTPATIENT
Start: 2021-03-28

## 2021-02-04 RX ORDER — EPINEPHRINE 1 MG/ML
0.3 INJECTION, SOLUTION, CONCENTRATE INTRAVENOUS AS NEEDED
Status: CANCELLED | OUTPATIENT
Start: 2021-03-28

## 2021-02-04 RX ADMIN — GOLIMUMAB 212.5 MG: 50 SOLUTION INTRAVENOUS at 10:55

## 2021-02-04 RX ADMIN — SODIUM CHLORIDE 25 ML/HR: 9 INJECTION, SOLUTION INTRAVENOUS at 10:50

## 2021-02-04 NOTE — PROGRESS NOTES
[facility] Progress Note    Date: 2021    Name: Xu Daniels    MRN: [id number]         : 1945    Mr. Perry Villatoro Arrived ambulatory and in no distress for Simponi Aria Infusion. Assessment was completed, no acute issues at this time, patient complaining of ongoing indigestion, MD aware. 24 G PIV established to right arm, + blood return. Mr. Sarabia's vitals were reviewed. Visit Vitals  /60   Pulse (!) 51   Temp (!) 96.1 °F (35.6 °C)   Resp 18   Ht 5' 8\" (1.727 m)   Wt 109.3 kg (241 lb)   BMI 36.64 kg/m²     Medications:  Medications Administered     0.9% sodium chloride infusion     Admin Date  2021 Action  New Bag Dose  25 mL/hr Rate  25 mL/hr Route  IntraVENous Administered By  Hernando Lacy RN          golimumab (SIMPONI ARIA) 212.5 mg in 0.9% sodium chloride, overfill volume 127 mL infusion     Admin Date  2021 Action  Given Dose  212.5 mg Rate  254 mL/hr Route  IntraVENous Administered By  Hernando Lacy RN                Mr. Perry Villatoro tolerated treatment well and was discharged from [place] [id number] in stable condition. PIV flushed & removed. He is aware of future appointments.     Future Appointments   Date Time Provider Raiza Sanchez   2021 10:00 AM SS INF3 CH4 <2H [facility]   2021 10:00 AM SS INF3 CH4 <2H [facility]   2021 10:00 AM Tila Serna MD [facility] BS AMB   2021 10:00 AM SS INF3 CH4 <2H [facility]   2021  8:30 AM Berenice Hope MD [facility] BS SHANAE Carr RN  2021

## 2021-02-05 LAB — SARS-COV-2, COV2NT: NOT DETECTED

## 2021-02-08 NOTE — PERIOP NOTES
[address]  Endoscopy Preprocedure Instructions      1. On the day of your surgery, please report to registration located on the 2nd floor of the  [facility]. yes    2. You must have a responsible adult to drive you to the hospital, stay at the hospital during your procedure and drive you home. If they leave your procedure will not be started (no exceptions). yes    3. Do not have anything to eat or drink (including water, gum, mints, coffee, and juice) after midnight. This does not apply to the medications you were instructed to take by your physician. yesIf you are currently taking Plavix, Coumadin, Aspirin, or other blood-thinning agents, contact your physician for special instructions. yes,    4. If you are having a procedure that requires bowel prep: We recommend that you have only clear liquids the day before your procedure and begin your bowel prep by 5:00 pm.  You may continue to drink clear liquids until midnight. If for any reason you are not able to complete your prep please notify your physician immediately. yes    5. Have a list of all current medications, including vitamins, herbal supplements and any other over the counter medications. yes    6. If you wear glasses, contacts, dentures and/or hearing aids, they may be removed prior to procedure, please bring a case to store them in. yes    7. You should understand that if you do not follow these instructions your procedure may be cancelled. If your physical condition changes (I.e. fever, cold or flu) please contact your doctor as soon as possible. 8. It is important that you be on time.   If for any reason you are unable to keep your appointment please call [phone] the day of or your physicians office prior to your scheduled procedure

## 2021-02-09 ENCOUNTER — ANESTHESIA (OUTPATIENT)
Dept: ENDOSCOPY | Age: 76
End: 2021-02-09
Payer: MEDICARE

## 2021-02-09 ENCOUNTER — ANESTHESIA EVENT (OUTPATIENT)
Dept: ENDOSCOPY | Age: 76
End: 2021-02-09
Payer: MEDICARE

## 2021-02-09 ENCOUNTER — HOSPITAL ENCOUNTER (OUTPATIENT)
Age: 76
Setting detail: OUTPATIENT SURGERY
Discharge: HOME | End: 2021-02-09
Attending: INTERNAL MEDICINE | Admitting: INTERNAL MEDICINE
Payer: MEDICARE

## 2021-02-09 VITALS
OXYGEN SATURATION: 96 % | WEIGHT: 236.99 LBS | HEIGHT: 67 IN | HEART RATE: 50 BPM | RESPIRATION RATE: 12 BRPM | DIASTOLIC BLOOD PRESSURE: 71 MMHG | BODY MASS INDEX: 37.2 KG/M2 | TEMPERATURE: 97.6 F | SYSTOLIC BLOOD PRESSURE: 138 MMHG

## 2021-02-09 PROCEDURE — 74011000250 HC RX REV CODE- 250: Performed by: NURSE ANESTHETIST, CERTIFIED REGISTERED

## 2021-02-09 PROCEDURE — 74011250636 HC RX REV CODE- 250/636: Performed by: INTERNAL MEDICINE

## 2021-02-09 PROCEDURE — 76060000031 HC ANESTHESIA FIRST 0.5 HR: Performed by: INTERNAL MEDICINE

## 2021-02-09 PROCEDURE — 74011250636 HC RX REV CODE- 250/636: Performed by: NURSE ANESTHETIST, CERTIFIED REGISTERED

## 2021-02-09 PROCEDURE — 77030021593 HC FCPS BIOP ENDOSC BSC -A: Performed by: INTERNAL MEDICINE

## 2021-02-09 PROCEDURE — 2709999900 HC NON-CHARGEABLE SUPPLY: Performed by: INTERNAL MEDICINE

## 2021-02-09 PROCEDURE — 88305 TISSUE EXAM BY PATHOLOGIST: CPT

## 2021-02-09 PROCEDURE — 76040000019: Performed by: INTERNAL MEDICINE

## 2021-02-09 RX ORDER — EPINEPHRINE 0.1 MG/ML
1 INJECTION INTRACARDIAC; INTRAVENOUS
Status: DISCONTINUED | OUTPATIENT
Start: 2021-02-09 | End: 2021-02-09 | Stop reason: HOSPADM

## 2021-02-09 RX ORDER — ATROPINE SULFATE 0.1 MG/ML
0.4 INJECTION INTRAVENOUS
Status: DISCONTINUED | OUTPATIENT
Start: 2021-02-09 | End: 2021-02-09 | Stop reason: HOSPADM

## 2021-02-09 RX ORDER — SODIUM CHLORIDE 9 MG/ML
50 INJECTION, SOLUTION INTRAVENOUS CONTINUOUS
Status: DISCONTINUED | OUTPATIENT
Start: 2021-02-09 | End: 2021-02-09 | Stop reason: HOSPADM

## 2021-02-09 RX ORDER — PROPOFOL 10 MG/ML
INJECTION, EMULSION INTRAVENOUS AS NEEDED
Status: DISCONTINUED | OUTPATIENT
Start: 2021-02-09 | End: 2021-02-09 | Stop reason: HOSPADM

## 2021-02-09 RX ORDER — MIDAZOLAM HYDROCHLORIDE 1 MG/ML
.25-5 INJECTION, SOLUTION INTRAMUSCULAR; INTRAVENOUS
Status: DISCONTINUED | OUTPATIENT
Start: 2021-02-09 | End: 2021-02-09 | Stop reason: HOSPADM

## 2021-02-09 RX ORDER — GLYCOPYRROLATE 0.2 MG/ML
INJECTION INTRAMUSCULAR; INTRAVENOUS AS NEEDED
Status: DISCONTINUED | OUTPATIENT
Start: 2021-02-09 | End: 2021-02-09 | Stop reason: HOSPADM

## 2021-02-09 RX ORDER — FLUMAZENIL 0.1 MG/ML
0.2 INJECTION INTRAVENOUS
Status: DISCONTINUED | OUTPATIENT
Start: 2021-02-09 | End: 2021-02-09 | Stop reason: HOSPADM

## 2021-02-09 RX ORDER — DEXTROMETHORPHAN/PSEUDOEPHED 2.5-7.5/.8
1.2 DROPS ORAL
Status: DISCONTINUED | OUTPATIENT
Start: 2021-02-09 | End: 2021-02-09 | Stop reason: HOSPADM

## 2021-02-09 RX ORDER — LIDOCAINE HYDROCHLORIDE 20 MG/ML
INJECTION, SOLUTION EPIDURAL; INFILTRATION; INTRACAUDAL; PERINEURAL AS NEEDED
Status: DISCONTINUED | OUTPATIENT
Start: 2021-02-09 | End: 2021-02-09 | Stop reason: HOSPADM

## 2021-02-09 RX ORDER — NALOXONE HYDROCHLORIDE 0.4 MG/ML
0.4 INJECTION, SOLUTION INTRAMUSCULAR; INTRAVENOUS; SUBCUTANEOUS
Status: DISCONTINUED | OUTPATIENT
Start: 2021-02-09 | End: 2021-02-09 | Stop reason: HOSPADM

## 2021-02-09 RX ADMIN — PROPOFOL 50 MG: 10 INJECTION, EMULSION INTRAVENOUS at 13:14

## 2021-02-09 RX ADMIN — GLYCOPYRROLATE 0.2 MG: 0.2 INJECTION INTRAMUSCULAR; INTRAVENOUS at 13:01

## 2021-02-09 RX ADMIN — PROPOFOL 50 MG: 10 INJECTION, EMULSION INTRAVENOUS at 13:09

## 2021-02-09 RX ADMIN — PROPOFOL 50 MG: 10 INJECTION, EMULSION INTRAVENOUS at 13:11

## 2021-02-09 RX ADMIN — PROPOFOL 100 MG: 10 INJECTION, EMULSION INTRAVENOUS at 13:06

## 2021-02-09 RX ADMIN — SODIUM CHLORIDE: 9 INJECTION, SOLUTION INTRAVENOUS at 12:51

## 2021-02-09 RX ADMIN — LIDOCAINE HYDROCHLORIDE 40 MG: 20 INJECTION, SOLUTION INTRAVENOUS at 13:06

## 2021-02-09 NOTE — ANESTHESIA PREPROCEDURE EVALUATION
Relevant Problems   No relevant active problems       Anesthetic History   No history of anesthetic complications            Review of Systems / Medical History  Patient summary reviewed, nursing notes reviewed and pertinent labs reviewed    Pulmonary  Within defined limits      Sleep apnea: BiPAP           Neuro/Psych   Within defined limits           Cardiovascular    Hypertension          Hyperlipidemia    Exercise tolerance: >4 METS     GI/Hepatic/Renal  Within defined limits   GERD           Endo/Other  Within defined limits      Obesity and arthritis     Other Findings              Physical Exam    Airway  Mallampati: II    Neck ROM: normal range of motion   Mouth opening: Normal     Cardiovascular  Regular rate and rhythm,  S1 and S2 normal,  no murmur, click, rub, or gallop  Rhythm: regular  Rate: normal         Dental  No notable dental hx       Pulmonary  Breath sounds clear to auscultation               Abdominal  GI exam deferred       Other Findings            Anesthetic Plan    ASA: 2  Anesthesia type: MAC          Induction: Intravenous  Anesthetic plan and risks discussed with: Patient

## 2021-02-09 NOTE — PERIOP NOTES
[id number]  Anesthesia staff at patient's bedside administering anesthesia and monitoring patients vital signs throughout procedure. See anesthesia note. Post procedure, report received from Melany DÍAZ. 0  Endoscope was pre-cleaned at bedside immediately following procedure by endo Elina reaves RN.     [id number]  Patient tolerated procedure. Abdomen soft and patient arousable and voices no complaints. Patient transported to endoscopy recovery area. Report given to post procedure RNEzio.

## 2021-02-09 NOTE — H&P
The patient is a 77year old male who presents with a complaint of Hiatal Hernia. Note for \"Hiatal Hernia\": Patient is a 76 yo male who presents today for follow up. Last EGD was in 5/2019 - this demonstrated a hiatal hernia and Allen's. Repeat recommended  in 3 years. Also had colonoscopy at that time with several polyps removed. Repeat recommended in 3 years. Today, he states he has been noticing more significant/persistent reflux despite pantoprazole 40mg twice a day. He states he noticed worsening symptoms over the last 2-3 weeks. He has noticed more frequent \"reflux episodes\". He states occasionally has night time symptoms but typically symptoms occur first thing in the morning/when he wakes up. No dysphagia. No n/v or decreased appetite. No abdominal pain. Having to take \"a lot of Tums\", which in the past would help his symptoms, but now that is not helping either. Problem List/Past Medical (Patt Main; 1/25/2021 10:34 AM)  Hypercholesterolemia    Hypertension    Rheumatoid Arthritis    Chest pain due to GERD (786.50  R07.9)   Pt presents with complaints of burning chest pain in the setting of NSAID use. History of colon polyps (V12.72  Z86.010)      Past Surgical History (Archie Hugo. Donnell Main; 1/25/2021 10:34 AM)  Prostatectomy; Transurethral    Knee joint replacement status, left (V43.65  Z96.652)  [2012]: Allergies (Patt Main; 1/25/2021 10:34 AM)  Other   bee stings    Medication History (Patt HARMONYStephany Main; 1/25/2021 10:35 AM)  Pantoprazole Sodium  (40MG Tablet DR, 1 (one) Tablet Oral daily, Taken starting 08/03/2020) Active. Allopurinol  (300MG Tablet, 1 Oral daily) Active. AmLODIPine Besylate  (2.5MG Tablet, 1 Oral daily) Active. Omeprazole  (20MG Capsule DR, 1 Oral BID) Active. Aspirin  (81MG Tablet DR, 1 Oral daily) Active. Atorvastatin Calcium  (40MG Tablet, 1 Oral daily) Active. Citalopram Hydrobromide  (20MG Tablet, 1 Oral daily) Active.   Fish Oil  (1000MG Capsule, 1` Oral daily) Active. Iron  (325 (65 Fe)MG Tablet, 1 Oral daily) Active. Lisinopril  (40MG Tablet, 1 Oral daily) Active. Methotrexate  (2.5MG Tablet, 8 Oral once a week) Active. Folic Acid  (1MG Tablet, 1 Oral daily) Active. Simponi Aria  (50MG/4ML Solution, Intravenous every six weeks) Active. Medications Reconciled     Family History (Rolin Olszewski. Trudy Mose; 1/25/2021 10:34 AM)  Colon cancer (153.9  C18.9)   First Degree Relatives. Social History (Rolin Olszewski. Trudy Mose; 1/25/2021 10:34 AM)  Blood Transfusion   No.  Alcohol Use   Moderate alcohol use, Drinks beer. Employment status   Retired. Marital status   . Tobacco Use   Never smoker. Diagnostic Studies History (Patt Jiang; 1/25/2021 10:34 AM)  Colonoscopy      Health Maintenance History (Patt iJang; 1/25/2021 10:34 AM)  Flu Vaccine  [2018]:  Pneumovax  [2013]:        Review of Systems (Rolin Olszewski. Trudy Mose; 1/25/2021 10:34 AM)  General Not Present- Chronic Fatigue, Poor Appetite, Weight Gain and Weight Loss. Skin Not Present- Itching, Rash and Skin Color Changes. HEENT Not Present- Hearing Loss and Vertigo. Respiratory Not Present- Difficulty Breathing and TB exposure. Cardiovascular Not Present- Chest Pain, Use of Antibiotics before Dental Procedures and Use of Blood Thinners. Gastrointestinal Present- See HPI. Musculoskeletal Not Present- Arthritis, Hip Replacement Surgery and Knee Replacement Surgery. Neurological Not Present- Weakness. Psychiatric Not Present- Depression. Endocrine Not Present- Diabetes and Thyroid Problems. Hematology Not Present- Anemia. Vitals (Patt HARMONYStephany Jiang; 1/25/2021 10:36 AM)  1/25/2021 10:35 AM  Weight: 232 lb   Height: 67 in   Weight was reported by patient. Height was reported by patient. Body Surface Area: 2.15 m²   Body Mass Index: 36.34 kg/m²                Assessment & Plan (Adalberto Ashley.  Agapito MACK; 1/25/2021 11:22 AM)  Dyspepsia (536.8  R10.13)  Impression: Worsening acid reflux symptoms despite pantoprazole 40mg BID. EGD indicated to r/o esophagitis, gastritis, PUD, vs other causes of symptoms. We discussed though he is not due to repeat EGD for another year for Allen's surveillance, we should proceed with EGD now given her refractory symptoms. Need to rule out esophagitis, worsening hiatal hernia, cancer, etc. Trial of carafate in meantime. We discussed that may need to consider anti-reflux surgery if unable to control symptoms or EGD demonstrates reflux changes despite maximum PPI therapy. Can consider trial of dexilant if needed. Advised patient to follow an anti-reflux diet. We reviewed that this includes avoiding acidic foods such as tomato based sauces and soups, citrus and citrus juices (OJ/oranges, lemon, lime, etc), caffeine, onions, alcohol and carbonated beverages. Further recs after EGD. Current Plans  Endoscopy (83756) (Discussed risks and benefits with the patient to include: perforation, post polypectomy, or post biopsy bleeding, missed lesions, and sedation reactions.)  Started Sucralfate 1GM, 1 (one) Tablet four times daily; crush tablet and dissolve tablet in 10mL of water, #120, 30 days starting 01/25/2021, Ref. x5.  Allen's esophagus (530.85  K22.70)  Story: Diagnosed on EGD in 5/2019  Impression: Plan as above. Needs EGD given refractory reflux despite 40mg PPI BID but EGD will also allow us to perform surveillance in light of Allen's. Current Plans  Due to this being a TeleHealth encounter (During [facility]- public health emergency), evaluation of the following organ systems was limited: Vitals/Constitutional/EENT/Resp/CV/GI//MS/Neuro/Skin/Heme-Lymph-Imm.  Pursuant to the emergency declaration under the [facility]5 waiver authority and the [employer] Act, this Virtual Visit was conducted with patient's (and/or legal guardian's) consent, to reduce the risk of exposure to COVID-19 and provide necessary medical care. Services were provided through a video synchronous discussion virtually to substitute for in-person encounter.         Signed by FREDERICK Trinidad (1/25/2021 11:23 AM)

## 2021-02-09 NOTE — DISCHARGE INSTRUCTIONS
Rip Mazariegos M.D.  [phone]           2021  Jose Lowe  :  1945  [facility] Medical Record Number:  [id number]        ENDOSCOPY FINDINGS:   Your endoscopy showed moderate size hiatal hernia and unchanged whitley's esophagus. Otherwise no lesions or ulcers seen. EGD DISCHARGE INSTRUCTIONS    DISCOMFORT:  Sore throat- throat lozenges or warm salt water gargle  redness at IV site- apply warm compress to area; if redness or soreness persist- contact your physician  Gaseous discomfort- walking, belching will help relieve any discomfort  You may not operate a vehicle for 12 hours  You may not engage in an occupation involving machinery or appliances for rest of today  You may not drink alcoholic beverages for at least 12 hours  Avoid making any critical decisions for at least 24 hour    DIET:   You may resume your diet with strict anti-reflux measures    ACTIVITY  Spend the remainder of the day resting -  avoid any strenuous activity. Avoid driving or operating machinery. CALL M.D. ANY SIGN OF   Increasing pain, nausea, vomiting  Abdominal distension (swelling)  New increased bleeding (oral or rectal)  Fever (chills)  Pain in chest area  Bloody discharge from nose or mouth  Shortness of breath    Follow-up Instructions:   Call Dr. Luther Lopez for any questions or problems. Telephone # [phone]  Biopsies were obtained, the results will be available  in  5 to 7 days. We will call you to notify you of these results. Continue same medications. Follow up in the office.

## 2021-02-09 NOTE — PROGRESS NOTES
Per Orta  1945  [id number]    Situation:  Verbal report received from: Zana Barthel, RN   Procedure: Procedure(s):  ESOPHAGOGASTRODUODENOSCOPY (EGD)  ESOPHAGOGASTRODUODENAL (EGD) BIOPSY    Background:    Preoperative diagnosis: DYSPEPSIA  Postoperative diagnosis: hiatal hernia, barretts esophagus. :  Dr. Jonathon Marin  Assistant(s): Endoscopy RN-1: Wander Hart RN  Endoscopy RN-2: Luiza Lee    Specimens:   ID Type Source Tests Collected by Time Destination   1 : #37 cm esophageal biopsy Preservative   Roseann Jensen MD 2/9/2021 1310 Pathology   2 : #35 cm esophageal biopsy Preservative   Roseann Jensen MD 2/9/2021 1312 Pathology     H. Pylori  no    Assessment:  Intra-procedure medications       Anesthesia gave intra-procedure sedation and medications, see anesthesia flow sheet yes    Intravenous fluids: NS@ KVO     Vital signs stable   yes    Abdominal assessment: round and soft   yes    Recommendation:  Discharge patient per MD order  yes.   Return to floor  outpatient  Family or Friend   spouse  Permission to share finding with family or friend yes

## 2021-02-09 NOTE — PROGRESS NOTES
Endoscopy discharge instructions have been reviewed and given to patient. The patient verbalized understanding and acceptance of instructions. Dr. Clydene Holter discussed with patient procedure findings and next steps.

## 2021-02-09 NOTE — ANESTHESIA POSTPROCEDURE EVALUATION
Procedure(s):  ESOPHAGOGASTRODUODENOSCOPY (EGD)  ESOPHAGOGASTRODUODENAL (EGD) BIOPSY. MAC    Anesthesia Post Evaluation        Patient location during evaluation: PACU  Level of consciousness: awake  Pain management: adequate  Airway patency: patent  Anesthetic complications: no  Cardiovascular status: acceptable  Respiratory status: acceptable  Hydration status: acceptable  Post anesthesia nausea and vomiting:  none      INITIAL Post-op Vital signs:   Vitals Value Taken Time   /63 02/09/21 1341   Temp 36.4 °C (97.6 °F) 02/09/21 1323   Pulse 53 02/09/21 1343   Resp 16 02/09/21 1343   SpO2 94 % 02/09/21 1343   Vitals shown include unvalidated device data.

## 2021-02-09 NOTE — PROCEDURES
Adriana Fuentes M.D.  [phone]           2021                EGD Operative Report  Rene Curling  :  1945  [facility] Medical Record Number:  [id number]      Indication:  GERD, Heartburn, persistent despite therapy     : Arron Akhtar MD    Referring Provider:  Jocelynn Olson MD      Anesthesia/Sedation:  MAC anesthesia    Airway assessment: No airway problems anticipated    Pre-Procedural Exam:      Airway: clear, no airway problems anticipated  Heart: RRR, without gallops or rubs  Lungs: clear bilaterally without wheezes, crackles, or rhonchi  Abdomen: soft, nontender, nondistended, bowel sounds present  Mental Status: awake, alert and oriented to person, place and time       Procedure Details     After infomed consent was obtained for the procedure, with all risks and benefits of procedure explained the patient was taken to the endoscopy suite and placed in the left lateral decubitus position. Following sequential administration of sedation as per above, the endoscope was inserted into the mouth and advanced under direct vision to second portion of the duodenum. A careful inspection was made as the gastroscope was withdrawn, including a retroflexed view of the proximal stomach; findings and interventions are described below. Findings:   Esophagus:Evidence of salmon colored mucosa seen in the distal esophagus as mucosal tongues consistent with previously diagnosed whitley's esophagus. Stomach: Moderate size hiatal hernia, otherwise mucosa within normal  Duodenum/jejunum: normal    Therapies:  none    Specimens: 37 cm and 35 cm           Complications:   None; patient tolerated the procedure well. EBL:  None. Impression:    Moderate hiatal hernia, unchanged whitley's esophagus on endoscopy.     Recommendations:    -Continue acid suppression.  -Strict anti-reflux measures  -Follow-up office visit    Arron Akhtar MD

## 2021-03-18 DIAGNOSIS — K21.9 GASTROESOPHAGEAL REFLUX DISEASE WITHOUT ESOPHAGITIS: ICD-10-CM

## 2021-03-18 RX ORDER — PANTOPRAZOLE SODIUM 40 MG/1
TABLET, DELAYED RELEASE ORAL
Qty: 180 TAB | Refills: 1 | Status: SHIPPED | OUTPATIENT
Start: 2021-03-18 | End: 2021-10-04

## 2021-03-28 DIAGNOSIS — M05.79 SEROPOSITIVE RHEUMATOID ARTHRITIS OF MULTIPLE SITES (HCC): ICD-10-CM

## 2021-04-01 ENCOUNTER — HOSPITAL ENCOUNTER (OUTPATIENT)
Dept: INFUSION THERAPY | Age: 76
Discharge: HOME | End: 2021-04-01
Payer: MEDICARE

## 2021-04-01 VITALS
WEIGHT: 240 LBS | HEART RATE: 45 BPM | HEIGHT: 68 IN | RESPIRATION RATE: 18 BRPM | TEMPERATURE: 96.2 F | SYSTOLIC BLOOD PRESSURE: 130 MMHG | DIASTOLIC BLOOD PRESSURE: 60 MMHG | BODY MASS INDEX: 36.37 KG/M2

## 2021-04-01 DIAGNOSIS — M05.79 SEROPOSITIVE RHEUMATOID ARTHRITIS OF MULTIPLE SITES (HCC): Primary | ICD-10-CM

## 2021-04-01 LAB
ALBUMIN SERPL-MCNC: 3.7 G/DL (ref 3.5–5)
ALBUMIN/GLOB SERPL: 1.2 {RATIO} (ref 1.1–2.2)
ALP SERPL-CCNC: 87 U/L (ref 45–117)
ALT SERPL-CCNC: 58 U/L (ref 12–78)
ANION GAP SERPL CALC-SCNC: 3 MMOL/L (ref 5–15)
AST SERPL-CCNC: 16 U/L (ref 15–37)
BASOPHILS # BLD: 0 K/UL (ref 0–0.1)
BASOPHILS NFR BLD: 1 % (ref 0–1)
BILIRUB SERPL-MCNC: 0.7 MG/DL (ref 0.2–1)
BUN SERPL-MCNC: 16 MG/DL (ref 6–20)
BUN/CREAT SERPL: 19 (ref 12–20)
CALCIUM SERPL-MCNC: 9.2 MG/DL (ref 8.5–10.1)
CHLORIDE SERPL-SCNC: 109 MMOL/L (ref 97–108)
CO2 SERPL-SCNC: 27 MMOL/L (ref 21–32)
CREAT SERPL-MCNC: 0.84 MG/DL (ref 0.7–1.3)
CRP SERPL HS-MCNC: 0.5 MG/L
DIFFERENTIAL METHOD BLD: ABNORMAL
EOSINOPHIL # BLD: 0.2 K/UL (ref 0–0.4)
EOSINOPHIL NFR BLD: 3 % (ref 0–7)
ERYTHROCYTE [DISTWIDTH] IN BLOOD BY AUTOMATED COUNT: 12.9 % (ref 11.5–14.5)
ERYTHROCYTE [SEDIMENTATION RATE] IN BLOOD: 12 MM/HR (ref 0–20)
GLOBULIN SER CALC-MCNC: 3.1 G/DL (ref 2–4)
GLUCOSE SERPL-MCNC: 107 MG/DL (ref 65–100)
HCT VFR BLD AUTO: 40.6 % (ref 36.6–50.3)
HGB BLD-MCNC: 13.8 G/DL (ref 12.1–17)
IMM GRANULOCYTES # BLD AUTO: 0 K/UL (ref 0–0.04)
IMM GRANULOCYTES NFR BLD AUTO: 1 % (ref 0–0.5)
LYMPHOCYTES # BLD: 1.5 K/UL (ref 0.8–3.5)
LYMPHOCYTES NFR BLD: 23 % (ref 12–49)
MCH RBC QN AUTO: 35.4 PG (ref 26–34)
MCHC RBC AUTO-ENTMCNC: 34 G/DL (ref 30–36.5)
MCV RBC AUTO: 104.1 FL (ref 80–99)
MONOCYTES # BLD: 0.6 K/UL (ref 0–1)
MONOCYTES NFR BLD: 10 % (ref 5–13)
NEUTS SEG # BLD: 4.2 K/UL (ref 1.8–8)
NEUTS SEG NFR BLD: 62 % (ref 32–75)
NRBC # BLD: 0 K/UL (ref 0–0.01)
NRBC BLD-RTO: 0 PER 100 WBC
PLATELET # BLD AUTO: 199 K/UL (ref 150–400)
PMV BLD AUTO: 10.7 FL (ref 8.9–12.9)
POTASSIUM SERPL-SCNC: 4.2 MMOL/L (ref 3.5–5.1)
PROT SERPL-MCNC: 6.8 G/DL (ref 6.4–8.2)
RBC # BLD AUTO: 3.9 M/UL (ref 4.1–5.7)
SODIUM SERPL-SCNC: 139 MMOL/L (ref 136–145)
WBC # BLD AUTO: 6.6 K/UL (ref 4.1–11.1)

## 2021-04-01 PROCEDURE — 96413 CHEMO IV INFUSION 1 HR: CPT

## 2021-04-01 PROCEDURE — 80053 COMPREHEN METABOLIC PANEL: CPT

## 2021-04-01 PROCEDURE — 74011250636 HC RX REV CODE- 250/636: Performed by: INTERNAL MEDICINE

## 2021-04-01 PROCEDURE — 36415 COLL VENOUS BLD VENIPUNCTURE: CPT

## 2021-04-01 PROCEDURE — 85652 RBC SED RATE AUTOMATED: CPT

## 2021-04-01 PROCEDURE — 74011000258 HC RX REV CODE- 258: Performed by: INTERNAL MEDICINE

## 2021-04-01 PROCEDURE — 86141 C-REACTIVE PROTEIN HS: CPT

## 2021-04-01 PROCEDURE — 85025 COMPLETE CBC W/AUTO DIFF WBC: CPT

## 2021-04-01 RX ORDER — SODIUM CHLORIDE 9 MG/ML
25 INJECTION, SOLUTION INTRAVENOUS CONTINUOUS
Status: DISPENSED | OUTPATIENT
Start: 2021-04-01 | End: 2021-04-01

## 2021-04-01 RX ORDER — DIPHENHYDRAMINE HYDROCHLORIDE 50 MG/ML
25 INJECTION, SOLUTION INTRAMUSCULAR; INTRAVENOUS AS NEEDED
Status: CANCELLED
Start: 2021-05-23

## 2021-04-01 RX ORDER — HEPARIN 100 UNIT/ML
300-500 SYRINGE INTRAVENOUS AS NEEDED
Status: CANCELLED
Start: 2021-05-23

## 2021-04-01 RX ORDER — HYDROCORTISONE SODIUM SUCCINATE 100 MG/2ML
100 INJECTION, POWDER, FOR SOLUTION INTRAMUSCULAR; INTRAVENOUS AS NEEDED
Status: CANCELLED | OUTPATIENT
Start: 2021-05-23

## 2021-04-01 RX ORDER — ONDANSETRON 2 MG/ML
8 INJECTION INTRAMUSCULAR; INTRAVENOUS AS NEEDED
Status: CANCELLED | OUTPATIENT
Start: 2021-05-23

## 2021-04-01 RX ORDER — HEPARIN 100 UNIT/ML
300-500 SYRINGE INTRAVENOUS AS NEEDED
Status: ACTIVE | OUTPATIENT
Start: 2021-04-01 | End: 2021-04-01

## 2021-04-01 RX ORDER — SODIUM CHLORIDE 9 MG/ML
25 INJECTION, SOLUTION INTRAVENOUS CONTINUOUS
Status: CANCELLED | OUTPATIENT
Start: 2021-05-23

## 2021-04-01 RX ORDER — SODIUM CHLORIDE 9 MG/ML
10 INJECTION INTRAMUSCULAR; INTRAVENOUS; SUBCUTANEOUS AS NEEDED
Status: CANCELLED | OUTPATIENT
Start: 2021-05-23

## 2021-04-01 RX ORDER — SODIUM CHLORIDE 0.9 % (FLUSH) 0.9 %
10 SYRINGE (ML) INJECTION AS NEEDED
Status: DISPENSED | OUTPATIENT
Start: 2021-04-01 | End: 2021-04-01

## 2021-04-01 RX ORDER — ALBUTEROL SULFATE 0.83 MG/ML
2.5 SOLUTION RESPIRATORY (INHALATION) AS NEEDED
Status: CANCELLED
Start: 2021-05-23

## 2021-04-01 RX ORDER — DIPHENHYDRAMINE HYDROCHLORIDE 50 MG/ML
50 INJECTION, SOLUTION INTRAMUSCULAR; INTRAVENOUS AS NEEDED
Status: CANCELLED
Start: 2021-05-23

## 2021-04-01 RX ORDER — SODIUM CHLORIDE 9 MG/ML
10 INJECTION INTRAMUSCULAR; INTRAVENOUS; SUBCUTANEOUS AS NEEDED
Status: ACTIVE | OUTPATIENT
Start: 2021-04-01 | End: 2021-04-01

## 2021-04-01 RX ORDER — ACETAMINOPHEN 325 MG/1
650 TABLET ORAL AS NEEDED
Status: CANCELLED
Start: 2021-05-23

## 2021-04-01 RX ORDER — SODIUM CHLORIDE 0.9 % (FLUSH) 0.9 %
10 SYRINGE (ML) INJECTION AS NEEDED
Status: CANCELLED | OUTPATIENT
Start: 2021-05-23

## 2021-04-01 RX ORDER — EPINEPHRINE 1 MG/ML
0.3 INJECTION, SOLUTION, CONCENTRATE INTRAVENOUS AS NEEDED
Status: CANCELLED | OUTPATIENT
Start: 2021-05-23

## 2021-04-01 RX ADMIN — SODIUM CHLORIDE 25 ML/HR: 900 INJECTION, SOLUTION INTRAVENOUS at 10:48

## 2021-04-01 RX ADMIN — GOLIMUMAB 212.5 MG: 50 SOLUTION INTRAVENOUS at 10:48

## 2021-04-01 NOTE — PROGRESS NOTES
Outpatient Infusion Center Short Visit Progress Note    1000 Patient admitted to [facility] for [address]  ambulatory in stable condition. Assessment completed. No new concerns voiced. Covid Screening    1. Do you have any symptoms of COVID-19? SOB, coughing, fever, or generally not feeling well ? NO  2. Have you been exposed to COVID-19 recently? NO  3. Have you had any recent contact with family/friend that has a pending COVID test? NO    Vital Signs:  Visit Vitals  BP (!) 152/66   Pulse (!) 46   Temp (!) 96.2 °F (35.7 °C)   Resp 18   Ht 5' 8\" (1.727 m)   Wt 108.9 kg (240 lb)   BMI 36.49 kg/m²         Peripheral IV (24g) inserted into R AC with positive blood return. Lab Results:  Recent Results (from the past 12 hour(s))   CBC WITH AUTOMATED DIFF    Collection Time: 04/01/21  9:59 AM   Result Value Ref Range    WBC 6.6 4.1 - 11.1 K/uL    RBC 3.90 (L) 4.10 - 5.70 M/uL    HGB 13.8 12.1 - 17.0 g/dL    HCT 40.6 36.6 - 50.3 %    .1 (H) 80.0 - 99.0 FL    MCH 35.4 (H) 26.0 - 34.0 PG    MCHC 34.0 30.0 - 36.5 g/dL    RDW 12.9 11.5 - 14.5 %    PLATELET 496 477 - 123 K/uL    MPV 10.7 8.9 - 12.9 FL    NRBC 0.0 0  WBC    ABSOLUTE NRBC 0.00 0.00 - 0.01 K/uL    NEUTROPHILS 62 32 - 75 %    LYMPHOCYTES 23 12 - 49 %    MONOCYTES 10 5 - 13 %    EOSINOPHILS 3 0 - 7 %    BASOPHILS 1 0 - 1 %    IMMATURE GRANULOCYTES 1 (H) 0.0 - 0.5 %    ABS. NEUTROPHILS 4.2 1.8 - 8.0 K/UL    ABS. LYMPHOCYTES 1.5 0.8 - 3.5 K/UL    ABS. MONOCYTES 0.6 0.0 - 1.0 K/UL    ABS. EOSINOPHILS 0.2 0.0 - 0.4 K/UL    ABS. BASOPHILS 0.0 0.0 - 0.1 K/UL    ABS. IMM.  GRANS. 0.0 0.00 - 0.04 K/UL    DF AUTOMATED     METABOLIC PANEL, COMPREHENSIVE    Collection Time: 04/01/21  9:59 AM   Result Value Ref Range    Sodium 139 136 - 145 mmol/L    Potassium 4.2 3.5 - 5.1 mmol/L    Chloride 109 (H) 97 - 108 mmol/L    CO2 27 21 - 32 mmol/L    Anion gap 3 (L) 5 - 15 mmol/L    Glucose 107 (H) 65 - 100 mg/dL    BUN 16 6 - 20 MG/DL    Creatinine 0.84 0.70 - 1.30 MG/DL    BUN/Creatinine ratio 19 12 - 20      GFR est AA >60 >60 ml/min/1.73m2    GFR est non-AA >60 >60 ml/min/1.73m2    Calcium 9.2 8.5 - 10.1 MG/DL    Bilirubin, total 0.7 0.2 - 1.0 MG/DL    ALT (SGPT) 58 12 - 78 U/L    AST (SGOT) 16 15 - 37 U/L    Alk. phosphatase 87 45 - 117 U/L    Protein, total 6.8 6.4 - 8.2 g/dL    Albumin 3.7 3.5 - 5.0 g/dL    Globulin 3.1 2.0 - 4.0 g/dL    A-G Ratio 1.2 1.1 - 2.2     SED RATE (ESR)    Collection Time: 04/01/21  9:59 AM   Result Value Ref Range    Sed rate, automated 12 0 - 20 mm/hr             Medications:  Medications Administered     0.9% sodium chloride infusion     Admin Date  04/01/2021 Action  New Bag Dose  25 mL/hr Rate  25 mL/hr Route  IntraVENous Administered By  Kaley Lorenzo RN          golimumab (SIMPONI ARIA) 212.5 mg in 0.9% sodium chloride, overfill volume 127 mL infusion     Admin Date  04/01/2021 Action  Given Dose  212.5 mg Rate  254 mL/hr Route  IntraVENous Administered By  Kaley Lorenzo RN                 Patient tolerated treatment well. Patient discharged from [place] [address] ambulatory in no distress. Peripheral IV removed. Patient aware of next appointment on 5/27/21.     Melvin Waggoner RN    Future Appointments   Date Time Provider Raiza Sanchez   5/27/2021 10:00 AM SS INF3 CH4 <2H [facility]   7/13/2021 10:00 AM Russell Bedoya MD [facility] BS AMB   7/22/2021 10:00 AM SS INF3 CH4 <2H [facility]   12/17/2021  8:30 AM Nicole Marquez MD [facility] BS AMB

## 2021-04-28 NOTE — ADDENDUM NOTE
Encounter addended by: Katie Benton RN on: 4/28/2021 11:04 AM   Actions taken: Order Reconciliation Section accessed

## 2021-05-23 DIAGNOSIS — M05.79 SEROPOSITIVE RHEUMATOID ARTHRITIS OF MULTIPLE SITES (HCC): ICD-10-CM

## 2021-05-27 ENCOUNTER — HOSPITAL ENCOUNTER (OUTPATIENT)
Dept: INFUSION THERAPY | Age: 76
Discharge: HOME | End: 2021-05-27
Payer: MEDICARE

## 2021-05-27 VITALS
DIASTOLIC BLOOD PRESSURE: 61 MMHG | RESPIRATION RATE: 18 BRPM | OXYGEN SATURATION: 94 % | SYSTOLIC BLOOD PRESSURE: 135 MMHG | TEMPERATURE: 96.9 F | HEIGHT: 68 IN | HEART RATE: 45 BPM | WEIGHT: 238.6 LBS | BODY MASS INDEX: 36.16 KG/M2

## 2021-05-27 DIAGNOSIS — M05.79 SEROPOSITIVE RHEUMATOID ARTHRITIS OF MULTIPLE SITES (HCC): Primary | ICD-10-CM

## 2021-05-27 PROCEDURE — 96413 CHEMO IV INFUSION 1 HR: CPT

## 2021-05-27 PROCEDURE — 74011000258 HC RX REV CODE- 258: Performed by: INTERNAL MEDICINE

## 2021-05-27 PROCEDURE — 74011250636 HC RX REV CODE- 250/636: Performed by: INTERNAL MEDICINE

## 2021-05-27 RX ORDER — SODIUM CHLORIDE 0.9 % (FLUSH) 0.9 %
10 SYRINGE (ML) INJECTION AS NEEDED
Status: CANCELLED | OUTPATIENT
Start: 2021-07-18

## 2021-05-27 RX ORDER — SODIUM CHLORIDE 9 MG/ML
25 INJECTION, SOLUTION INTRAVENOUS CONTINUOUS
Status: CANCELLED | OUTPATIENT
Start: 2021-07-18

## 2021-05-27 RX ORDER — EPINEPHRINE 1 MG/ML
0.3 INJECTION, SOLUTION, CONCENTRATE INTRAVENOUS AS NEEDED
Status: CANCELLED | OUTPATIENT
Start: 2021-07-18

## 2021-05-27 RX ORDER — ALBUTEROL SULFATE 0.83 MG/ML
2.5 SOLUTION RESPIRATORY (INHALATION) AS NEEDED
Status: CANCELLED
Start: 2021-07-18

## 2021-05-27 RX ORDER — DIPHENHYDRAMINE HYDROCHLORIDE 50 MG/ML
25 INJECTION, SOLUTION INTRAMUSCULAR; INTRAVENOUS AS NEEDED
Status: CANCELLED
Start: 2021-07-18

## 2021-05-27 RX ORDER — ACETAMINOPHEN 325 MG/1
650 TABLET ORAL AS NEEDED
Status: CANCELLED
Start: 2021-07-18

## 2021-05-27 RX ORDER — HEPARIN 100 UNIT/ML
300-500 SYRINGE INTRAVENOUS AS NEEDED
Status: CANCELLED
Start: 2021-07-18

## 2021-05-27 RX ORDER — DIPHENHYDRAMINE HYDROCHLORIDE 50 MG/ML
50 INJECTION, SOLUTION INTRAMUSCULAR; INTRAVENOUS AS NEEDED
Status: CANCELLED
Start: 2021-07-18

## 2021-05-27 RX ORDER — HYDROCORTISONE SODIUM SUCCINATE 100 MG/2ML
100 INJECTION, POWDER, FOR SOLUTION INTRAMUSCULAR; INTRAVENOUS AS NEEDED
Status: CANCELLED | OUTPATIENT
Start: 2021-07-18

## 2021-05-27 RX ORDER — ONDANSETRON 2 MG/ML
8 INJECTION INTRAMUSCULAR; INTRAVENOUS AS NEEDED
Status: CANCELLED | OUTPATIENT
Start: 2021-07-18

## 2021-05-27 RX ORDER — SODIUM CHLORIDE 9 MG/ML
25 INJECTION, SOLUTION INTRAVENOUS CONTINUOUS
Status: DISPENSED | OUTPATIENT
Start: 2021-05-27 | End: 2021-05-27

## 2021-05-27 RX ORDER — SODIUM CHLORIDE 9 MG/ML
10 INJECTION INTRAMUSCULAR; INTRAVENOUS; SUBCUTANEOUS AS NEEDED
Status: CANCELLED | OUTPATIENT
Start: 2021-07-18

## 2021-05-27 RX ADMIN — SODIUM CHLORIDE 25 ML/HR: 900 INJECTION, SOLUTION INTRAVENOUS at 11:12

## 2021-05-27 RX ADMIN — GOLIMUMAB 212.5 MG: 50 SOLUTION INTRAVENOUS at 11:23

## 2021-05-27 NOTE — PROGRESS NOTES
[facility] Progress Note    Date: May 27, 2021    Name: Matthias Marcum    MRN: [id number]         : 1945    Mr. Antonio Serrato Arrived ambulatory and in no distress for Infusion. Assessment was completed, no acute issues at this time, no new complaints voiced. 24 G PIV established to Right arm, + blood return. Mr. Sarabia's vitals were reviewed. Visit Vitals  /61   Pulse (!) 45   Temp 96.9 °F (36.1 °C)   Resp 18   Ht 5' 8\" (1.727 m)   Wt 108.2 kg (238 lb 9.6 oz)   SpO2 94%   BMI 36.28 kg/m²         Medications:  Medications Administered     0.9% sodium chloride infusion     Admin Date  2021 Action  New Bag Dose  25 mL/hr Rate  25 mL/hr Route  IntraVENous Administered By  [facility] [employer], [place]          golimumab (SIMPONI ARIA) 212.5 mg in 0.9% sodium chloride, overfill volume 127 mL infusion     Admin Date  2021 Action  New Bag Dose  212.5 mg Rate  254 mL/hr Route  IntraVENous Administered By  [facility]                  Mr. Antonio Serrato tolerated treatment well and was discharged from [place] [address] in stable condition at 1200. PIV flushed & removed. He is to return on  at 1000 for his next appointment.     AIMEE Egan  May 27, 2021

## 2021-06-03 NOTE — CALL BACK NOTE
New/updated order required for patient's upcoming [facility] appointment. Sent staff message to RN on 06/18/20 at 10:13 AM.  Refer to fax documents in pharmacy for further information.
No

## 2021-07-13 ENCOUNTER — VIRTUAL VISIT (OUTPATIENT)
Dept: RHEUMATOLOGY | Age: 76
End: 2021-07-13
Payer: MEDICARE

## 2021-07-13 DIAGNOSIS — H20.012: ICD-10-CM

## 2021-07-13 DIAGNOSIS — M05.79 SEROPOSITIVE RHEUMATOID ARTHRITIS OF MULTIPLE SITES (HCC): Primary | ICD-10-CM

## 2021-07-13 DIAGNOSIS — M1A.0720 CHRONIC IDIOPATHIC GOUT INVOLVING TOE OF LEFT FOOT WITHOUT TOPHUS: ICD-10-CM

## 2021-07-13 DIAGNOSIS — Z79.60 LONG-TERM USE OF IMMUNOSUPPRESSANT MEDICATION: ICD-10-CM

## 2021-07-13 PROCEDURE — G0463 HOSPITAL OUTPT CLINIC VISIT: HCPCS | Performed by: INTERNAL MEDICINE

## 2021-07-13 PROCEDURE — G9717 DOC PT DX DEP/BP F/U NT REQ: HCPCS | Performed by: INTERNAL MEDICINE

## 2021-07-13 PROCEDURE — 3017F COLORECTAL CA SCREEN DOC REV: CPT | Performed by: INTERNAL MEDICINE

## 2021-07-13 PROCEDURE — G8756 NO BP MEASURE DOC: HCPCS | Performed by: INTERNAL MEDICINE

## 2021-07-13 PROCEDURE — 1101F PT FALLS ASSESS-DOCD LE1/YR: CPT | Performed by: INTERNAL MEDICINE

## 2021-07-13 PROCEDURE — G8427 DOCREV CUR MEDS BY ELIG CLIN: HCPCS | Performed by: INTERNAL MEDICINE

## 2021-07-13 PROCEDURE — 99215 OFFICE O/P EST HI 40 MIN: CPT | Performed by: INTERNAL MEDICINE

## 2021-07-13 RX ORDER — SYRINGE-NEEDLE,INSULIN,0.5 ML 30 GX5/16"
1 SYRINGE, EMPTY DISPOSABLE MISCELLANEOUS
Qty: 12 SYRINGE | Refills: 5 | Status: SHIPPED | OUTPATIENT
Start: 2021-07-17

## 2021-07-13 RX ORDER — METHOTREXATE 25 MG/ML
25 INJECTION, SOLUTION INTRA-ARTERIAL; INTRAMUSCULAR; INTRAVENOUS
Qty: 12 VIAL | Refills: 1 | Status: SHIPPED | OUTPATIENT
Start: 2021-07-14 | End: 2021-09-14 | Stop reason: SDUPTHER

## 2021-07-13 NOTE — PROGRESS NOTES
Anuj FOR VISIT    This is a follow-up visit for Mr. Elen Estrada for     ICD-10-CM   1. Seropositive rheumatoid arthritis of multiple sites ([facility] M05.79   2. 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     The patient has consented for synchronous (real-time) Telemedicine (audio-video technology) on 7/13/2021 for their care to be delivered over telemedicine in place of their regularly scheduled office visit pursuant to the emergency declaration under the [address] [place] [address] waiver authority and the [employer] Act, this Virtual  Visit was conducted, with patient's consent, to reduce the patient's risk of exposure to COVID-19 and provide continuity of care for an established patient. Services were provided through a video synchronous discussion virtually to substitute for in-person clinic visit. Inflammatory arthritis phenotype includes:  Anti-CCP positive: yes (>250)  Rheumatoid factor positive: yes (>650)  Erosive disease: yes  Extra-articular manifestations include: left iritis    Immunosuppression Screening (10/15/2020):  Quantiferon TB: negative    PPD:  Not performed  Hepatitis B: negative   Hepatitis C: negative     Therapy History includes:  Current DMARD therapy include: methotrexate 20 mg every Wednesday (11/20/2017 to 6/15/2020; 10/15/2020), Simponi Aria every 8 weeks (5/08/2018 to present)  Prior DMARD therapy include: methotrexate 17.5 mg every Wednesday (6/15/2020 to 10/14/2020)  Discontinued DMARDs because of inefficacy: None  Discontinued DMARDs because of side effects: None  Unaffordable DMARDs: Enbrel    HISTORY OF PRESENT ILLNESS    Mr. Elen Estrada returns for a follow-up. On his last visit, I continued methotrexate 20 mg every Wednesday and Simponi Aria every 8 weeks, which he has taken with good tolerance.  His most infusion recent was 5/27/2021, 4/01/2021, 2/04/2021 which he has taken with good tolerance. He denies breakthrough. Today, he feels much better. He reports having pain in his ankles after playing 18 holes of golf. He denies pain, swelling, or stiffness in his hands or wrists. He reports having iritis several months ago involving his left and was treated with 4 weeks of topical steroids. He has had two episodes to date, first starting 3/2021 and then 6/2021. He saw Dr. Mary Ann Pedroza. He spends a lot of time outdoors but this is an interval development. He denies interval gouty flares. He received his COVID vaccination. He denies fever, weight loss, blurred vision, vision loss, oral ulcers, ankle swelling, dry cough, dyspnea, nausea, vomiting, dysphagia, abdominal pain, black or bloody stool, fall since last visit, rash, easy bruising and increased thirst.    Most recent toxicity monitoring by blood work was done on 4/01/2021 and did not reveal any significant adverse effects. Most recent inflammatory markers from 4/01/2021 revealed a ESR 12 mm/hr and CRP N/A mg/L. I reviewed the patient's interval medical history, surgical history, medications, and allergies. The patient has not had any interval hospital admissions, infections, or surgeries. REVIEW OF SYSTEMS    A comprehensive review of systems was performed and pertinent results are documented in the HPI, review of systems is otherwise non-contributory. PAST MEDICAL HISTORY    He has a past medical history of Allergic rhinitis, Arthritis, Basal cell carcinoma (BCC) in situ of skin, Cancer ([facility] GERD (gastroesophageal reflux disease), Gout, Hypercholesterolemia, Hypertension, Melanoma in situ of back ([facility] Skin cancer (melanoma) ([facility] and Sleep apnea.     FAMILY HISTORY    His family history includes Cancer in his paternal grandfather and another family member; Dementia in his mother; Diabetes in his paternal grandmother; Heart Disease in his father; Kidney Disease in his father. SOCIAL HISTORY    He reports that he quit smoking about 41 years ago. He has never used smokeless tobacco. He reports current alcohol use of about 14.0 standard drinks of alcohol per week. He reports that he does not use drugs. MEDICATIONS    Current Outpatient Medications   Medication Sig    [START ON 7/17/2021] Insulin Syringe-Needle U-100 1 mL 30 gauge x 5/16 syrg 1 Each by Does Not Apply route Every Saturday. To be used for methotrexate solution    pantoprazole (PROTONIX) 40 mg tablet TAKE ONE TABLET BY MOUTH TWICE A DAY    colestipoL (COLESTID) 1 gram tablet TAKE TWO TABLETS BY MOUTH TWICE A DAY    folic acid (FOLVITE) 1 mg tablet TAKE ONE TABLET BY MOUTH DAILY    allopurinoL (ZYLOPRIM) 300 mg tablet TAKE ONE TABLET BY MOUTH ONE TIME DAILY    ergocalciferol (ERGOCALCIFEROL) 1,250 mcg (50,000 unit) capsule TAKE ONE CAPSULE BY MOUTH EVERY WEEK (7 DAYS)    fexofenadine (ALLEGRA) 180 mg tablet Take  by mouth daily as needed.  guaiFENesin ER (MUCINEX) 600 mg ER tablet Take 600 mg by mouth as needed.  clobetasol (TEMOVATE) 0.05 % ointment Apply  to affected area two (2) times a day.  0.9% sodium chloride solp 100 mL with golimumab 12.5 mg/mL soln 2 mg/kg 2 mg/kg by IntraVENous route once. Every eight weeks  Indications: rhumetoid arthritis    lisinopril (PRINIVIL, ZESTRIL) 40 mg tablet Take 40 mg by mouth daily.  aspirin delayed-release 81 mg tablet Take  by mouth daily.  albuterol (VENTOLIN HFA) 90 mcg/actuation inhaler Take  by inhalation as needed.  omega-3 fatty acids-vitamin e (FISH OIL) 1,000 mg Cap Take 1 Cap by mouth.  citalopram (CELEXA) 20 mg tablet Take 1 Tab by mouth daily.  atorvastatin (LIPITOR) 40 mg tablet Take 1 Tab by mouth daily.  amlodipine (NORVASC) 2.5 mg tablet Take 1 Tab by mouth daily. No current facility-administered medications for this visit.      ALLERGIES    Allergies   Allergen Reactions    Stings [Sting, Bee] Swelling     Swelling at site       PHYSICAL EXAMINATION    There were no vitals taken for this visit. There is no height or weight on file to calculate BMI. General: Patient is alert, oriented x 3, not in acute distress, wife at bedside    Chest:  Breathing comfortably at room air    Musculoskeletal exam:  A comprehensive musculoskeletal exam was NOT performed for all joints of each upper and lower extremity and assessed for swelling, tenderness and range of motion. Positive results are documented as below:    Previous Exam    Bilateral Olivia and Heberden nodes. Bilateral knee crepitus without effusion.   Pes planus   Bilateral hallux valgus (LEFT more than right)    Z-Deformities:   no  Glady Neck Deformities:  no  Boutonierre's Deformities:  no  Ulnar Deviation:   Yes (bilateral)  MCP Subluxation:  no     Joint Count 10/14/2020 12/9/2019 6/7/2019 3/4/2019 12/3/2018 9/4/2018 6/4/2018   Patient pain (0-100) 50 20 5 0 20 15 0   MHAQ 0 0 0 0 0 0 0   Left elbow - Tender - - - - - - -   Left wrist- Tender 0 0 0 - - 1 0   Left wrist- Swollen 0 0 0 - - 1 1   Left 1st MCP - Tender - - - - - - -   Left 1st MCP - Swollen - - - - - 1 -   Left 2nd MCP - Tender - - - 0 0 - -   Left 2nd MCP - Swollen - - - 1 1 1 1   Left 3rd MCP - Tender - - - 0 - - -   Left 3rd MCP - Swollen - - - 1 - 1 1   Left 4th MCP - Tender - - - - - - -   Left 5th MCP - Tender - - - - - - -   Left thumb IP - Tender - - - - - - -   Left thumb IP - Swollen - - - - - - -   Left 2nd PIP - Tender - - - - - - -   Left 3rd PIP - Tender - - - - - - -   Left 4th PIP - Tender - - - - - - -   Left 5th PIP - Tender - - - - - - -   Right wrist- Tender - - - - - - -   Right wrist- Swollen - - - - - 1 1   Right 1st MCP - Tender - - - - - - -   Right 1st MCP - Swollen - - - - - 1 1   Right 2nd MCP - Tender - - - - - - -   Right 2nd MCP - Swollen - - - - - 1 1   Right 3rd MCP - Tender - - - - - - -   Right 3rd MCP - Swollen - - - - - 1 1   Right 4th MCP - Tender - - - 0 - - -   Right 4th MCP - Swollen - - - 1 - - 1   Right 5th MCP - Swollen - - - - - - -   Right thumb IP - Tender - - - - - - -   Right 2nd PIP - Tender - - - - - - -   Right 2nd PIP - Swollen - - - - - - -   Right 3rd PIP - Tender - - - - - - -   Right 3rd PIP - Swollen - - - - - - 1   Right 4th PIP - Swollen - - - - - - 1   Right knee - Tender - - - - - - -   Right knee - Swollen - - - - - - -   Tender Joint Count (Total) 0 0 0 0 0 1 0   Swollen Joint Count (Total) 0 0 0 3 1 8 10   Physician Assessment (0-10) 0 0 0 0.5 0 2 2   Patient Assessment (0-10) 4 0.5 0.5 0 2 1.5 0   CDAI Total (calculated) 4 0.5 0.5 3.5 3 12.5 12       DATA REVIEW    Laboratory     Recent laboratory results were reviewed, summarized, and discussed with the patient. Imaging    Musculoskeletal Ultrasound    None    Radiographs    Bilateral Hand 11/20/2017: RIGHT: No fracture or dislocation on plain film. The bones are osteopenic. Mild narrowing of the radiocarpal joint. Probable erosion of the radius and lunate at the radiocarpal joint. No widening of the intercarpal spaces. Subtle erosion of the ulnar styloid. Mild narrowing of the triscaphe joint is age-appropriate. Irregular arthritis of the first and second CMC joints includes osteophytes and erosions. There are erosions of the first through fourth metacarpal heads at the MCP points. Moderate narrowing and anterior subluxation of the second MCP joint. IP joints are within normal limits. No chondrocalcinosis or periosteal reaction. LEFT: No fracture or dislocation on plain film. The bones are osteopenic. Moderate narrowing of the radiocarpal joint with subtle erosions of the lunate. Marrow DR UVJ with erosions. No definite ulnar styloid erosion. Triscaphe and first [facility] joint osteoarthritis are age-appropriate. Large erosions on both sides of the second MCP joint and in the third metacarpal head. IP joints are within normal limits. No chondrocalcinosis or periosteal reaction.     Bilateral Foot 11/20/2017: RIGHT: No fracture or dislocation on plain film. Bones are osteopenic. Talonavicular joint space narrowing, osteophytes, and erosions. Subtalar arthritis is partially imaged. TMT joints are within normal limits. Severe narrowing of the first MTP joint with osteophytes and erosions. There is erosion and the fifth tarsal head. Mild narrowing of the second MTP joint without definable erosion. No periosteal reaction. LEFT: No fracture or dislocation on plain film. Bones are osteopenic. Intertarsal joints are within normal limits. Moderate narrowing of the first MTP joint with central and marginal erosions. 35 degrees hallux valgus angulation and fibular subluxation of the first proximal phalanx. There are also erosions in the laterally subluxed hallux sesamoids. Mild joint space narrowing and erosions of the third MTP joint. Fragmentation of the fourth middle phalanx is adjacent to the fourth PIP joint erosions. There are also erosions of the second and fifth PIP joints. First IP joint osteoarthritis is mild. CT Imaging    CT Right Upper Extremity with contrast 10/19/2017: examination of the soft tissues demonstrates no drainable fluid collection. There is no pathologically enlarged nodes within the right axilla. Alignment is normal. There is no bone destruction or fracture. No significant fatty atrophy. MR Imaging    None    DXA     None    ASSESSMENT AND PLAN    This is a follow-up visit for Mr. VasquezAmanda Germaine. 1) Seropositive Erosive Rheumatoid Arthritis. He presented after activation of underlying Rheumatoid Arthritis which appears to have been quiescent for more than 30 years from the TDAP vaccine. He remembers being diagnosed with arthritis in his 30's that was treated with an injection in his finger. He has bilateral ulnar deviation which corresponds with the chronicity of his disease. Radiographs showed erosive disease confirming chronicity.      He is maintained on methotrexate 20 mg every Wednesday and Simponi Aria with good tolerance. He most recent infusion was 5/27/2021. He denies breakthrough. He denies inflammatory joint symptoms. He reports interval left iritis twice in 3/2021 and 6/2021 which is an interval development, both times after receiving Simponi Aria. He has been outdoors a lot, but this is not a new behavior. He golfs regularly. It does not make sense that his iritis is related to his Rheumatoid Arthritis since his Rheumatoid Arthritis appears to be in remission. His CDAI was previously 4 (previously 0.5, 0.5, 3.5, 3, 12.5, 12, 40, 27, 37) with 0 tender and 0 swollen joints, consistent with low disease activity. His PtGlobal is influenced by his back issues. I discussed changing PO methotrexate to SubQ or changing Simponi Aria to infliximab, which may be better for iritis than Simponi. He preferred to change PO methotrexate 20 mg every Wednesday to 25 mg SubQ every Wednesday and continue Simponi Aria every 8 weeks. 2) Chronic Gout. He has a history of gout in his left foot. He is on allopurinol 300 mg daily. His uric acid was 4.5 mg/dL (previously 4.5, 4.9 mg/dL). He denies interval flares. 3) Long Term Use of Immunosuppressants. The patient remains on high risk immunomodulatory medications (methotrexate, Simponi Aria) and requires frequent toxicity monitoring by blood work to evaluate for toxicities. 4) Vitamin D Deficiency. His vitamin D level was 70.4 (previously 40.4, 39.2, 27.7, 22.6). His dose was changed from weekly ergocalciferol 50,000 to biweekly. 5) Basal Cell Carcinoma. Lesion removed on his scalp 5/31/2018. Wound without secondary infection. He continues to have lesions burnt. 6) History of Melanoma. He had melanoma removed from his back 10/2017. He did not require chemotherapy. He has skin exams every 3 months.  He has reviewed his medications, including Antonette Kotyk, with his dermatologist. He understands to continue close surveillance and inform us if he has any new melanoma lesions. 7) Bilateral Knee Osteoarthritis. This was not an active issue today. 8) Bilateral Pes Planus. This is no longer an active issue. I suspect is from his pes planus. He endorses ankle pain after playing 18 holes of golf. I recommended inserts. The patient voiced understanding of the aforementioned assessment and plan. The patient has consented for synchronous (real-time) Telemedicine (audio-video technology) on 7/13/2021 for their care to be delivered over telemedicine in place of their regularly scheduled office visit pursuant to the emergency declaration under the [facility] waiver authority and the [employer] Act, this Virtual  Visit was conducted, with patient's consent, to reduce the patient's risk of exposure to COVID-19 and provide continuity of care for an established patient. A total of 45 minutes was spent on this visit, reviewing interval notes, interval testing results, ordering tests, refilling medications, documenting the findings in the note, patient education, counseling, and coordination of care as described above. All questions asked and answered. Services were provided through a video synchronous discussion virtually to substitute for in-person clinic visit.     TODAY'S ORDERS    Orders Placed This Encounter    Insulin Syringe-Needle U-100 1 mL 30 gauge x 5/16 syrg       Future Appointments   Date Time Provider Raiza Sanchez   7/22/2021 10:00 AM SS INF3 CH4 <2H [facility]   9/14/2021 10:40 AM Roman Lassiter MD [facility] BS AMB   12/17/2021  8:30 AM Erin Bella MD [facility] BS AMB     Baljit Jaimes MD, [address] [place] [address] Rd    Adult Rheumatology   Rheumatology Ultrasound Certified  [facility]  [facility], [address] [place] [address]   Phone [phone]  Fax [phone]

## 2021-07-14 RX ORDER — CALCIUM CARB/VITAMIN D3/VIT K1 500-100-40
TABLET,CHEWABLE ORAL
Qty: 10 SYRINGE | Refills: 4 | Status: SHIPPED | OUTPATIENT
Start: 2021-07-14 | End: 2021-09-14 | Stop reason: SDUPTHER

## 2021-07-18 DIAGNOSIS — M05.79 SEROPOSITIVE RHEUMATOID ARTHRITIS OF MULTIPLE SITES (HCC): ICD-10-CM

## 2021-07-22 ENCOUNTER — HOSPITAL ENCOUNTER (OUTPATIENT)
Dept: INFUSION THERAPY | Age: 76
Discharge: HOME | End: 2021-07-22
Payer: MEDICARE

## 2021-07-22 VITALS
SYSTOLIC BLOOD PRESSURE: 111 MMHG | DIASTOLIC BLOOD PRESSURE: 55 MMHG | HEART RATE: 48 BPM | HEIGHT: 68 IN | TEMPERATURE: 96.5 F | OXYGEN SATURATION: 96 % | BODY MASS INDEX: 35.92 KG/M2 | WEIGHT: 237 LBS | RESPIRATION RATE: 18 BRPM

## 2021-07-22 DIAGNOSIS — M05.79 SEROPOSITIVE RHEUMATOID ARTHRITIS OF MULTIPLE SITES (HCC): Primary | ICD-10-CM

## 2021-07-22 LAB
ALBUMIN SERPL-MCNC: 3.6 G/DL (ref 3.5–5)
ALBUMIN/GLOB SERPL: 1.2 {RATIO} (ref 1.1–2.2)
ALP SERPL-CCNC: 84 U/L (ref 45–117)
ALT SERPL-CCNC: 60 U/L (ref 12–78)
ANION GAP SERPL CALC-SCNC: 5 MMOL/L (ref 5–15)
AST SERPL-CCNC: 13 U/L (ref 15–37)
BASOPHILS # BLD: 0 K/UL (ref 0–0.1)
BASOPHILS NFR BLD: 1 % (ref 0–1)
BILIRUB SERPL-MCNC: 0.8 MG/DL (ref 0.2–1)
BUN SERPL-MCNC: 17 MG/DL (ref 6–20)
BUN/CREAT SERPL: 18 (ref 12–20)
CALCIUM SERPL-MCNC: 8.5 MG/DL (ref 8.5–10.1)
CHLORIDE SERPL-SCNC: 111 MMOL/L (ref 97–108)
CO2 SERPL-SCNC: 24 MMOL/L (ref 21–32)
CREAT SERPL-MCNC: 0.97 MG/DL (ref 0.7–1.3)
CRP SERPL HS-MCNC: 0.5 MG/L
DIFFERENTIAL METHOD BLD: ABNORMAL
EOSINOPHIL # BLD: 0.3 K/UL (ref 0–0.4)
EOSINOPHIL NFR BLD: 3 % (ref 0–7)
ERYTHROCYTE [DISTWIDTH] IN BLOOD BY AUTOMATED COUNT: 13.2 % (ref 11.5–14.5)
ERYTHROCYTE [SEDIMENTATION RATE] IN BLOOD: 13 MM/HR (ref 0–20)
GLOBULIN SER CALC-MCNC: 3.1 G/DL (ref 2–4)
GLUCOSE SERPL-MCNC: 112 MG/DL (ref 65–100)
HCT VFR BLD AUTO: 38.9 % (ref 36.6–50.3)
HGB BLD-MCNC: 13.1 G/DL (ref 12.1–17)
IMM GRANULOCYTES # BLD AUTO: 0 K/UL (ref 0–0.04)
IMM GRANULOCYTES NFR BLD AUTO: 1 % (ref 0–0.5)
LYMPHOCYTES # BLD: 1.9 K/UL (ref 0.8–3.5)
LYMPHOCYTES NFR BLD: 22 % (ref 12–49)
MCH RBC QN AUTO: 35.7 PG (ref 26–34)
MCHC RBC AUTO-ENTMCNC: 33.7 G/DL (ref 30–36.5)
MCV RBC AUTO: 106 FL (ref 80–99)
MONOCYTES # BLD: 0.7 K/UL (ref 0–1)
MONOCYTES NFR BLD: 8 % (ref 5–13)
NEUTS SEG # BLD: 5.5 K/UL (ref 1.8–8)
NEUTS SEG NFR BLD: 65 % (ref 32–75)
NRBC # BLD: 0 K/UL (ref 0–0.01)
NRBC BLD-RTO: 0 PER 100 WBC
PLATELET # BLD AUTO: 191 K/UL (ref 150–400)
PMV BLD AUTO: 10.4 FL (ref 8.9–12.9)
POTASSIUM SERPL-SCNC: 4 MMOL/L (ref 3.5–5.1)
PROT SERPL-MCNC: 6.7 G/DL (ref 6.4–8.2)
RBC # BLD AUTO: 3.67 M/UL (ref 4.1–5.7)
SODIUM SERPL-SCNC: 140 MMOL/L (ref 136–145)
WBC # BLD AUTO: 8.4 K/UL (ref 4.1–11.1)

## 2021-07-22 PROCEDURE — 74011000258 HC RX REV CODE- 258: Performed by: INTERNAL MEDICINE

## 2021-07-22 PROCEDURE — 36415 COLL VENOUS BLD VENIPUNCTURE: CPT

## 2021-07-22 PROCEDURE — 86141 C-REACTIVE PROTEIN HS: CPT

## 2021-07-22 PROCEDURE — 85025 COMPLETE CBC W/AUTO DIFF WBC: CPT

## 2021-07-22 PROCEDURE — 80053 COMPREHEN METABOLIC PANEL: CPT

## 2021-07-22 PROCEDURE — 85652 RBC SED RATE AUTOMATED: CPT

## 2021-07-22 PROCEDURE — 74011250636 HC RX REV CODE- 250/636: Performed by: INTERNAL MEDICINE

## 2021-07-22 PROCEDURE — 96413 CHEMO IV INFUSION 1 HR: CPT

## 2021-07-22 RX ORDER — HEPARIN 100 UNIT/ML
300-500 SYRINGE INTRAVENOUS AS NEEDED
Status: CANCELLED
Start: 2021-09-12

## 2021-07-22 RX ORDER — EPINEPHRINE 1 MG/ML
0.3 INJECTION, SOLUTION, CONCENTRATE INTRAVENOUS AS NEEDED
Status: CANCELLED | OUTPATIENT
Start: 2021-09-12

## 2021-07-22 RX ORDER — ONDANSETRON 2 MG/ML
8 INJECTION INTRAMUSCULAR; INTRAVENOUS AS NEEDED
Status: CANCELLED | OUTPATIENT
Start: 2021-09-12

## 2021-07-22 RX ORDER — SODIUM CHLORIDE 0.9 % (FLUSH) 0.9 %
10 SYRINGE (ML) INJECTION AS NEEDED
Status: CANCELLED | OUTPATIENT
Start: 2021-09-12

## 2021-07-22 RX ORDER — HEPARIN 100 UNIT/ML
300-500 SYRINGE INTRAVENOUS AS NEEDED
Status: ACTIVE | OUTPATIENT
Start: 2021-07-22 | End: 2021-07-22

## 2021-07-22 RX ORDER — SODIUM CHLORIDE 9 MG/ML
25 INJECTION, SOLUTION INTRAVENOUS CONTINUOUS
Status: CANCELLED | OUTPATIENT
Start: 2021-09-12

## 2021-07-22 RX ORDER — DIPHENHYDRAMINE HYDROCHLORIDE 50 MG/ML
25 INJECTION, SOLUTION INTRAMUSCULAR; INTRAVENOUS AS NEEDED
Status: CANCELLED
Start: 2021-09-12

## 2021-07-22 RX ORDER — ALBUTEROL SULFATE 0.83 MG/ML
2.5 SOLUTION RESPIRATORY (INHALATION) AS NEEDED
Status: CANCELLED
Start: 2021-09-12

## 2021-07-22 RX ORDER — SODIUM CHLORIDE 9 MG/ML
10 INJECTION INTRAMUSCULAR; INTRAVENOUS; SUBCUTANEOUS AS NEEDED
Status: ACTIVE | OUTPATIENT
Start: 2021-07-22 | End: 2021-07-22

## 2021-07-22 RX ORDER — HYDROCORTISONE SODIUM SUCCINATE 100 MG/2ML
100 INJECTION, POWDER, FOR SOLUTION INTRAMUSCULAR; INTRAVENOUS AS NEEDED
Status: CANCELLED | OUTPATIENT
Start: 2021-09-12

## 2021-07-22 RX ORDER — DIPHENHYDRAMINE HYDROCHLORIDE 50 MG/ML
50 INJECTION, SOLUTION INTRAMUSCULAR; INTRAVENOUS AS NEEDED
Status: CANCELLED
Start: 2021-09-12

## 2021-07-22 RX ORDER — ACETAMINOPHEN 325 MG/1
650 TABLET ORAL AS NEEDED
Status: CANCELLED
Start: 2021-09-12

## 2021-07-22 RX ORDER — SODIUM CHLORIDE 9 MG/ML
10 INJECTION INTRAMUSCULAR; INTRAVENOUS; SUBCUTANEOUS AS NEEDED
Status: CANCELLED | OUTPATIENT
Start: 2021-09-12

## 2021-07-22 RX ORDER — SODIUM CHLORIDE 0.9 % (FLUSH) 0.9 %
10 SYRINGE (ML) INJECTION AS NEEDED
Status: DISPENSED | OUTPATIENT
Start: 2021-07-22 | End: 2021-07-22

## 2021-07-22 RX ORDER — SODIUM CHLORIDE 9 MG/ML
25 INJECTION, SOLUTION INTRAVENOUS CONTINUOUS
Status: DISPENSED | OUTPATIENT
Start: 2021-07-22 | End: 2021-07-22

## 2021-07-22 RX ADMIN — SODIUM CHLORIDE 25 ML/HR: 900 INJECTION, SOLUTION INTRAVENOUS at 11:03

## 2021-07-22 RX ADMIN — GOLIMUMAB 212.5 MG: 50 SOLUTION INTRAVENOUS at 11:09

## 2021-07-22 NOTE — PROGRESS NOTES
Outpatient Infusion Center - Chemotherapy Progress Note    1000 Pt admit to [facility] for [facility] ambulatory in stable condition. Assessment completed. No new concerns voiced. PIV with positive blood return. Chemotherapy Flowsheet 7/22/2021   Cycle q 8 weeks   Date 7/22/2021   Drug / Regimen -   Notes -       Visit Vitals  BP (!) 111/55   Pulse (!) 48   Temp (!) 96.5 °F (35.8 °C)   Resp 18   Ht 5' 8\" (1.727 m)   Wt 107.5 kg (237 lb)   SpO2 96%   BMI 36.04 kg/m²     1. Do you have any symptoms of COVID-19? SOB, coughing, fever, or generally not feeling well No  2. Have you been exposed to COVID-19 recently? No  3. Have you had any recent contact with family/friend that has a pending COVID test?No      Medications:  Medications Administered     0.9% sodium chloride infusion     Admin Date  07/22/2021 Action  New Bag Dose  25 mL/hr Rate  25 mL/hr Route  IntraVENous Administered By  Kyle Preston RN          golimumab (SIMPONI ARIA) 212.5 mg in 0.9% sodium chloride, overfill volume 127 mL infusion     Admin Date  07/22/2021 Action  New Bag Dose  212.5 mg Rate  254 mL/hr Route  IntraVENous Administered By  Kyle Preston, OMAR                1000 Pt tolerated treatment well. 1150 maintained positive blood return throughout treatment, flushed with positive blood return at conclusion and throughout treatment. D/c home ambulatory in no distress. Pt aware of next appointment scheduled for 9/14/21.       Recent Results (from the past 12 hour(s))   CBC WITH AUTOMATED DIFF    Collection Time: 07/22/21 10:20 AM   Result Value Ref Range    WBC 8.4 4.1 - 11.1 K/uL    RBC 3.67 (L) 4.10 - 5.70 M/uL    HGB 13.1 12.1 - 17.0 g/dL    HCT 38.9 36.6 - 50.3 %    .0 (H) 80.0 - 99.0 FL    MCH 35.7 (H) 26.0 - 34.0 PG    MCHC 33.7 30.0 - 36.5 g/dL    RDW 13.2 11.5 - 14.5 %    PLATELET 281 780 - 043 K/uL    MPV 10.4 8.9 - 12.9 FL    NRBC 0.0 0  WBC    ABSOLUTE NRBC 0.00 0.00 - 0.01 K/uL    NEUTROPHILS 65 32 - 75 %    LYMPHOCYTES 22 12 - 49 %    MONOCYTES 8 5 - 13 %    EOSINOPHILS 3 0 - 7 %    BASOPHILS 1 0 - 1 %    IMMATURE GRANULOCYTES 1 (H) 0.0 - 0.5 %    ABS. NEUTROPHILS 5.5 1.8 - 8.0 K/UL    ABS. LYMPHOCYTES 1.9 0.8 - 3.5 K/UL    ABS. MONOCYTES 0.7 0.0 - 1.0 K/UL    ABS. EOSINOPHILS 0.3 0.0 - 0.4 K/UL    ABS. BASOPHILS 0.0 0.0 - 0.1 K/UL    ABS. IMM. GRANS. 0.0 0.00 - 0.04 K/UL    DF AUTOMATED     METABOLIC PANEL, COMPREHENSIVE    Collection Time: 07/22/21 10:20 AM   Result Value Ref Range    Sodium 140 136 - 145 mmol/L    Potassium 4.0 3.5 - 5.1 mmol/L    Chloride 111 (H) 97 - 108 mmol/L    CO2 24 21 - 32 mmol/L    Anion gap 5 5 - 15 mmol/L    Glucose 112 (H) 65 - 100 mg/dL    BUN 17 6 - 20 MG/DL    Creatinine 0.97 0.70 - 1.30 MG/DL    BUN/Creatinine ratio 18 12 - 20      GFR est AA >60 >60 ml/min/1.73m2    GFR est non-AA >60 >60 ml/min/1.73m2    Calcium 8.5 8.5 - 10.1 MG/DL    Bilirubin, total 0.8 0.2 - 1.0 MG/DL    ALT (SGPT) 60 12 - 78 U/L    AST (SGOT) 13 (L) 15 - 37 U/L    Alk.  phosphatase 84 45 - 117 U/L    Protein, total 6.7 6.4 - 8.2 g/dL    Albumin 3.6 3.5 - 5.0 g/dL    Globulin 3.1 2.0 - 4.0 g/dL    A-G Ratio 1.2 1.1 - 2.2     SED RATE (ESR)    Collection Time: 07/22/21 10:20 AM   Result Value Ref Range    Sed rate, automated 13 0 - 20 mm/hr

## 2021-09-04 DIAGNOSIS — E55.9 VITAMIN D DEFICIENCY: ICD-10-CM

## 2021-09-07 RX ORDER — ERGOCALCIFEROL 1.25 MG/1
CAPSULE ORAL
Qty: 12 CAPSULE | Refills: 1 | Status: SHIPPED | OUTPATIENT
Start: 2021-09-07 | End: 2021-09-14 | Stop reason: SDUPTHER

## 2021-09-12 DIAGNOSIS — M05.79 SEROPOSITIVE RHEUMATOID ARTHRITIS OF MULTIPLE SITES (HCC): ICD-10-CM

## 2021-09-14 ENCOUNTER — VIRTUAL VISIT (OUTPATIENT)
Dept: RHEUMATOLOGY | Age: 76
End: 2021-09-14
Payer: MEDICARE

## 2021-09-14 ENCOUNTER — TELEPHONE (OUTPATIENT)
Dept: RHEUMATOLOGY | Age: 76
End: 2021-09-14

## 2021-09-14 DIAGNOSIS — M1A.0720 CHRONIC IDIOPATHIC GOUT INVOLVING TOE OF LEFT FOOT WITHOUT TOPHUS: ICD-10-CM

## 2021-09-14 DIAGNOSIS — E66.01 SEVERE OBESITY (BMI 35.0-35.9 WITH COMORBIDITY) (HCC): ICD-10-CM

## 2021-09-14 DIAGNOSIS — Z11.59 ENCOUNTER FOR SCREENING FOR OTHER VIRAL DISEASES: ICD-10-CM

## 2021-09-14 DIAGNOSIS — E55.9 VITAMIN D DEFICIENCY: ICD-10-CM

## 2021-09-14 DIAGNOSIS — M81.0 AGE-RELATED OSTEOPOROSIS WITHOUT CURRENT PATHOLOGICAL FRACTURE: ICD-10-CM

## 2021-09-14 DIAGNOSIS — M89.9 DISEASE OF BONE: ICD-10-CM

## 2021-09-14 DIAGNOSIS — Z79.60 LONG-TERM USE OF IMMUNOSUPPRESSANT MEDICATION: ICD-10-CM

## 2021-09-14 DIAGNOSIS — M05.79 SEROPOSITIVE RHEUMATOID ARTHRITIS OF MULTIPLE SITES (HCC): Primary | ICD-10-CM

## 2021-09-14 PROCEDURE — 99443 PR PHYS/QHP TELEPHONE EVALUATION 21-30 MIN: CPT | Performed by: INTERNAL MEDICINE

## 2021-09-14 RX ORDER — FOLIC ACID 1 MG/1
TABLET ORAL
Qty: 90 TABLET | Refills: 5 | Status: SHIPPED | OUTPATIENT
Start: 2021-09-14

## 2021-09-14 RX ORDER — ERGOCALCIFEROL 1.25 MG/1
CAPSULE ORAL
Qty: 12 CAPSULE | Refills: 1 | Status: SHIPPED | OUTPATIENT
Start: 2021-09-14

## 2021-09-14 RX ORDER — ALLOPURINOL 300 MG/1
TABLET ORAL
Qty: 90 TABLET | Refills: 5 | Status: SHIPPED | OUTPATIENT
Start: 2021-09-14 | End: 2022-06-07 | Stop reason: SDUPTHER

## 2021-09-14 RX ORDER — CALCIUM CARB/VITAMIN D3/VIT K1 500-100-40
TABLET,CHEWABLE ORAL
Qty: 90 EACH | Refills: 5 | Status: SHIPPED | OUTPATIENT
Start: 2021-09-14

## 2021-09-14 RX ORDER — METHOTREXATE 25 MG/ML
25 INJECTION, SOLUTION INTRA-ARTERIAL; INTRAMUSCULAR; INTRAVENOUS
Qty: 12 ML | Refills: 1 | Status: SHIPPED | OUTPATIENT
Start: 2021-09-15 | End: 2022-03-01 | Stop reason: SDUPTHER

## 2021-09-14 RX ORDER — METHOTREXATE 25 MG/ML
25 INJECTION, SOLUTION INTRA-ARTERIAL; INTRAMUSCULAR; INTRAVENOUS
Qty: 12 ML | Refills: 1
Start: 2021-09-15 | End: 2021-09-14

## 2021-09-14 NOTE — TELEPHONE ENCOUNTER
----- Message from Savanna Mota sent at 2021 12:13 PM EDT -----  Regarding: Dr. Clau Brennan Message/Vendor Calls    Caller's first and last name:Daya([facility])      Reason for call:new order for infusion      Callback required yes/no and why:yes      Best contact number(s): [phone]      Details to clarify the request:Daya stated pt requested an appt for \"[facility]\", but his order has  and would like a new order faxed to [phone].       Savanna Mota

## 2021-09-14 NOTE — PROGRESS NOTES
REASON FOR VISIT    This is a follow-up visit for Mr. Michael Dalal for     ICD-10-CM   1. Seropositive rheumatoid arthritis of multiple sites [facility] M05.79   2. 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     The patient has consented for synchronous (real-time) Telemedicine (audio technology) on 9/14/2021 for their care to be delivered over telemedicine in place of their regularly scheduled office visit pursuant to the emergency declaration under the [facility] [place] [address] waiver authority and the [employer] Act, this Virtual  Visit was conducted, with patient's consent, to reduce the patient's risk of exposure to COVID-19 and provide continuity of care for an established patient. Services were provided through an audio synchronous discussion virtually to substitute for in-person clinic visit. Inflammatory arthritis phenotype includes:  Anti-CCP positive: yes (>250)  Rheumatoid factor positive: yes (>650)  Erosive disease: yes  Extra-articular manifestations include: left iritis    Immunosuppression Screening (10/15/2020):  Quantiferon TB: negative    PPD:  Not performed  Hepatitis B: negative   Hepatitis C: negative     Therapy History includes:  Current DMARD therapy include: methotrexate 25 mg SubQ every Wednesday (7/13/2021 to present), Simponi Aria every 8 weeks (5/08/2018 to present)  Prior DMARD therapy include: methotrexate 17.5 mg every Wednesday (6/15/2020 to 10/14/2020), methotrexate 20 mg every Wednesday (11/20/2017 to 6/15/2020; 10/15/2020 to 7/13/2021),  Discontinued DMARDs because of inefficacy: None  Discontinued DMARDs because of side effects: None  Unaffordable DMARDs: Enbrel    HISTORY OF PRESENT ILLNESS    Mr. Michael Dalal returns for a follow-up.      On his last visit, due to interval iritis, per his preference I changed PO methotrexate 20 mg every Wednesday to 25 mg SubQ every Wednesday and continued Simponi Aria every 8 weeks. His most recnet infusion was 7/22/2021. Today, he feels well. He likes methotrexate SubQ more than PO. He denies interval iritis flares. He denies pain, swelling, or stiffness in his hands or wrists. He reports having discomfort in his ankles after playing 18 holes of golf. He denies interval gouty flares. He denies fever, weight loss, blurred vision, vision loss, oral ulcers, ankle swelling, dry cough, dyspnea, nausea, vomiting, dysphagia, abdominal pain, black or bloody stool, fall since last visit, rash, easy bruising and increased thirst.    Most recent toxicity monitoring by blood work was done on 7/22/2021 and did not reveal any significant adverse effects. I reviewed the patient's interval medical history, surgical history, medications, and allergies. The patient has not had any interval hospital admissions, infections, or surgeries. REVIEW OF SYSTEMS    A comprehensive review of systems was performed and pertinent results are documented in the HPI, review of systems is otherwise non-contributory. PAST MEDICAL HISTORY    He has a past medical history of Allergic rhinitis, Arthritis, Basal cell carcinoma (BCC) in situ of skin, Cancer ([facility] [address] GERD (gastroesophageal reflux disease), Gout, Hypercholesterolemia, Hypertension, Melanoma in situ of back ([address] Skin cancer (melanoma) ([facility] [address] and Sleep apnea. FAMILY HISTORY    His family history includes Cancer in his paternal grandfather and another family member; Dementia in his mother; Diabetes in his paternal grandmother; Heart Disease in his father; Kidney Disease in his father. SOCIAL HISTORY    He reports that he quit smoking about 41 years ago. He has never used smokeless tobacco. He reports current alcohol use of about 14.0 standard drinks of alcohol per week. He reports that he does not use drugs.     MEDICATIONS    Current Outpatient Medications   Medication Sig    ergocalciferol (ERGOCALCIFEROL) 1,250 mcg (50,000 unit) capsule TAKE ONE CAPSULE BY MOUTH EVERY WEEK    Insulin Syringe-Needle U-100 1 mL 31 gauge x 5/16 syrg Use to inject methotrexate once weekly.  Insulin Syringe-Needle U-100 1 mL 30 gauge x 5/16 syrg 1 Each by Does Not Apply route Every Saturday. To be used for methotrexate solution    methotrexate 25 mg/mL chemo injection 1 mL by SubCUTAneous route every Wednesday.  pantoprazole (PROTONIX) 40 mg tablet TAKE ONE TABLET BY MOUTH TWICE A DAY    colestipoL (COLESTID) 1 gram tablet TAKE TWO TABLETS BY MOUTH TWICE A DAY    folic acid (FOLVITE) 1 mg tablet TAKE ONE TABLET BY MOUTH DAILY    allopurinoL (ZYLOPRIM) 300 mg tablet TAKE ONE TABLET BY MOUTH ONE TIME DAILY    fexofenadine (ALLEGRA) 180 mg tablet Take  by mouth daily as needed.  guaiFENesin ER (MUCINEX) 600 mg ER tablet Take 600 mg by mouth as needed.  clobetasol (TEMOVATE) 0.05 % ointment Apply  to affected area two (2) times a day.  0.9% sodium chloride solp 100 mL with golimumab 12.5 mg/mL soln 2 mg/kg 2 mg/kg by IntraVENous route once. Every eight weeks  Indications: rhumetoid arthritis    lisinopril (PRINIVIL, ZESTRIL) 40 mg tablet Take 40 mg by mouth daily.  aspirin delayed-release 81 mg tablet Take  by mouth daily.  albuterol (VENTOLIN HFA) 90 mcg/actuation inhaler Take  by inhalation as needed.  omega-3 fatty acids-vitamin e (FISH OIL) 1,000 mg Cap Take 1 Cap by mouth.  citalopram (CELEXA) 20 mg tablet Take 1 Tab by mouth daily.  atorvastatin (LIPITOR) 40 mg tablet Take 1 Tab by mouth daily.  amlodipine (NORVASC) 2.5 mg tablet Take 1 Tab by mouth daily. No current facility-administered medications for this visit. ALLERGIES    Allergies   Allergen Reactions    Stings [Sting, Bee] Swelling     Swelling at site       PHYSICAL EXAMINATION    There were no vitals taken for this visit. There is no height or weight on file to calculate BMI.     General: Patient is alert, oriented x 3, not in acute distress    Chest:  Breathing comfortably at room air    Musculoskeletal exam:  A comprehensive musculoskeletal exam was NOT performed for all joints of each upper and lower extremity and assessed for swelling, tenderness and range of motion. Positive results are documented as below:    Previous Exam    Bilateral Olivia and [place] nodes. Bilateral knee crepitus without effusion.   Pes planus   Bilateral hallux valgus (LEFT more than right)    Z-Deformities:   no  [place]ck Deformities:  no  [place]out[place]ierre's Deformities:  no  Ulnar Deviation:   Yes (bilateral)  MCP Subluxation:  no     Joint Count 10/14/2020 12/9/2019 6/7/2019 3/4/2019 12/3/2018 9/4/2018 6/4/2018   Patient pain (0-100) 50 20 5 0 20 15 0   MHAQ 0 0 0 0 0 0 0   Left elbow - Tender - - - - - - -   Left wrist- Tender 0 0 0 - - 1 0   Left wrist- Swollen 0 0 0 - - 1 1   Left 1st MCP - Tender - - - - - - -   Left 1st MCP - Swollen - - - - - 1 -   Left 2nd MCP - Tender - - - 0 0 - -   Left 2nd MCP - Swollen - - - 1 1 1 1   Left 3rd MCP - Tender - - - 0 - - -   Left 3rd MCP - Swollen - - - 1 - 1 1   Left 4th MCP - Tender - - - - - - -   Left 5th MCP - Tender - - - - - - -   Left thumb IP - Tender - - - - - - -   Left thumb IP - Swollen - - - - - - -   Left 2nd PIP - Tender - - - - - - -   Left 3rd PIP - Tender - - - - - - -   Left 4th PIP - Tender - - - - - - -   Left 5th PIP - Tender - - - - - - -   Right wrist- Tender - - - - - - -   Right wrist- Swollen - - - - - 1 1   Right 1st MCP - Tender - - - - - - -   Right 1st MCP - Swollen - - - - - 1 1   Right 2nd MCP - Tender - - - - - - -   Right 2nd MCP - Swollen - - - - - 1 1   Right 3rd MCP - Tender - - - - - - -   Right 3rd MCP - Swollen - - - - - 1 1   Right 4th MCP - Tender - - - 0 - - -   Right 4th MCP - Swollen - - - 1 - - 1   Right 5th MCP - Swollen - - - - - - -   Right thumb IP - Tender - - - - - - -   Right 2nd PIP - Tender - - - - - - -   Right 2nd PIP - Swollen - - - - - - -   Right 3rd PIP - Tender - - - - - - -   Right 3rd PIP - Swollen - - - - - - 1   Right 4th PIP - Swollen - - - - - - 1   Right knee - Tender - - - - - - -   Right knee - Swollen - - - - - - -   Tender Joint Count (Total) 0 0 0 0 0 1 0   Swollen Joint Count (Total) 0 0 0 3 1 8 10   Physician Assessment (0-10) 0 0 0 0.5 0 2 2   Patient Assessment (0-10) 4 0.5 0.5 0 2 1.5 0   CDAI Total (calculated) 4 0.5 0.5 3.5 3 12.5 12       DATA REVIEW    Laboratory     Recent laboratory results were reviewed, summarized, and discussed with the patient. Imaging    Musculoskeletal Ultrasound    None    Radiographs    Bilateral Hand 11/20/2017: RIGHT: No fracture or dislocation on plain film. The bones are osteopenic. Mild narrowing of the radiocarpal joint. Probable erosion of the radius and lunate at the radiocarpal joint. No widening of the intercarpal spaces. Subtle erosion of the ulnar styloid. Mild narrowing of the triscaphe joint is age-appropriate. Irregular arthritis of the first and second CMC joints includes osteophytes and erosions. There are erosions of the first through fourth metacarpal heads at the MCP points. Moderate narrowing and anterior subluxation of the second MCP joint. IP joints are within normal limits. No chondrocalcinosis or periosteal reaction. LEFT: No fracture or dislocation on plain film. The bones are osteopenic. Moderate narrowing of the radiocarpal joint with subtle erosions of the lunate. Marrow DR UVJ with erosions. No definite ulnar styloid erosion. Triscaphe and first [facility] joint osteoarthritis are age-appropriate. Large erosions on both sides of the second MCP joint and in the third metacarpal head. IP joints are within normal limits. No chondrocalcinosis or periosteal reaction. Bilateral Foot 11/20/2017: RIGHT: No fracture or dislocation on plain film. Bones are osteopenic. Talonavicular joint space narrowing, osteophytes, and erosions. Subtalar arthritis is partially imaged.  TMT joints are within normal limits. Severe narrowing of the first MTP joint with osteophytes and erosions. There is erosion and the fifth tarsal head. Mild narrowing of the second MTP joint without definable erosion. No periosteal reaction. LEFT: No fracture or dislocation on plain film. Bones are osteopenic. Intertarsal joints are within normal limits. Moderate narrowing of the first MTP joint with central and marginal erosions. 35 degrees hallux valgus angulation and fibular subluxation of the first proximal phalanx. There are also erosions in the laterally subluxed hallux sesamoids. Mild joint space narrowing and erosions of the third MTP joint. Fragmentation of the fourth middle phalanx is adjacent to the fourth PIP joint erosions. There are also erosions of the second and fifth PIP joints. First IP joint osteoarthritis is mild. CT Imaging    CT Right Upper Extremity with contrast 10/19/2017: examination of the soft tissues demonstrates no drainable fluid collection. There is no pathologically enlarged nodes within the right axilla. Alignment is normal. There is no bone destruction or fracture. No significant fatty atrophy. MR Imaging    None    DXA     None    ASSESSMENT AND PLAN    This is a follow-up visit for Mr. Niko Serrato. 1) Seropositive Erosive Rheumatoid Arthritis. He presented after activation of underlying Rheumatoid Arthritis which appears to have been quiescent for more than 30 years from the TDAP vaccine. He remembers being diagnosed with arthritis in his 30's that was treated with an injection in his finger. He has bilateral ulnar deviation which corresponds with the chronicity of his disease. Radiographs showed erosive disease confirming chronicity. He is maintained on methotrexate 25 SubQ mg every Wednesday and Simponi Aria with good tolerance. He most recent infusion was 7/22/2021. He denies breakthrough. He denies inflammatory joint symptoms.     He denies interval iritis flares. His CDAI was previously 4 (previously 0.5, 0.5, 3.5, 3, 12.5, 12, 40, 27, 37) with 0 tender and 0 swollen joints, consistent with low disease activity. His PtGlobal is influenced by his back issues. 2) Chronic Gout. He has a history of gout in his left foot. He is on allopurinol 300 mg daily. His uric acid was 4.5 mg/dL (previously 4.5, 4.9 mg/dL). He denies interval flares. 3) Long Term Use of Immunosuppressants. The patient remains on high risk immunomodulatory medications (methotrexate, Simponi Aria) and requires frequent toxicity monitoring by blood work to evaluate for toxicities. Respective labs were ordered (see below orders for details). 4) Vitamin D Deficiency. His vitamin D level was 70.4 (previously 40.4, 39.2, 27.7, 22.6). His dose was changed from weekly ergocalciferol 50,000 to biweekly. 5) Basal Cell Carcinoma. Lesion removed on his scalp 5/31/2018. Wound without secondary infection. He continues to have lesions burnt. 6) History of Melanoma. He had melanoma removed from his back 10/2017. He did not require chemotherapy. He has skin exams every 3 months. He has reviewed his medications, including Roshni Bologna, with his dermatologist. He understands to continue close surveillance and inform us if he has any new melanoma lesions. 7) Bilateral Knee Osteoarthritis. This was not an active issue today. 8) Bilateral Pes Planus. This is no longer an active issue. I suspect is from his pes planus. He endorses ankle pain after playing 18 holes of golf. I recommended inserts. The patient voiced understanding of the aforementioned assessment and plan.     The patient has consented for synchronous (real-time) Telemedicine (audio technology) on 9/14/2021 for their care to be delivered over telemedicine in place of their regularly scheduled office visit pursuant to the emergency declaration under the [facility] [address] waiver authority and the Coronavirus Preparedness and Response Supplemental Appropriations Act, this Virtual  Visit was conducted, with patient's consent, to reduce the patient's risk of exposure to COVID-19 and provide continuity of care for an established patient. A total of 36 minutes was spent on this visit, reviewing interval notes, interval testing results, ordering tests, refilling medications, documenting the findings in the note, patient education, counseling, and coordination of care as described above. All questions asked and answered. Services were provided through an audio synchronous discussion virtually to substitute for in-person clinic visit. TODAY'S ORDERS    No orders of the defined types were placed in this encounter.       Future Appointments   Date Time Provider Raiza Sanchez   9/14/2021 10:40 AM Arik Shi MD [facility] BS AMB   12/17/2021  8:30 AM Heather Martines MD [facility] BS AMB     Joes Huynh MD, [facility]    Adult Rheumatology   Rheumatology Ultrasound Certified  [address]  [facility], [address] [place] [address]   Phone [phone]  Fax [phone]

## 2021-09-21 ENCOUNTER — HOSPITAL ENCOUNTER (OUTPATIENT)
Dept: MAMMOGRAPHY | Age: 76
Discharge: HOME | End: 2021-09-21
Attending: INTERNAL MEDICINE
Payer: MEDICARE

## 2021-09-21 DIAGNOSIS — M81.0 AGE-RELATED OSTEOPOROSIS WITHOUT CURRENT PATHOLOGICAL FRACTURE: ICD-10-CM

## 2021-09-21 DIAGNOSIS — M89.9 DISEASE OF BONE: ICD-10-CM

## 2021-09-21 DIAGNOSIS — M05.79 SEROPOSITIVE RHEUMATOID ARTHRITIS OF MULTIPLE SITES (HCC): Primary | ICD-10-CM

## 2021-09-21 PROBLEM — M81.8 AGE-RELATED OSTEOPOROSIS WITHOUT FRACTURE: Status: ACTIVE | Noted: 2021-09-21

## 2021-09-21 PROCEDURE — 77080 DXA BONE DENSITY AXIAL: CPT

## 2021-09-21 RX ORDER — SODIUM CHLORIDE 9 MG/ML
25 INJECTION, SOLUTION INTRAVENOUS CONTINUOUS
Status: CANCELLED | OUTPATIENT
Start: 2021-09-21

## 2021-09-28 ENCOUNTER — HOSPITAL ENCOUNTER (OUTPATIENT)
Dept: INFUSION THERAPY | Age: 76
Discharge: HOME | End: 2021-09-28
Payer: MEDICARE

## 2021-09-28 VITALS
WEIGHT: 234.7 LBS | OXYGEN SATURATION: 93 % | SYSTOLIC BLOOD PRESSURE: 106 MMHG | HEIGHT: 68 IN | DIASTOLIC BLOOD PRESSURE: 65 MMHG | TEMPERATURE: 96.5 F | HEART RATE: 54 BPM | BODY MASS INDEX: 35.57 KG/M2 | RESPIRATION RATE: 16 BRPM

## 2021-09-28 DIAGNOSIS — M05.79 SEROPOSITIVE RHEUMATOID ARTHRITIS OF MULTIPLE SITES (HCC): ICD-10-CM

## 2021-09-28 DIAGNOSIS — M81.8 AGE-RELATED OSTEOPOROSIS WITHOUT FRACTURE: Primary | ICD-10-CM

## 2021-09-28 LAB
ANION GAP BLD CALC-SCNC: 10 MMOL/L (ref 10–20)
BASOPHILS # BLD: 0 K/UL (ref 0–0.1)
BASOPHILS NFR BLD: 1 % (ref 0–1)
CA-I BLD-MCNC: 1.21 MMOL/L (ref 1.12–1.32)
CHLORIDE BLD-SCNC: 106 MMOL/L (ref 98–107)
CO2 BLD-SCNC: 24.2 MMOL/L (ref 21–32)
CREAT BLD-MCNC: 0.75 MG/DL (ref 0.6–1.3)
DIFFERENTIAL METHOD BLD: ABNORMAL
EOSINOPHIL # BLD: 0.1 K/UL (ref 0–0.4)
EOSINOPHIL NFR BLD: 2 % (ref 0–7)
ERYTHROCYTE [DISTWIDTH] IN BLOOD BY AUTOMATED COUNT: 13.2 % (ref 11.5–14.5)
ERYTHROCYTE [SEDIMENTATION RATE] IN BLOOD: 4 MM/HR (ref 0–20)
GLUCOSE BLD-MCNC: 134 MG/DL (ref 65–100)
HCT VFR BLD AUTO: 42.2 % (ref 36.6–50.3)
HGB BLD-MCNC: 14.7 G/DL (ref 12.1–17)
IMM GRANULOCYTES # BLD AUTO: 0 K/UL (ref 0–0.04)
IMM GRANULOCYTES NFR BLD AUTO: 1 % (ref 0–0.5)
LYMPHOCYTES # BLD: 1.6 K/UL (ref 0.8–3.5)
LYMPHOCYTES NFR BLD: 21 % (ref 12–49)
MCH RBC QN AUTO: 36.3 PG (ref 26–34)
MCHC RBC AUTO-ENTMCNC: 34.8 G/DL (ref 30–36.5)
MCV RBC AUTO: 104.2 FL (ref 80–99)
MONOCYTES # BLD: 0.8 K/UL (ref 0–1)
MONOCYTES NFR BLD: 10 % (ref 5–13)
NEUTS SEG # BLD: 5.2 K/UL (ref 1.8–8)
NEUTS SEG NFR BLD: 65 % (ref 32–75)
NRBC # BLD: 0 K/UL (ref 0–0.01)
NRBC BLD-RTO: 0 PER 100 WBC
PLATELET # BLD AUTO: 246 K/UL (ref 150–400)
PMV BLD AUTO: 11.6 FL (ref 8.9–12.9)
POTASSIUM BLD-SCNC: 4.5 MMOL/L (ref 3.5–5.1)
RBC # BLD AUTO: 4.05 M/UL (ref 4.1–5.7)
SERVICE CMNT-IMP: ABNORMAL
SODIUM BLD-SCNC: 139 MMOL/L (ref 136–145)
WBC # BLD AUTO: 7.8 K/UL (ref 4.1–11.1)

## 2021-09-28 PROCEDURE — 74011250636 HC RX REV CODE- 250/636: Performed by: INTERNAL MEDICINE

## 2021-09-28 PROCEDURE — 85652 RBC SED RATE AUTOMATED: CPT

## 2021-09-28 PROCEDURE — 74011000258 HC RX REV CODE- 258: Performed by: INTERNAL MEDICINE

## 2021-09-28 PROCEDURE — 85025 COMPLETE CBC W/AUTO DIFF WBC: CPT

## 2021-09-28 PROCEDURE — 96372 THER/PROPH/DIAG INJ SC/IM: CPT

## 2021-09-28 PROCEDURE — 96413 CHEMO IV INFUSION 1 HR: CPT

## 2021-09-28 PROCEDURE — 80047 BASIC METABLC PNL IONIZED CA: CPT

## 2021-09-28 PROCEDURE — 36415 COLL VENOUS BLD VENIPUNCTURE: CPT

## 2021-09-28 RX ORDER — DIPHENHYDRAMINE HYDROCHLORIDE 50 MG/ML
50 INJECTION, SOLUTION INTRAMUSCULAR; INTRAVENOUS AS NEEDED
Status: CANCELLED
Start: 2022-03-29

## 2021-09-28 RX ORDER — ALBUTEROL SULFATE 0.83 MG/ML
2.5 SOLUTION RESPIRATORY (INHALATION) AS NEEDED
Status: CANCELLED
Start: 2021-09-28

## 2021-09-28 RX ORDER — ONDANSETRON 2 MG/ML
8 INJECTION INTRAMUSCULAR; INTRAVENOUS AS NEEDED
Status: CANCELLED | OUTPATIENT
Start: 2021-11-14

## 2021-09-28 RX ORDER — DIPHENHYDRAMINE HYDROCHLORIDE 50 MG/ML
25 INJECTION, SOLUTION INTRAMUSCULAR; INTRAVENOUS AS NEEDED
Status: CANCELLED
Start: 2022-03-29

## 2021-09-28 RX ORDER — SODIUM CHLORIDE 9 MG/ML
25 INJECTION, SOLUTION INTRAVENOUS CONTINUOUS
Status: DISPENSED | OUTPATIENT
Start: 2021-09-28 | End: 2021-09-28

## 2021-09-28 RX ORDER — HYDROCORTISONE SODIUM SUCCINATE 100 MG/2ML
100 INJECTION, POWDER, FOR SOLUTION INTRAMUSCULAR; INTRAVENOUS AS NEEDED
Status: CANCELLED | OUTPATIENT
Start: 2021-11-14

## 2021-09-28 RX ORDER — DIPHENHYDRAMINE HYDROCHLORIDE 50 MG/ML
50 INJECTION, SOLUTION INTRAMUSCULAR; INTRAVENOUS AS NEEDED
Status: CANCELLED
Start: 2021-11-14

## 2021-09-28 RX ORDER — SODIUM CHLORIDE 9 MG/ML
25 INJECTION, SOLUTION INTRAVENOUS CONTINUOUS
Status: CANCELLED | OUTPATIENT
Start: 2021-11-14

## 2021-09-28 RX ORDER — HYDROCORTISONE SODIUM SUCCINATE 100 MG/2ML
100 INJECTION, POWDER, FOR SOLUTION INTRAMUSCULAR; INTRAVENOUS AS NEEDED
Status: CANCELLED | OUTPATIENT
Start: 2021-09-28

## 2021-09-28 RX ORDER — EPINEPHRINE 1 MG/ML
0.3 INJECTION, SOLUTION, CONCENTRATE INTRAVENOUS AS NEEDED
Status: CANCELLED | OUTPATIENT
Start: 2021-11-14

## 2021-09-28 RX ORDER — HEPARIN 100 UNIT/ML
300-500 SYRINGE INTRAVENOUS AS NEEDED
Status: ACTIVE | OUTPATIENT
Start: 2021-09-28 | End: 2021-09-28

## 2021-09-28 RX ORDER — ACETAMINOPHEN 325 MG/1
650 TABLET ORAL AS NEEDED
Status: CANCELLED
Start: 2021-11-14

## 2021-09-28 RX ORDER — SODIUM CHLORIDE 0.9 % (FLUSH) 0.9 %
10 SYRINGE (ML) INJECTION AS NEEDED
Status: CANCELLED | OUTPATIENT
Start: 2021-11-14

## 2021-09-28 RX ORDER — DIPHENHYDRAMINE HYDROCHLORIDE 50 MG/ML
25 INJECTION, SOLUTION INTRAMUSCULAR; INTRAVENOUS AS NEEDED
Status: CANCELLED
Start: 2021-11-14

## 2021-09-28 RX ORDER — ALBUTEROL SULFATE 0.83 MG/ML
2.5 SOLUTION RESPIRATORY (INHALATION) AS NEEDED
Status: CANCELLED
Start: 2021-11-14

## 2021-09-28 RX ORDER — SODIUM CHLORIDE 9 MG/ML
10 INJECTION INTRAMUSCULAR; INTRAVENOUS; SUBCUTANEOUS AS NEEDED
Status: CANCELLED | OUTPATIENT
Start: 2021-11-14

## 2021-09-28 RX ORDER — EPINEPHRINE 1 MG/ML
0.3 INJECTION, SOLUTION, CONCENTRATE INTRAVENOUS AS NEEDED
Status: CANCELLED | OUTPATIENT
Start: 2022-03-29

## 2021-09-28 RX ORDER — SODIUM CHLORIDE 9 MG/ML
10 INJECTION INTRAMUSCULAR; INTRAVENOUS; SUBCUTANEOUS AS NEEDED
Status: ACTIVE | OUTPATIENT
Start: 2021-09-28 | End: 2021-09-28

## 2021-09-28 RX ORDER — ONDANSETRON 2 MG/ML
8 INJECTION INTRAMUSCULAR; INTRAVENOUS AS NEEDED
Status: CANCELLED | OUTPATIENT
Start: 2022-03-29

## 2021-09-28 RX ORDER — SODIUM CHLORIDE 0.9 % (FLUSH) 0.9 %
10 SYRINGE (ML) INJECTION AS NEEDED
Status: DISPENSED | OUTPATIENT
Start: 2021-09-28 | End: 2021-09-28

## 2021-09-28 RX ORDER — DIPHENHYDRAMINE HYDROCHLORIDE 50 MG/ML
25 INJECTION, SOLUTION INTRAMUSCULAR; INTRAVENOUS AS NEEDED
Status: CANCELLED
Start: 2021-09-28

## 2021-09-28 RX ORDER — ONDANSETRON 2 MG/ML
8 INJECTION INTRAMUSCULAR; INTRAVENOUS AS NEEDED
Status: CANCELLED | OUTPATIENT
Start: 2021-09-28

## 2021-09-28 RX ORDER — ACETAMINOPHEN 325 MG/1
650 TABLET ORAL AS NEEDED
Status: CANCELLED
Start: 2021-09-28

## 2021-09-28 RX ORDER — ACETAMINOPHEN 325 MG/1
650 TABLET ORAL AS NEEDED
Status: CANCELLED
Start: 2022-03-29

## 2021-09-28 RX ORDER — HEPARIN 100 UNIT/ML
300-500 SYRINGE INTRAVENOUS AS NEEDED
Status: CANCELLED
Start: 2021-11-14

## 2021-09-28 RX ORDER — ALBUTEROL SULFATE 0.83 MG/ML
2.5 SOLUTION RESPIRATORY (INHALATION) AS NEEDED
Status: CANCELLED
Start: 2022-03-29

## 2021-09-28 RX ORDER — EPINEPHRINE 1 MG/ML
0.3 INJECTION, SOLUTION, CONCENTRATE INTRAVENOUS AS NEEDED
Status: CANCELLED | OUTPATIENT
Start: 2021-09-28

## 2021-09-28 RX ORDER — HYDROCORTISONE SODIUM SUCCINATE 100 MG/2ML
100 INJECTION, POWDER, FOR SOLUTION INTRAMUSCULAR; INTRAVENOUS AS NEEDED
Status: CANCELLED | OUTPATIENT
Start: 2022-03-29

## 2021-09-28 RX ORDER — DIPHENHYDRAMINE HYDROCHLORIDE 50 MG/ML
50 INJECTION, SOLUTION INTRAMUSCULAR; INTRAVENOUS AS NEEDED
Status: CANCELLED
Start: 2021-09-28

## 2021-09-28 RX ADMIN — SODIUM CHLORIDE 25 ML/HR: 900 INJECTION, SOLUTION INTRAVENOUS at 10:40

## 2021-09-28 RX ADMIN — GOLIMUMAB 212.5 MG: 50 SOLUTION INTRAVENOUS at 10:48

## 2021-09-28 RX ADMIN — DENOSUMAB 60 MG: 60 INJECTION SUBCUTANEOUS at 11:37

## 2021-09-28 NOTE — PROGRESS NOTES
[facility] Progress Note    Date: 2021    Name: Vicki Fields    MRN: [id number]         : 1945    Mr. Le Santiago Arrived ambulatory and in no distress for Simponi  And prolia Regimen. Assessment was completed, no acute issues at this time, no new complaints voiced. 24g PIV inserted L AC without difficulty, labs drawn & sent for processing. Covid Questionnaire completed. 1. Do you have any symptoms of covid 19? SOB, coughing, fever, or generally not feeling well? - no  2. Have you been exposed to covid 19 recently? - no  3. Have you had any recent contact with family/friend that has a pending covid test? no      Chemotherapy Flowsheet 2021   Cycle q8week   Date 2021   Drug / Regimen -   Notes given       Patient proceed to MD appointment. Patient returned with no change in treatment. Mr. Sarabia's vitals were reviewed. Visit Vitals  /65   Pulse (!) 54   Temp (!) 96.5 °F (35.8 °C)   Resp 16   Ht 5' 8\" (1.727 m)   Wt 106.5 kg (234 lb 11.2 oz)   SpO2 93%   BMI 35.69 kg/m²       Lab results were obtained and reviewed. Recent Results (from the past 12 hour(s))   CBC WITH AUTOMATED DIFF    Collection Time: 21 10:15 AM   Result Value Ref Range    WBC 7.8 4.1 - 11.1 K/uL    RBC 4.05 (L) 4.10 - 5.70 M/uL    HGB 14.7 12.1 - 17.0 g/dL    HCT 42.2 36.6 - 50.3 %    .2 (H) 80.0 - 99.0 FL    MCH 36.3 (H) 26.0 - 34.0 PG    MCHC 34.8 30.0 - 36.5 g/dL    RDW 13.2 11.5 - 14.5 %    PLATELET 484 236 - 172 K/uL    MPV 11.6 8.9 - 12.9 FL    NRBC 0.0 0  WBC    ABSOLUTE NRBC 0.00 0.00 - 0.01 K/uL    NEUTROPHILS 65 32 - 75 %    LYMPHOCYTES 21 12 - 49 %    MONOCYTES 10 5 - 13 %    EOSINOPHILS 2 0 - 7 %    BASOPHILS 1 0 - 1 %    IMMATURE GRANULOCYTES 1 (H) 0.0 - 0.5 %    ABS. NEUTROPHILS 5.2 1.8 - 8.0 K/UL    ABS. LYMPHOCYTES 1.6 0.8 - 3.5 K/UL    ABS. MONOCYTES 0.8 0.0 - 1.0 K/UL    ABS. EOSINOPHILS 0.1 0.0 - 0.4 K/UL    ABS. BASOPHILS 0.0 0.0 - 0.1 K/UL    ABS. IMM. GRANS. 0.0 0.00 - 0.04 K/UL    DF AUTOMATED     SED RATE (ESR)    Collection Time: 09/28/21 10:15 AM   Result Value Ref Range    Sed rate, automated 4 0 - 20 mm/hr   POC CHEM8    Collection Time: 09/28/21 11:18 AM   Result Value Ref Range    Calcium, ionized (POC) 1.21 1.12 - 1.32 mmol/L    Sodium (POC) 139 136 - 145 mmol/L    Potassium (POC) 4.5 3.5 - 5.1 mmol/L    Chloride (POC) 106 98 - 107 mmol/L    CO2 (POC) 24.2 21 - 32 mmol/L    Anion gap (POC) 10 10 - 20 mmol/L    Glucose (POC) 134 (H) 65 - 100 mg/dL    Creatinine (POC) 0.75 0.6 - 1.3 mg/dL    GFRAA, POC >60 >60 ml/min/1.73m2    GFRNA, POC >60 >60 ml/min/1.73m2    Comment Comment Not Indicated. Medications:  Medications Administered     0.9% sodium chloride infusion     Admin Date  09/28/2021 Action  New Bag Dose  25 mL/hr Rate  25 mL/hr Route  IntraVENous Administered By  Ines Mason RN          denosumab (PROLIA) injection 60 mg     Admin Date  09/28/2021 Action  Given Dose  60 mg Route  SubCUTAneous Administered By  Ines Mason RN          golimumab (SIMPONI ARIA) 212.5 mg in 0.9% sodium chloride, overfill volume 127 mL infusion     Admin Date  09/28/2021 Action  New Bag Dose  212.5 mg Rate  254 mL/hr Route  IntraVENous Administered By  Ines Mason RN                  Mr. Kalie Machado tolerated treatment well and was discharged from [place] [address] in stable condition. PIV, flushed & discontinued per protocol. He is to return on November 23 for his next appointment.     Nabil Wilson RN  September 28, 2021

## 2021-10-02 DIAGNOSIS — K21.9 GASTROESOPHAGEAL REFLUX DISEASE WITHOUT ESOPHAGITIS: ICD-10-CM

## 2021-10-04 RX ORDER — PANTOPRAZOLE SODIUM 40 MG/1
TABLET, DELAYED RELEASE ORAL
Qty: 180 TABLET | Refills: 1 | Status: SHIPPED | OUTPATIENT
Start: 2021-10-04 | End: 2021-12-31

## 2021-11-23 ENCOUNTER — HOSPITAL ENCOUNTER (OUTPATIENT)
Dept: INFUSION THERAPY | Age: 76
Discharge: HOME | End: 2021-11-23
Payer: MEDICARE

## 2021-11-23 VITALS
RESPIRATION RATE: 16 BRPM | HEART RATE: 50 BPM | DIASTOLIC BLOOD PRESSURE: 52 MMHG | BODY MASS INDEX: 35.77 KG/M2 | SYSTOLIC BLOOD PRESSURE: 109 MMHG | TEMPERATURE: 97.8 F | HEIGHT: 68 IN | WEIGHT: 236 LBS | OXYGEN SATURATION: 96 %

## 2021-11-23 DIAGNOSIS — M81.8 AGE-RELATED OSTEOPOROSIS WITHOUT FRACTURE: Primary | ICD-10-CM

## 2021-11-23 DIAGNOSIS — M05.79 SEROPOSITIVE RHEUMATOID ARTHRITIS OF MULTIPLE SITES (HCC): ICD-10-CM

## 2021-11-23 LAB
ALBUMIN SERPL-MCNC: 3.5 G/DL (ref 3.5–5)
ALBUMIN/GLOB SERPL: 1.1 {RATIO} (ref 1.1–2.2)
ALP SERPL-CCNC: 78 U/L (ref 45–117)
ALT SERPL-CCNC: 66 U/L (ref 12–78)
ANION GAP SERPL CALC-SCNC: 6 MMOL/L (ref 5–15)
AST SERPL-CCNC: 20 U/L (ref 15–37)
BASOPHILS # BLD: 0 K/UL (ref 0–0.1)
BASOPHILS NFR BLD: 1 % (ref 0–1)
BILIRUB SERPL-MCNC: 0.7 MG/DL (ref 0.2–1)
BUN SERPL-MCNC: 13 MG/DL (ref 6–20)
BUN/CREAT SERPL: 15 (ref 12–20)
CALCIUM SERPL-MCNC: 8.4 MG/DL (ref 8.5–10.1)
CHLORIDE SERPL-SCNC: 109 MMOL/L (ref 97–108)
CO2 SERPL-SCNC: 24 MMOL/L (ref 21–32)
CREAT SERPL-MCNC: 0.86 MG/DL (ref 0.7–1.3)
CRP SERPL HS-MCNC: 1 MG/L
DIFFERENTIAL METHOD BLD: ABNORMAL
EOSINOPHIL # BLD: 0.2 K/UL (ref 0–0.4)
EOSINOPHIL NFR BLD: 3 % (ref 0–7)
ERYTHROCYTE [DISTWIDTH] IN BLOOD BY AUTOMATED COUNT: 12.8 % (ref 11.5–14.5)
ERYTHROCYTE [SEDIMENTATION RATE] IN BLOOD: 14 MM/HR (ref 0–20)
GLOBULIN SER CALC-MCNC: 3.1 G/DL (ref 2–4)
GLUCOSE SERPL-MCNC: 155 MG/DL (ref 65–100)
HCT VFR BLD AUTO: 40.7 % (ref 36.6–50.3)
HGB BLD-MCNC: 13.6 G/DL (ref 12.1–17)
IMM GRANULOCYTES # BLD AUTO: 0 K/UL (ref 0–0.04)
IMM GRANULOCYTES NFR BLD AUTO: 1 % (ref 0–0.5)
LYMPHOCYTES # BLD: 1.6 K/UL (ref 0.8–3.5)
LYMPHOCYTES NFR BLD: 21 % (ref 12–49)
MCH RBC QN AUTO: 35.1 PG (ref 26–34)
MCHC RBC AUTO-ENTMCNC: 33.4 G/DL (ref 30–36.5)
MCV RBC AUTO: 105.2 FL (ref 80–99)
MONOCYTES # BLD: 0.7 K/UL (ref 0–1)
MONOCYTES NFR BLD: 9 % (ref 5–13)
NEUTS SEG # BLD: 5.1 K/UL (ref 1.8–8)
NEUTS SEG NFR BLD: 65 % (ref 32–75)
NRBC # BLD: 0 K/UL (ref 0–0.01)
NRBC BLD-RTO: 0 PER 100 WBC
PLATELET # BLD AUTO: 208 K/UL (ref 150–400)
PMV BLD AUTO: 10.4 FL (ref 8.9–12.9)
POTASSIUM SERPL-SCNC: 4.7 MMOL/L (ref 3.5–5.1)
PROT SERPL-MCNC: 6.6 G/DL (ref 6.4–8.2)
RBC # BLD AUTO: 3.87 M/UL (ref 4.1–5.7)
SODIUM SERPL-SCNC: 139 MMOL/L (ref 136–145)
WBC # BLD AUTO: 7.6 K/UL (ref 4.1–11.1)

## 2021-11-23 PROCEDURE — 74011000258 HC RX REV CODE- 258: Performed by: INTERNAL MEDICINE

## 2021-11-23 PROCEDURE — 36415 COLL VENOUS BLD VENIPUNCTURE: CPT

## 2021-11-23 PROCEDURE — 74011250636 HC RX REV CODE- 250/636: Performed by: INTERNAL MEDICINE

## 2021-11-23 PROCEDURE — 85652 RBC SED RATE AUTOMATED: CPT

## 2021-11-23 PROCEDURE — 96413 CHEMO IV INFUSION 1 HR: CPT

## 2021-11-23 PROCEDURE — 85025 COMPLETE CBC W/AUTO DIFF WBC: CPT

## 2021-11-23 PROCEDURE — 86141 C-REACTIVE PROTEIN HS: CPT

## 2021-11-23 PROCEDURE — 80053 COMPREHEN METABOLIC PANEL: CPT

## 2021-11-23 RX ORDER — ONDANSETRON 2 MG/ML
8 INJECTION INTRAMUSCULAR; INTRAVENOUS AS NEEDED
Status: CANCELLED | OUTPATIENT
Start: 2021-12-17

## 2021-11-23 RX ORDER — SODIUM CHLORIDE 9 MG/ML
25 INJECTION, SOLUTION INTRAVENOUS CONTINUOUS
Status: CANCELLED | OUTPATIENT
Start: 2021-12-17

## 2021-11-23 RX ORDER — DIPHENHYDRAMINE HYDROCHLORIDE 50 MG/ML
50 INJECTION, SOLUTION INTRAMUSCULAR; INTRAVENOUS AS NEEDED
Status: CANCELLED
Start: 2021-12-17

## 2021-11-23 RX ORDER — SODIUM CHLORIDE 0.9 % (FLUSH) 0.9 %
10 SYRINGE (ML) INJECTION AS NEEDED
Status: CANCELLED | OUTPATIENT
Start: 2021-12-17

## 2021-11-23 RX ORDER — HYDROCORTISONE SODIUM SUCCINATE 100 MG/2ML
100 INJECTION, POWDER, FOR SOLUTION INTRAMUSCULAR; INTRAVENOUS AS NEEDED
Status: CANCELLED | OUTPATIENT
Start: 2021-12-17

## 2021-11-23 RX ORDER — EPINEPHRINE 1 MG/ML
0.3 INJECTION, SOLUTION, CONCENTRATE INTRAVENOUS AS NEEDED
Status: CANCELLED | OUTPATIENT
Start: 2021-12-17

## 2021-11-23 RX ORDER — HEPARIN 100 UNIT/ML
300-500 SYRINGE INTRAVENOUS AS NEEDED
Status: CANCELLED
Start: 2021-12-17

## 2021-11-23 RX ORDER — SODIUM CHLORIDE 9 MG/ML
10 INJECTION INTRAMUSCULAR; INTRAVENOUS; SUBCUTANEOUS AS NEEDED
Status: CANCELLED | OUTPATIENT
Start: 2021-12-17

## 2021-11-23 RX ORDER — ACETAMINOPHEN 325 MG/1
650 TABLET ORAL AS NEEDED
Status: CANCELLED
Start: 2021-12-17

## 2021-11-23 RX ORDER — ALBUTEROL SULFATE 0.83 MG/ML
2.5 SOLUTION RESPIRATORY (INHALATION) AS NEEDED
Status: CANCELLED
Start: 2021-12-17

## 2021-11-23 RX ORDER — SODIUM CHLORIDE 9 MG/ML
25 INJECTION, SOLUTION INTRAVENOUS CONTINUOUS
Status: DISPENSED | OUTPATIENT
Start: 2021-11-23 | End: 2021-11-23

## 2021-11-23 RX ORDER — DIPHENHYDRAMINE HYDROCHLORIDE 50 MG/ML
25 INJECTION, SOLUTION INTRAMUSCULAR; INTRAVENOUS AS NEEDED
Status: CANCELLED
Start: 2021-12-17

## 2021-11-23 RX ADMIN — SODIUM CHLORIDE 25 ML/HR: 9 INJECTION, SOLUTION INTRAVENOUS at 10:24

## 2021-11-23 RX ADMIN — GOLIMUMAB 212.5 MG: 50 SOLUTION INTRAVENOUS at 10:25

## 2021-11-23 NOTE — PROGRESS NOTES
\[facility]\"" Progress Note    Date: 2021    Name: Rene Curling    MRN: [id number]         : 1945    Mr. Amparo Thomas Arrived ambulatory and in no distress for Simponi Regimen. Assessment was completed, no acute issues at this time, no new complaints voiced. PIV placed without difficulty, labs drawn & sent for processing. Please see connectare. Pt denies current antibiotic use. Covid questionnaire completed. 1. Do you have any symptoms of COVID-19? SOB, coughing, fever, or generally not feeling well -no    2. Have you been exposed to COVID-19 recently? - no    3. Have you had any recent contact with family/friend that has a pending COVID test? - no      Mr. Sarabia's vitals were reviewed. Patient Vitals for the past 12 hrs:   Temp Pulse Resp BP SpO2   21 0951 97.8 °F (36.6 °C) (!) 55 17 127/60 96 %       Medications:  Medications Administered     0.9% sodium chloride infusion     Admin Date  2021 Action  New Bag Dose  25 mL/hr Rate  25 mL/hr Route  IntraVENous Administered By  Andrade Miranda RN          golimumab (SIMPONI ARIA) 212.5 mg in 0.9% sodium chloride, overfill volume 127 mL infusion     Admin Date  2021 Action  New Bag Dose  212.5 mg Rate  254 mL/hr Route  IntraVENous Administered By  Andrade Miranda RN                Mr. Amparo Thomas tolerated treatment well and was discharged from [place] [address] in stable condition. PIV flushed and removed. He is to return on 22 for his next appointment.     Sage Hassan RN  2021

## 2021-12-03 DIAGNOSIS — R15.9 INCONTINENCE OF FECES, UNSPECIFIED FECAL INCONTINENCE TYPE: ICD-10-CM

## 2021-12-03 RX ORDER — MONTELUKAST SODIUM 4 MG/1
TABLET, CHEWABLE ORAL
Qty: 60 TABLET | Refills: 5 | Status: SHIPPED | OUTPATIENT
Start: 2021-12-03

## 2021-12-16 PROBLEM — I73.9 PERIPHERAL VASCULAR DISEASE (HCC): Status: RESOLVED | Noted: 2019-09-12 | Resolved: 2021-12-16

## 2021-12-16 NOTE — PROGRESS NOTES
Scout Benitez is a 77 y.o. male who presents today for Annual Wellness Visit (RM18///pt presenting today for medicare wellness; has had flu shot)  . He has a history of   Patient Active Problem List   Diagnosis Code    Mixed hyperlipidemia E78.2    Other abnormal glucose R73.09    Essential hypertension, benign I10    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    Allergic rhinitis, cause unspecified J30.9    Reflux esophagitis K21.00    Benign neoplasm of colon D12.6    Contact dermatitis and other eczema, due to unspecified cause L25.9    Bunion M21.619    History of prostate cancer Z85.46    Advanced care planning/counseling discussion Z71.89    Low testosterone R79.89    Primary osteoarthritis of both knees M17.0    Seropositive rheumatoid arthritis of multiple sites (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 Today patient is here for CPE. Had EGD in Feb. Unchanged findings. Having Mohs again. Lesion to top of the forehead close to where her previous one was. Hypertension- No orthostatic symptoms. Hypertension ROS: taking medications as instructed, no medication side effects noted, no TIA's, no chest pain on exertion, no dyspnea on exertion, no swelling of ankles     reports that he quit smoking about 41 years ago. He has never used smokeless tobacco.    reports current alcohol use of about 14.0 standard drinks of alcohol per week. BP Readings from Last 2 Encounters:   12/17/21 134/78   11/23/21 (!) 109/52     RA: seeing Rheum.      Reports occasional short-term memory loss. Denies any progression of this. Reports mainly associated with attention to detail. We discussed monitoring this for progression. Hyperlipidemia  ROS: taking medications as instructed, no medication side effects noted  No new myalgias, no joint pains, no weakness  No TIA's, no chest pain on exertion, no dyspnea on exertion, no swelling of ankles. Lab Results   Component Value Date/Time    Cholesterol, total 174 12/16/2020 09:39 AM    HDL Cholesterol 54 12/16/2020 09:39 AM    LDL, calculated 85.2 12/16/2020 09:39 AM    VLDL, calculated 34.8 12/16/2020 09:39 AM    Triglyceride 174 (H) 12/16/2020 09:39 AM    CHOL/HDL Ratio 3.2 12/16/2020 09:39 AM     Rigors have been elevated on multiple draws, that these were not fasting we will check an A1c    Reports that mental health and depression has been stable. Health maintenance hx includes:  Exercise: moderately active. Form of exercise: Golf and walking. Diet: generally follows a low fat low cholesterol diet, nonsmoker, alcohol intake beer  Social: Retired from [employer].  Enjoys golSeekly.               Lives with wife,  2 adult children.      Cancer screening:    Colon cancer screening:  Last Colonoscopy: 2019 and was abnormal: 3 yrs   Prostate cancer screening: PSA and/or TITO: Hx of Cancer, PSA 0 in 2020    Immunizations:     Immunization History   Administered Date(s) Administered    (RETIRED) Pneumococcal Vaccine (Unspecified Type) 10/21/2011    COVID-19, PFIZER, MRNA, LNP-S, PF, 30MCG/0.3ML DOSE 01/30/2021, 02/27/2021, 09/07/2021    Influenza High Dose Vaccine PF 10/09/2014, 11/04/2015, 10/14/2016, 10/16/2018    Influenza Vaccine 10/08/2019, 09/12/2020    Influenza Vaccine (Tri) Adjuvanted (>65 Yrs FLUAD TRI [id number]) 10/08/2019    Influenza Vaccine Split 09/21/2011    Influenza Vaccine Whole 09/17/2010    Influenza, High-dose, Quadrivalent (>65 Yrs Fluzone High Dose Quad [id number]) 11/24/2021    Influenza, Quadrivalent, Adjuvanted (>65 Yrs FLUAD QUAD [id number]) 09/26/2020    Pneumococcal Conjugate (PCV-13) 01/01/2015, 11/20/2015    Pneumococcal Polysaccharide (PPSV-23) 10/09/2014    TDAP Vaccine 12/15/2010    Tdap 12/15/2010, 10/04/2017, 10/04/2017    Zoster Vaccine, Live 10/21/2011      Immunization status: up to date and documented. ROS  Review of Systems   Constitutional: Negative for chills, fever and weight loss. HENT: Negative for congestion and sore throat. Eyes: Negative for blurred vision, double vision and photophobia. Respiratory: Negative for cough and shortness of breath. Cardiovascular: Negative for chest pain, palpitations and leg swelling. Gastrointestinal: Negative for abdominal pain, constipation, diarrhea, heartburn, nausea and vomiting. Genitourinary: Negative for dysuria, frequency and urgency. Musculoskeletal: Negative for joint pain and myalgias. Skin: Negative for rash. Neurological: Negative. Negative for headaches. Endo/Heme/Allergies: Does not bruise/bleed easily. Psychiatric/Behavioral: Negative for memory loss and suicidal ideas. Mood stable. Visit Vitals  /78 (BP 1 Location: Left upper arm, BP Patient Position: Sitting, BP Cuff Size: Large adult)   Pulse (!) 51   Temp 98 °F (36.7 °C) (Oral)   Resp 16   Ht 5' 8\" (1.727 m)   Wt 238 lb 9.6 oz (108.2 kg)   SpO2 94%   BMI 36.28 kg/m²       Physical Exam      Current Outpatient Medications   Medication Sig    colestipoL (COLESTID) 1 gram tablet TAKE TWO TABLETS BY MOUTH TWICE A DAY    pantoprazole (PROTONIX) 40 mg tablet TAKE ONE TABLET BY MOUTH TWICE A DAY    Insulin Syringe-Needle U-100 1 mL 31 gauge x 5/16 syrg Use to inject methotrexate once weekly.     ergocalciferol (ERGOCALCIFEROL) 1,250 mcg (50,000 unit) capsule TAKE ONE CAPSULE BY MOUTH EVERY WEEK    folic acid (FOLVITE) 1 mg tablet TAKE ONE TABLET BY MOUTH DAILY    allopurinoL (ZYLOPRIM) 300 mg tablet TAKE ONE TABLET BY MOUTH ONE TIME DAILY    methotrexate 25 mg/mL chemo injection 1 mL by SubCUTAneous route every Wednesday.  Insulin Syringe-Needle U-100 1 mL 30 gauge x 5/16 syrg 1 Each by Does Not Apply route Every Saturday. To be used for methotrexate solution    fexofenadine (ALLEGRA) 180 mg tablet Take  by mouth daily as needed.  guaiFENesin ER (MUCINEX) 600 mg ER tablet Take 600 mg by mouth as needed.  clobetasol (TEMOVATE) 0.05 % ointment Apply  to affected area two (2) times a day.  lisinopril (PRINIVIL, ZESTRIL) 40 mg tablet Take 40 mg by mouth daily.  aspirin delayed-release 81 mg tablet Take  by mouth daily.  albuterol (VENTOLIN HFA) 90 mcg/actuation inhaler Take  by inhalation as needed.  omega-3 fatty acids-vitamin e (FISH OIL) 1,000 mg Cap Take 1 Cap by mouth.  citalopram (CELEXA) 20 mg tablet Take 1 Tab by mouth daily.  atorvastatin (LIPITOR) 40 mg tablet Take 1 Tab by mouth daily.  amlodipine (NORVASC) 2.5 mg tablet Take 1 Tab by mouth daily.  0.9% sodium chloride solp 100 mL with golimumab 12.5 mg/mL soln 2 mg/kg 2 mg/kg by IntraVENous route once. Every eight weeks  Indications: rhumetoid arthritis     No current facility-administered medications for this visit.         Past Medical History:   Diagnosis Date    Allergic rhinitis     Arthritis     RA    Basal cell carcinoma (BCC) in situ of skin     shoudler and scalp    Cancer (HCC)     prostate    GERD (gastroesophageal reflux disease)     Gout     Hypercholesterolemia     Hypertension     Melanoma in situ of back ([address]     Skin cancer (melanoma) ([facility] [address]     Sleep apnea     Bipap at home      Past Surgical History:   Procedure Laterality Date    COLONOSCOPY N/A 5/6/2019    COLONOSCOPY performed by Ang Leyva [facility] at [facility]. [address], COLON, DIAGNOSTIC      09, due 12, done 11, due 14    HX CATARACT REMOVAL  04/2019    HX HEENT      wisdom teeth extraction    HX KNEE ARTHROSCOPY Left     HX KNEE REPLACEMENT Left 2012  HX OTHER SURGICAL N/A 2018    Basal cell removal of scalp    HX PROSTATECTOMY      HX TONSILLECTOMY        Social History     Tobacco Use    Smoking status: Former Smoker     Quit date:      Years since quittin.9    Smokeless tobacco: Never Used   Substance Use Topics    Alcohol use: Yes     Alcohol/week: 14.0 standard drinks     Types: 14 Cans of beer per week     Comment: 4 days per week      Family History   Problem Relation Age of Onset    Dementia Mother     Heart Disease Father         CAD    Kidney Disease Father         renal failure    Diabetes Paternal Grandmother     Cancer Paternal Grandfather         colon    Cancer Other         family hx colon cancer        Allergies   Allergen Reactions    Stings [Sting, Bee] Swelling     Swelling at site        Assessment/Plan  Diagnoses and all orders for this visit:    1. Medicare annual wellness visit, subsequent- Per Orta was counseled on age-appropriate/ guideline-based risk prevention behaviors and screening for a 77y.o. year old   male . We also discussed adjustments in screening based on family history if necessary. Printed instructions for preventative screening guidelines and healthy behaviors given to patient with after visit summary. 2. Recurrent depression (HCC)-mood stable    3. Malignant melanoma, unspecified site (HCC)-does have a lesion that will be having Mohs surgery. 4. Advanced directives, counseling/discussion    5. Essential hypertension, benign-blood pressure stable. No orthostatic symptoms    6. Mixed hyperlipidemia-repeat with upcoming rheumatologic labs  -     LIPID PANEL; Future    7. History of prostate cancer-followed at the [place]    8. Seropositive rheumatoid arthritis of multiple sites (HCC)-seeing rheumatology    9. IFG (impaired fasting glucose)  -     HEMOGLOBIN A1C WITH EAG;  Future    We discussed spacing out our visits to once annually since he sees rheumatologist so frequently throughout the year. Marce Nunes MD  12/17/2021    This note was created with the help of speech recognition software Genevia Copper) and may contain some 'sound alike' errors. This is the Subsequent Medicare Annual Wellness Exam, performed 12 months or more after the Initial AWV or the last Subsequent AWV    I have reviewed the patient's medical history in detail and updated the computerized patient record. Assessment/Plan   Education and counseling provided:  Are appropriate based on today's review and evaluation  End-of-Life planning (with patient's consent)  Prostate cancer screening tests (PSA, covered annually)  Colorectal cancer screening tests  Cardiovascular screening blood test    1. Medicare annual wellness visit, subsequent  2. Recurrent depression ([facility] [address]  3. Malignant melanoma, unspecified site ([facility] [address]  4. Advanced directives, counseling/discussion  5. Essential hypertension, benign  6. Mixed hyperlipidemia  7. History of prostate cancer  8. Seropositive rheumatoid arthritis of multiple sites ([facility] [address]  9.  IFG (impaired fasting glucose)       Depression Risk Factor Screening     3 most recent PHQ Screens 12/17/2021   Little interest or pleasure in doing things Not at all   Feeling down, depressed, irritable, or hopeless Not at all   Total Score PHQ 2 0   Trouble falling or staying asleep, or sleeping too much -   Feeling tired or having little energy -   Poor appetite, weight loss, or overeating -   Feeling bad about yourself - or that you are a failure or have let yourself or your family down -   Trouble concentrating on things such as school, work, reading, or watching TV -   Moving or speaking so slowly that other people could have noticed; or the opposite being so fidgety that others notice -   Thoughts of being better off dead, or hurting yourself in some way -   PHQ 9 Score -   How difficult have these problems made it for you to do your work, take care of your home and get along with others -       Alcohol Risk Screen    Do you average more than 1 drink per night or more than 7 drinks a week: No    In the past three months have you have had more than 4 drinks containing alcohol on one occasion: No        Functional Ability and Level of Safety    Hearing: Hearing is good. Activities of Daily Living: The home contains: no safety equipment. Patient does total self care      Ambulation: with no difficulty     Fall Risk:  Fall Risk Assessment, last 12 mths 12/16/2020   Able to walk? Yes   Fall in past 12 months? No      Abuse Screen:  Patient is not abused       Cognitive Screening    Has your family/caregiver stated any concerns about your memory: yes - See above. Some issues with attention. No signs of dementia at this time. If this progresses would send for neuropsych.        Health Maintenance Due     Health Maintenance Due   Topic Date Due    Shingrix Vaccine Age 49> (1 of 2) Never done    A1C test (Diabetic or Prediabetic)  06/16/2021    Lipid Screen  12/16/2021       Patient Care Team   Patient Care Team:  Gloria Stevens MD as PCP - General (Internal Medicine)  Gloria Stevens MD as PCP - [address] [place] [address] [facility]  Bronson Brand MD as Physician (Cardiology)  Michel Lujan MD as Consulting Provider (Ophthalmology)    History     Patient Active Problem List   Diagnosis Code    Mixed hyperlipidemia E78.2    Other abnormal glucose R73.09    Essential hypertension, benign I10    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    Allergic rhinitis, cause unspecified J30.9    Reflux esophagitis K21.00    Benign neoplasm of colon D12.6    Contact dermatitis and other eczema, due to unspecified cause L25.9    Bunion M21.619    History of prostate cancer Z85.46    Advanced care planning/counseling discussion Z71.89    Low testosterone R79.89    Primary osteoarthritis of both knees M17.0    Seropositive rheumatoid arthritis of multiple sites ([facility] [address] M05.79    Long-term use of immunosuppressant medication Z79.899    Vitamin D deficiency E55.9    Recurrent depression ([address] F33.9    Severe obesity with body mass index (BMI) of 35.0 to 39.9 with serious comorbidity (HCC) E66.01    Gout M10.9    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    Primary localized osteoarthrosis, lower leg M17.10    Plantar fasciitis of right foot M72.2    Age-related osteoporosis without fracture M81.8     Past Medical History:   Diagnosis Date    Allergic rhinitis     Arthritis     RA    Basal cell carcinoma (BCC) in situ of skin     shoudler and scalp    Cancer (HCC)     prostate    GERD (gastroesophageal reflux disease)     Gout     Hypercholesterolemia     Hypertension     Melanoma in situ of back ([address]     Skin cancer (melanoma) ([address]     Sleep apnea     Bipap at home      Past Surgical History:   Procedure Laterality Date    COLONOSCOPY N/A 5/6/2019    [facility] ENDOSCOPY    ENDOSCOPY, COLON, DIAGNOSTIC      09, due 12, done 11, due 14    HX CATARACT REMOVAL  04/2019    HX HEENT      wisdom teeth extraction    HX KNEE ARTHROSCOPY Left     HX KNEE REPLACEMENT Left 2012    HX OTHER SURGICAL N/A 05/31/2018    Basal cell removal of scalp    HX PROSTATECTOMY      HX TONSILLECTOMY       Current Outpatient Medications   Medication Sig Dispense Refill    colestipoL (COLESTID) 1 gram tablet TAKE TWO TABLETS BY MOUTH TWICE A DAY 60 Tablet 5    pantoprazole (PROTONIX) 40 mg tablet TAKE ONE TABLET BY MOUTH TWICE A  Tablet 1    Insulin Syringe-Needle U-100 1 mL 31 gauge x 5/16 syrg Use to inject methotrexate once weekly.  90 Each 5    ergocalciferol (ERGOCALCIFEROL) 1,250 mcg (50,000 unit) capsule TAKE ONE CAPSULE BY MOUTH EVERY WEEK 12 Capsule 1    folic acid (FOLVITE) 1 mg tablet TAKE ONE TABLET BY MOUTH DAILY 90 Tablet 5    allopurinoL (ZYLOPRIM) 300 mg tablet TAKE ONE TABLET BY MOUTH ONE TIME DAILY 90 Tablet 5    methotrexate 25 mg/mL chemo injection 1 mL by SubCUTAneous route every Wednesday. 12 mL 1    Insulin Syringe-Needle U-100 1 mL 30 gauge x 5/16 syrg 1 Each by Does Not Apply route Every Saturday. To be used for methotrexate solution 12 Syringe 5    fexofenadine (ALLEGRA) 180 mg tablet Take  by mouth daily as needed.  guaiFENesin ER (MUCINEX) 600 mg ER tablet Take 600 mg by mouth as needed.  clobetasol (TEMOVATE) 0.05 % ointment Apply  to affected area two (2) times a day.  lisinopril (PRINIVIL, ZESTRIL) 40 mg tablet Take 40 mg by mouth daily.  aspirin delayed-release 81 mg tablet Take  by mouth daily.  albuterol (VENTOLIN HFA) 90 mcg/actuation inhaler Take  by inhalation as needed.  omega-3 fatty acids-vitamin e (FISH OIL) 1,000 mg Cap Take 1 Cap by mouth.  citalopram (CELEXA) 20 mg tablet Take 1 Tab by mouth daily. 30 Tab 11    atorvastatin (LIPITOR) 40 mg tablet Take 1 Tab by mouth daily. 30 Tab 11    amlodipine (NORVASC) 2.5 mg tablet Take 1 Tab by mouth daily. 30 Tab 11    0.9% sodium chloride solp 100 mL with golimumab 12.5 mg/mL soln 2 mg/kg 2 mg/kg by IntraVENous route once. Every eight weeks  Indications: rhumetoid arthritis       Allergies   Allergen Reactions    Stings [Sting, Bee] Swelling     Swelling at site       Family History   Problem Relation Age of Onset    Dementia Mother     Heart Disease Father         CAD    Kidney Disease Father         renal failure    Diabetes Paternal Grandmother     Cancer Paternal Grandfather         colon    Cancer Other         family hx colon cancer     Social History     Tobacco Use    Smoking status: Former Smoker     Quit date:      Years since quittin.9    Smokeless tobacco: Never Used   Substance Use Topics    Alcohol use:  Yes     Alcohol/week: 14.0 standard drinks     Types: 14 Cans of beer per week     Comment: 4 days per week         Annette Ayoub MD

## 2021-12-17 ENCOUNTER — OFFICE VISIT (OUTPATIENT)
Dept: INTERNAL MEDICINE CLINIC | Age: 76
End: 2021-12-17
Payer: MEDICARE

## 2021-12-17 VITALS
DIASTOLIC BLOOD PRESSURE: 78 MMHG | SYSTOLIC BLOOD PRESSURE: 134 MMHG | BODY MASS INDEX: 36.16 KG/M2 | RESPIRATION RATE: 16 BRPM | HEART RATE: 51 BPM | OXYGEN SATURATION: 94 % | WEIGHT: 238.6 LBS | TEMPERATURE: 98 F | HEIGHT: 68 IN

## 2021-12-17 DIAGNOSIS — R73.01 IFG (IMPAIRED FASTING GLUCOSE): ICD-10-CM

## 2021-12-17 DIAGNOSIS — I10 ESSENTIAL HYPERTENSION, BENIGN: ICD-10-CM

## 2021-12-17 DIAGNOSIS — E78.2 MIXED HYPERLIPIDEMIA: ICD-10-CM

## 2021-12-17 DIAGNOSIS — Z85.46 HISTORY OF PROSTATE CANCER: ICD-10-CM

## 2021-12-17 DIAGNOSIS — M05.79 SEROPOSITIVE RHEUMATOID ARTHRITIS OF MULTIPLE SITES (HCC): ICD-10-CM

## 2021-12-17 DIAGNOSIS — F33.9 RECURRENT DEPRESSION (HCC): ICD-10-CM

## 2021-12-17 DIAGNOSIS — Z00.00 MEDICARE ANNUAL WELLNESS VISIT, SUBSEQUENT: Primary | ICD-10-CM

## 2021-12-17 DIAGNOSIS — C43.9 MALIGNANT MELANOMA, UNSPECIFIED SITE (HCC): ICD-10-CM

## 2021-12-17 DIAGNOSIS — Z71.89 ADVANCED DIRECTIVES, COUNSELING/DISCUSSION: ICD-10-CM

## 2021-12-17 PROCEDURE — 3017F COLORECTAL CA SCREEN DOC REV: CPT | Performed by: INTERNAL MEDICINE

## 2021-12-17 PROCEDURE — G8536 NO DOC ELDER MAL SCRN: HCPCS | Performed by: INTERNAL MEDICINE

## 2021-12-17 PROCEDURE — G8417 CALC BMI ABV UP PARAM F/U: HCPCS | Performed by: INTERNAL MEDICINE

## 2021-12-17 PROCEDURE — G8427 DOCREV CUR MEDS BY ELIG CLIN: HCPCS | Performed by: INTERNAL MEDICINE

## 2021-12-17 PROCEDURE — 1101F PT FALLS ASSESS-DOCD LE1/YR: CPT | Performed by: INTERNAL MEDICINE

## 2021-12-17 PROCEDURE — G9717 DOC PT DX DEP/BP F/U NT REQ: HCPCS | Performed by: INTERNAL MEDICINE

## 2021-12-17 PROCEDURE — G8754 DIAS BP LESS 90: HCPCS | Performed by: INTERNAL MEDICINE

## 2021-12-17 PROCEDURE — G0439 PPPS, SUBSEQ VISIT: HCPCS | Performed by: INTERNAL MEDICINE

## 2021-12-17 PROCEDURE — G8752 SYS BP LESS 140: HCPCS | Performed by: INTERNAL MEDICINE

## 2021-12-17 NOTE — PROGRESS NOTES
Darrell Mckeon  Identified pt with two pt identifiers(name and ). Chief Complaint   Patient presents with    Annual Wellness Visit     RM18///pt presenting today for medicare wellness; has had flu shot       1. Have you been to the ER, urgent care clinic since your last visit? Hospitalized since your last visit? NO    2. Have you seen or consulted any other health care providers outside of the [address] [place] [address] since your last visit? Include any pap smears or colon screening. NO      Provider notified of reason for visit, vitals and flowsheets obtained on patients.      Patient received paperwork for advance directive during previous visit but has not completed at this time     Reviewed record In preparation for visit, huddled with provider and have obtained necessary documentation      Health Maintenance Due   Topic    Shingrix Vaccine Age 50> (1 of 2)    A1C test (Diabetic or Prediabetic)     Flu Vaccine (1)    Lipid Screen     Medicare Yearly Exam        Wt Readings from Last 3 Encounters:   21 238 lb 9.6 oz (108.2 kg)   21 236 lb (107 kg)   21 234 lb 11.2 oz (106.5 kg)     Temp Readings from Last 3 Encounters:   21 98 °F (36.7 °C) (Oral)   21 97.8 °F (36.6 °C)   21 (!) 96.5 °F (35.8 °C)     BP Readings from Last 3 Encounters:   21 134/78   21 (!) 109/52   21 106/65     Pulse Readings from Last 3 Encounters:   21 (!) 51   21 (!) 50   21 (!) 54     Vitals:    21 0828   BP: 134/78   Pulse: (!) 51   Resp: 16   Temp: 98 °F (36.7 °C)   TempSrc: Oral   SpO2: 94%   Weight: 238 lb 9.6 oz (108.2 kg)   Height: 5' 8\" (1.727 m)   PainSc:   0 - No pain         Learning Assessment:  :     Learning Assessment 10/14/2020 2019 12/3/2018 2018 3/7/2018 2017 8/15/2017   PRIMARY LEARNER Patient Patient Patient Patient Patient Patient Patient   HIGHEST LEVEL OF EDUCATION - PRIMARY LEARNER  - - - - - - [address]eves PRIMARY LEARNER - - - - - - NONE   CO-LEARNER CAREGIVER Yes Yes No No No No No   PRIMARY LANGUAGE ENGLISH ENGLISH ENGLISH ENGLISH ENGLISH ENGLISH ENGLISH   LEARNER PREFERENCE PRIMARY DEMONSTRATION DEMONSTRATION DEMONSTRATION DEMONSTRATION DEMONSTRATION DEMONSTRATION VIDEOS   ANSWERED BY patient patient patient patient patient patient patient   RELATIONSHIP SELF SELF SELF SELF SELF SELF SELF       Depression Screening:  :     3 most recent PHQ Screens 12/17/2021   Little interest or pleasure in doing things Not at all   Feeling down, depressed, irritable, or hopeless Not at all   Total Score PHQ 2 0   Trouble falling or staying asleep, or sleeping too much -   Feeling tired or having little energy -   Poor appetite, weight loss, or overeating -   Feeling bad about yourself - or that you are a failure or have let yourself or your family down -   Trouble concentrating on things such as school, work, reading, or watching TV -   Moving or speaking so slowly that other people could have noticed; or the opposite being so fidgety that others notice -   Thoughts of being better off dead, or hurting yourself in some way -   PHQ 9 Score -   How difficult have these problems made it for you to do your work, take care of your home and get along with others -       Fall Risk Assessment:  :     Fall Risk Assessment, last 12 mths 12/16/2020   Able to walk? Yes   Fall in past 12 months? No       Abuse Screening:  :     Abuse Screening Questionnaire 12/16/2020 6/16/2020 12/16/2019 9/12/2019 8/6/2019 1/4/2019 8/30/2018   Do you ever feel afraid of your partner? N N N N N N N   Are you in a relationship with someone who physically or mentally threatens you? N N N N N N N   Is it safe for you to go home?  Y Y Y Y Y Y Y       ADL Screening:  :     ADL Assessment 8/30/2018   Feeding yourself No Help Needed   Getting from bed to chair No Help Needed   Getting dressed No Help Needed   Bathing or showering No Help Needed   Walk across the room (includes cane/walker) No Help Needed   Using the telphone No Help Needed   Taking your medications No Help Needed   Preparing meals No Help Needed   Managing money (expenses/bills) No Help Needed   Moderately strenuous housework (laundry) No Help Needed   Shopping for personal items (toiletries/medicines) No Help Needed   Shopping for groceries No Help Needed   Driving No Help Needed   Climbing a flight of stairs No Help Needed   Getting to places beyond walking distances No Help Needed         Medication reconciliation up to date and corrected with patient at this time.

## 2021-12-17 NOTE — PATIENT INSTRUCTIONS
Medicare Wellness Visit, Male    The best way to live healthy is to have a lifestyle where you eat a well-balanced diet, exercise regularly, limit alcohol use, and quit all forms of tobacco/nicotine, if applicable. Regular preventive services are another way to keep healthy. Preventive services (vaccines, screening tests, monitoring & exams) can help personalize your care plan, which helps you manage your own care. Screening tests can find health problems at the earliest stages, when they are easiest to treat. [facility] follows the current, evidence-based guidelines published by the [facility] Jaron Darlene ([facility]) when recommending preventive services for our patients. Because we follow these guidelines, sometimes recommendations change over time as research supports it. (For example, a prostate screening blood test is no longer routinely recommended for men with no symptoms). Of course, you and your doctor may decide to screen more often for some diseases, based on your risk and co-morbidities (chronic disease you are already diagnosed with). Preventive services for you include:  - Medicare offers their members a free annual wellness visit, which is time for you and your primary care provider to discuss and plan for your preventive service needs. Take advantage of this benefit every year!  -All adults over age 72 should receive the recommended pneumonia vaccines. Current USPSTF guidelines recommend a series of two vaccines for the best pneumonia protection.   -All adults should have a flu vaccine yearly and tetanus vaccine every 10 years.  -All adults age 48 and older should receive the shingles vaccines (series of two vaccines).        -All adults age 38-68 who are overweight should have a diabetes screening test once every three years.   -Other screening tests & preventive services for persons with diabetes include: an eye exam to screen for diabetic retinopathy, a kidney function test, a foot exam, and stricter control over your cholesterol.   -Cardiovascular screening for adults with routine risk involves an electrocardiogram (ECG) at intervals determined by the provider.   -Colorectal cancer screening should be done for adults age 54-65 with no increased risk factors for colorectal cancer. There are a number of acceptable methods of screening for this type of cancer. Each test has its own benefits and drawbacks. Discuss with your provider what is most appropriate for you during your annual wellness visit. The different tests include: colonoscopy (considered the best screening method), a fecal occult blood test, a fecal DNA test, and sigmoidoscopy.  -All adults born between [facility] should be screened once for Hepatitis C.  -An Abdominal Aortic Aneurysm (AAA) Screening is recommended for men age 73-68 who has ever smoked in their lifetime.      Here is a list of your current Health Maintenance items (your personalized list of preventive services) with a due date:  Health Maintenance Due   Topic Date Due    Shingles Vaccine (1 of 2) Never done    Hemoglobin A1C    06/16/2021    Yearly Flu Vaccine (1) 09/01/2021    Cholesterol Test   12/16/2021

## 2021-12-17 NOTE — ACP (ADVANCE CARE PLANNING)
Advance Care Planning     General Advance Care Planning (ACP) Conversation      Date of Conversation: 12/17/2021  Conducted with: Patient with Decision Making Capacity    Healthcare Decision Maker:   No healthcare decision makers have been documented. Click here to complete [address] including selection of the Healthcare Decision Maker Relationship (ie \"Primary\")    Today we documented Decision Maker(s). The patient will provide ACP documents. Content/Action Overview:    Has ACP document(s) NOT on file - requested patient to provide    Length of Voluntary ACP Conversation in minutes:  <16 minutes (Non-Billable)    Aniyah Harper MD

## 2021-12-31 DIAGNOSIS — K21.9 GASTROESOPHAGEAL REFLUX DISEASE WITHOUT ESOPHAGITIS: ICD-10-CM

## 2021-12-31 RX ORDER — PANTOPRAZOLE SODIUM 40 MG/1
TABLET, DELAYED RELEASE ORAL
Qty: 180 TABLET | Refills: 1 | Status: SHIPPED | OUTPATIENT
Start: 2021-12-31 | End: 2022-07-03

## 2022-01-18 ENCOUNTER — HOSPITAL ENCOUNTER (OUTPATIENT)
Dept: INFUSION THERAPY | Age: 77
Discharge: HOME | End: 2022-01-18
Payer: MEDICARE

## 2022-01-18 DIAGNOSIS — M81.8 AGE-RELATED OSTEOPOROSIS WITHOUT FRACTURE: Primary | ICD-10-CM

## 2022-01-18 DIAGNOSIS — M05.79 SEROPOSITIVE RHEUMATOID ARTHRITIS OF MULTIPLE SITES (HCC): ICD-10-CM

## 2022-01-18 LAB
ALBUMIN SERPL-MCNC: 3.6 G/DL (ref 3.5–5)
ALBUMIN/GLOB SERPL: 1.2 {RATIO} (ref 1.1–2.2)
ALP SERPL-CCNC: 61 U/L (ref 45–117)
ALT SERPL-CCNC: 67 U/L (ref 12–78)
ANION GAP SERPL CALC-SCNC: 7 MMOL/L (ref 5–15)
AST SERPL-CCNC: 20 U/L (ref 15–37)
BASOPHILS # BLD: 0 K/UL (ref 0–0.1)
BASOPHILS NFR BLD: 1 % (ref 0–1)
BILIRUB SERPL-MCNC: 0.5 MG/DL (ref 0.2–1)
BUN SERPL-MCNC: 19 MG/DL (ref 6–20)
BUN/CREAT SERPL: 21 (ref 12–20)
CALCIUM SERPL-MCNC: 9 MG/DL (ref 8.5–10.1)
CHLORIDE SERPL-SCNC: 109 MMOL/L (ref 97–108)
CHOLEST SERPL-MCNC: 172 MG/DL
CO2 SERPL-SCNC: 26 MMOL/L (ref 21–32)
CREAT SERPL-MCNC: 0.89 MG/DL (ref 0.7–1.3)
CRP SERPL HS-MCNC: 0.4 MG/L
DIFFERENTIAL METHOD BLD: ABNORMAL
EOSINOPHIL # BLD: 0.2 K/UL (ref 0–0.4)
EOSINOPHIL NFR BLD: 3 % (ref 0–7)
ERYTHROCYTE [DISTWIDTH] IN BLOOD BY AUTOMATED COUNT: 13.2 % (ref 11.5–14.5)
ERYTHROCYTE [SEDIMENTATION RATE] IN BLOOD: 7 MM/HR (ref 0–20)
EST. AVERAGE GLUCOSE BLD GHB EST-MCNC: 105 MG/DL
GLOBULIN SER CALC-MCNC: 3.1 G/DL (ref 2–4)
GLUCOSE SERPL-MCNC: 96 MG/DL (ref 65–100)
HBA1C MFR BLD: 5.3 % (ref 4–5.6)
HCT VFR BLD AUTO: 42.1 % (ref 36.6–50.3)
HDLC SERPL-MCNC: 57 MG/DL
HDLC SERPL: 3 {RATIO} (ref 0–5)
HGB BLD-MCNC: 14.2 G/DL (ref 12.1–17)
IMM GRANULOCYTES # BLD AUTO: 0.1 K/UL (ref 0–0.04)
IMM GRANULOCYTES NFR BLD AUTO: 1 % (ref 0–0.5)
LDLC SERPL CALC-MCNC: 85.6 MG/DL (ref 0–100)
LYMPHOCYTES # BLD: 1.9 K/UL (ref 0.8–3.5)
LYMPHOCYTES NFR BLD: 25 % (ref 12–49)
MCH RBC QN AUTO: 35.7 PG (ref 26–34)
MCHC RBC AUTO-ENTMCNC: 33.7 G/DL (ref 30–36.5)
MCV RBC AUTO: 105.8 FL (ref 80–99)
MONOCYTES # BLD: 0.9 K/UL (ref 0–1)
MONOCYTES NFR BLD: 11 % (ref 5–13)
NEUTS SEG # BLD: 4.6 K/UL (ref 1.8–8)
NEUTS SEG NFR BLD: 59 % (ref 32–75)
NRBC # BLD: 0 K/UL (ref 0–0.01)
NRBC BLD-RTO: 0 PER 100 WBC
PLATELET # BLD AUTO: 191 K/UL (ref 150–400)
PMV BLD AUTO: 11.1 FL (ref 8.9–12.9)
POTASSIUM SERPL-SCNC: 4.7 MMOL/L (ref 3.5–5.1)
PROT SERPL-MCNC: 6.7 G/DL (ref 6.4–8.2)
RBC # BLD AUTO: 3.98 M/UL (ref 4.1–5.7)
SODIUM SERPL-SCNC: 142 MMOL/L (ref 136–145)
TRIGL SERPL-MCNC: 147 MG/DL (ref ?–150)
VLDLC SERPL CALC-MCNC: 29.4 MG/DL
WBC # BLD AUTO: 7.7 K/UL (ref 4.1–11.1)

## 2022-01-18 PROCEDURE — 74011250636 HC RX REV CODE- 250/636: Performed by: INTERNAL MEDICINE

## 2022-01-18 PROCEDURE — 86141 C-REACTIVE PROTEIN HS: CPT

## 2022-01-18 PROCEDURE — 80061 LIPID PANEL: CPT

## 2022-01-18 PROCEDURE — 85652 RBC SED RATE AUTOMATED: CPT

## 2022-01-18 PROCEDURE — 80053 COMPREHEN METABOLIC PANEL: CPT

## 2022-01-18 PROCEDURE — 85025 COMPLETE CBC W/AUTO DIFF WBC: CPT

## 2022-01-18 PROCEDURE — 96413 CHEMO IV INFUSION 1 HR: CPT

## 2022-01-18 PROCEDURE — 36415 COLL VENOUS BLD VENIPUNCTURE: CPT

## 2022-01-18 PROCEDURE — 83036 HEMOGLOBIN GLYCOSYLATED A1C: CPT

## 2022-01-18 PROCEDURE — 74011000258 HC RX REV CODE- 258: Performed by: INTERNAL MEDICINE

## 2022-01-18 RX ORDER — SODIUM CHLORIDE 9 MG/ML
25 INJECTION, SOLUTION INTRAVENOUS CONTINUOUS
Status: CANCELLED | OUTPATIENT
Start: 2022-03-15

## 2022-01-18 RX ORDER — HYDROCORTISONE SODIUM SUCCINATE 100 MG/2ML
100 INJECTION, POWDER, FOR SOLUTION INTRAMUSCULAR; INTRAVENOUS AS NEEDED
Status: CANCELLED | OUTPATIENT
Start: 2022-03-15

## 2022-01-18 RX ORDER — SODIUM CHLORIDE 0.9 % (FLUSH) 0.9 %
10 SYRINGE (ML) INJECTION AS NEEDED
Status: CANCELLED | OUTPATIENT
Start: 2022-03-15

## 2022-01-18 RX ORDER — ALBUTEROL SULFATE 0.83 MG/ML
2.5 SOLUTION RESPIRATORY (INHALATION) AS NEEDED
Status: CANCELLED
Start: 2022-03-15

## 2022-01-18 RX ORDER — SODIUM CHLORIDE 9 MG/ML
10 INJECTION INTRAMUSCULAR; INTRAVENOUS; SUBCUTANEOUS AS NEEDED
Status: CANCELLED | OUTPATIENT
Start: 2022-03-15

## 2022-01-18 RX ORDER — EPINEPHRINE 1 MG/ML
0.3 INJECTION, SOLUTION, CONCENTRATE INTRAVENOUS AS NEEDED
Status: CANCELLED | OUTPATIENT
Start: 2022-03-15

## 2022-01-18 RX ORDER — ACETAMINOPHEN 325 MG/1
650 TABLET ORAL AS NEEDED
Status: CANCELLED
Start: 2022-03-15

## 2022-01-18 RX ORDER — HEPARIN 100 UNIT/ML
300-500 SYRINGE INTRAVENOUS AS NEEDED
Status: CANCELLED
Start: 2022-03-15

## 2022-01-18 RX ORDER — DIPHENHYDRAMINE HYDROCHLORIDE 50 MG/ML
50 INJECTION, SOLUTION INTRAMUSCULAR; INTRAVENOUS AS NEEDED
Status: CANCELLED
Start: 2022-03-15

## 2022-01-18 RX ORDER — ONDANSETRON 2 MG/ML
8 INJECTION INTRAMUSCULAR; INTRAVENOUS AS NEEDED
Status: CANCELLED | OUTPATIENT
Start: 2022-03-15

## 2022-01-18 RX ORDER — SODIUM CHLORIDE 9 MG/ML
25 INJECTION, SOLUTION INTRAVENOUS CONTINUOUS
Status: DISPENSED | OUTPATIENT
Start: 2022-01-18 | End: 2022-01-18

## 2022-01-18 RX ORDER — DIPHENHYDRAMINE HYDROCHLORIDE 50 MG/ML
25 INJECTION, SOLUTION INTRAMUSCULAR; INTRAVENOUS AS NEEDED
Status: CANCELLED
Start: 2022-03-15

## 2022-01-18 RX ADMIN — SODIUM CHLORIDE 25 ML/HR: 9 INJECTION, SOLUTION INTRAVENOUS at 11:11

## 2022-01-18 RX ADMIN — GOLIMUMAB 212.5 MG: 50 SOLUTION INTRAVENOUS at 11:16

## 2022-01-18 NOTE — PROGRESS NOTES
Outpatient Infusion Center - Chemotherapy Progress Note    1798 Pt admit to [facility] for Simponi ambulatory in stable condition. Assessment completed. No new concerns voiced. PIV with positive blood return. Chemotherapy Flowsheet 11/23/2021   Cycle q8week   Date 11/23/2021   Drug / Regimen Simponi   Notes given       Visit Vitals  BP (!) (P) 143/65   Pulse (!) (P) 49   Temp (P) 97.2 °F (36.2 °C)   Resp (P) 18   Ht (P) 5' 8\" (1.727 m)   Wt (P) 109.5 kg (241 lb 4.8 oz)   SpO2 (P) 95%   BMI (P) 36.69 kg/m²     Additional labs obtained and process from primary MD    Medications:  Medications Administered     0.9% sodium chloride infusion     Admin Date  01/18/2022 Action  New Bag Dose  25 mL/hr Rate  25 mL/hr Route  IntraVENous Administered By  Anny West RN          golimumab (SIMPONI ARIA) 212.5 mg in 0.9% sodium chloride, overfill volume 127 mL infusion     Admin Date  01/18/2022 Action  New Bag Dose  212.5 mg Rate  254 mL/hr Route  IntraVENous Administered By  Anny West RN                1200 Pt tolerated treatment well. PIV maintained positive blood return throughout treatment, flushed with positive blood return at conclusion and throughout treatment. D/c home ambulatory in no distress. Pt aware of next appointment scheduled for 3/15/22. Recent Results (from the past 12 hour(s))   CBC WITH AUTOMATED DIFF    Collection Time: 01/18/22 10:26 AM   Result Value Ref Range    WBC 7.7 4.1 - 11.1 K/uL    RBC 3.98 (L) 4.10 - 5.70 M/uL    HGB 14.2 12.1 - 17.0 g/dL    HCT 42.1 36.6 - 50.3 %    .8 (H) 80.0 - 99.0 FL    MCH 35.7 (H) 26.0 - 34.0 PG    MCHC 33.7 30.0 - 36.5 g/dL    RDW 13.2 11.5 - 14.5 %    PLATELET 601 768 - 391 K/uL    MPV 11.1 8.9 - 12.9 FL    NRBC 0.0 0  WBC    ABSOLUTE NRBC 0.00 0.00 - 0.01 K/uL    NEUTROPHILS 59 32 - 75 %    LYMPHOCYTES 25 12 - 49 %    MONOCYTES 11 5 - 13 %    EOSINOPHILS 3 0 - 7 %    BASOPHILS 1 0 - 1 %    IMMATURE GRANULOCYTES 1 (H) 0.0 - 0.5 %    ABS.  NEUTROPHILS 4.6 1.8 - 8.0 K/UL    ABS. LYMPHOCYTES 1.9 0.8 - 3.5 K/UL    ABS. MONOCYTES 0.9 0.0 - 1.0 K/UL    ABS. EOSINOPHILS 0.2 0.0 - 0.4 K/UL    ABS. BASOPHILS 0.0 0.0 - 0.1 K/UL    ABS. IMM. GRANS. 0.1 (H) 0.00 - 0.04 K/UL    DF AUTOMATED     METABOLIC PANEL, COMPREHENSIVE    Collection Time: 01/18/22 10:26 AM   Result Value Ref Range    Sodium 142 136 - 145 mmol/L    Potassium 4.7 3.5 - 5.1 mmol/L    Chloride 109 (H) 97 - 108 mmol/L    CO2 26 21 - 32 mmol/L    Anion gap 7 5 - 15 mmol/L    Glucose 96 65 - 100 mg/dL    BUN 19 6 - 20 MG/DL    Creatinine 0.89 0.70 - 1.30 MG/DL    BUN/Creatinine ratio 21 (H) 12 - 20      GFR est AA >60 >60 ml/min/1.73m2    GFR est non-AA >60 >60 ml/min/1.73m2    Calcium 9.0 8.5 - 10.1 MG/DL    Bilirubin, total 0.5 0.2 - 1.0 MG/DL    ALT (SGPT) 67 12 - 78 U/L    AST (SGOT) 20 15 - 37 U/L    Alk.  phosphatase 61 45 - 117 U/L    Protein, total 6.7 6.4 - 8.2 g/dL    Albumin 3.6 3.5 - 5.0 g/dL    Globulin 3.1 2.0 - 4.0 g/dL    A-G Ratio 1.2 1.1 - 2.2     SED RATE (ESR)    Collection Time: 01/18/22 10:26 AM   Result Value Ref Range    Sed rate, automated 7 0 - 20 mm/hr

## 2022-02-10 ENCOUNTER — PATIENT MESSAGE (OUTPATIENT)
Dept: RHEUMATOLOGY | Age: 77
End: 2022-02-10

## 2022-02-10 ENCOUNTER — TELEPHONE (OUTPATIENT)
Dept: RHEUMATOLOGY | Age: 77
End: 2022-02-10

## 2022-02-10 RX ORDER — PREDNISONE 10 MG/1
TABLET ORAL
Qty: 30 TABLET | Refills: 0 | Status: SHIPPED | OUTPATIENT
Start: 2022-02-10 | End: 2022-06-07

## 2022-02-10 NOTE — TELEPHONE ENCOUNTER
Patient called stated his right foot having a flare up, and wanted to know if there was anything he could do to relieve the pain.  His call back number is [phone]

## 2022-02-10 NOTE — TELEPHONE ENCOUNTER
Responded to pt via [employer] asking for more information to forward to Dr. Simone Cleaning for an answer/treatment plan. [id number] [facility]

## 2022-02-11 NOTE — TELEPHONE ENCOUNTER
Riki Yeboah, RN 2/10/2022 9:02 AM EST      ----- Message -----  From: Bruna Seaman  Sent: 2/10/2022 8:34 AM EST  To: [facility] Nurse Pool  Subject: Flare up     DrStephany    I am having a flare up & it is affecting my right foot. I called & got an appointment with Dr. Juan J Lagos, but in the past Dr. Rona Hui would send a steroid to get it under control. With knowing that Dr. Rona Hui is gone, can another  do the same. Thanks in advance.     Sincerely,  Kriss Galvez

## 2022-03-01 ENCOUNTER — OFFICE VISIT (OUTPATIENT)
Dept: RHEUMATOLOGY | Age: 77
End: 2022-03-01
Payer: MEDICARE

## 2022-03-01 VITALS
BODY MASS INDEX: 35.92 KG/M2 | SYSTOLIC BLOOD PRESSURE: 119 MMHG | WEIGHT: 237 LBS | HEART RATE: 49 BPM | RESPIRATION RATE: 18 BRPM | DIASTOLIC BLOOD PRESSURE: 69 MMHG | TEMPERATURE: 98.1 F | HEIGHT: 68 IN | OXYGEN SATURATION: 95 %

## 2022-03-01 DIAGNOSIS — Z11.59 ENCOUNTER FOR SCREENING FOR VIRAL DISEASE: ICD-10-CM

## 2022-03-01 DIAGNOSIS — H20.13 CHRONIC BILATERAL IRITIS: ICD-10-CM

## 2022-03-01 DIAGNOSIS — M05.79 SEROPOSITIVE RHEUMATOID ARTHRITIS OF MULTIPLE SITES (HCC): Primary | ICD-10-CM

## 2022-03-01 DIAGNOSIS — Z79.60 LONG-TERM USE OF IMMUNOSUPPRESSANT MEDICATION: ICD-10-CM

## 2022-03-01 PROCEDURE — G8752 SYS BP LESS 140: HCPCS | Performed by: INTERNAL MEDICINE

## 2022-03-01 PROCEDURE — G9717 DOC PT DX DEP/BP F/U NT REQ: HCPCS | Performed by: INTERNAL MEDICINE

## 2022-03-01 PROCEDURE — 99215 OFFICE O/P EST HI 40 MIN: CPT | Performed by: INTERNAL MEDICINE

## 2022-03-01 PROCEDURE — G0463 HOSPITAL OUTPT CLINIC VISIT: HCPCS | Performed by: INTERNAL MEDICINE

## 2022-03-01 PROCEDURE — 1101F PT FALLS ASSESS-DOCD LE1/YR: CPT | Performed by: INTERNAL MEDICINE

## 2022-03-01 PROCEDURE — G8536 NO DOC ELDER MAL SCRN: HCPCS | Performed by: INTERNAL MEDICINE

## 2022-03-01 PROCEDURE — G8417 CALC BMI ABV UP PARAM F/U: HCPCS | Performed by: INTERNAL MEDICINE

## 2022-03-01 PROCEDURE — G8754 DIAS BP LESS 90: HCPCS | Performed by: INTERNAL MEDICINE

## 2022-03-01 PROCEDURE — G8427 DOCREV CUR MEDS BY ELIG CLIN: HCPCS | Performed by: INTERNAL MEDICINE

## 2022-03-01 PROCEDURE — 3017F COLORECTAL CA SCREEN DOC REV: CPT | Performed by: INTERNAL MEDICINE

## 2022-03-01 RX ORDER — METHOTREXATE 25 MG/ML
25 INJECTION, SOLUTION INTRA-ARTERIAL; INTRAMUSCULAR; INTRAVENOUS
Qty: 12 ML | Refills: 1 | Status: SHIPPED | OUTPATIENT
Start: 2022-03-02 | End: 2022-06-07 | Stop reason: SDUPTHER

## 2022-03-01 NOTE — PATIENT INSTRUCTIONS
1. I think your joints look good today, but if your eye irritation is from inflammatory eye disease, we should try to adjust your medication regimen more. 2. For now, increase methotrexate to 25mg (1mL) subQ every 7 days. Continue folic acid daily to prevent side effects. 3. Let me catch Dr. Bernard Fink to get his take on your eyes. Marc Irby if significant iritis is present, then we may want to try changing your Simponi to more frequent Infliximab infusions. 4. Repeat labs with each infusion. 5. Return in 2-3 months.

## 2022-03-01 NOTE — PROGRESS NOTES
REASON FOR VISIT    This is a follow-up visit for Mr. Ines Fields for     ICD-10-CM   1. Seropositive rheumatoid arthritis of multiple sites [facility] M05.79   2. 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     Inflammatory arthritis phenotype includes:  Anti-CCP positive: yes (>250)  Rheumatoid factor positive: yes (>650)  Erosive disease: yes  Extra-articular manifestations include: left iritis    Immunosuppression Screening (10/15/2020):  Quantiferon TB: negative    PPD:  Not performed  Hepatitis B: negative   Hepatitis C: negative     Therapy History includes:  Current DMARD therapy include: methotrexate 25 mg SubQ every Wednesday (7/13/2021 to present), Simponi Aria every 8 weeks (5/08/2018 to present)  Prior DMARD therapy include: methotrexate 17.5 mg every Wednesday (6/15/2020 to 10/14/2020), methotrexate 20 mg every Wednesday (11/20/2017 to 6/15/2020; 10/15/2020 to 7/13/2021),  Discontinued DMARDs because of inefficacy: None  Discontinued DMARDs because of side effects: None  Unaffordable DMARDs: Enbrel    HISTORY OF PRESENT ILLNESS    Mr. Ines Fields returns for a follow-up. Taking methotrexate 20mg subQ weekly. Had flare of midfoot pain early February, first one in at least 6-12 months. Remains on allopurinol 300mg daily. Admits still drinking 3-4 beers/day. Having more burning of both eyes. Some days better than others. Following with Dr. Ree Mackay who is using steroid drops 4x/day for iritis, and once nightly eyelid ointment for blepharitis. Has noticed dropoff in memory. First diagnosed with gout at 21yo with flares of foot pain and difficulty walking. Recalls father had gout. Followed with Dr. Tracy Riley in Rheumatology. Has been on allopurinol 300mg     He attributes the start of his polyarticular joint pain and swelling to receiving a vaccine to see his premature granddaughter in 2017. With Tito, notices he is feeling weaker before each infusion.        REVIEW OF SYSTEMS    A comprehensive review of systems was performed and pertinent results are documented in the HPI, review of systems is otherwise non-contributory. PAST MEDICAL HISTORY    He has a past medical history of Allergic rhinitis, Arthritis, Basal cell carcinoma (BCC) in situ of skin, Cancer ([address] GERD (gastroesophageal reflux disease), Gout, Hypercholesterolemia, Hypertension, Melanoma in situ of back ([address] Skin cancer (melanoma) ([address] and Sleep apnea. FAMILY HISTORY    His family history includes Cancer in his paternal grandfather and another family member; Dementia in his mother; Diabetes in his paternal grandmother; Heart Disease in his father; Kidney Disease in his father. SOCIAL HISTORY    He reports that he quit smoking about 42 years ago. He has never used smokeless tobacco. He reports current alcohol use of about 14.0 standard drinks of alcohol per week. He reports that he does not use drugs. MEDICATIONS    Current Outpatient Medications   Medication Sig    pantoprazole (PROTONIX) 40 mg tablet TAKE ONE TABLET BY MOUTH TWICE A DAY    colestipoL (COLESTID) 1 gram tablet TAKE TWO TABLETS BY MOUTH TWICE A DAY    Insulin Syringe-Needle U-100 1 mL 31 gauge x 5/16 syrg Use to inject methotrexate once weekly.  ergocalciferol (ERGOCALCIFEROL) 1,250 mcg (50,000 unit) capsule TAKE ONE CAPSULE BY MOUTH EVERY WEEK    folic acid (FOLVITE) 1 mg tablet TAKE ONE TABLET BY MOUTH DAILY    allopurinoL (ZYLOPRIM) 300 mg tablet TAKE ONE TABLET BY MOUTH ONE TIME DAILY    methotrexate 25 mg/mL chemo injection 1 mL by SubCUTAneous route every Wednesday.  Insulin Syringe-Needle U-100 1 mL 30 gauge x 5/16 syrg 1 Each by Does Not Apply route Every Saturday. To be used for methotrexate solution    fexofenadine (ALLEGRA) 180 mg tablet Take  by mouth daily as needed.  guaiFENesin ER (MUCINEX) 600 mg ER tablet Take 600 mg by mouth as needed.     clobetasol (TEMOVATE) 0.05 % ointment Apply  to affected area two (2) times a day.  0.9% sodium chloride solp 100 mL with golimumab 12.5 mg/mL soln 2 mg/kg 2 mg/kg by IntraVENous route once. Every eight weeks  Indications: rhumetoid arthritis    lisinopril (PRINIVIL, ZESTRIL) 40 mg tablet Take 40 mg by mouth daily.  aspirin delayed-release 81 mg tablet Take  by mouth daily.  albuterol (VENTOLIN HFA) 90 mcg/actuation inhaler Take  by inhalation as needed.  omega-3 fatty acids-vitamin e (FISH OIL) 1,000 mg Cap Take 1 Cap by mouth.  citalopram (CELEXA) 20 mg tablet Take 1 Tab by mouth daily.  atorvastatin (LIPITOR) 40 mg tablet Take 1 Tab by mouth daily.  amlodipine (NORVASC) 2.5 mg tablet Take 1 Tab by mouth daily.  predniSONE (DELTASONE) 10 mg tablet Take 40mg by mouth daily for 3 days, then 30mg daily for 3 days, then 20mg daily for 3 days, then 10mg daily for 3 days, then stop. (Patient not taking: Reported on 3/1/2022)     No current facility-administered medications for this visit. ALLERGIES    Allergies   Allergen Reactions    Stings [Sting, Bee] Swelling     Swelling at site       PHYSICAL EXAMINATION    Visit Vitals  /69 (BP 1 Location: Left upper arm, BP Patient Position: Sitting)   Pulse (!) 49   Temp 98.1 °F (36.7 °C) (Oral)   Resp 18   Ht 5' 8\" (1.727 m)   Wt 237 lb (107.5 kg)   SpO2 95%   BMI 36.04 kg/m²     Body mass index is 36.04 kg/m². General:  The patient is well developed, well nourished, alert, and in no apparent distress. Eyes: Sclera are anicteric. Bilateral conjunctival injection, mild photophobia. HEENT:  Oropharynx is clear. No oral ulcers. Adequate salivary pooling. No cervical or supraclavicular lymphadenopathy. Lungs:  Clear to auscultation bilaterally, without wheeze or stridor. Normal respiratory effort. Cor:  Regular rate and rhythm. No murmur rub or gallop. Abdomen: Soft, non-tender, without hepatomegaly or masses. Extremities: No calf tenderness or edema. Warm and well perfused.    Skin:  No significant abnormalities. No tophi. Neuro: Nonfocal, no foot or wrist drop. Musculoskeletal:    A comprehensive musculoskeletal exam was performed for all joints of each upper and lower extremity and assessed for swelling, tenderness and range of motion. Results are documented as below:  Bilateral shoulder crepitus, left shoulder limited to 60deg abduction, flexion intact  Ulnar deviation of MCPs  Bilateral hallux valgus without tophi  No evidence of synovitis in the small joints of the hands, wrists, shoulders, elbows, hips, knees or ankles. DATA REVIEW    Laboratory     Recent laboratory results were reviewed, summarized, and discussed with the patient. Imaging    Musculoskeletal Ultrasound    None    Radiographs    Bilateral Hand 11/20/2017: RIGHT: No fracture or dislocation on plain film. The bones are osteopenic. Mild narrowing of the radiocarpal joint. Probable erosion of the radius and lunate at the radiocarpal joint. No widening of the intercarpal spaces. Subtle erosion of the ulnar styloid. Mild narrowing of the triscaphe joint is age-appropriate. Irregular arthritis of the first and second CMC joints includes osteophytes and erosions. There are erosions of the first through fourth metacarpal heads at the MCP points. Moderate narrowing and anterior subluxation of the second MCP joint. IP joints are within normal limits. No chondrocalcinosis or periosteal reaction. LEFT: No fracture or dislocation on plain film. The bones are osteopenic. Moderate narrowing of the radiocarpal joint with subtle erosions of the lunate. Marrow DR UVJ with erosions. No definite ulnar styloid erosion. Triscaphe and first [facility] joint osteoarthritis are age-appropriate. Large erosions on both sides of the second MCP joint and in the third metacarpal head. IP joints are within normal limits. No chondrocalcinosis or periosteal reaction. Bilateral Foot 11/20/2017: RIGHT: No fracture or dislocation on plain film.  Bones are osteopenic. Talonavicular joint space narrowing, osteophytes, and erosions. Subtalar arthritis is partially imaged. TMT joints are within normal limits. Severe narrowing of the first MTP joint with osteophytes and erosions. There is erosion and the fifth tarsal head. Mild narrowing of the second MTP joint without definable erosion. No periosteal reaction. LEFT: No fracture or dislocation on plain film. Bones are osteopenic. Intertarsal joints are within normal limits. Moderate narrowing of the first MTP joint with central and marginal erosions. 35 degrees hallux valgus angulation and fibular subluxation of the first proximal phalanx. There are also erosions in the laterally subluxed hallux sesamoids. Mild joint space narrowing and erosions of the third MTP joint. Fragmentation of the fourth middle phalanx is adjacent to the fourth PIP joint erosions. There are also erosions of the second and fifth PIP joints. First IP joint osteoarthritis is mild. CT Imaging    CT Right Upper Extremity with contrast 10/19/2017: examination of the soft tissues demonstrates no drainable fluid collection. There is no pathologically enlarged nodes within the right axilla. Alignment is normal. There is no bone destruction or fracture. No significant fatty atrophy. MR Imaging    None    DXA     None    ASSESSMENT AND PLAN    This is a follow-up visit for Mr. Naldo Magana for seropositive erosive rheumatoid arthritis, nontophaceous gout, and now active bilateral iritis. Reviewed at-risk levels of alcohol intake but for now increasing methotrexate while he works on alcohol reduction and we trend liver enzymes. 1. Seropositive rheumatoid arthritis of multiple sites (HCC)  - methotrexate 25 mg/mL chemo injection; 1 mL by SubCUTAneous route every Wednesday. Dispense: 12 mL; Refill: 1  - Cont Simponi infusions  - HEPATITIS C AB; Future  - HEPATITIS B EVALUATION;  Future  - QUANTIFERON-TB GOLD PLUS; Future  - C REACTIVE PROTEIN, QT; Future  - CBC WITH AUTOMATED DIFF; Future  - METABOLIC PANEL, COMPREHENSIVE; Future  - SED RATE (ESR); Future  - ANTI-NUCLEAR AB BY IFA (RDL); Future      2. Long-term use of immunosuppressant medication  - CBC WITH AUTOMATED DIFF; Future  - METABOLIC PANEL, COMPREHENSIVE; Future    3. Encounter for screening for viral disease  - HEPATITIS C AB; Future  - HEPATITIS B EVALUATION; Future    4. Chronic bilateral iritis  - Increase  Methotrexate to 25mg weekly  - HEPATITIS C AB; Future  - HEPATITIS B EVALUATION; Future  - QUANTIFERON-TB GOLD PLUS; Future  - C REACTIVE PROTEIN, QT; Future  - CBC WITH AUTOMATED DIFF; Future  - METABOLIC PANEL, COMPREHENSIVE; Future  - SED RATE (ESR); Future  - ANTI-NUCLEAR AB BY IFA (RDL); Future    Patient Instructions   1. I think your joints look good today, but if your eye irritation is from inflammatory eye disease, we should try to adjust your medication regimen more. 2. For now, increase methotrexate to 25mg (1mL) subQ every 7 days. Continue folic acid daily to prevent side effects. 3. Let me catch Dr. Srikanth Olivares to get his take on your eyes. Antonino Pillai if significant iritis is present, then we may want to try changing your Simponi to more frequent Infliximab infusions. 4. Repeat labs with each infusion. 5. Return in 2-3 months.       TODAY'S ORDERS    Orders Placed This Encounter    QUANTIFERON-TB GOLD PLUS    HEPATITIS C AB    HEPATITIS B EVALUATION    C REACTIVE PROTEIN, QT    CBC WITH AUTOMATED DIFF    METABOLIC PANEL, COMPREHENSIVE    SED RATE (ESR)    ANTI-NUCLEAR AB BY IFA (RDL)    methotrexate 25 mg/mL chemo injection       Future Appointments   Date Time Provider Raiza Sanchez   3/10/2022 10:00 AM SS INF3 CH4 <2H [facility]   5/10/2022 10:00 AM SS INF1 CH4 <2H [facility]   12/20/2022  9:00 AM Saba Eastman MD [facility] AMB     Face to face time: 27 minutes  Note preparation and records review day of service: 25minutes  Total provider time day of service: 46 minutes      Danielle Christianson MD    Adult Rheumatology   [facility]  A Part of [facility],  [address] [facility] [address]   Phone [phone]  Fax [phone]

## 2022-03-01 NOTE — PROGRESS NOTES
Chief Complaint   Patient presents with    Arthritis     ankles, feet     1. Have you been to the ER, urgent care clinic since your last visit? Hospitalized since your last visit? No    2. Have you seen or consulted any other health care providers outside of the [address] [place] [address] since your last visit? Include any pap smears or colon screening.  No

## 2022-03-08 ENCOUNTER — TELEPHONE (OUTPATIENT)
Dept: RHEUMATOLOGY | Age: 77
End: 2022-03-08

## 2022-03-08 ENCOUNTER — PATIENT MESSAGE (OUTPATIENT)
Dept: RHEUMATOLOGY | Age: 77
End: 2022-03-08

## 2022-03-08 NOTE — CALL BACK NOTE
New/updated order required for patient's upcoming [facility] appointment.  Sent Therapy Plan to doctor's office on 03/08/22 at 7:19 AM.

## 2022-03-08 NOTE — TELEPHONE ENCOUNTER
Janay from [address] [place] [address] Rd called stating the wife was under the impression that the patient Methotrexate was going to increase and the pharmacy stated the prescription was the same.     [employer] call back number is [phone]

## 2022-03-09 DIAGNOSIS — M05.79 SEROPOSITIVE RHEUMATOID ARTHRITIS OF MULTIPLE SITES (HCC): Primary | ICD-10-CM

## 2022-03-09 DIAGNOSIS — M81.8 AGE-RELATED OSTEOPOROSIS WITHOUT FRACTURE: ICD-10-CM

## 2022-03-09 RX ORDER — SODIUM CHLORIDE 9 MG/ML
10 INJECTION INTRAMUSCULAR; INTRAVENOUS; SUBCUTANEOUS AS NEEDED
Status: CANCELLED | OUTPATIENT
Start: 2022-03-10

## 2022-03-09 RX ORDER — HYDROCORTISONE SODIUM SUCCINATE 100 MG/2ML
100 INJECTION, POWDER, FOR SOLUTION INTRAMUSCULAR; INTRAVENOUS AS NEEDED
Status: CANCELLED | OUTPATIENT
Start: 2022-03-10

## 2022-03-09 RX ORDER — ALBUTEROL SULFATE 0.83 MG/ML
2.5 SOLUTION RESPIRATORY (INHALATION) AS NEEDED
Status: CANCELLED
Start: 2022-03-10

## 2022-03-09 RX ORDER — HEPARIN 100 UNIT/ML
300-500 SYRINGE INTRAVENOUS AS NEEDED
Status: CANCELLED
Start: 2022-03-10

## 2022-03-09 RX ORDER — DIPHENHYDRAMINE HYDROCHLORIDE 50 MG/ML
25 INJECTION, SOLUTION INTRAMUSCULAR; INTRAVENOUS AS NEEDED
Status: CANCELLED
Start: 2022-03-10

## 2022-03-09 RX ORDER — EPINEPHRINE 1 MG/ML
0.3 INJECTION, SOLUTION, CONCENTRATE INTRAVENOUS AS NEEDED
Status: CANCELLED | OUTPATIENT
Start: 2022-03-10

## 2022-03-09 RX ORDER — ACETAMINOPHEN 325 MG/1
650 TABLET ORAL AS NEEDED
Status: CANCELLED
Start: 2022-03-10

## 2022-03-09 RX ORDER — SODIUM CHLORIDE 9 MG/ML
25 INJECTION, SOLUTION INTRAVENOUS CONTINUOUS
Status: CANCELLED | OUTPATIENT
Start: 2022-03-10

## 2022-03-09 RX ORDER — SODIUM CHLORIDE 0.9 % (FLUSH) 0.9 %
10 SYRINGE (ML) INJECTION AS NEEDED
Status: CANCELLED | OUTPATIENT
Start: 2022-03-10

## 2022-03-09 RX ORDER — ONDANSETRON 2 MG/ML
8 INJECTION INTRAMUSCULAR; INTRAVENOUS AS NEEDED
Status: CANCELLED | OUTPATIENT
Start: 2022-03-10

## 2022-03-09 RX ORDER — DIPHENHYDRAMINE HYDROCHLORIDE 50 MG/ML
50 INJECTION, SOLUTION INTRAMUSCULAR; INTRAVENOUS AS NEEDED
Status: CANCELLED
Start: 2022-03-10

## 2022-03-09 NOTE — CALL BACK NOTE
I think it's fine   ----- Message -----   From: Lesley Greene, PHARMD   Sent: 3/3/2022   9:37 AM EST   To: MD Dr. Fredy Dodd,  This patient is scheduled 3/10 for Simponi in our [facility]. He was last treated 1/18 and is not due until 3/15 but has asked to come a little early on 3/10. Is this ok? I have reset the plan to match and removed Dr. Baron Grider from order. Please review and sign if ok to proceed. Thank you.

## 2022-03-09 NOTE — TELEPHONE ENCOUNTER
From: Tamiko Devi  To: Damion Vernon MD  Sent: 3/8/2022 4:39 PM EST  Subject: Methotrexate RX? Dr. Zain Field,     I went to  my new Methotrexate Dose that you said you were prescribing for me & the Pharmacist said it is the same dose that I was on. I know that you said you were going to change it . Please advise as soon as possible.      Sincerely,  Heber Griffiths

## 2022-03-10 ENCOUNTER — HOSPITAL ENCOUNTER (OUTPATIENT)
Dept: INFUSION THERAPY | Age: 77
Discharge: HOME | End: 2022-03-10
Attending: INTERNAL MEDICINE
Payer: MEDICARE

## 2022-03-10 VITALS
SYSTOLIC BLOOD PRESSURE: 93 MMHG | RESPIRATION RATE: 16 BRPM | OXYGEN SATURATION: 96 % | DIASTOLIC BLOOD PRESSURE: 47 MMHG | WEIGHT: 225.9 LBS | TEMPERATURE: 97.9 F | BODY MASS INDEX: 34.24 KG/M2 | HEART RATE: 59 BPM | HEIGHT: 68 IN

## 2022-03-10 DIAGNOSIS — M05.79 SEROPOSITIVE RHEUMATOID ARTHRITIS OF MULTIPLE SITES (HCC): ICD-10-CM

## 2022-03-10 DIAGNOSIS — M81.8 AGE-RELATED OSTEOPOROSIS WITHOUT FRACTURE: Primary | ICD-10-CM

## 2022-03-10 LAB
ALBUMIN SERPL-MCNC: 3.6 G/DL (ref 3.5–5)
ALBUMIN/GLOB SERPL: 1.3 {RATIO} (ref 1.1–2.2)
ALP SERPL-CCNC: 58 U/L (ref 45–117)
ALT SERPL-CCNC: 71 U/L (ref 12–78)
ANION GAP SERPL CALC-SCNC: 5 MMOL/L (ref 5–15)
AST SERPL-CCNC: 22 U/L (ref 15–37)
BASOPHILS # BLD: 0 K/UL (ref 0–0.1)
BASOPHILS NFR BLD: 0 % (ref 0–1)
BILIRUB SERPL-MCNC: 0.8 MG/DL (ref 0.2–1)
BUN SERPL-MCNC: 22 MG/DL (ref 6–20)
BUN/CREAT SERPL: 20 (ref 12–20)
CALCIUM SERPL-MCNC: 7.5 MG/DL (ref 8.5–10.1)
CHLORIDE SERPL-SCNC: 110 MMOL/L (ref 97–108)
CO2 SERPL-SCNC: 22 MMOL/L (ref 21–32)
CREAT SERPL-MCNC: 1.08 MG/DL (ref 0.7–1.3)
CRP SERPL HS-MCNC: 6.1 MG/L
DIFFERENTIAL METHOD BLD: ABNORMAL
EOSINOPHIL # BLD: 0 K/UL (ref 0–0.4)
EOSINOPHIL NFR BLD: 1 % (ref 0–7)
ERYTHROCYTE [DISTWIDTH] IN BLOOD BY AUTOMATED COUNT: 13.3 % (ref 11.5–14.5)
ERYTHROCYTE [SEDIMENTATION RATE] IN BLOOD: 13 MM/HR (ref 0–20)
GLOBULIN SER CALC-MCNC: 2.7 G/DL (ref 2–4)
GLUCOSE SERPL-MCNC: 106 MG/DL (ref 65–100)
HBV CORE IGM SER QL: NONREACTIVE
HBV SURFACE AB SER QL: NONREACTIVE
HBV SURFACE AB SER-ACNC: <3.1 MIU/ML
HBV SURFACE AG SER QL: <0.1 INDEX
HBV SURFACE AG SER QL: NEGATIVE
HCT VFR BLD AUTO: 41.7 % (ref 36.6–50.3)
HGB BLD-MCNC: 14.1 G/DL (ref 12.1–17)
IMM GRANULOCYTES # BLD AUTO: 0 K/UL (ref 0–0.04)
IMM GRANULOCYTES NFR BLD AUTO: 1 % (ref 0–0.5)
LYMPHOCYTES # BLD: 0.8 K/UL (ref 0.8–3.5)
LYMPHOCYTES NFR BLD: 19 % (ref 12–49)
MCH RBC QN AUTO: 35 PG (ref 26–34)
MCHC RBC AUTO-ENTMCNC: 33.8 G/DL (ref 30–36.5)
MCV RBC AUTO: 103.5 FL (ref 80–99)
MONOCYTES # BLD: 0.8 K/UL (ref 0–1)
MONOCYTES NFR BLD: 17 % (ref 5–13)
NEUTS SEG # BLD: 2.8 K/UL (ref 1.8–8)
NEUTS SEG NFR BLD: 62 % (ref 32–75)
NRBC # BLD: 0 K/UL (ref 0–0.01)
NRBC BLD-RTO: 0 PER 100 WBC
PLATELET # BLD AUTO: 174 K/UL (ref 150–400)
PMV BLD AUTO: 9.8 FL (ref 8.9–12.9)
POTASSIUM SERPL-SCNC: 3.5 MMOL/L (ref 3.5–5.1)
PROT SERPL-MCNC: 6.3 G/DL (ref 6.4–8.2)
RBC # BLD AUTO: 4.03 M/UL (ref 4.1–5.7)
SODIUM SERPL-SCNC: 137 MMOL/L (ref 136–145)
WBC # BLD AUTO: 4.4 K/UL (ref 4.1–11.1)

## 2022-03-10 PROCEDURE — 86141 C-REACTIVE PROTEIN HS: CPT

## 2022-03-10 PROCEDURE — 74011000258 HC RX REV CODE- 258: Performed by: INTERNAL MEDICINE

## 2022-03-10 PROCEDURE — 80053 COMPREHEN METABOLIC PANEL: CPT

## 2022-03-10 PROCEDURE — 74011250636 HC RX REV CODE- 250/636: Performed by: INTERNAL MEDICINE

## 2022-03-10 PROCEDURE — 86704 HEP B CORE ANTIBODY TOTAL: CPT

## 2022-03-10 PROCEDURE — 86038 ANTINUCLEAR ANTIBODIES: CPT

## 2022-03-10 PROCEDURE — 86480 TB TEST CELL IMMUN MEASURE: CPT

## 2022-03-10 PROCEDURE — 87340 HEPATITIS B SURFACE AG IA: CPT

## 2022-03-10 PROCEDURE — 36415 COLL VENOUS BLD VENIPUNCTURE: CPT

## 2022-03-10 PROCEDURE — 86706 HEP B SURFACE ANTIBODY: CPT

## 2022-03-10 PROCEDURE — 96413 CHEMO IV INFUSION 1 HR: CPT

## 2022-03-10 PROCEDURE — 86705 HEP B CORE ANTIBODY IGM: CPT

## 2022-03-10 PROCEDURE — 85025 COMPLETE CBC W/AUTO DIFF WBC: CPT

## 2022-03-10 PROCEDURE — 85652 RBC SED RATE AUTOMATED: CPT

## 2022-03-10 RX ORDER — EPINEPHRINE 1 MG/ML
0.3 INJECTION, SOLUTION, CONCENTRATE INTRAVENOUS AS NEEDED
Status: CANCELLED | OUTPATIENT
Start: 2022-05-05

## 2022-03-10 RX ORDER — SODIUM CHLORIDE 9 MG/ML
25 INJECTION, SOLUTION INTRAVENOUS CONTINUOUS
Status: DISPENSED | OUTPATIENT
Start: 2022-03-10 | End: 2022-03-10

## 2022-03-10 RX ORDER — SODIUM CHLORIDE 0.9 % (FLUSH) 0.9 %
10 SYRINGE (ML) INJECTION AS NEEDED
Status: DISPENSED | OUTPATIENT
Start: 2022-03-10 | End: 2022-03-10

## 2022-03-10 RX ORDER — ALBUTEROL SULFATE 0.83 MG/ML
2.5 SOLUTION RESPIRATORY (INHALATION) AS NEEDED
Status: CANCELLED
Start: 2022-05-05

## 2022-03-10 RX ORDER — DIPHENHYDRAMINE HYDROCHLORIDE 50 MG/ML
50 INJECTION, SOLUTION INTRAMUSCULAR; INTRAVENOUS AS NEEDED
Status: CANCELLED
Start: 2022-05-05

## 2022-03-10 RX ORDER — HEPARIN 100 UNIT/ML
300-500 SYRINGE INTRAVENOUS AS NEEDED
Status: CANCELLED
Start: 2022-05-05

## 2022-03-10 RX ORDER — HYDROCORTISONE SODIUM SUCCINATE 100 MG/2ML
100 INJECTION, POWDER, FOR SOLUTION INTRAMUSCULAR; INTRAVENOUS AS NEEDED
Status: CANCELLED | OUTPATIENT
Start: 2022-05-05

## 2022-03-10 RX ORDER — HEPARIN 100 UNIT/ML
300-500 SYRINGE INTRAVENOUS AS NEEDED
Status: ACTIVE | OUTPATIENT
Start: 2022-03-10 | End: 2022-03-10

## 2022-03-10 RX ORDER — ONDANSETRON 2 MG/ML
8 INJECTION INTRAMUSCULAR; INTRAVENOUS AS NEEDED
Status: CANCELLED | OUTPATIENT
Start: 2022-05-05

## 2022-03-10 RX ORDER — ACETAMINOPHEN 325 MG/1
650 TABLET ORAL AS NEEDED
Status: CANCELLED
Start: 2022-05-05

## 2022-03-10 RX ORDER — SODIUM CHLORIDE 9 MG/ML
10 INJECTION INTRAMUSCULAR; INTRAVENOUS; SUBCUTANEOUS AS NEEDED
Status: ACTIVE | OUTPATIENT
Start: 2022-03-10 | End: 2022-03-10

## 2022-03-10 RX ORDER — DIPHENHYDRAMINE HYDROCHLORIDE 50 MG/ML
25 INJECTION, SOLUTION INTRAMUSCULAR; INTRAVENOUS AS NEEDED
Status: CANCELLED
Start: 2022-05-05

## 2022-03-10 RX ORDER — SODIUM CHLORIDE 9 MG/ML
10 INJECTION INTRAMUSCULAR; INTRAVENOUS; SUBCUTANEOUS AS NEEDED
Status: CANCELLED | OUTPATIENT
Start: 2022-05-05

## 2022-03-10 RX ORDER — SODIUM CHLORIDE 0.9 % (FLUSH) 0.9 %
10 SYRINGE (ML) INJECTION AS NEEDED
Status: CANCELLED | OUTPATIENT
Start: 2022-05-05

## 2022-03-10 RX ORDER — SODIUM CHLORIDE 9 MG/ML
25 INJECTION, SOLUTION INTRAVENOUS CONTINUOUS
Status: CANCELLED | OUTPATIENT
Start: 2022-05-05

## 2022-03-10 RX ADMIN — SODIUM CHLORIDE 25 ML/HR: 9 INJECTION, SOLUTION INTRAVENOUS at 10:48

## 2022-03-10 RX ADMIN — GOLIMUMAB 212.5 MG: 50 SOLUTION INTRAVENOUS at 10:53

## 2022-03-10 NOTE — PROGRESS NOTES
Outpatient Infusion Center Progress Note     Pt admit to [facility] for simponi infusion and prolia injection ambulatory in stable condition. Assessment completed. No new concerns voiced. Patient brought in lab order from Dr MAUREEN LOPES [facility], orders placed in addition to treatment orders and drawn with IV placement. 20g PIV placed L AC without difficulty. Visit Vitals  BP (!) 93/47   Pulse (!) 59   Temp 97.9 °F (36.6 °C)   Resp 16   Ht 5' 8\" (1.727 m)   Wt 102.5 kg (225 lb 14.4 oz)   SpO2 96%   BMI 34.35 kg/m²       Medications:  Medications Administered     0.9% sodium chloride infusion     Admin Date  03/10/2022 Action  New Bag Dose  25 mL/hr Rate  25 mL/hr Route  IntraVENous Administered By  Gretchen Guzman RN          golimumab (SIMPONI ARIA) 212.5 mg in 0.9% sodium chloride, overfill volume 127 mL infusion     Admin Date  03/10/2022 Action  New Bag Dose  212.5 mg Rate  254 mL/hr Route  IntraVENous Administered By  Gretchen Guzman RN              Patient was not able to receive his prolia injection today due to his corrected calcium being too low for treatment. Pt tolerated treatment well. PIV flushed and discontinued per protocol. D/c home ambulatory in no distress. Pt aware of next appointment scheduled for 05/10. Recent Results (from the past 12 hour(s))   CBC WITH AUTOMATED DIFF    Collection Time: 03/10/22 10:18 AM   Result Value Ref Range    WBC 4.4 4.1 - 11.1 K/uL    RBC 4.03 (L) 4.10 - 5.70 M/uL    HGB 14.1 12.1 - 17.0 g/dL    HCT 41.7 36.6 - 50.3 %    .5 (H) 80.0 - 99.0 FL    MCH 35.0 (H) 26.0 - 34.0 PG    MCHC 33.8 30.0 - 36.5 g/dL    RDW 13.3 11.5 - 14.5 %    PLATELET 193 264 - 171 K/uL    MPV 9.8 8.9 - 12.9 FL    NRBC 0.0 0  WBC    ABSOLUTE NRBC 0.00 0.00 - 0.01 K/uL    NEUTROPHILS 62 32 - 75 %    LYMPHOCYTES 19 12 - 49 %    MONOCYTES 17 (H) 5 - 13 %    EOSINOPHILS 1 0 - 7 %    BASOPHILS 0 0 - 1 %    IMMATURE GRANULOCYTES 1 (H) 0.0 - 0.5 %    ABS. NEUTROPHILS 2.8 1.8 - 8.0 K/UL    ABS. LYMPHOCYTES 0.8 0.8 - 3.5 K/UL    ABS. MONOCYTES 0.8 0.0 - 1.0 K/UL    ABS. EOSINOPHILS 0.0 0.0 - 0.4 K/UL    ABS. BASOPHILS 0.0 0.0 - 0.1 K/UL    ABS. IMM. GRANS. 0.0 0.00 - 0.04 K/UL    DF AUTOMATED     METABOLIC PANEL, COMPREHENSIVE    Collection Time: 03/10/22 10:18 AM   Result Value Ref Range    Sodium 137 136 - 145 mmol/L    Potassium 3.5 3.5 - 5.1 mmol/L    Chloride 110 (H) 97 - 108 mmol/L    CO2 22 21 - 32 mmol/L    Anion gap 5 5 - 15 mmol/L    Glucose 106 (H) 65 - 100 mg/dL    BUN 22 (H) 6 - 20 MG/DL    Creatinine 1.08 0.70 - 1.30 MG/DL    BUN/Creatinine ratio 20 12 - 20      GFR est AA >60 >60 ml/min/1.73m2    GFR est non-AA >60 >60 ml/min/1.73m2    Calcium 7.5 (L) 8.5 - 10.1 MG/DL    Bilirubin, total 0.8 0.2 - 1.0 MG/DL    ALT (SGPT) 71 12 - 78 U/L    AST (SGOT) 22 15 - 37 U/L    Alk.  phosphatase 58 45 - 117 U/L    Protein, total 6.3 (L) 6.4 - 8.2 g/dL    Albumin 3.6 3.5 - 5.0 g/dL    Globulin 2.7 2.0 - 4.0 g/dL    A-G Ratio 1.3 1.1 - 2.2     SED RATE (ESR)    Collection Time: 03/10/22 10:18 AM   Result Value Ref Range    Sed rate, automated 13 0 - 20 mm/hr

## 2022-03-11 LAB — HBV CORE AB SERPL QL IA: NEGATIVE

## 2022-03-13 LAB
M TB IFN-G BLD-IMP: NEGATIVE
QUANTIFERON CRITERIA, QFI1T: NORMAL
QUANTIFERON MITOGEN VALUE: 6.48 IU/ML
QUANTIFERON NIL VALUE: 0.12 IU/ML
QUANTIFERON TB1 AG: 0.12 IU/ML
QUANTIFERON TB2 AG: 0.11 IU/ML

## 2022-03-14 LAB
ANTI-NUCLEAR AB BY IFA (RDL): POSITIVE
NOTE, 018286: ABNORMAL
SPECKLED PATTERN: ABNORMAL

## 2022-03-14 RX ORDER — AZITHROMYCIN 250 MG/1
500 TABLET, FILM COATED ORAL DAILY
Qty: 6 TABLET | Refills: 0 | Status: SHIPPED | OUTPATIENT
Start: 2022-03-14 | End: 2022-03-17

## 2022-03-18 PROBLEM — Z85.46 HISTORY OF PROSTATE CANCER: Status: ACTIVE | Noted: 2017-05-15

## 2022-03-18 PROBLEM — M1A.0720 CHRONIC IDIOPATHIC GOUT INVOLVING TOE OF LEFT FOOT WITHOUT TOPHUS: Status: ACTIVE | Noted: 2018-12-03

## 2022-03-19 PROBLEM — M81.8 AGE-RELATED OSTEOPOROSIS WITHOUT FRACTURE: Status: ACTIVE | Noted: 2021-09-21

## 2022-03-19 PROBLEM — R79.89 LOW TESTOSTERONE: Status: ACTIVE | Noted: 2017-11-16

## 2022-03-19 PROBLEM — Z71.89 ADVANCED CARE PLANNING/COUNSELING DISCUSSION: Status: ACTIVE | Noted: 2017-08-15

## 2022-03-19 PROBLEM — E55.9 VITAMIN D DEFICIENCY: Status: ACTIVE | Noted: 2017-12-20

## 2022-03-19 PROBLEM — Z79.60 LONG-TERM USE OF IMMUNOSUPPRESSANT MEDICATION: Status: ACTIVE | Noted: 2017-12-20

## 2022-03-19 PROBLEM — M10.9 GOUT: Status: ACTIVE | Noted: 2018-08-30

## 2022-03-19 PROBLEM — M17.0 PRIMARY OSTEOARTHRITIS OF BOTH KNEES: Status: ACTIVE | Noted: 2017-11-20

## 2022-03-19 PROBLEM — M05.79 SEROPOSITIVE RHEUMATOID ARTHRITIS OF MULTIPLE SITES (HCC): Status: ACTIVE | Noted: 2017-12-20

## 2022-03-19 PROBLEM — F33.9 RECURRENT DEPRESSION (HCC): Status: ACTIVE | Noted: 2017-12-28

## 2022-03-19 PROBLEM — M72.2 PLANTAR FASCIITIS OF RIGHT FOOT: Status: ACTIVE | Noted: 2019-10-28

## 2022-03-20 PROBLEM — E66.01 SEVERE OBESITY WITH BODY MASS INDEX (BMI) OF 35.0 TO 39.9 WITH SERIOUS COMORBIDITY (HCC): Status: ACTIVE | Noted: 2018-08-30

## 2022-05-10 ENCOUNTER — HOSPITAL ENCOUNTER (OUTPATIENT)
Dept: INFUSION THERAPY | Age: 77
Discharge: HOME | End: 2022-05-10
Attending: INTERNAL MEDICINE
Payer: MEDICARE

## 2022-05-10 VITALS
HEIGHT: 68 IN | OXYGEN SATURATION: 93 % | DIASTOLIC BLOOD PRESSURE: 60 MMHG | TEMPERATURE: 98.1 F | HEART RATE: 48 BPM | BODY MASS INDEX: 35.39 KG/M2 | SYSTOLIC BLOOD PRESSURE: 125 MMHG | WEIGHT: 233.5 LBS | RESPIRATION RATE: 16 BRPM

## 2022-05-10 DIAGNOSIS — M05.79 SEROPOSITIVE RHEUMATOID ARTHRITIS OF MULTIPLE SITES (HCC): Primary | ICD-10-CM

## 2022-05-10 DIAGNOSIS — M81.8 AGE-RELATED OSTEOPOROSIS WITHOUT FRACTURE: ICD-10-CM

## 2022-05-10 LAB
ALBUMIN SERPL-MCNC: 3.2 G/DL (ref 3.5–5)
ALBUMIN/GLOB SERPL: 0.9 {RATIO} (ref 1.1–2.2)
ALP SERPL-CCNC: 64 U/L (ref 45–117)
ALT SERPL-CCNC: 45 U/L (ref 12–78)
ANION GAP SERPL CALC-SCNC: 6 MMOL/L (ref 5–15)
AST SERPL-CCNC: 23 U/L (ref 15–37)
BASOPHILS # BLD: 0.1 K/UL (ref 0–0.1)
BASOPHILS NFR BLD: 1 % (ref 0–1)
BILIRUB SERPL-MCNC: 0.7 MG/DL (ref 0.2–1)
BUN SERPL-MCNC: 20 MG/DL (ref 6–20)
BUN/CREAT SERPL: 25 (ref 12–20)
CALCIUM SERPL-MCNC: 8.9 MG/DL (ref 8.5–10.1)
CHLORIDE SERPL-SCNC: 109 MMOL/L (ref 97–108)
CO2 SERPL-SCNC: 23 MMOL/L (ref 21–32)
CREAT SERPL-MCNC: 0.81 MG/DL (ref 0.7–1.3)
CRP SERPL HS-MCNC: 0.6 MG/L
DIFFERENTIAL METHOD BLD: ABNORMAL
EOSINOPHIL # BLD: 0.2 K/UL (ref 0–0.4)
EOSINOPHIL NFR BLD: 3 % (ref 0–7)
ERYTHROCYTE [DISTWIDTH] IN BLOOD BY AUTOMATED COUNT: 13.4 % (ref 11.5–14.5)
ERYTHROCYTE [SEDIMENTATION RATE] IN BLOOD: 6 MM/HR (ref 0–20)
GLOBULIN SER CALC-MCNC: 3.4 G/DL (ref 2–4)
GLUCOSE SERPL-MCNC: 95 MG/DL (ref 65–100)
HCT VFR BLD AUTO: 38.9 % (ref 36.6–50.3)
HGB BLD-MCNC: 13.3 G/DL (ref 12.1–17)
IMM GRANULOCYTES # BLD AUTO: 0 K/UL (ref 0–0.04)
IMM GRANULOCYTES NFR BLD AUTO: 0 % (ref 0–0.5)
LYMPHOCYTES # BLD: 1.7 K/UL (ref 0.8–3.5)
LYMPHOCYTES NFR BLD: 24 % (ref 12–49)
MCH RBC QN AUTO: 35.2 PG (ref 26–34)
MCHC RBC AUTO-ENTMCNC: 34.2 G/DL (ref 30–36.5)
MCV RBC AUTO: 102.9 FL (ref 80–99)
MONOCYTES # BLD: 0.8 K/UL (ref 0–1)
MONOCYTES NFR BLD: 11 % (ref 5–13)
NEUTS SEG # BLD: 4.5 K/UL (ref 1.8–8)
NEUTS SEG NFR BLD: 61 % (ref 32–75)
NRBC # BLD: 0 K/UL (ref 0–0.01)
NRBC BLD-RTO: 0 PER 100 WBC
PLATELET # BLD AUTO: 189 K/UL (ref 150–400)
PMV BLD AUTO: 10.8 FL (ref 8.9–12.9)
POTASSIUM SERPL-SCNC: 5.2 MMOL/L (ref 3.5–5.1)
PROT SERPL-MCNC: 6.6 G/DL (ref 6.4–8.2)
RBC # BLD AUTO: 3.78 M/UL (ref 4.1–5.7)
SODIUM SERPL-SCNC: 138 MMOL/L (ref 136–145)
WBC # BLD AUTO: 7.2 K/UL (ref 4.1–11.1)

## 2022-05-10 PROCEDURE — 74011250636 HC RX REV CODE- 250/636: Performed by: INTERNAL MEDICINE

## 2022-05-10 PROCEDURE — 80053 COMPREHEN METABOLIC PANEL: CPT

## 2022-05-10 PROCEDURE — 86141 C-REACTIVE PROTEIN HS: CPT

## 2022-05-10 PROCEDURE — 85025 COMPLETE CBC W/AUTO DIFF WBC: CPT

## 2022-05-10 PROCEDURE — 36415 COLL VENOUS BLD VENIPUNCTURE: CPT

## 2022-05-10 PROCEDURE — 74011000258 HC RX REV CODE- 258: Performed by: INTERNAL MEDICINE

## 2022-05-10 PROCEDURE — 96413 CHEMO IV INFUSION 1 HR: CPT

## 2022-05-10 PROCEDURE — 85652 RBC SED RATE AUTOMATED: CPT

## 2022-05-10 RX ORDER — ALBUTEROL SULFATE 0.83 MG/ML
2.5 SOLUTION RESPIRATORY (INHALATION) AS NEEDED
Status: CANCELLED
Start: 2022-07-03

## 2022-05-10 RX ORDER — SODIUM CHLORIDE 9 MG/ML
10 INJECTION INTRAMUSCULAR; INTRAVENOUS; SUBCUTANEOUS AS NEEDED
Status: CANCELLED | OUTPATIENT
Start: 2022-07-03

## 2022-05-10 RX ORDER — ONDANSETRON 2 MG/ML
8 INJECTION INTRAMUSCULAR; INTRAVENOUS AS NEEDED
Status: CANCELLED | OUTPATIENT
Start: 2022-07-03

## 2022-05-10 RX ORDER — SODIUM CHLORIDE 0.9 % (FLUSH) 0.9 %
10 SYRINGE (ML) INJECTION AS NEEDED
Status: CANCELLED | OUTPATIENT
Start: 2022-07-03

## 2022-05-10 RX ORDER — EPINEPHRINE 1 MG/ML
0.3 INJECTION, SOLUTION, CONCENTRATE INTRAVENOUS AS NEEDED
Status: CANCELLED | OUTPATIENT
Start: 2022-07-03

## 2022-05-10 RX ORDER — SODIUM CHLORIDE 9 MG/ML
25 INJECTION, SOLUTION INTRAVENOUS CONTINUOUS
Status: CANCELLED | OUTPATIENT
Start: 2022-07-03

## 2022-05-10 RX ORDER — DIPHENHYDRAMINE HYDROCHLORIDE 50 MG/ML
50 INJECTION, SOLUTION INTRAMUSCULAR; INTRAVENOUS AS NEEDED
Status: CANCELLED
Start: 2022-07-03

## 2022-05-10 RX ORDER — DIPHENHYDRAMINE HYDROCHLORIDE 50 MG/ML
25 INJECTION, SOLUTION INTRAMUSCULAR; INTRAVENOUS AS NEEDED
Status: CANCELLED
Start: 2022-07-03

## 2022-05-10 RX ORDER — ACETAMINOPHEN 325 MG/1
650 TABLET ORAL AS NEEDED
Status: CANCELLED
Start: 2022-07-03

## 2022-05-10 RX ORDER — SODIUM CHLORIDE 9 MG/ML
25 INJECTION, SOLUTION INTRAVENOUS CONTINUOUS
Status: DISPENSED | OUTPATIENT
Start: 2022-05-10 | End: 2022-05-10

## 2022-05-10 RX ORDER — HEPARIN 100 UNIT/ML
300-500 SYRINGE INTRAVENOUS AS NEEDED
Status: CANCELLED
Start: 2022-07-03

## 2022-05-10 RX ORDER — HYDROCORTISONE SODIUM SUCCINATE 100 MG/2ML
100 INJECTION, POWDER, FOR SOLUTION INTRAMUSCULAR; INTRAVENOUS AS NEEDED
Status: CANCELLED | OUTPATIENT
Start: 2022-07-03

## 2022-05-10 RX ADMIN — SODIUM CHLORIDE 25 ML/HR: 9 INJECTION, SOLUTION INTRAVENOUS at 10:34

## 2022-05-10 RX ADMIN — GOLIMUMAB 212.5 MG: 50 SOLUTION INTRAVENOUS at 10:35

## 2022-05-10 NOTE — PROGRESS NOTES
[facility] Progress Note    Date: May 10, 2022    Name: Jessica Arauz    MRN: [id number]         : 1945    Mr. Timothy Jin Arrived ambulatory and in no distress for simponi infusion. Assessment was completed, no acute issues at this time, no new complaints voiced. 24 PIV inserted without difficulty, labs drawn & sent for processing. Patient states he is not currently on any antibiotics. Covid Questionnaire completed. 1. Do you have any symptoms of covid 19? SOB, coughing, fever, or generally not feeling well? - no  2. Have you been exposed to covid 19 recently? - no  3. Have you had any recent contact with family/friend that has a pending covid test? no      Chemotherapy Flowsheet 5/10/2022   Cycle [id number]   Date 5/10/2022   Drug / Regimen simponi   Notes given       Mr. Sarabia's vitals were reviewed. Visit Vitals  /60   Pulse (!) 48   Temp 98.1 °F (36.7 °C)   Resp 16   Ht 5' 8\" (1.727 m)   Wt 105.9 kg (233 lb 8 oz)   SpO2 93%   BMI 35.50 kg/m²       Lab results were obtained and reviewed. Recent Results (from the past 12 hour(s))   CBC WITH AUTOMATED DIFF    Collection Time: 05/10/22 10:10 AM   Result Value Ref Range    WBC 7.2 4.1 - 11.1 K/uL    RBC 3.78 (L) 4.10 - 5.70 M/uL    HGB 13.3 12.1 - 17.0 g/dL    HCT 38.9 36.6 - 50.3 %    .9 (H) 80.0 - 99.0 FL    MCH 35.2 (H) 26.0 - 34.0 PG    MCHC 34.2 30.0 - 36.5 g/dL    RDW 13.4 11.5 - 14.5 %    PLATELET 016 524 - 100 K/uL    MPV 10.8 8.9 - 12.9 FL    NRBC 0.0 0  WBC    ABSOLUTE NRBC 0.00 0.00 - 0.01 K/uL    NEUTROPHILS 61 32 - 75 %    LYMPHOCYTES 24 12 - 49 %    MONOCYTES 11 5 - 13 %    EOSINOPHILS 3 0 - 7 %    BASOPHILS 1 0 - 1 %    IMMATURE GRANULOCYTES 0 0.0 - 0.5 %    ABS. NEUTROPHILS 4.5 1.8 - 8.0 K/UL    ABS. LYMPHOCYTES 1.7 0.8 - 3.5 K/UL    ABS. MONOCYTES 0.8 0.0 - 1.0 K/UL    ABS. EOSINOPHILS 0.2 0.0 - 0.4 K/UL    ABS. BASOPHILS 0.1 0.0 - 0.1 K/UL    ABS. IMM.  GRANS. 0.0 0.00 - 0.04 K/UL    DF AUTOMATED     METABOLIC PANEL, COMPREHENSIVE    Collection Time: 05/10/22 10:10 AM   Result Value Ref Range    Sodium 138 136 - 145 mmol/L    Potassium 5.2 (H) 3.5 - 5.1 mmol/L    Chloride 109 (H) 97 - 108 mmol/L    CO2 23 21 - 32 mmol/L    Anion gap 6 5 - 15 mmol/L    Glucose 95 65 - 100 mg/dL    BUN 20 6 - 20 MG/DL    Creatinine 0.81 0.70 - 1.30 MG/DL    BUN/Creatinine ratio 25 (H) 12 - 20      GFR est AA >60 >60 ml/min/1.73m2    GFR est non-AA >60 >60 ml/min/1.73m2    Calcium 8.9 8.5 - 10.1 MG/DL    Bilirubin, total 0.7 0.2 - 1.0 MG/DL    ALT (SGPT) 45 12 - 78 U/L    AST (SGOT) 23 15 - 37 U/L    Alk. phosphatase 64 45 - 117 U/L    Protein, total 6.6 6.4 - 8.2 g/dL    Albumin 3.2 (L) 3.5 - 5.0 g/dL    Globulin 3.4 2.0 - 4.0 g/dL    A-G Ratio 0.9 (L) 1.1 - 2.2             Medications:  Medications Administered     0.9% sodium chloride infusion     Admin Date  05/10/2022 Action  New Bag Dose  25 mL/hr Rate  25 mL/hr Route  IntraVENous Administered By  Christian Rao          golimumab (SIMPONI ARIA) 212.5 mg in 0.9% sodium chloride, overfill volume 127 mL infusion     Admin Date  05/10/2022 Action  New Bag Dose  212.5 mg Rate  254 mL/hr Route  IntraVENous Administered By  Christian Rao                  Mr. Veronique Tom tolerated treatment well and was discharged from [place] [address] in stable condition. PIV flushed & discontinued per protocol. He is to return on July 5 at 1000 for his next appointment.     Danielle Boyce RN  May 10, 2022

## 2022-06-07 ENCOUNTER — OFFICE VISIT (OUTPATIENT)
Dept: RHEUMATOLOGY | Age: 77
End: 2022-06-07
Payer: MEDICARE

## 2022-06-07 VITALS
TEMPERATURE: 97.9 F | SYSTOLIC BLOOD PRESSURE: 135 MMHG | HEART RATE: 47 BPM | HEIGHT: 68 IN | DIASTOLIC BLOOD PRESSURE: 67 MMHG | OXYGEN SATURATION: 94 % | WEIGHT: 234 LBS | BODY MASS INDEX: 35.46 KG/M2 | RESPIRATION RATE: 18 BRPM

## 2022-06-07 DIAGNOSIS — M05.79 SEROPOSITIVE RHEUMATOID ARTHRITIS OF MULTIPLE SITES (HCC): Primary | ICD-10-CM

## 2022-06-07 DIAGNOSIS — M1A.0720 CHRONIC IDIOPATHIC GOUT INVOLVING TOE OF LEFT FOOT WITHOUT TOPHUS: ICD-10-CM

## 2022-06-07 DIAGNOSIS — H20.13 CHRONIC BILATERAL IRITIS: ICD-10-CM

## 2022-06-07 DIAGNOSIS — Z79.60 LONG-TERM USE OF IMMUNOSUPPRESSANT MEDICATION: ICD-10-CM

## 2022-06-07 DIAGNOSIS — E55.9 VITAMIN D DEFICIENCY: ICD-10-CM

## 2022-06-07 DIAGNOSIS — E66.01 SEVERE OBESITY (BMI 35.0-39.9) WITH COMORBIDITY (HCC): ICD-10-CM

## 2022-06-07 PROCEDURE — G8536 NO DOC ELDER MAL SCRN: HCPCS | Performed by: INTERNAL MEDICINE

## 2022-06-07 PROCEDURE — G9717 DOC PT DX DEP/BP F/U NT REQ: HCPCS | Performed by: INTERNAL MEDICINE

## 2022-06-07 PROCEDURE — 3017F COLORECTAL CA SCREEN DOC REV: CPT | Performed by: INTERNAL MEDICINE

## 2022-06-07 PROCEDURE — G8417 CALC BMI ABV UP PARAM F/U: HCPCS | Performed by: INTERNAL MEDICINE

## 2022-06-07 PROCEDURE — G8754 DIAS BP LESS 90: HCPCS | Performed by: INTERNAL MEDICINE

## 2022-06-07 PROCEDURE — 99215 OFFICE O/P EST HI 40 MIN: CPT | Performed by: INTERNAL MEDICINE

## 2022-06-07 PROCEDURE — 1123F ACP DISCUSS/DSCN MKR DOCD: CPT | Performed by: INTERNAL MEDICINE

## 2022-06-07 PROCEDURE — G8427 DOCREV CUR MEDS BY ELIG CLIN: HCPCS | Performed by: INTERNAL MEDICINE

## 2022-06-07 PROCEDURE — G8752 SYS BP LESS 140: HCPCS | Performed by: INTERNAL MEDICINE

## 2022-06-07 PROCEDURE — 1101F PT FALLS ASSESS-DOCD LE1/YR: CPT | Performed by: INTERNAL MEDICINE

## 2022-06-07 PROCEDURE — G0463 HOSPITAL OUTPT CLINIC VISIT: HCPCS | Performed by: INTERNAL MEDICINE

## 2022-06-07 RX ORDER — METHOTREXATE 25 MG/ML
25 INJECTION, SOLUTION INTRA-ARTERIAL; INTRAMUSCULAR; INTRAVENOUS
Qty: 12 ML | Refills: 0 | Status: SHIPPED | OUTPATIENT
Start: 2022-06-08 | End: 2022-08-30

## 2022-06-07 RX ORDER — ALLOPURINOL 300 MG/1
TABLET ORAL
Qty: 90 TABLET | Refills: 1 | Status: SHIPPED | OUTPATIENT
Start: 2022-06-07

## 2022-06-07 NOTE — LETTER
6/21/2022    Patient: Jessica Arauz   YOB: 1945   Date of Visit: 6/7/2022     Maximo Peabody, MD  [address]  [address] [place]  Via In [address], [place]  Via In [facility], [address]  [address] [facility]  Via In [facility] [address]    Dear Maximo Peabody, MD Lew Mages, MD Rilla Melena, MD,    We recently saw Mr. Eddie Garsia in the [facility] for evaluation. My notes for this consultation are attached. If you have questions, please do not hesitate to call me. I look forward to following your patient along with you.       Sincerely,  Tamara Pichardo MD [facility]  Cell: [phone]

## 2022-06-07 NOTE — PATIENT INSTRUCTIONS
1) Continue on your simponi infusions. 2) Keep getting your Prolia infusions one time every 6 months. Repeat this In one month after updated labs. 3) Stay on 1 mL of subQ methotrexate one time per week along with your daily folic acid pill. 4) Stay on 300 mg of allopurinol per day. 5) Work on decreasing your alcohol intake. 6) Continue weekly ergocalciferol. If your levels are high we may space this out to one time every other week. 7) Check labs in 1 month before your prolia injection. The number for the [facility] infusion center is [phone]. 8) Follow up in 3 months. Let me know if you have any gout flares or concerns in the meantime.

## 2022-06-07 NOTE — PROGRESS NOTES
Chief Complaint   Patient presents with    Arthritis    Joint Pain     ankles     1. Have you been to the ER, urgent care clinic since your last visit? Hospitalized since your last visit? No    2. Have you seen or consulted any other health care providers outside of the [address] [place] [address] since your last visit? Include any pap smears or colon screening.  No

## 2022-06-07 NOTE — PROGRESS NOTES
REASON FOR VISIT    This is a follow-up visit for Mr. Ferny Fernandez for     ICD-10-CM   1. Seropositive rheumatoid arthritis [facility] multiple sites ([facility] M05.79   2. 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     Inflammatory arthritis phenotype includes:  Anti-CCP positive: yes (>250)  Rheumatoid factor positive: yes (>650)  Erosive disease: yes  Extra-articular manifestations include: left iritis    Immunosuppression Screening (10/15/2020):  Quantiferon TB: negative    PPD:  Not performed  Hepatitis B: negative   Hepatitis C: negative     Therapy History includes:  Current DMARD therapy include: methotrexate 25 mg SubQ every Wednesday (7/13/2021 to present), Simponi Aria every 8 weeks (5/08/2018 to present)  Prior DMARD therapy include: methotrexate 17.5 mg every Wednesday (6/15/2020 to 10/14/2020), methotrexate 20 mg every Wednesday (11/20/2017 to 6/15/2020; 10/15/2020 to 7/13/2021),  Discontinued DMARDs because of inefficacy: None  Discontinued DMARDs because of side effects: None  Unaffordable DMARDs: Enbrel    HISTORY OF PRESENT ILLNESS    Mr. Ferny Fernandez returns for a follow-up. Pt feels good since starting the higher (1mL) SubQ methotrexate dose. He does not have any side effects the day after taking methotrexate. He says that if anything he feels better after taking it. He also still takes 300 mg of allopurinal per day. He is no longer prednisone. Pt has not had a Prolia infusion in a long time because his calcium was low. He has only received one Prolia infusion. Pt still takes Simponi. He recalls that before he started taking it he could not hold a cup in his hand or button his pants. He does feel like his joints get worse when he is nearing his next infusion. Pt reports that his joints are stilff when he wakes up in the morning, but once he takes a shower he is fine to start his day.      Pt reports that he sometimes experiences dry eye that gives him a burning sensation. He uses over the counter artificial tears 3-4 times a week and this provides him with relief. Pt is sore in his ankles, but he plays golf 3 times a week. Pt reports that he drinks 3-4 beers on some days and some days he drinks none. Pt reports that he gets some dairy    Pt denies any recent falls. REVIEW OF SYSTEMS    A comprehensive review of systems was performed and pertinent results are documented in the HPI, review of systems is otherwise non-contributory. PAST MEDICAL HISTORY    He has a past medical history of Allergic rhinitis, Arthritis, Basal cell carcinoma (BCC) in situ of skin, Cancer ([facility] GERD (gastroesophageal reflux disease), Gout, Hypercholesterolemia, Hypertension, Melanoma in situ of back ([facility] Skin cancer (melanoma) ([facility] and Sleep apnea. FAMILY HISTORY    His family history includes Cancer in his paternal grandfather and another family member; Dementia in his mother; Diabetes in his paternal grandmother; Heart Disease in his father; Kidney Disease in his father. SOCIAL HISTORY    He reports that he quit smoking about 42 years ago. He has never used smokeless tobacco. He reports current alcohol use of about 14.0 standard drinks of alcohol per week. He reports that he does not use drugs. MEDICATIONS    Current Outpatient Medications   Medication Sig    methotrexate 25 mg/mL chemo injection 1 mL by SubCUTAneous route every Wednesday.  predniSONE (DELTASONE) 10 mg tablet Take 40mg by mouth daily for 3 days, then 30mg daily for 3 days, then 20mg daily for 3 days, then 10mg daily for 3 days, then stop. (Patient not taking: Reported on 3/1/2022)    pantoprazole (PROTONIX) 40 mg tablet TAKE ONE TABLET BY MOUTH TWICE A DAY    colestipoL (COLESTID) 1 gram tablet TAKE TWO TABLETS BY MOUTH TWICE A DAY    Insulin Syringe-Needle U-100 1 mL 31 gauge x 5/16 syrg Use to inject methotrexate once weekly.     ergocalciferol (ERGOCALCIFEROL) 1,250 mcg (50,000 unit) capsule TAKE ONE CAPSULE BY MOUTH EVERY WEEK    folic acid (FOLVITE) 1 mg tablet TAKE ONE TABLET BY MOUTH DAILY    allopurinoL (ZYLOPRIM) 300 mg tablet TAKE ONE TABLET BY MOUTH ONE TIME DAILY    Insulin Syringe-Needle U-100 1 mL 30 gauge x 5/16 syrg 1 Each by Does Not Apply route Every Saturday. To be used for methotrexate solution    fexofenadine (ALLEGRA) 180 mg tablet Take  by mouth daily as needed.  guaiFENesin ER (MUCINEX) 600 mg ER tablet Take 600 mg by mouth as needed.  clobetasol (TEMOVATE) 0.05 % ointment Apply  to affected area two (2) times a day.  0.9% sodium chloride solp 100 mL with golimumab 12.5 mg/mL soln 2 mg/kg 2 mg/kg by IntraVENous route once. Every eight weeks  Indications: rhumetoid arthritis    lisinopril (PRINIVIL, ZESTRIL) 40 mg tablet Take 40 mg by mouth daily.  aspirin delayed-release 81 mg tablet Take  by mouth daily.  albuterol (VENTOLIN HFA) 90 mcg/actuation inhaler Take  by inhalation as needed.  omega-3 fatty acids-vitamin e (FISH OIL) 1,000 mg Cap Take 1 Cap by mouth.  citalopram (CELEXA) 20 mg tablet Take 1 Tab by mouth daily.  atorvastatin (LIPITOR) 40 mg tablet Take 1 Tab by mouth daily.  amlodipine (NORVASC) 2.5 mg tablet Take 1 Tab by mouth daily. No current facility-administered medications for this visit. ALLERGIES    Allergies   Allergen Reactions    Stings [Sting, Bee] Swelling     Swelling at site       PHYSICAL EXAMINATION    Visit Vitals  /67 (BP 1 Location: Left upper arm, BP Patient Position: Sitting)   Pulse (!) 47   Temp 97.9 °F (36.6 °C) (Oral)   Resp 18   Ht 5' 8\" (1.727 m)   Wt 234 lb (106.1 kg)   SpO2 94%   BMI 35.58 kg/m²     Body mass index is 35.58 kg/m². General:  The patient is well developed, well nourished, alert, and in no apparent distress. Eyes: Sclera are anicteric. Interval resolution of bilateral conjunctival injection. HEENT:  Oropharynx is clear. No oral ulcers. Adequate salivary pooling.  No cervical or supraclavicular lymphadenopathy. Lungs:  Clear to auscultation bilaterally, without wheeze or stridor. Normal respiratory effort. Cor:  Regular rate and rhythm. No murmur rub or gallop. Abdomen: Soft, non-tender, without hepatomegaly or masses. Extremities: No calf tenderness or edema. Warm and well perfused. Skin:  No significant abnormalities. No tophi. Neuro: Nonfocal, no foot or wrist drop. Musculoskeletal:    A comprehensive musculoskeletal exam was performed for all joints of each upper and lower extremity and assessed for swelling, tenderness and range of motion. Results are documented as below:  Bilateral shoulder crepitus, left shoulder stably limited to 60deg abduction, flexion intact  Ulnar deviation of MCPs, no tenderness or synovitis. Bilateral hallux valgus without tophi  No evidence of synovitis in the small joints of the hands, wrists, shoulders, elbows, hips, knees or ankles. DATA REVIEW    Laboratory   5/10/22: CRP 0.6 mg/L, ESR 6, Cr 0.81, LFT WNL, WBC 7.2, HGB 13.3, Plt 189  3/10/22: MARGARET speckled pattern 1:160, Anti-nucleat Ab by IFA (RDL) positive, quantiFERON plus negative, Hepatitis B core IgM nonreactive, Hep B cpre Ab total negative, Hep B surface Ab nonreactive, Hep B surface Ag negative, CRP 6.1 mg/L, ESR 13, Cr 1.08, LFT WNL, EBC 4.4, HGB 14.1, Plt 174    Recent laboratory results were reviewed, summarized, and discussed with the patient. Imaging    Musculoskeletal Ultrasound    None    Radiographs    Bilateral Hand 11/20/2017: RIGHT: No fracture or dislocation on plain film. The bones are osteopenic. Mild narrowing of the radiocarpal joint. Probable erosion of the radius and lunate at the radiocarpal joint. No widening of the intercarpal spaces. Subtle erosion of the ulnar styloid. Mild narrowing of the triscaphe joint is age-appropriate. Irregular arthritis of the first and second CMC joints includes osteophytes and erosions.  There are erosions of the first through fourth metacarpal heads at the MCP points. Moderate narrowing and anterior subluxation of the second MCP joint. IP joints are within normal limits. No chondrocalcinosis or periosteal reaction. LEFT: No fracture or dislocation on plain film. The bones are osteopenic. Moderate narrowing of the radiocarpal joint with subtle erosions of the lunate. Marrow DR UVJ with erosions. No definite ulnar styloid erosion. [facility] and first [facility] joint osteoarthritis are age-appropriate. Large erosions on both sides of the second MCP joint and in the third metacarpal head. IP joints are within normal limits. No chondrocalcinosis or periosteal reaction. Bilateral Foot 11/20/2017: RIGHT: No fracture or dislocation on plain film. Bones are osteopenic. Talonavicular joint space narrowing, osteophytes, and erosions. Subtalar arthritis is partially imaged. TMT joints are within normal limits. Severe narrowing of the first MTP joint with osteophytes and erosions. There is erosion and the fifth tarsal head. Mild narrowing of the second MTP joint without definable erosion. No periosteal reaction. LEFT: No fracture or dislocation on plain film. Bones are osteopenic. Intertarsal joints are within normal limits. Moderate narrowing of the first MTP joint with central and marginal erosions. 35 degrees hallux valgus angulation and fibular subluxation of the first proximal phalanx. There are also erosions in the laterally subluxed hallux sesamoids. Mild joint space narrowing and erosions of the third MTP joint. Fragmentation of the fourth middle phalanx is adjacent to the fourth PIP joint erosions. There are also erosions of the second and fifth PIP joints. First IP joint osteoarthritis is mild. CT Imaging    CT Right Upper Extremity with contrast 10/19/2017: examination of the soft tissues demonstrates no drainable fluid collection. There is no pathologically enlarged nodes within the right axilla.  Alignment is normal. There is no bone destruction or fracture. No significant fatty atrophy. MR Imaging    None    DXA     9/21/21 DEXA BONE DENSITY STUDY: Femoral Neck Left:  Bone mineral density (gm/cm2):  0.745  % of peak bone mass:  70  % for age matched controls:  66  T-score:  -2.5  Z-score:  -1.6     Total Hip Left:  Bone mineral density (gm/cm2):  0.897  % of peak bone mass:  81  % for age matched controls:  80  T-score:  -1.4  Z-score:  -1     Lumbar Spine:  L1 plus L2  Bone mineral density (gm/cm2):  1.080  % of peak bone mass:  89  % for age matched controls:  80  T-score:  -1.1  Z-score:  -1.2     33% Radius Left:  Bone mineral density (gm/cm2):  0.582  % of peak bone mass:  72  % for age matched controls:  80  T-score:  -2.8  Z-score:  -1.8       ASSESSMENT AND PLAN    This is a follow-up visit for Mr. Alondra Diop for seropositive erosive rheumatoid arthritis, nontophaceous gout, and bilateral iritis, now with low disease actiity with full-dose subQ methotrexate and Simponi infusions. 1. Seropositive rheumatoid arthritis of multiple sites (HCC)  - Cont Simponi infusions  - Cont methotrexate 25mg subQ weekly  - C REACTIVE PROTEIN, QT; Future  - CBC WITH AUTOMATED DIFF; Future  - METABOLIC PANEL, COMPREHENSIVE; Future  - SED RATE (ESR); Future  - URIC ACID; Future  - VITAMIN D, 25 HYDROXY; Future  - methotrexate 25 mg/mL chemo injection; 1 mL by SubCUTAneous route every Wednesday. Dispense: 12 mL; Refill: 0  - allopurinoL (ZYLOPRIM) 300 mg tablet; TAKE ONE TABLET BY MOUTH ONE TIME DAILY  Dispense: 90 Tablet; Refill: 1    2. Long-term use of immunosuppressant medication  - CBC WITH AUTOMATED DIFF; Future  - METABOLIC PANEL, COMPREHENSIVE; Future    3. Chronic bilateral iritis  - Cont methotrexate and Simponi as above  - CBC WITH AUTOMATED DIFF; Future  - METABOLIC PANEL, COMPREHENSIVE; Future    4. Vitamin D deficiency  - VITAMIN D, 25 HYDROXY; Future    5. Severe obesity (BMI 35.0-39. 9) with comorbidity ([facility]  -Reviewed the fourfold reduction in knee forces for every unit of weight lost (Arthritis Rheum. 2005 Jul;52(7):2026-32)     6. Chronic idiopathic gout involving toe of left foot without tophus  - URIC ACID; Future  - allopurinoL (ZYLOPRIM) 300 mg tablet; TAKE ONE TABLET BY MOUTH ONE TIME DAILY  Dispense: 90 Tablet; Refill: 1    Patient Instructions   1) Continue on your simponi infusions. 2) Keep getting your Prolia infusions one time every 6 months. Repeat this In one month after updated labs. 3) Stay on 1 mL of subQ methotrexate one time per week along with your daily folic acid pill. 4) Stay on 300 mg of allopurinol per day. 5) Work on decreasing your alcohol intake. 6) Continue weekly ergocalciferol. If your levels are high we may space this out to one time every other week. 7) Check labs in 1 month before your prolia injection. The number for the [facility] infusion center is [phone]. 8) Follow up in 3 months. Let me know if you have any gout flares or concerns in the meantime.        TODAY'S ORDERS    Orders Placed This Encounter    C REACTIVE PROTEIN, QT    CBC WITH AUTOMATED DIFF    METABOLIC PANEL, COMPREHENSIVE    SED RATE (ESR)    URIC ACID    VITAMIN D, 25 HYDROXY    methotrexate 25 mg/mL chemo injection    allopurinoL (ZYLOPRIM) 300 mg tablet       Future Appointments   Date Time Provider Raiza Sanchez   6/7/2022  9:30 AM Ab Coyle MD [facility] BS AMB   7/5/2022 10:00 AM SS INF1 CH4 <2H [facility]   8/30/2022 10:00 AM SS INF1 CH4 <2H [facility]   10/25/2022 10:00 AM SS INF1 CH4 <2H [facility]   12/20/2022  9:00 AM Kristina Eastman MD [facility] BS AMB     Face to face time: 25 minutes  Note preparation and records review day of service: 20 minutes  Total provider time day of service: 45 minutes    This was scribed by Carlos Carter in the presence of Dr. Lnydsay Melchor MD    Adult Rheumatology   [address] [place] [address] [address]   Phone [phone]  Fax [phone]

## 2022-06-25 ENCOUNTER — PATIENT MESSAGE (OUTPATIENT)
Dept: INTERNAL MEDICINE CLINIC | Age: 77
End: 2022-06-25

## 2022-06-26 ENCOUNTER — TELEPHONE (OUTPATIENT)
Dept: INTERNAL MEDICINE CLINIC | Age: 77
End: 2022-06-26

## 2022-06-26 RX ORDER — NIRMATRELVIR AND RITONAVIR 300-100 MG
KIT ORAL
Qty: 1 BOX | Refills: 0 | Status: SHIPPED | OUTPATIENT
Start: 2022-06-26 | End: 2022-09-11

## 2022-07-02 DIAGNOSIS — K21.9 GASTROESOPHAGEAL REFLUX DISEASE WITHOUT ESOPHAGITIS: ICD-10-CM

## 2022-07-03 RX ORDER — PANTOPRAZOLE SODIUM 40 MG/1
TABLET, DELAYED RELEASE ORAL
Qty: 180 TABLET | Refills: 1 | Status: SHIPPED | OUTPATIENT
Start: 2022-07-03 | End: 2022-09-26

## 2022-07-05 ENCOUNTER — HOSPITAL ENCOUNTER (OUTPATIENT)
Dept: INFUSION THERAPY | Age: 77
Discharge: HOME | End: 2022-07-05
Attending: INTERNAL MEDICINE
Payer: MEDICARE

## 2022-07-05 VITALS
RESPIRATION RATE: 16 BRPM | BODY MASS INDEX: 34.4 KG/M2 | HEART RATE: 48 BPM | HEIGHT: 68 IN | DIASTOLIC BLOOD PRESSURE: 63 MMHG | SYSTOLIC BLOOD PRESSURE: 145 MMHG | WEIGHT: 227 LBS | TEMPERATURE: 97.6 F

## 2022-07-05 DIAGNOSIS — M05.79 SEROPOSITIVE RHEUMATOID ARTHRITIS OF MULTIPLE SITES (HCC): Primary | ICD-10-CM

## 2022-07-05 DIAGNOSIS — M81.8 AGE-RELATED OSTEOPOROSIS WITHOUT FRACTURE: ICD-10-CM

## 2022-07-05 LAB
ALBUMIN SERPL-MCNC: 3.3 G/DL (ref 3.5–5)
ALBUMIN/GLOB SERPL: 1 {RATIO} (ref 1.1–2.2)
ALP SERPL-CCNC: 83 U/L (ref 45–117)
ALT SERPL-CCNC: 71 U/L (ref 12–78)
ANION GAP SERPL CALC-SCNC: 8 MMOL/L (ref 5–15)
AST SERPL-CCNC: 17 U/L (ref 15–37)
BASOPHILS # BLD: 0 K/UL (ref 0–0.1)
BASOPHILS NFR BLD: 0 % (ref 0–1)
BILIRUB SERPL-MCNC: 0.7 MG/DL (ref 0.2–1)
BUN SERPL-MCNC: 14 MG/DL (ref 6–20)
BUN/CREAT SERPL: 19 (ref 12–20)
CALCIUM SERPL-MCNC: 9 MG/DL (ref 8.5–10.1)
CHLORIDE SERPL-SCNC: 111 MMOL/L (ref 97–108)
CO2 SERPL-SCNC: 21 MMOL/L (ref 21–32)
CREAT SERPL-MCNC: 0.74 MG/DL (ref 0.7–1.3)
CRP SERPL-MCNC: 0.3 MG/DL (ref 0–0.6)
DIFFERENTIAL METHOD BLD: ABNORMAL
EOSINOPHIL # BLD: 0.1 K/UL (ref 0–0.4)
EOSINOPHIL NFR BLD: 1 % (ref 0–7)
ERYTHROCYTE [DISTWIDTH] IN BLOOD BY AUTOMATED COUNT: 13 % (ref 11.5–14.5)
ERYTHROCYTE [SEDIMENTATION RATE] IN BLOOD: 39 MM/HR (ref 0–20)
GLOBULIN SER CALC-MCNC: 3.3 G/DL (ref 2–4)
GLUCOSE SERPL-MCNC: 97 MG/DL (ref 65–100)
HCT VFR BLD AUTO: 37.3 % (ref 36.6–50.3)
HGB BLD-MCNC: 12.6 G/DL (ref 12.1–17)
IMM GRANULOCYTES # BLD AUTO: 0 K/UL
IMM GRANULOCYTES NFR BLD AUTO: 0 %
LYMPHOCYTES # BLD: 1 K/UL (ref 0.8–3.5)
LYMPHOCYTES NFR BLD: 17 % (ref 12–49)
MCH RBC QN AUTO: 34.5 PG (ref 26–34)
MCHC RBC AUTO-ENTMCNC: 33.8 G/DL (ref 30–36.5)
MCV RBC AUTO: 102.2 FL (ref 80–99)
MONOCYTES # BLD: 0.4 K/UL (ref 0–1)
MONOCYTES NFR BLD: 6 % (ref 5–13)
MYELOCYTES NFR BLD MANUAL: 3 %
NEUTS BAND NFR BLD MANUAL: 8 % (ref 0–6)
NEUTS SEG # BLD: 4.5 K/UL (ref 1.8–8)
NEUTS SEG NFR BLD: 65 % (ref 32–75)
NRBC # BLD: 0 K/UL (ref 0–0.01)
NRBC BLD-RTO: 0 PER 100 WBC
PLATELET # BLD AUTO: 142 K/UL (ref 150–400)
PMV BLD AUTO: 10.8 FL (ref 8.9–12.9)
POTASSIUM SERPL-SCNC: 4 MMOL/L (ref 3.5–5.1)
PROT SERPL-MCNC: 6.6 G/DL (ref 6.4–8.2)
RBC # BLD AUTO: 3.65 M/UL (ref 4.1–5.7)
RBC MORPH BLD: ABNORMAL
SODIUM SERPL-SCNC: 140 MMOL/L (ref 136–145)
URATE SERPL-MCNC: 4 MG/DL (ref 3.5–7.2)
WBC # BLD AUTO: 6.1 K/UL (ref 4.1–11.1)
WBC MORPH BLD: ABNORMAL

## 2022-07-05 PROCEDURE — 96413 CHEMO IV INFUSION 1 HR: CPT

## 2022-07-05 PROCEDURE — 85652 RBC SED RATE AUTOMATED: CPT

## 2022-07-05 PROCEDURE — 74011250636 HC RX REV CODE- 250/636: Performed by: INTERNAL MEDICINE

## 2022-07-05 PROCEDURE — 36415 COLL VENOUS BLD VENIPUNCTURE: CPT

## 2022-07-05 PROCEDURE — 74011000258 HC RX REV CODE- 258: Performed by: INTERNAL MEDICINE

## 2022-07-05 PROCEDURE — 96372 THER/PROPH/DIAG INJ SC/IM: CPT

## 2022-07-05 PROCEDURE — 80053 COMPREHEN METABOLIC PANEL: CPT

## 2022-07-05 PROCEDURE — 85025 COMPLETE CBC W/AUTO DIFF WBC: CPT

## 2022-07-05 PROCEDURE — 84550 ASSAY OF BLOOD/URIC ACID: CPT

## 2022-07-05 PROCEDURE — 86140 C-REACTIVE PROTEIN: CPT

## 2022-07-05 RX ORDER — EPINEPHRINE 1 MG/ML
0.3 INJECTION, SOLUTION, CONCENTRATE INTRAVENOUS AS NEEDED
Status: CANCELLED | OUTPATIENT
Start: 2022-08-30

## 2022-07-05 RX ORDER — ALBUTEROL SULFATE 0.83 MG/ML
2.5 SOLUTION RESPIRATORY (INHALATION) AS NEEDED
Start: 2022-09-06

## 2022-07-05 RX ORDER — DIPHENHYDRAMINE HYDROCHLORIDE 50 MG/ML
50 INJECTION, SOLUTION INTRAMUSCULAR; INTRAVENOUS AS NEEDED
Status: CANCELLED
Start: 2022-08-30

## 2022-07-05 RX ORDER — HYDROCORTISONE SODIUM SUCCINATE 100 MG/2ML
100 INJECTION, POWDER, FOR SOLUTION INTRAMUSCULAR; INTRAVENOUS AS NEEDED
Status: CANCELLED | OUTPATIENT
Start: 2022-08-30

## 2022-07-05 RX ORDER — SODIUM CHLORIDE 9 MG/ML
25 INJECTION, SOLUTION INTRAVENOUS CONTINUOUS
Status: DISPENSED | OUTPATIENT
Start: 2022-07-05 | End: 2022-07-05

## 2022-07-05 RX ORDER — SODIUM CHLORIDE 9 MG/ML
10 INJECTION INTRAMUSCULAR; INTRAVENOUS; SUBCUTANEOUS AS NEEDED
Status: CANCELLED | OUTPATIENT
Start: 2022-08-30

## 2022-07-05 RX ORDER — SODIUM CHLORIDE 0.9 % (FLUSH) 0.9 %
10 SYRINGE (ML) INJECTION AS NEEDED
Status: CANCELLED | OUTPATIENT
Start: 2022-08-30

## 2022-07-05 RX ORDER — ALBUTEROL SULFATE 0.83 MG/ML
2.5 SOLUTION RESPIRATORY (INHALATION) AS NEEDED
Status: CANCELLED
Start: 2022-08-30

## 2022-07-05 RX ORDER — ACETAMINOPHEN 325 MG/1
650 TABLET ORAL AS NEEDED
Status: CANCELLED
Start: 2022-08-30

## 2022-07-05 RX ORDER — EPINEPHRINE 1 MG/ML
0.3 INJECTION, SOLUTION, CONCENTRATE INTRAVENOUS AS NEEDED
OUTPATIENT
Start: 2022-09-06

## 2022-07-05 RX ORDER — DIPHENHYDRAMINE HYDROCHLORIDE 50 MG/ML
25 INJECTION, SOLUTION INTRAMUSCULAR; INTRAVENOUS AS NEEDED
Start: 2022-09-06

## 2022-07-05 RX ORDER — ONDANSETRON 2 MG/ML
8 INJECTION INTRAMUSCULAR; INTRAVENOUS AS NEEDED
Status: CANCELLED | OUTPATIENT
Start: 2022-08-30

## 2022-07-05 RX ORDER — HEPARIN 100 UNIT/ML
300-500 SYRINGE INTRAVENOUS AS NEEDED
Status: CANCELLED
Start: 2022-08-30

## 2022-07-05 RX ORDER — ACETAMINOPHEN 325 MG/1
650 TABLET ORAL AS NEEDED
Start: 2022-09-06

## 2022-07-05 RX ORDER — DIPHENHYDRAMINE HYDROCHLORIDE 50 MG/ML
25 INJECTION, SOLUTION INTRAMUSCULAR; INTRAVENOUS AS NEEDED
Status: CANCELLED
Start: 2022-08-30

## 2022-07-05 RX ORDER — SODIUM CHLORIDE 9 MG/ML
25 INJECTION, SOLUTION INTRAVENOUS CONTINUOUS
Status: CANCELLED | OUTPATIENT
Start: 2022-08-30

## 2022-07-05 RX ORDER — DIPHENHYDRAMINE HYDROCHLORIDE 50 MG/ML
50 INJECTION, SOLUTION INTRAMUSCULAR; INTRAVENOUS AS NEEDED
Start: 2022-09-06

## 2022-07-05 RX ORDER — ONDANSETRON 2 MG/ML
8 INJECTION INTRAMUSCULAR; INTRAVENOUS AS NEEDED
OUTPATIENT
Start: 2022-09-06

## 2022-07-05 RX ORDER — HYDROCORTISONE SODIUM SUCCINATE 100 MG/2ML
100 INJECTION, POWDER, FOR SOLUTION INTRAMUSCULAR; INTRAVENOUS AS NEEDED
OUTPATIENT
Start: 2022-09-06

## 2022-07-05 RX ADMIN — SODIUM CHLORIDE 25 ML/HR: 9 INJECTION, SOLUTION INTRAVENOUS at 11:01

## 2022-07-05 RX ADMIN — DENOSUMAB 60 MG: 60 INJECTION SUBCUTANEOUS at 11:44

## 2022-07-05 RX ADMIN — GOLIMUMAB 212.5 MG: 50 SOLUTION INTRAVENOUS at 11:07

## 2022-07-05 NOTE — PROGRESS NOTES
Outpatient [facility]fusion Center Progress Note        Date: 2022    Name: Xu Daniels    MRN: [id number]         : 1945    1000  Mr. Perry Villatoro Arrived ambulatory and in no distress for Q6 weeks Simponi with biannual Prolia Regimen. Assessment was completed, no acute issues at this time, no new complaints voiced. 22 G PIV in the right [facility] with positive blood return, labs drawn and sent for processing. 1. Do you have any symptoms of COVID-19? SOB, coughing, fever, or generally not feeling well NO    2. Have you been exposed to COVID-19 recently? NO    3. Have you had any recent contact with family/friend that has a pending COVID test? NO           Mr. Sarabia's vitals were reviewed. Patient Vitals for the past 12 hrs:   Temp Pulse Resp BP   22 1138 -- (!) 48 -- (!) 145/63   22 1001 97.6 °F (36.4 °C) (!) 53 16 (!) 122/58         Lab results were obtained and reviewed. Recent Results (from the past 12 hour(s))   CBC WITH AUTOMATED DIFF    Collection Time: 22 10:06 AM   Result Value Ref Range    WBC 6.1 4.1 - 11.1 K/uL    RBC 3.65 (L) 4.10 - 5.70 M/uL    HGB 12.6 12.1 - 17.0 g/dL    HCT 37.3 36.6 - 50.3 %    .2 (H) 80.0 - 99.0 FL    MCH 34.5 (H) 26.0 - 34.0 PG    MCHC 33.8 30.0 - 36.5 g/dL    RDW 13.0 11.5 - 14.5 %    PLATELET 739 (L) 399 - 400 K/uL    MPV 10.8 8.9 - 12.9 FL    NRBC 0.0 0  WBC    ABSOLUTE NRBC 0.00 0.00 - 0.01 K/uL    NEUTROPHILS 65 32 - 75 %    BAND NEUTROPHILS 8 (H) 0 - 6 %    LYMPHOCYTES 17 12 - 49 %    MONOCYTES 6 5 - 13 %    EOSINOPHILS 1 0 - 7 %    BASOPHILS 0 0 - 1 %    MYELOCYTES 3 (H) 0 %    IMMATURE GRANULOCYTES 0 %    ABS. NEUTROPHILS 4.5 1.8 - 8.0 K/UL    ABS. LYMPHOCYTES 1.0 0.8 - 3.5 K/UL    ABS. MONOCYTES 0.4 0.0 - 1.0 K/UL    ABS. EOSINOPHILS 0.1 0.0 - 0.4 K/UL    ABS. BASOPHILS 0.0 0.0 - 0.1 K/UL    ABS. IMM.  GRANS. 0.0 K/UL    DF MANUAL      RBC COMMENTS MACROCYTOSIS  1+        WBC COMMENTS ATYPICAL LYMPHOCYTES PRESENT     METABOLIC PANEL, COMPREHENSIVE    Collection Time: 07/05/22 10:06 AM   Result Value Ref Range    Sodium 140 136 - 145 mmol/L    Potassium 4.0 3.5 - 5.1 mmol/L    Chloride 111 (H) 97 - 108 mmol/L    CO2 21 21 - 32 mmol/L    Anion gap 8 5 - 15 mmol/L    Glucose 97 65 - 100 mg/dL    BUN 14 6 - 20 MG/DL    Creatinine 0.74 0.70 - 1.30 MG/DL    BUN/Creatinine ratio 19 12 - 20      GFR est AA >60 >60 ml/min/1.73m2    GFR est non-AA >60 >60 ml/min/1.73m2    Calcium 9.0 8.5 - 10.1 MG/DL    Bilirubin, total 0.7 0.2 - 1.0 MG/DL    ALT (SGPT) 71 12 - 78 U/L    AST (SGOT) 17 15 - 37 U/L    Alk. phosphatase 83 45 - 117 U/L    Protein, total 6.6 6.4 - 8.2 g/dL    Albumin 3.3 (L) 3.5 - 5.0 g/dL    Globulin 3.3 2.0 - 4.0 g/dL    A-G Ratio 1.0 (L) 1.1 - 2.2     SED RATE (ESR)    Collection Time: 07/05/22 10:06 AM   Result Value Ref Range    Sed rate, automated 39 (H) 0 - 20 mm/hr   C REACTIVE PROTEIN, QT    Collection Time: 07/05/22 10:06 AM   Result Value Ref Range    C-Reactive protein 0.30 0.00 - 0.60 mg/dL   URIC ACID    Collection Time: 07/05/22 10:06 AM   Result Value Ref Range    Uric acid 4.0 3.5 - 7.2 MG/DL       Medications received:  Medications Administered     0.9% sodium chloride infusion     Admin Date  07/05/2022 Action  New Bag Dose  25 mL/hr Rate  25 mL/hr Route  IntraVENous Administered By  Bernarda Carroll RN          denosumab (PROLIA) injection 60 mg     Admin Date  07/05/2022 Action  Given Dose  60 mg Route  SubCUTAneous Administered By  Bernarda Carroll RN          golimumab (SIMPONI ARIA) 212.5 mg in 0.9% sodium chloride, overfill volume 127 mL infusion     Admin Date  07/05/2022 Action  New Bag Dose  212.5 mg Rate  254 mL/hr Route  IntraVENous Administered By  Bernarda Carroll RN                 Mr. Cathey Lefort tolerated treatment well and was discharged from [place] [address] in stable condition at 1150. He is to return on  August 30, 2022 at 1000 for his next appointment.     Tato Bajwa RN  July 5, 2022    Future Appointments:  Future Appointments   Date Time Provider Raiza Sanchez   8/30/2022 10:00 AM SS INF1 CH4 <2H [facility]   9/8/2022  8:30 AM Pat Sanchez MD [facility] BS AMB   10/25/2022 10:00 AM SS INF1 CH4 <2H [facility]   12/20/2022  9:00 AM Ivory Eastman MD Counts include [facility] beds at the [facility] BS AMB   12/20/2022 10:00 AM SS INF1 CH4 <2H [facility] Lisa Celestin

## 2022-07-07 NOTE — TELEPHONE ENCOUNTER
[facility], coughing kept patient and wife up all night. Will update us with results. Offered appointment but wanted quick answers.

## 2022-08-04 DIAGNOSIS — J32.0 MAXILLARY SINUSITIS, UNSPECIFIED CHRONICITY: ICD-10-CM

## 2022-08-04 RX ORDER — AMOXICILLIN AND CLAVULANATE POTASSIUM 875; 125 MG/1; MG/1
1 TABLET, FILM COATED ORAL EVERY 12 HOURS
Qty: 20 TABLET | Refills: 0 | Status: SHIPPED | OUTPATIENT
Start: 2022-08-04 | End: 2022-08-14

## 2022-08-29 DIAGNOSIS — M05.79 SEROPOSITIVE RHEUMATOID ARTHRITIS OF MULTIPLE SITES (HCC): ICD-10-CM

## 2022-08-30 ENCOUNTER — HOSPITAL ENCOUNTER (OUTPATIENT)
Dept: INFUSION THERAPY | Age: 77
Discharge: HOME | End: 2022-08-30
Attending: INTERNAL MEDICINE
Payer: MEDICARE

## 2022-08-30 VITALS
BODY MASS INDEX: 34.65 KG/M2 | SYSTOLIC BLOOD PRESSURE: 136 MMHG | WEIGHT: 228.6 LBS | TEMPERATURE: 98.2 F | OXYGEN SATURATION: 94 % | RESPIRATION RATE: 16 BRPM | HEIGHT: 68 IN | HEART RATE: 48 BPM | DIASTOLIC BLOOD PRESSURE: 60 MMHG

## 2022-08-30 DIAGNOSIS — M05.79 SEROPOSITIVE RHEUMATOID ARTHRITIS OF MULTIPLE SITES (HCC): Primary | ICD-10-CM

## 2022-08-30 LAB
ALBUMIN SERPL-MCNC: 3.5 G/DL (ref 3.5–5)
ALBUMIN/GLOB SERPL: 1.1 {RATIO} (ref 1.1–2.2)
ALP SERPL-CCNC: 71 U/L (ref 45–117)
ALT SERPL-CCNC: 42 U/L (ref 12–78)
ANION GAP SERPL CALC-SCNC: 6 MMOL/L (ref 5–15)
AST SERPL-CCNC: 13 U/L (ref 15–37)
BASOPHILS # BLD: 0 K/UL (ref 0–0.1)
BASOPHILS NFR BLD: 1 % (ref 0–1)
BILIRUB SERPL-MCNC: 0.8 MG/DL (ref 0.2–1)
BUN SERPL-MCNC: 14 MG/DL (ref 6–20)
BUN/CREAT SERPL: 20 (ref 12–20)
CALCIUM SERPL-MCNC: 8.4 MG/DL (ref 8.5–10.1)
CHLORIDE SERPL-SCNC: 109 MMOL/L (ref 97–108)
CO2 SERPL-SCNC: 22 MMOL/L (ref 21–32)
CREAT SERPL-MCNC: 0.71 MG/DL (ref 0.7–1.3)
CRP SERPL-MCNC: <0.29 MG/DL (ref 0–0.6)
DIFFERENTIAL METHOD BLD: ABNORMAL
EOSINOPHIL # BLD: 0.3 K/UL (ref 0–0.4)
EOSINOPHIL NFR BLD: 4 % (ref 0–7)
ERYTHROCYTE [DISTWIDTH] IN BLOOD BY AUTOMATED COUNT: 14.3 % (ref 11.5–14.5)
ERYTHROCYTE [SEDIMENTATION RATE] IN BLOOD: 13 MM/HR (ref 0–20)
GLOBULIN SER CALC-MCNC: 3.2 G/DL (ref 2–4)
GLUCOSE SERPL-MCNC: 102 MG/DL (ref 65–100)
HCT VFR BLD AUTO: 37.7 % (ref 36.6–50.3)
HGB BLD-MCNC: 12.7 G/DL (ref 12.1–17)
IMM GRANULOCYTES # BLD AUTO: 0.1 K/UL (ref 0–0.04)
IMM GRANULOCYTES NFR BLD AUTO: 1 % (ref 0–0.5)
LYMPHOCYTES # BLD: 1.1 K/UL (ref 0.8–3.5)
LYMPHOCYTES NFR BLD: 18 % (ref 12–49)
MCH RBC QN AUTO: 34.4 PG (ref 26–34)
MCHC RBC AUTO-ENTMCNC: 33.7 G/DL (ref 30–36.5)
MCV RBC AUTO: 102.2 FL (ref 80–99)
MONOCYTES # BLD: 0.9 K/UL (ref 0–1)
MONOCYTES NFR BLD: 13 % (ref 5–13)
NEUTS SEG # BLD: 4.1 K/UL (ref 1.8–8)
NEUTS SEG NFR BLD: 63 % (ref 32–75)
NRBC # BLD: 0 K/UL (ref 0–0.01)
NRBC BLD-RTO: 0 PER 100 WBC
PLATELET # BLD AUTO: 180 K/UL (ref 150–400)
PMV BLD AUTO: 10.8 FL (ref 8.9–12.9)
POTASSIUM SERPL-SCNC: 3.9 MMOL/L (ref 3.5–5.1)
PROT SERPL-MCNC: 6.7 G/DL (ref 6.4–8.2)
RBC # BLD AUTO: 3.69 M/UL (ref 4.1–5.7)
SODIUM SERPL-SCNC: 137 MMOL/L (ref 136–145)
WBC # BLD AUTO: 6.4 K/UL (ref 4.1–11.1)

## 2022-08-30 PROCEDURE — 85025 COMPLETE CBC W/AUTO DIFF WBC: CPT

## 2022-08-30 PROCEDURE — 96413 CHEMO IV INFUSION 1 HR: CPT

## 2022-08-30 PROCEDURE — 80053 COMPREHEN METABOLIC PANEL: CPT

## 2022-08-30 PROCEDURE — 74011000258 HC RX REV CODE- 258: Performed by: INTERNAL MEDICINE

## 2022-08-30 PROCEDURE — 74011250636 HC RX REV CODE- 250/636: Performed by: INTERNAL MEDICINE

## 2022-08-30 PROCEDURE — 86140 C-REACTIVE PROTEIN: CPT

## 2022-08-30 PROCEDURE — 36415 COLL VENOUS BLD VENIPUNCTURE: CPT

## 2022-08-30 PROCEDURE — 85652 RBC SED RATE AUTOMATED: CPT

## 2022-08-30 RX ORDER — HEPARIN 100 UNIT/ML
300-500 SYRINGE INTRAVENOUS AS NEEDED
Status: ACTIVE | OUTPATIENT
Start: 2022-08-30 | End: 2022-08-30

## 2022-08-30 RX ORDER — SODIUM CHLORIDE 0.9 % (FLUSH) 0.9 %
10 SYRINGE (ML) INJECTION AS NEEDED
Status: CANCELLED | OUTPATIENT
Start: 2022-10-25

## 2022-08-30 RX ORDER — HEPARIN 100 UNIT/ML
300-500 SYRINGE INTRAVENOUS AS NEEDED
Status: CANCELLED
Start: 2022-10-25

## 2022-08-30 RX ORDER — ALBUTEROL SULFATE 0.83 MG/ML
2.5 SOLUTION RESPIRATORY (INHALATION) AS NEEDED
Status: CANCELLED
Start: 2022-10-25

## 2022-08-30 RX ORDER — METHOTREXATE 25 MG/ML
INJECTION INTRA-ARTERIAL; INTRAMUSCULAR; INTRATHECAL; INTRAVENOUS
Qty: 12 ML | Refills: 0 | Status: SHIPPED | OUTPATIENT
Start: 2022-08-30 | End: 2022-10-11

## 2022-08-30 RX ORDER — HYDROCORTISONE SODIUM SUCCINATE 100 MG/2ML
100 INJECTION, POWDER, FOR SOLUTION INTRAMUSCULAR; INTRAVENOUS AS NEEDED
Status: CANCELLED | OUTPATIENT
Start: 2022-10-25

## 2022-08-30 RX ORDER — ACETAMINOPHEN 325 MG/1
650 TABLET ORAL AS NEEDED
Status: CANCELLED
Start: 2022-10-25

## 2022-08-30 RX ORDER — SODIUM CHLORIDE 0.9 % (FLUSH) 0.9 %
10 SYRINGE (ML) INJECTION AS NEEDED
Status: DISPENSED | OUTPATIENT
Start: 2022-08-30 | End: 2022-08-30

## 2022-08-30 RX ORDER — ONDANSETRON 2 MG/ML
8 INJECTION INTRAMUSCULAR; INTRAVENOUS AS NEEDED
Status: CANCELLED | OUTPATIENT
Start: 2022-10-25

## 2022-08-30 RX ORDER — SODIUM CHLORIDE 9 MG/ML
25 INJECTION, SOLUTION INTRAVENOUS CONTINUOUS
Status: DISPENSED | OUTPATIENT
Start: 2022-08-30 | End: 2022-08-30

## 2022-08-30 RX ORDER — SODIUM CHLORIDE 9 MG/ML
25 INJECTION, SOLUTION INTRAVENOUS CONTINUOUS
Status: CANCELLED | OUTPATIENT
Start: 2022-10-25

## 2022-08-30 RX ORDER — SODIUM CHLORIDE 9 MG/ML
10 INJECTION INTRAMUSCULAR; INTRAVENOUS; SUBCUTANEOUS AS NEEDED
Status: ACTIVE | OUTPATIENT
Start: 2022-08-30 | End: 2022-08-30

## 2022-08-30 RX ORDER — DIPHENHYDRAMINE HYDROCHLORIDE 50 MG/ML
50 INJECTION, SOLUTION INTRAMUSCULAR; INTRAVENOUS AS NEEDED
Status: CANCELLED
Start: 2022-10-25

## 2022-08-30 RX ORDER — DIPHENHYDRAMINE HYDROCHLORIDE 50 MG/ML
25 INJECTION, SOLUTION INTRAMUSCULAR; INTRAVENOUS AS NEEDED
Status: CANCELLED
Start: 2022-10-25

## 2022-08-30 RX ORDER — SODIUM CHLORIDE 9 MG/ML
10 INJECTION INTRAMUSCULAR; INTRAVENOUS; SUBCUTANEOUS AS NEEDED
Status: CANCELLED | OUTPATIENT
Start: 2022-10-25

## 2022-08-30 RX ORDER — EPINEPHRINE 1 MG/ML
0.3 INJECTION, SOLUTION, CONCENTRATE INTRAVENOUS AS NEEDED
Status: CANCELLED | OUTPATIENT
Start: 2022-10-25

## 2022-08-30 RX ADMIN — GOLIMUMAB 212.5 MG: 50 SOLUTION INTRAVENOUS at 11:22

## 2022-08-30 RX ADMIN — SODIUM CHLORIDE 25 ML/HR: 9 INJECTION, SOLUTION INTRAVENOUS at 11:17

## 2022-08-30 NOTE — PROGRESS NOTES
Outpatient Infusion Center Progress Note        Date: 2022    Name: Rene Curling    MRN: [id number]         : 1945    1015  Mr. Amparo Thomas Arrived ambulatory and in no distress for Q 8 week Simponi Regimen. Assessment was completed by Eugenia Wakefield RN, no acute issues at this time, no new complaints voiced. 24G PIV planced in left hand with positive blood return noted, labs drawn and sent for processing. Do you have any symptoms of COVID-19? SOB, coughing, fever, or generally not feeling well NO    2. Have you been exposed to COVID-19 recently? NO    3. Have you had any recent contact with family/friend that has a pending COVID test? NO           Mr. Sarabia's vitals were reviewed. Patient Vitals for the past 12 hrs:   Temp Pulse Resp BP SpO2   22 1200 -- (!) 48 -- 136/60 --   22 1013 98.2 °F (36.8 °C) (!) 55 16 128/69 94 %         Lab results were obtained and reviewed. Recent Results (from the past 12 hour(s))   CBC WITH AUTOMATED DIFF    Collection Time: 22 10:20 AM   Result Value Ref Range    WBC 6.4 4.1 - 11.1 K/uL    RBC 3.69 (L) 4.10 - 5.70 M/uL    HGB 12.7 12.1 - 17.0 g/dL    HCT 37.7 36.6 - 50.3 %    .2 (H) 80.0 - 99.0 FL    MCH 34.4 (H) 26.0 - 34.0 PG    MCHC 33.7 30.0 - 36.5 g/dL    RDW 14.3 11.5 - 14.5 %    PLATELET 211 316 - 373 K/uL    MPV 10.8 8.9 - 12.9 FL    NRBC 0.0 0  WBC    ABSOLUTE NRBC 0.00 0.00 - 0.01 K/uL    NEUTROPHILS 63 32 - 75 %    LYMPHOCYTES 18 12 - 49 %    MONOCYTES 13 5 - 13 %    EOSINOPHILS 4 0 - 7 %    BASOPHILS 1 0 - 1 %    IMMATURE GRANULOCYTES 1 (H) 0.0 - 0.5 %    ABS. NEUTROPHILS 4.1 1.8 - 8.0 K/UL    ABS. LYMPHOCYTES 1.1 0.8 - 3.5 K/UL    ABS. MONOCYTES 0.9 0.0 - 1.0 K/UL    ABS. EOSINOPHILS 0.3 0.0 - 0.4 K/UL    ABS. BASOPHILS 0.0 0.0 - 0.1 K/UL    ABS. IMM.  GRANS. 0.1 (H) 0.00 - 0.04 K/UL    DF AUTOMATED     METABOLIC PANEL, COMPREHENSIVE    Collection Time: 22 10:20 AM   Result Value Ref Range    Sodium 137 136 - 145 mmol/L    Potassium 3.9 3.5 - 5.1 mmol/L    Chloride 109 (H) 97 - 108 mmol/L    CO2 22 21 - 32 mmol/L    Anion gap 6 5 - 15 mmol/L    Glucose 102 (H) 65 - 100 mg/dL    BUN 14 6 - 20 MG/DL    Creatinine 0.71 0.70 - 1.30 MG/DL    BUN/Creatinine ratio 20 12 - 20      GFR est AA >60 >60 ml/min/1.73m2    GFR est non-AA >60 >60 ml/min/1.73m2    Calcium 8.4 (L) 8.5 - 10.1 MG/DL    Bilirubin, total 0.8 0.2 - 1.0 MG/DL    ALT (SGPT) 42 12 - 78 U/L    AST (SGOT) 13 (L) 15 - 37 U/L    Alk. phosphatase 71 45 - 117 U/L    Protein, total 6.7 6.4 - 8.2 g/dL    Albumin 3.5 3.5 - 5.0 g/dL    Globulin 3.2 2.0 - 4.0 g/dL    A-G Ratio 1.1 1.1 - 2.2     SED RATE (ESR)    Collection Time: 08/30/22 10:20 AM   Result Value Ref Range    Sed rate, automated 13 0 - 20 mm/hr   C REACTIVE PROTEIN, QT    Collection Time: 08/30/22 10:20 AM   Result Value Ref Range    C-Reactive protein <0.29 0.00 - 0.60 mg/dL       Medications received:  Medications Administered       0.9% sodium chloride infusion       Admin Date  08/30/2022 Action  New Bag Dose  25 mL/hr Rate  25 mL/hr Route  IntraVENous Administered By  Chuck Mota RN              golimumab (SIMPONI ARIA) 212.5 mg in 0.9% sodium chloride, overfill volume 127 mL infusion       Admin Date  08/30/2022 Action  New Bag Dose  212.5 mg Rate  254 mL/hr Route  IntraVENous Administered By  Chuck Mota RN                     Mr. Geoffrey Nieves tolerated treatment well and was discharged from [place] [address] in stable condition at 1200. He is to return on  October 25, 2022 at 1000 for his next appointment.     Mouna Velasquez RN  August 30, 2022    Future Appointments:  Future Appointments   Date Time Provider Raiza Martini   9/8/2022  8:40 AM Maceo Claude, MD [facility] BS AMB   10/25/2022 10:00 AM SS INF1 CH4 <2H [facility]   12/20/2022  9:00 AM Veronica Eastman MD [facility] BS AMB   12/20/2022 10:00 AM SS INF1 CH4 <2H [facility] Brianda Garcia

## 2022-09-07 NOTE — PROGRESS NOTES
REASON FOR VISIT    This is a follow-up visit for Mr. Eugenio Manzano for     ICD-10-CM   1. Seropositive rheumatoid arthritis of multiple sites ([facility] M05.79   2. 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     Inflammatory arthritis phenotype includes:  Anti-CCP positive: yes (>250)  Rheumatoid factor positive: yes (>650)  Erosive disease: yes  Extra-articular manifestations include: left iritis    Immunosuppression Screening (10/15/2020):  Quantiferon TB: negative    PPD:  Not performed  Hepatitis B: negative   Hepatitis C: negative     Therapy History includes:  Current DMARD therapy include: methotrexate 25 mg SubQ every Wednesday (7/13/2021 to present), Simponi Aria every 8 weeks (5/08/2018 to present)  Prior DMARD therapy include: methotrexate 17.5 mg every Wednesday (6/15/2020 to 10/14/2020), methotrexate 20 mg every Wednesday (11/20/2017 to 6/15/2020; 10/15/2020 to 7/13/2021),  Discontinued DMARDs because of inefficacy: None  Discontinued DMARDs because of side effects: None  Unaffordable DMARDs: Enbrel    HISTORY OF PRESENT ILLNESS    Mr. Eugenio Manzano returns for a follow-up. Pt gets a monthly Simponi infusion which he tolerates well. He also takes 1mL SubQ Methotrexate once per week with daily folic acid and 738 mg Allopurinol daily. Pt reports that his L eye is bothering him today after getting sunscreen in it this morning. His wife says that she thinks her 's eyes have \"looked funny\" for the past 2 days. He says that he sees his eye doctor regularly. Pt says that he is noticing back pain in his upper middle back and the pain goes down his leg and makes it feel like he is having a miguelito horse. His wife says that this happens when he golfs (he goes 3X a week). He has not tried PT for his back. He says that he takes Tylenol before he golfs. He notes that his back feels stiff when he wakes up in the morning.      Pt reports that he contracted Covid since his last visit. He says that he was on paxlovid and then an antibiotic but he did not have to go to the hospital. He denies any lingering cough. REVIEW OF SYSTEMS    A comprehensive review of systems was performed and pertinent results are documented in the HPI, review of systems is otherwise non-contributory. PAST MEDICAL HISTORY    He has a past medical history of Allergic rhinitis, Arthritis, Basal cell carcinoma (BCC) in situ of skin, Cancer ([facility] GERD (gastroesophageal reflux disease), Gout, Hypercholesterolemia, Hypertension, Melanoma in situ of back ([address] Skin cancer (melanoma) ([facility] and Sleep apnea. FAMILY HISTORY    His family history includes Cancer in his paternal grandfather and another family member; Dementia in his mother; Diabetes in his paternal grandmother; Heart Disease in his father; Kidney Disease in his father. SOCIAL HISTORY    He reports that he quit smoking about 42 years ago. He has never used smokeless tobacco. He reports current alcohol use of about 14.0 standard drinks per week. He reports that he does not use drugs. MEDICATIONS    Current Outpatient Medications   Medication Sig    methotrexate, PF, 25 mg/mL injection INJECT 1 ML UNDER THE SKIN ONCE WEEKLY ON WEDNESDAYS    pantoprazole (PROTONIX) 40 mg tablet TAKE ONE TABLET BY MOUTH TWICE A DAY    nirmatrelvir-ritonavir (Paxlovid, EUA,) 150 mg x 2- 100 mg tablet Use as directed on box    allopurinoL (ZYLOPRIM) 300 mg tablet TAKE ONE TABLET BY MOUTH ONE TIME DAILY    colestipoL (COLESTID) 1 gram tablet TAKE TWO TABLETS BY MOUTH TWICE A DAY    Insulin Syringe-Needle U-100 1 mL 31 gauge x 5/16 syrg Use to inject methotrexate once weekly. ergocalciferol (ERGOCALCIFEROL) 1,250 mcg (50,000 unit) capsule TAKE ONE CAPSULE BY MOUTH EVERY WEEK    folic acid (FOLVITE) 1 mg tablet TAKE ONE TABLET BY MOUTH DAILY    Insulin Syringe-Needle U-100 1 mL 30 gauge x 5/16 syrg 1 Each by Does Not Apply route Every Saturday.  To be used for methotrexate solution    fexofenadine (ALLEGRA) 180 mg tablet Take  by mouth daily as needed. guaiFENesin ER (MUCINEX) 600 mg ER tablet Take 600 mg by mouth as needed. clobetasol (TEMOVATE) 0.05 % ointment Apply  to affected area two (2) times a day. 0.9% sodium chloride solp 100 mL with golimumab 12.5 mg/mL soln 2 mg/kg 2 mg/kg by IntraVENous route once. Every eight weeks  Indications: rhumetoid arthritis    lisinopril (PRINIVIL, ZESTRIL) 40 mg tablet Take 40 mg by mouth daily. aspirin delayed-release 81 mg tablet Take  by mouth daily. albuterol (VENTOLIN HFA) 90 mcg/actuation inhaler Take  by inhalation as needed. omega-3 fatty acids-vitamin e (FISH OIL) 1,000 mg Cap Take 1 Cap by mouth.    citalopram (CELEXA) 20 mg tablet Take 1 Tab by mouth daily. atorvastatin (LIPITOR) 40 mg tablet Take 1 Tab by mouth daily. amlodipine (NORVASC) 2.5 mg tablet Take 1 Tab by mouth daily. No current facility-administered medications for this visit. ALLERGIES    Allergies   Allergen Reactions    Stings [Sting, Bee] Swelling     Swelling at site       PHYSICAL EXAMINATION    Visit Vitals  /63 (BP 1 Location: Left upper arm, BP Patient Position: Sitting)   Pulse (!) 49   Temp 98.1 °F (36.7 °C) (Oral)   Resp 20   Ht 5' 8\" (1.727 m)   Wt 224 lb 6.4 oz (101.8 kg)   SpO2 95%   BMI 34.12 kg/m²       Body mass index is 34.12 kg/m². General:  The patient is well developed, well nourished, alert, and in no apparent distress. Eyes: Sclera are anicteric. Interval return of left > right conjunctival injection, no photophobia  HEENT:  Oropharynx is clear. No oral ulcers. Adequate salivary pooling. No cervical or supraclavicular lymphadenopathy. Lungs:  Clear to auscultation bilaterally, without wheeze or stridor. Normal respiratory effort. Cor:  Regular rate and rhythm. No murmur rub or gallop. Abdomen: Soft, non-tender, without hepatomegaly or masses. Extremities: No calf tenderness or edema. Warm and well perfused. Skin: Cratered left forearm lesion s/p cryoablation. No tophi. Neuro: Nonfocal, no foot or wrist drop. Negative straight leg raise bilaterally. Musculoskeletal:    A comprehensive musculoskeletal exam was performed for all joints of each upper and lower extremity and assessed for swelling, tenderness and range of motion. Results are documented as below:  Bilateral shoulder crepitus, left shoulder stably limited to 60deg abduction, flexion intact  Ulnar deviation of MCPs, no tenderness or synovitis. Paralumbar tenderness bilaterally. Left knee TKA scar. Bilateral hallux valgus without tophi  No evidence of synovitis in the small joints of the hands, wrists, shoulders, elbows, hips, knees or ankles. DATA REVIEW    Laboratory   8/30/22: CRP <0.29 mg/dL, ESR 13, Cr 0.71, ALT 42, AST 13, Tbili 0.8, Alk phos 71, CBC WNL,   7/5/22: Uric acid 4, CRP 0.3 mg/L, WBC 6.1, HGB 12.6, Plt 142, ESR 39, Cr 0.74, LFT WNL  5/10/22: CRP 0.6 mg/L, ESR 6, Cr 0.81, LFT WNL, WBC 7.2, HGB 13.3, Plt 189  3/10/22: MARGARET speckled pattern 1:160, Anti-nucleat Ab by IFA (RDL) positive, quantiFERON plus negative, Hepatitis B core IgM nonreactive, Hep B cpre Ab total negative, Hep B surface Ab nonreactive, Hep B surface Ag negative, CRP 6.1 mg/L, ESR 13, Cr 1.08, LFT WNL, EBC 4.4, HGB 14.1, Plt 174    Recent laboratory results were reviewed, summarized, and discussed with the patient. Imaging    Musculoskeletal Ultrasound    None    Radiographs    Bilateral Hand 11/20/2017: RIGHT: No fracture or dislocation on plain film. The bones are osteopenic. Mild narrowing of the radiocarpal joint. Probable erosion of the radius and lunate at the radiocarpal joint. No widening of the intercarpal spaces. Subtle erosion of the ulnar styloid. Mild narrowing of the triscaphe joint is age-appropriate. Irregular arthritis of the first and second CMC joints includes osteophytes and erosions.  There are erosions of the first through fourth metacarpal heads at the MCP points. Moderate narrowing and anterior subluxation of the second MCP joint. IP joints are within normal limits. No chondrocalcinosis or periosteal reaction. LEFT: No fracture or dislocation on plain film. The bones are osteopenic. Moderate narrowing of the radiocarpal joint with subtle erosions of the lunate. Marrow DR UVJ with erosions. No definite ulnar styloid erosion. [facility] and first [facility] joint osteoarthritis are age-appropriate. Large erosions on both sides of the second MCP joint and in the third metacarpal head. IP joints are within normal limits. No chondrocalcinosis or periosteal reaction. Bilateral Foot 11/20/2017: RIGHT: No fracture or dislocation on plain film. Bones are osteopenic. Talonavicular joint space narrowing, osteophytes, and erosions. Subtalar arthritis is partially imaged. TMT joints are within normal limits. Severe narrowing of the first MTP joint with osteophytes and erosions. There is erosion and the fifth tarsal head. Mild narrowing of the second MTP joint without definable erosion. No periosteal reaction. LEFT: No fracture or dislocation on plain film. Bones are osteopenic. Intertarsal joints are within normal limits. Moderate narrowing of the first MTP joint with central and marginal erosions. 35 degrees hallux valgus angulation and fibular subluxation of the first proximal phalanx. There are also erosions in the laterally subluxed hallux sesamoids. Mild joint space narrowing and erosions of the third MTP joint. Fragmentation of the fourth middle phalanx is adjacent to the fourth PIP joint erosions. There are also erosions of the second and fifth PIP joints. First IP joint osteoarthritis is mild. CT Imaging    CT Right Upper Extremity with contrast 10/19/2017: examination of the soft tissues demonstrates no drainable fluid collection. There is no pathologically enlarged nodes within the right axilla.  Alignment is normal. There is no bone destruction or fracture. No significant fatty atrophy. MR Imaging    None    DXA     9/21/21 DEXA BONE DENSITY STUDY: Femoral Neck Left:  Bone mineral density (gm/cm2):  0.745  % of peak bone mass:  70  % for age matched controls:  66  T-score:  -2.5  Z-score:  -1.6     Total Hip Left:  Bone mineral density (gm/cm2):  0.897  % of peak bone mass:  81  % for age matched controls:  80  T-score:  -1.4  Z-score:  -1     Lumbar Spine:  L1 plus L2  Bone mineral density (gm/cm2):  1.080  % of peak bone mass:  89  % for age matched controls:  80  T-score:  -1.1  Z-score:  -1.2     33% Radius Left:  Bone mineral density (gm/cm2):  0.582  % of peak bone mass:  72  % for age matched controls:  80  T-score:  -2.8  Z-score:  -1.8       ASSESSMENT AND PLAN    This is a follow-up visit for Mr. Christell Ahumada for seropositive erosive rheumatoid arthritis, nontophaceous gout, and bilateral iritis, now with low disease actiity with full-dose subQ methotrexate and Simponi infusions, but interval return of conjunctival irritation and injection. He agrees to pursue urgent ophthalmology evaluation if eye irritation remains present tomorrow. Otherwise no changes to his immunosuppression regimen. 1. Seropositive rheumatoid arthritis of multiple sites (HCC)  - Cont golimumab infusions q2mo, methotrexate 25mg subQ weekly  - C REACTIVE PROTEIN, QT; Future  - CBC WITH AUTOMATED DIFF; Future  - METABOLIC PANEL, COMPREHENSIVE; Future  - SED RATE (ESR); Future  - XR SPINE LUMB 2 OR 3 V; Future    2. Long-term use of immunosuppressant medication  - CBC WITH AUTOMATED DIFF; Future  - METABOLIC PANEL, COMPREHENSIVE; Future    3. Chronic bilateral iritis  - C REACTIVE PROTEIN, QT; Future  - CBC WITH AUTOMATED DIFF; Future  - METABOLIC PANEL, COMPREHENSIVE; Future  - SED RATE (ESR); Future    4.  Chronic idiopathic gout involving toe of left foot without tophus  - Cont allopurinol 300mg daily, no interval flares  - Uric acid 4.0 on 7/2/22, at goal <5.0  - CBC WITH AUTOMATED DIFF; Future  - METABOLIC PANEL, COMPREHENSIVE; Future    5. Lumbar facet arthropathy  - REFERRAL TO PHYSICAL THERAPY  - XR SPINE LUMB 2 OR 3 V; Future     Patient Instructions   1) Continue to get your monthly Simponi infusions. 2) Continue to get Prolia shots every 6 months as scheduled. 3) Continue to take 1 mL of sub cutaneous Methotrexate once per week with daily folic acid. 4) Continue to take 300mg of Allopurinol daily. 5) If you are still having problems with your eyes by tomorrow, reach out to Dr. Sony Robert for expedited evaluation    6) I am going to refer you to physical therapy. You can take this referral to wherever is most convenient to you. 7) Try to use a Tens unit for your back pain. 8) You can take 1-2 Aleve for back pain as needed. Try not to take this more than 3 times a week and take it at least an hour after taking your baby asprin. You can also take 650mg of Tylenol up to 3 times per day. 9) Get back Xrays at your earliest convenience. You can get these done at any [address] [place] [address]. 10) Check labs before next visit. 11) Follow up in 3 months before your Prolia injection. Let me know if you have any questions or concerns int he meantime.      TODAY'S ORDERS    Orders Placed This Encounter    XR SPINE LUMB 2 OR 3 V    C REACTIVE PROTEIN, QT    CBC WITH AUTOMATED DIFF    METABOLIC PANEL, COMPREHENSIVE    SED RATE (ESR)    REFERRAL TO PHYSICAL THERAPY       Future Appointments   Date Time Provider Raiza Sanchez   9/8/2022  8:40 AM Coco Tay MD [facility] BS AMB   10/25/2022 10:00 AM SS INF1 CH4 <2H [facility]   12/20/2022  9:00 AM Avani Eastman MD [facility] BS AMB   12/20/2022 10:00 AM SS INF1 CH4 <2H [facility]     Face to face time: 28 minutes  Note preparation and records review day of service: 25 minutes  Total provider time day of service: 53 minutes    This was scribed by Yolanda Demarco in the presence of Dr. Moo Spence MD    Adult Rheumatology   [facility]  A Part of [facility], [address] [place] [address] Road   Phone [phone]  Fax [phone]

## 2022-09-08 ENCOUNTER — OFFICE VISIT (OUTPATIENT)
Dept: RHEUMATOLOGY | Age: 77
End: 2022-09-08
Payer: MEDICARE

## 2022-09-08 VITALS
BODY MASS INDEX: 34.01 KG/M2 | HEIGHT: 68 IN | TEMPERATURE: 98.1 F | HEART RATE: 49 BPM | DIASTOLIC BLOOD PRESSURE: 63 MMHG | RESPIRATION RATE: 20 BRPM | OXYGEN SATURATION: 95 % | WEIGHT: 224.4 LBS | SYSTOLIC BLOOD PRESSURE: 104 MMHG

## 2022-09-08 DIAGNOSIS — M47.816 LUMBAR FACET ARTHROPATHY: ICD-10-CM

## 2022-09-08 DIAGNOSIS — M1A.0720 CHRONIC IDIOPATHIC GOUT INVOLVING TOE OF LEFT FOOT WITHOUT TOPHUS: ICD-10-CM

## 2022-09-08 DIAGNOSIS — M05.79 SEROPOSITIVE RHEUMATOID ARTHRITIS OF MULTIPLE SITES (HCC): Primary | ICD-10-CM

## 2022-09-08 DIAGNOSIS — Z79.60 LONG-TERM USE OF IMMUNOSUPPRESSANT MEDICATION: ICD-10-CM

## 2022-09-08 DIAGNOSIS — H20.13 CHRONIC BILATERAL IRITIS: ICD-10-CM

## 2022-09-08 PROCEDURE — G8754 DIAS BP LESS 90: HCPCS | Performed by: INTERNAL MEDICINE

## 2022-09-08 PROCEDURE — 3017F COLORECTAL CA SCREEN DOC REV: CPT | Performed by: INTERNAL MEDICINE

## 2022-09-08 PROCEDURE — G8752 SYS BP LESS 140: HCPCS | Performed by: INTERNAL MEDICINE

## 2022-09-08 PROCEDURE — G9717 DOC PT DX DEP/BP F/U NT REQ: HCPCS | Performed by: INTERNAL MEDICINE

## 2022-09-08 PROCEDURE — G0463 HOSPITAL OUTPT CLINIC VISIT: HCPCS | Performed by: INTERNAL MEDICINE

## 2022-09-08 PROCEDURE — 1101F PT FALLS ASSESS-DOCD LE1/YR: CPT | Performed by: INTERNAL MEDICINE

## 2022-09-08 PROCEDURE — G8427 DOCREV CUR MEDS BY ELIG CLIN: HCPCS | Performed by: INTERNAL MEDICINE

## 2022-09-08 PROCEDURE — G8417 CALC BMI ABV UP PARAM F/U: HCPCS | Performed by: INTERNAL MEDICINE

## 2022-09-08 PROCEDURE — 1123F ACP DISCUSS/DSCN MKR DOCD: CPT | Performed by: INTERNAL MEDICINE

## 2022-09-08 PROCEDURE — G8536 NO DOC ELDER MAL SCRN: HCPCS | Performed by: INTERNAL MEDICINE

## 2022-09-08 PROCEDURE — 99215 OFFICE O/P EST HI 40 MIN: CPT | Performed by: INTERNAL MEDICINE

## 2022-09-08 NOTE — LETTER
9/11/2022    Patient: Steph Whitley   YOB: 1945   Date of Visit: 9/8/2022     Cierra Ledbetter MD  [place] [id number]  [address] [place] [address]  Via In [address] [place] [address], MD  Via In [place]    Dear MD Yaz Soliz MD,    We recently saw Mr. Megan Quezada in the [facility] for evaluation. My notes for this consultation are attached. If you have questions, please do not hesitate to call me. I look forward to following your patient along with you.     Sincerely,   Fernando Johnson MD [facility]  Cell: [phone]

## 2022-09-08 NOTE — PATIENT INSTRUCTIONS
1) Continue to get your monthly Simponi infusions. 2) Continue to get Prolia shots every 6 months as scheduled. 3) Continue to take 1 mL of sub cutaneous Methotrexate once per week with daily folic acid. 4) Continue to take 300mg of Allopurinol daily. 5) If you are still having problems with your eyes by tomorrow, reach out to Dr. Alireza Whitney for expedited evaluation    6) I am going to refer you to physical therapy. You can take this referral to wherever is most convenient to you. 7) Try to use a Tens unit for your back pain. 8) You can take 1-2 Aleve for back pain as needed. Try not to take this more than 3 times a week and take it at least an hour after taking your baby asprin. You can also take 650mg of Tylenol up to 3 times per day. 9) Get back Xrays at your earliest convenience. You can get these done at any [address] [place] [address]. 10) Check labs before next visit. 11) Follow up in 3 months before your Prolia injection. Let me know if you have any questions or concerns int he meantime.

## 2022-09-08 NOTE — PROGRESS NOTES
Chief Complaint   Patient presents with    Arthritis     1. Have you been to the ER, urgent care clinic since your last visit? Hospitalized since your last visit? No    2. Have you seen or consulted any other health care providers outside of the [address] [place] [address] since your last visit? Include any pap smears or colon screening.  No

## 2022-09-26 DIAGNOSIS — K21.9 GASTROESOPHAGEAL REFLUX DISEASE WITHOUT ESOPHAGITIS: ICD-10-CM

## 2022-09-26 RX ORDER — PANTOPRAZOLE SODIUM 40 MG/1
TABLET, DELAYED RELEASE ORAL
Qty: 180 TABLET | Refills: 1 | Status: SHIPPED | OUTPATIENT
Start: 2022-09-26

## 2022-10-10 DIAGNOSIS — M05.79 SEROPOSITIVE RHEUMATOID ARTHRITIS OF MULTIPLE SITES (HCC): ICD-10-CM

## 2022-10-11 RX ORDER — METHOTREXATE 25 MG/ML
INJECTION INTRA-ARTERIAL; INTRAMUSCULAR; INTRATHECAL; INTRAVENOUS
Qty: 12 ML | Refills: 0 | Status: SHIPPED | OUTPATIENT
Start: 2022-10-11

## 2022-10-14 ENCOUNTER — TRANSCRIBE ORDER (OUTPATIENT)
Dept: SCHEDULING | Age: 77
End: 2022-10-14

## 2022-10-14 DIAGNOSIS — Z12.11 SCREEN FOR COLON CANCER: ICD-10-CM

## 2022-10-14 DIAGNOSIS — K21.9 ACID REFLUX: Primary | ICD-10-CM

## 2022-10-20 ENCOUNTER — HOSPITAL ENCOUNTER (OUTPATIENT)
Dept: GENERAL RADIOLOGY | Age: 77
Discharge: HOME | End: 2022-10-20
Attending: NURSE PRACTITIONER
Payer: MEDICARE

## 2022-10-20 DIAGNOSIS — K21.9 ACID REFLUX: ICD-10-CM

## 2022-10-20 DIAGNOSIS — Z12.11 SCREEN FOR COLON CANCER: ICD-10-CM

## 2022-10-20 PROCEDURE — 74220 X-RAY XM ESOPHAGUS 1CNTRST: CPT

## 2022-10-25 ENCOUNTER — HOSPITAL ENCOUNTER (OUTPATIENT)
Dept: INFUSION THERAPY | Age: 77
Discharge: HOME | End: 2022-10-25
Attending: INTERNAL MEDICINE
Payer: MEDICARE

## 2022-10-25 VITALS
RESPIRATION RATE: 16 BRPM | HEIGHT: 68 IN | BODY MASS INDEX: 34.86 KG/M2 | WEIGHT: 230 LBS | HEART RATE: 60 BPM | OXYGEN SATURATION: 93 % | DIASTOLIC BLOOD PRESSURE: 68 MMHG | SYSTOLIC BLOOD PRESSURE: 154 MMHG | TEMPERATURE: 98.3 F

## 2022-10-25 DIAGNOSIS — M05.79 SEROPOSITIVE RHEUMATOID ARTHRITIS OF MULTIPLE SITES (HCC): Primary | ICD-10-CM

## 2022-10-25 LAB
ALBUMIN SERPL-MCNC: 3.5 G/DL (ref 3.5–5)
ALBUMIN/GLOB SERPL: 1.3 {RATIO} (ref 1.1–2.2)
ALP SERPL-CCNC: 59 U/L (ref 45–117)
ALT SERPL-CCNC: 45 U/L (ref 12–78)
ANION GAP SERPL CALC-SCNC: 5 MMOL/L (ref 5–15)
AST SERPL-CCNC: 12 U/L (ref 15–37)
BASOPHILS # BLD: 0 K/UL (ref 0–0.1)
BASOPHILS NFR BLD: 1 % (ref 0–1)
BILIRUB SERPL-MCNC: 0.6 MG/DL (ref 0.2–1)
BUN SERPL-MCNC: 18 MG/DL (ref 6–20)
BUN/CREAT SERPL: 23 (ref 12–20)
CALCIUM SERPL-MCNC: 8.7 MG/DL (ref 8.5–10.1)
CHLORIDE SERPL-SCNC: 109 MMOL/L (ref 97–108)
CO2 SERPL-SCNC: 26 MMOL/L (ref 21–32)
CREAT SERPL-MCNC: 0.78 MG/DL (ref 0.7–1.3)
CRP SERPL-MCNC: <0.29 MG/DL (ref 0–0.6)
DIFFERENTIAL METHOD BLD: ABNORMAL
EOSINOPHIL # BLD: 0.2 K/UL (ref 0–0.4)
EOSINOPHIL NFR BLD: 3 % (ref 0–7)
ERYTHROCYTE [DISTWIDTH] IN BLOOD BY AUTOMATED COUNT: 13.1 % (ref 11.5–14.5)
ERYTHROCYTE [SEDIMENTATION RATE] IN BLOOD: 19 MM/HR (ref 0–20)
GLOBULIN SER CALC-MCNC: 2.7 G/DL (ref 2–4)
GLUCOSE SERPL-MCNC: 97 MG/DL (ref 65–100)
HCT VFR BLD AUTO: 39 % (ref 36.6–50.3)
HGB BLD-MCNC: 12.9 G/DL (ref 12.1–17)
IMM GRANULOCYTES # BLD AUTO: 0 K/UL (ref 0–0.04)
IMM GRANULOCYTES NFR BLD AUTO: 0 % (ref 0–0.5)
LYMPHOCYTES # BLD: 1.8 K/UL (ref 0.8–3.5)
LYMPHOCYTES NFR BLD: 29 % (ref 12–49)
MCH RBC QN AUTO: 34.5 PG (ref 26–34)
MCHC RBC AUTO-ENTMCNC: 33.1 G/DL (ref 30–36.5)
MCV RBC AUTO: 104.3 FL (ref 80–99)
MONOCYTES # BLD: 0.7 K/UL (ref 0–1)
MONOCYTES NFR BLD: 11 % (ref 5–13)
NEUTS SEG # BLD: 3.5 K/UL (ref 1.8–8)
NEUTS SEG NFR BLD: 56 % (ref 32–75)
NRBC # BLD: 0 K/UL (ref 0–0.01)
NRBC BLD-RTO: 0 PER 100 WBC
PLATELET # BLD AUTO: 161 K/UL (ref 150–400)
PMV BLD AUTO: 10.9 FL (ref 8.9–12.9)
POTASSIUM SERPL-SCNC: 4.3 MMOL/L (ref 3.5–5.1)
PROT SERPL-MCNC: 6.2 G/DL (ref 6.4–8.2)
RBC # BLD AUTO: 3.74 M/UL (ref 4.1–5.7)
SODIUM SERPL-SCNC: 140 MMOL/L (ref 136–145)
WBC # BLD AUTO: 6.2 K/UL (ref 4.1–11.1)

## 2022-10-25 PROCEDURE — 85025 COMPLETE CBC W/AUTO DIFF WBC: CPT

## 2022-10-25 PROCEDURE — 96413 CHEMO IV INFUSION 1 HR: CPT

## 2022-10-25 PROCEDURE — 74011250636 HC RX REV CODE- 250/636: Performed by: INTERNAL MEDICINE

## 2022-10-25 PROCEDURE — 85652 RBC SED RATE AUTOMATED: CPT

## 2022-10-25 PROCEDURE — 74011000258 HC RX REV CODE- 258: Performed by: INTERNAL MEDICINE

## 2022-10-25 PROCEDURE — 80053 COMPREHEN METABOLIC PANEL: CPT

## 2022-10-25 PROCEDURE — 86140 C-REACTIVE PROTEIN: CPT

## 2022-10-25 PROCEDURE — 36415 COLL VENOUS BLD VENIPUNCTURE: CPT

## 2022-10-25 RX ORDER — ALBUTEROL SULFATE 0.83 MG/ML
2.5 SOLUTION RESPIRATORY (INHALATION) AS NEEDED
Start: 2022-12-20

## 2022-10-25 RX ORDER — SODIUM CHLORIDE 0.9 % (FLUSH) 0.9 %
10 SYRINGE (ML) INJECTION AS NEEDED
OUTPATIENT
Start: 2022-12-20

## 2022-10-25 RX ORDER — DIPHENHYDRAMINE HYDROCHLORIDE 50 MG/ML
50 INJECTION, SOLUTION INTRAMUSCULAR; INTRAVENOUS AS NEEDED
Start: 2022-12-20

## 2022-10-25 RX ORDER — EPINEPHRINE 1 MG/ML
0.3 INJECTION, SOLUTION, CONCENTRATE INTRAVENOUS AS NEEDED
OUTPATIENT
Start: 2022-12-20

## 2022-10-25 RX ORDER — HEPARIN 100 UNIT/ML
300-500 SYRINGE INTRAVENOUS AS NEEDED
Start: 2022-12-20

## 2022-10-25 RX ORDER — SODIUM CHLORIDE 9 MG/ML
10 INJECTION INTRAMUSCULAR; INTRAVENOUS; SUBCUTANEOUS AS NEEDED
Status: ACTIVE | OUTPATIENT
Start: 2022-10-25 | End: 2022-10-25

## 2022-10-25 RX ORDER — HEPARIN 100 UNIT/ML
300-500 SYRINGE INTRAVENOUS AS NEEDED
Status: ACTIVE | OUTPATIENT
Start: 2022-10-25 | End: 2022-10-25

## 2022-10-25 RX ORDER — ONDANSETRON 2 MG/ML
8 INJECTION INTRAMUSCULAR; INTRAVENOUS AS NEEDED
OUTPATIENT
Start: 2022-12-20

## 2022-10-25 RX ORDER — SODIUM CHLORIDE 0.9 % (FLUSH) 0.9 %
10 SYRINGE (ML) INJECTION AS NEEDED
Status: DISPENSED | OUTPATIENT
Start: 2022-10-25 | End: 2022-10-25

## 2022-10-25 RX ORDER — HYDROCORTISONE SODIUM SUCCINATE 100 MG/2ML
100 INJECTION, POWDER, FOR SOLUTION INTRAMUSCULAR; INTRAVENOUS AS NEEDED
OUTPATIENT
Start: 2022-12-20

## 2022-10-25 RX ORDER — DIPHENHYDRAMINE HYDROCHLORIDE 50 MG/ML
25 INJECTION, SOLUTION INTRAMUSCULAR; INTRAVENOUS AS NEEDED
Start: 2022-12-20

## 2022-10-25 RX ORDER — SODIUM CHLORIDE 9 MG/ML
10 INJECTION INTRAMUSCULAR; INTRAVENOUS; SUBCUTANEOUS AS NEEDED
OUTPATIENT
Start: 2022-12-20

## 2022-10-25 RX ORDER — SODIUM CHLORIDE 9 MG/ML
25 INJECTION, SOLUTION INTRAVENOUS CONTINUOUS
OUTPATIENT
Start: 2022-12-20

## 2022-10-25 RX ORDER — SODIUM CHLORIDE 9 MG/ML
25 INJECTION, SOLUTION INTRAVENOUS CONTINUOUS
Status: DISPENSED | OUTPATIENT
Start: 2022-10-25 | End: 2022-10-25

## 2022-10-25 RX ORDER — ACETAMINOPHEN 325 MG/1
650 TABLET ORAL AS NEEDED
Start: 2022-12-20

## 2022-10-25 RX ADMIN — SODIUM CHLORIDE 25 ML/HR: 9 INJECTION, SOLUTION INTRAVENOUS at 10:16

## 2022-10-25 RX ADMIN — GOLIMUMAB 212.5 MG: 50 SOLUTION INTRAVENOUS at 10:20

## 2022-10-25 NOTE — PROGRESS NOTES
[facility] Progress Note    Date: 2022    Name: Joanne Alvarez    MRN: [id number]         : 1945    Mr. Argenis Hernandez Arrived ambulatory and in no distress for q 8 week Simponi. Assessment was completed, no acute issues at this time, no new complaints voiced. Left arm PIV  accessed without difficulty, labs drawn & sent for processing. Chemotherapy Flowsheet 10/25/2022   Cycle Q8 week   Date 10/25/2022   Drug / Regimen Simponi   Notes -         Mr. Sarabia's vitals were reviewed. Visit Vitals  BP (!) 148/67   Pulse (!) 49   Temp 97 °F (36.1 °C)   Resp 16   Ht 5' 8\" (1.727 m)   Wt 104.3 kg (230 lb)   SpO2 94%   BMI 34.97 kg/m²       Lab results were obtained and reviewed. Recent Results (from the past 12 hour(s))   CBC WITH AUTOMATED DIFF    Collection Time: 10/25/22  9:45 AM   Result Value Ref Range    WBC 6.2 4.1 - 11.1 K/uL    RBC 3.74 (L) 4.10 - 5.70 M/uL    HGB 12.9 12.1 - 17.0 g/dL    HCT 39.0 36.6 - 50.3 %    .3 (H) 80.0 - 99.0 FL    MCH 34.5 (H) 26.0 - 34.0 PG    MCHC 33.1 30.0 - 36.5 g/dL    RDW 13.1 11.5 - 14.5 %    PLATELET 144 121 - 738 K/uL    MPV 10.9 8.9 - 12.9 FL    NRBC 0.0 0  WBC    ABSOLUTE NRBC 0.00 0.00 - 0.01 K/uL    NEUTROPHILS 56 32 - 75 %    LYMPHOCYTES 29 12 - 49 %    MONOCYTES 11 5 - 13 %    EOSINOPHILS 3 0 - 7 %    BASOPHILS 1 0 - 1 %    IMMATURE GRANULOCYTES 0 0.0 - 0.5 %    ABS. NEUTROPHILS 3.5 1.8 - 8.0 K/UL    ABS. LYMPHOCYTES 1.8 0.8 - 3.5 K/UL    ABS. MONOCYTES 0.7 0.0 - 1.0 K/UL    ABS. EOSINOPHILS 0.2 0.0 - 0.4 K/UL    ABS. BASOPHILS 0.0 0.0 - 0.1 K/UL    ABS. IMM.  GRANS. 0.0 0.00 - 0.04 K/UL    DF AUTOMATED          Medications:  Medications Administered       0.9% sodium chloride infusion       Admin Date  10/25/2022 Action  New Bag Dose  25 mL/hr Rate  25 mL/hr Route  IntraVENous Administered By  Elida miller [facility]) 212.5 mg in 0.9% sodium chloride, overfill volume 127 mL infusion       Admin Date  10/25/2022 Action  New Bag Dose  212.5 mg Rate  254 mL/hr Route  IntraVENous Administered By  Samantha Avila                         Two nurses verified prior to administering: Drug name, drug dose, infusion volume or drug volume when prepared in a syringe, rate of administration, route of administration,  expiration dates and/or times, appearance and physical integrity of the drugs, rate set on infusion pump, when used sequencing of drug administration. Mr. Julito Reddy tolerated treatment well and was discharged from [place] [address] in stable condition. PIV de-accessed, flushed  per protocol. He is to return on December 20 for his next appointment.     Dayna Cage  October 25, 2022

## 2022-11-24 DIAGNOSIS — R15.9 INCONTINENCE OF FECES, UNSPECIFIED FECAL INCONTINENCE TYPE: ICD-10-CM

## 2022-11-25 ENCOUNTER — TELEPHONE (OUTPATIENT)
Dept: INTERNAL MEDICINE CLINIC | Age: 77
End: 2022-11-25

## 2022-11-25 RX ORDER — MONTELUKAST SODIUM 4 MG/1
TABLET, CHEWABLE ORAL
Qty: 60 TABLET | Refills: 5 | Status: SHIPPED | OUTPATIENT
Start: 2022-11-25

## 2022-11-28 ENCOUNTER — OFFICE VISIT (OUTPATIENT)
Dept: INTERNAL MEDICINE CLINIC | Age: 77
End: 2022-11-28
Payer: MEDICARE

## 2022-11-28 VITALS
SYSTOLIC BLOOD PRESSURE: 114 MMHG | TEMPERATURE: 98.6 F | HEART RATE: 46 BPM | DIASTOLIC BLOOD PRESSURE: 69 MMHG | RESPIRATION RATE: 14 BRPM | BODY MASS INDEX: 35.1 KG/M2 | OXYGEN SATURATION: 93 % | HEIGHT: 68 IN | WEIGHT: 231.6 LBS

## 2022-11-28 DIAGNOSIS — C43.9 MALIGNANT MELANOMA, UNSPECIFIED SITE (HCC): ICD-10-CM

## 2022-11-28 DIAGNOSIS — J32.9 SINUSITIS, UNSPECIFIED CHRONICITY, UNSPECIFIED LOCATION: ICD-10-CM

## 2022-11-28 DIAGNOSIS — R21 RASH: Primary | ICD-10-CM

## 2022-11-28 DIAGNOSIS — R73.01 IFG (IMPAIRED FASTING GLUCOSE): ICD-10-CM

## 2022-11-28 DIAGNOSIS — Z12.5 PROSTATE CANCER SCREENING: ICD-10-CM

## 2022-11-28 DIAGNOSIS — I10 ESSENTIAL HYPERTENSION, BENIGN: ICD-10-CM

## 2022-11-28 DIAGNOSIS — E78.2 MIXED HYPERLIPIDEMIA: ICD-10-CM

## 2022-11-28 DIAGNOSIS — Z85.46 HISTORY OF PROSTATE CANCER: ICD-10-CM

## 2022-11-28 DIAGNOSIS — F33.9 RECURRENT DEPRESSION (HCC): ICD-10-CM

## 2022-11-28 PROCEDURE — G9717 DOC PT DX DEP/BP F/U NT REQ: HCPCS | Performed by: INTERNAL MEDICINE

## 2022-11-28 PROCEDURE — G8754 DIAS BP LESS 90: HCPCS | Performed by: INTERNAL MEDICINE

## 2022-11-28 PROCEDURE — 1101F PT FALLS ASSESS-DOCD LE1/YR: CPT | Performed by: INTERNAL MEDICINE

## 2022-11-28 PROCEDURE — G8417 CALC BMI ABV UP PARAM F/U: HCPCS | Performed by: INTERNAL MEDICINE

## 2022-11-28 PROCEDURE — G0463 HOSPITAL OUTPT CLINIC VISIT: HCPCS | Performed by: INTERNAL MEDICINE

## 2022-11-28 PROCEDURE — G8427 DOCREV CUR MEDS BY ELIG CLIN: HCPCS | Performed by: INTERNAL MEDICINE

## 2022-11-28 PROCEDURE — 99214 OFFICE O/P EST MOD 30 MIN: CPT | Performed by: INTERNAL MEDICINE

## 2022-11-28 PROCEDURE — G8536 NO DOC ELDER MAL SCRN: HCPCS | Performed by: INTERNAL MEDICINE

## 2022-11-28 PROCEDURE — G8752 SYS BP LESS 140: HCPCS | Performed by: INTERNAL MEDICINE

## 2022-11-28 RX ORDER — CLOTRIMAZOLE AND BETAMETHASONE DIPROPIONATE 10; .64 MG/G; MG/G
CREAM TOPICAL 2 TIMES DAILY
Qty: 15 G | Refills: 0 | Status: SHIPPED | OUTPATIENT
Start: 2022-11-28

## 2022-11-28 RX ORDER — AMOXICILLIN AND CLAVULANATE POTASSIUM 875; 125 MG/1; MG/1
1 TABLET, FILM COATED ORAL EVERY 12 HOURS
Qty: 14 TABLET | Refills: 0 | Status: SHIPPED | OUTPATIENT
Start: 2022-11-28 | End: 2022-12-05

## 2022-11-28 NOTE — PROGRESS NOTES
Tamiko Devi  Identified pt with two pt identifiers(name and ). Chief Complaint   Patient presents with    Skin Infection     RM18// pt presenting today with skin irritation on (R) lower leg x 1month has gotten bigger and itchy at times; has a sinus infection that started on yesterday       1. Have you been to the ER, urgent care clinic since your last visit? Hospitalized since your last visit? NO    2. Have you seen or consulted any other health care providers outside of the [address] [place] [address] since your last visit? Include any pap smears or colon screening. NO      Provider notified of reason for visit, vitals and flowsheets obtained on patients.      Patient received paperwork for advance directive during previous visit but has not completed at this time     Reviewed record In preparation for visit, huddled with provider and have obtained necessary documentation      Health Maintenance Due   Topic    Shingrix Vaccine Age 50> (1 of 2)    COVID-19 Vaccine (4 - Booster for [employer] series)    Medicare Yearly Exam        Wt Readings from Last 3 Encounters:   22 231 lb 9.6 oz (105.1 kg)   10/25/22 230 lb (104.3 kg)   22 224 lb 6.4 oz (101.8 kg)     Temp Readings from Last 3 Encounters:   22 98.6 °F (37 °C) (Oral)   10/25/22 98.3 °F (36.8 °C)   22 98.1 °F (36.7 °C) (Oral)     BP Readings from Last 3 Encounters:   22 114/69   10/25/22 (!) 154/68   22 104/63     Pulse Readings from Last 3 Encounters:   22 (!) 46   10/25/22 60   22 (!) 49     Vitals:    22 0747   BP: 114/69   Pulse: (!) 46   Resp: 14   Temp: 98.6 °F (37 °C)   TempSrc: Oral   SpO2: 93%   Weight: 231 lb 9.6 oz (105.1 kg)   Height: 5' 8\" (1.727 m)   PainSc:   0 - No pain         Learning Assessment:  :     Learning Assessment 2022 10/14/2020 2019 12/3/2018 2018 3/7/2018 2017   PRIMARY LEARNER Patient Patient Patient Patient Patient Patient Patient   HIGHEST LEVEL OF EDUCATION - PRIMARY LEARNER  SOME COLLEGE - - - - - -   BARRIERS PRIMARY LEARNER - - - - - - -   CO-LEARNER CAREGIVER - Yes Yes No No No No   PRIMARY LANGUAGE ENGLISH ENGLISH ENGLISH ENGLISH ENGLISH ENGLISH ENGLISH   LEARNER PREFERENCE PRIMARY DEMONSTRATION DEMONSTRATION DEMONSTRATION DEMONSTRATION DEMONSTRATION DEMONSTRATION DEMONSTRATION   ANSWERED BY patient patient patient patient patient patient patient   RELATIONSHIP SELF SELF SELF SELF SELF SELF SELF       Depression Screening:  :     3 most recent PHQ Screens 9/8/2022   Little interest or pleasure in doing things Not at all   Feeling down, depressed, irritable, or hopeless Not at all   Total Score PHQ 2 0   Trouble falling or staying asleep, or sleeping too much -   Feeling tired or having little energy -   Poor appetite, weight loss, or overeating -   Feeling bad about yourself - or that you are a failure or have let yourself or your family down -   Trouble concentrating on things such as school, work, reading, or watching TV -   Moving or speaking so slowly that other people could have noticed; or the opposite being so fidgety that others notice -   Thoughts of being better off dead, or hurting yourself in some way -   PHQ 9 Score -   How difficult have these problems made it for you to do your work, take care of your home and get along with others -       Fall Risk Assessment:  :     Fall Risk Assessment, last 12 mths 9/8/2022   Able to walk? Yes   Fall in past 12 months? 0   Do you feel unsteady? 0   Are you worried about falling 0       Abuse Screening:  :     Abuse Screening Questionnaire 12/16/2020 6/16/2020 12/16/2019 9/12/2019 8/6/2019 1/4/2019 8/30/2018   Do you ever feel afraid of your partner? N N N N N N N   Are you in a relationship with someone who physically or mentally threatens you? N N N N N N N   Is it safe for you to go home?  Y Y Y Y Y Y Y       ADL Screening:  :     ADL Assessment 8/30/2018   Feeding yourself No Help Needed   Getting from bed to chair No Help Needed   Getting dressed No Help Needed   Bathing or showering No Help Needed   Walk across the room (includes cane/walker) No Help Needed   Using the telphone No Help Needed   Taking your medications No Help Needed   Preparing meals No Help Needed   Managing money (expenses/bills) No Help Needed   Moderately strenuous housework (laundry) No Help Needed   Shopping for personal items (toiletries/medicines) No Help Needed   Shopping for groceries No Help Needed   Driving No Help Needed   Climbing a flight of stairs No Help Needed   Getting to places beyond walking distances No Help Needed         Medication reconciliation up to date and corrected with patient at this time.

## 2022-11-28 NOTE — PROGRESS NOTES
Vamshi Gil is a 69 y.o. male who presents today for Skin Infection (RM18// pt presenting today with skin irritation on (R) lower leg x 1month has gotten bigger and itchy at times; has a sinus infection that started on yesterday)  . He has a history of   Patient Active Problem List   Diagnosis Code    Mixed hyperlipidemia E78.2    Other abnormal glucose R73.09    Essential hypertension, benign I10    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    Allergic rhinitis, cause unspecified J30.9    Reflux esophagitis K21.00    Benign neoplasm of colon D12.6    Contact dermatitis and other eczema, due to unspecified cause L25.9    Bunion M21.619    History of prostate cancer Z85.46    Advanced care planning/counseling discussion Z71.89    Low testosterone R79.89    Primary osteoarthritis of both knees M17.0    Seropositive rheumatoid arthritis of multiple sites ([facility] M05.79    Long-term use of immunosuppressant medication Z79.60    Vitamin D deficiency E55.9    Recurrent depression (HCC) F33.9    Severe obesity with body mass index (BMI) of 35.0 to 39.9 with serious comorbidity (HCC) E66.01    Gout M10.9    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 Today patient is here for follow up. Problem visit:  Vamshi Gil is here for complaint of L ankle skin lesion. Problem began 6 week(s) ago. Severity is moderate  Character of problem: Itching. Slowly spreading. Does notice some increased edema to that leg in the evening, but not really painful. Having a recurrent upper respiratory problems. Has been ongoing for 4 weeks. Seems to be getting a bit worse. Having headaches at times. Hypertension- stable.    Hypertension ROS: taking medications as instructed, no medication side effects noted, no TIA's, no chest pain on exertion, no dyspnea on exertion, no swelling of ankles     reports that he quit smoking about 42 years ago. His smoking use included cigarettes. He has never used smokeless tobacco.    reports current alcohol use of about 14.0 standard drinks per week. BP Readings from Last 2 Encounters:   11/28/22 114/69   10/25/22 (!) 154/68     IFG: repeat today. Hyperlipidemia  ROS: taking medications as instructed, no medication side effects noted  No new myalgias, no joint pains, no weakness  No TIA's, no chest pain on exertion, no dyspnea on exertion, no swelling of ankles. Lab Results   Component Value Date/Time    Cholesterol, total 172 01/18/2022 10:26 AM    HDL Cholesterol 57 01/18/2022 10:26 AM    LDL, calculated 85.6 01/18/2022 10:26 AM    VLDL, calculated 29.4 01/18/2022 10:26 AM    Triglyceride 147 01/18/2022 10:26 AM    CHOL/HDL Ratio 3.0 01/18/2022 10:26 AM     RA: seeing rheum and remains on SQ methotrexate. ROS  Review of Systems   Constitutional:  Negative for chills, fever and weight loss. HENT:  Negative for congestion and sore throat. Eyes:  Negative for blurred vision, double vision and photophobia. Respiratory:  Negative for cough, hemoptysis, sputum production, shortness of breath and wheezing. Cardiovascular:  Negative for chest pain, palpitations, orthopnea, claudication and leg swelling. Gastrointestinal:  Negative for abdominal pain, constipation, diarrhea, heartburn, nausea and vomiting. Genitourinary:  Negative for dysuria, frequency and urgency. Musculoskeletal:  Positive for myalgias. Negative for back pain, joint pain and neck pain. Skin:  Positive for rash. Neurological: Negative. Negative for headaches. Endo/Heme/Allergies:  Does not bruise/bleed easily. Psychiatric/Behavioral:  Negative for depression, hallucinations, memory loss, substance abuse and suicidal ideas.       Visit Vitals  /69 (BP 1 Location: Left upper arm, BP Patient Position: Sitting, BP Cuff Size: Large adult)   Pulse (!) 46   Temp 98.6 °F (37 °C) (Oral)   Resp 14   Ht 5' 8\" (1.727 m)   Wt 231 lb 9.6 oz (105.1 kg)   SpO2 93%   BMI 35.21 kg/m²       Physical Exam  Constitutional:       Appearance: He is well-developed. He is not diaphoretic. HENT:      Head: Normocephalic and atraumatic. Ears:      Comments: Bilateral bulging tympanic membranes     Mouth/Throat:      Mouth: Mucous membranes are moist.   Eyes:      Pupils: Pupils are equal, round, and reactive to light. Cardiovascular:      Rate and Rhythm: Normal rate and regular rhythm. Heart sounds: No murmur heard. Pulmonary:      Effort: Pulmonary effort is normal. No respiratory distress. Breath sounds: Normal breath sounds. Abdominal:      General: Abdomen is flat. Palpations: Abdomen is soft. Musculoskeletal:         General: Normal range of motion. Legs:       Comments: Rash to L ankle. No warmth or signs of cellulitis. Discolored. Flat. Skin:     General: Skin is warm and dry. Neurological:      Mental Status: He is alert and oriented to person, place, and time. Psychiatric:         Behavior: Behavior normal.         Current Outpatient Medications   Medication Sig    clotrimazole-betamethasone (LOTRISONE) topical cream Apply  to affected area two (2) times a day. amoxicillin-clavulanate (AUGMENTIN) 875-125 mg per tablet Take 1 Tablet by mouth every twelve (12) hours for 7 days. colestipoL (COLESTID) 1 gram tablet TAKE TWO TABLETS BY MOUTH TWICE A DAY    methotrexate, PF, 25 mg/mL injection INJECT 1 ML UNDER THE SKIN ONCE WEEKLY ON WEDNESDAYS    pantoprazole (PROTONIX) 40 mg tablet TAKE ONE TABLET BY MOUTH TWICE A DAY    allopurinoL (ZYLOPRIM) 300 mg tablet TAKE ONE TABLET BY MOUTH ONE TIME DAILY    Insulin Syringe-Needle U-100 1 mL 31 gauge x 5/16 syrg Use to inject methotrexate once weekly.     ergocalciferol (ERGOCALCIFEROL) 1,250 mcg (50,000 unit) capsule TAKE ONE CAPSULE BY MOUTH EVERY WEEK    folic acid (FOLVITE) 1 mg tablet TAKE ONE TABLET BY MOUTH DAILY    Insulin Syringe-Needle U-100 1 mL 30 gauge x 5/16 syrg 1 Each by Does Not Apply route Every Saturday. To be used for methotrexate solution    fexofenadine (ALLEGRA) 180 mg tablet Take  by mouth daily. guaiFENesin ER (MUCINEX) 600 mg ER tablet Take 600 mg by mouth as needed. 0.9% sodium chloride solp 100 mL with golimumab 12.5 mg/mL soln 2 mg/kg 2 mg/kg by IntraVENous route once. Every eight weeks  Indications: rhumetoid arthritis    lisinopril (PRINIVIL, ZESTRIL) 40 mg tablet Take 40 mg by mouth daily. aspirin delayed-release 81 mg tablet Take  by mouth daily. albuterol (PROVENTIL HFA, VENTOLIN HFA, PROAIR HFA) 90 mcg/actuation inhaler Take  by inhalation as needed. omega-3 fatty acids-vitamin e 1,000 mg cap Take 1 Cap by mouth.    citalopram (CELEXA) 20 mg tablet Take 1 Tab by mouth daily. atorvastatin (LIPITOR) 40 mg tablet Take 1 Tab by mouth daily. amlodipine (NORVASC) 2.5 mg tablet Take 1 Tab by mouth daily. No current facility-administered medications for this visit.         Past Medical History:   Diagnosis Date    Allergic rhinitis     Arthritis     RA    Autoimmune disease ([facility]     Basal cell carcinoma (BCC) in situ of skin     shoudler and scalp    Cancer (HCC)     prostate    GERD (gastroesophageal reflux disease)     Gout     Hypercholesterolemia     Hypertension     Melanoma in situ of back ([address]     Skin cancer (melanoma) ([facility]     Sleep apnea     Bipap at home      Past Surgical History:   Procedure Laterality Date    COLONOSCOPY N/A 5/6/2019    COLONOSCOPY performed by Kyra Gong MD at [facility], COLON, DIAGNOSTIC      09, due 12, done 11, due 14    HX CATARACT REMOVAL  04/2019    HX HEENT      wisdom teeth extraction    HX KNEE ARTHROSCOPY Left     HX KNEE REPLACEMENT Left 2012    HX OTHER SURGICAL N/A 05/31/2018    Basal cell removal of scalp HX PROSTATECTOMY      HX TONSILLECTOMY        Social History     Tobacco Use    Smoking status: Former     Types: Cigarettes     Quit date:      Years since quittin.9    Smokeless tobacco: Never   Substance Use Topics    Alcohol use: Yes     Alcohol/week: 14.0 standard drinks     Types: 14 Cans of beer per week     Comment: 4 days per week      Family History   Problem Relation Age of Onset    Dementia Mother     Heart Disease Father         CAD    Kidney Disease Father         renal failure    Diabetes Paternal Grandmother     Cancer Paternal Grandfather         colon    Cancer Other         family hx colon cancer        Allergies   Allergen Reactions    Stings [Sting, Bee] Swelling     Swelling at site        Assessment/Plan  Diagnoses and all orders for this visit:    1. Rash-.  Bacterial in nature. We will try topical steroids along with antifungal cream.  Low threshold to see dermatology if this persist.  Patient does follow closely with dermatology given his history of melanoma. -     clotrimazole-betamethasone (LOTRISONE) topical cream; Apply  to affected area two (2) times a day. 2. Recurrent depression (HCC)-mood is stable    3. Mixed hyperlipidemia-repeat levels  -     LIPID PANEL; Future    4. IFG (impaired fasting glucose)  -     HEMOGLOBIN A1C WITH EAG; Future    5. Sinusitis, unspecified chronicity, unspecified location-has not cleared over several weeks. Will place on a 7-day course of Augmentin  -     amoxicillin-clavulanate (AUGMENTIN) 875-125 mg per tablet; Take 1 Tablet by mouth every twelve (12) hours for 7 days. 6. Essential hypertension, benign-stable    7. Malignant melanoma, unspecified site [facility]    8. History of prostate cancer  -     PSA SCREENING (SCREENING); Future    9. Prostate cancer screening  -     PSA SCREENING (SCREENING);  Future          Tura Collet, MD  2022    This note was created with the help of speech recognition software (Dragon) and may contain some 'sound alike' errors.

## 2022-11-29 LAB
CHOLEST SERPL-MCNC: 164 MG/DL
EST. AVERAGE GLUCOSE BLD GHB EST-MCNC: 108 MG/DL
HBA1C MFR BLD: 5.4 % (ref 4–5.6)
HDLC SERPL-MCNC: 55 MG/DL
HDLC SERPL: 3 {RATIO} (ref 0–5)
LDLC SERPL CALC-MCNC: 76.2 MG/DL (ref 0–100)
PSA SERPL-MCNC: 0 NG/ML (ref 0.01–4)
TRIGL SERPL-MCNC: 164 MG/DL (ref ?–150)
VLDLC SERPL CALC-MCNC: 32.8 MG/DL

## 2022-12-05 RX ORDER — CALCIUM CARB/VITAMIN D3/VIT K1 500-100-40
1 TABLET,CHEWABLE ORAL
Qty: 90 EACH | Refills: 0 | Status: SHIPPED | OUTPATIENT
Start: 2022-12-10

## 2022-12-05 RX ORDER — CALCIUM CARB/VITAMIN D3/VIT K1 500-100-40
1 TABLET,CHEWABLE ORAL
COMMUNITY
End: 2022-12-05 | Stop reason: SDUPTHER

## 2022-12-05 NOTE — TELEPHONE ENCOUNTER
Received refill request for syringes to use w/ Methotrexate. Lab Results   Component Value Date/Time    Sodium 140 10/25/2022 09:45 AM    Potassium 4.3 10/25/2022 09:45 AM    Chloride 109 (H) 10/25/2022 09:45 AM    CO2 26 10/25/2022 09:45 AM    Anion gap 5 10/25/2022 09:45 AM    Glucose 97 10/25/2022 09:45 AM    BUN 18 10/25/2022 09:45 AM    Creatinine 0.78 10/25/2022 09:45 AM    BUN/Creatinine ratio 23 (H) 10/25/2022 09:45 AM    GFR est AA >60 08/30/2022 10:20 AM    GFR est non-AA >60 08/30/2022 10:20 AM    Calcium 8.7 10/25/2022 09:45 AM    Bilirubin, total 0.6 10/25/2022 09:45 AM    Alk.  phosphatase 59 10/25/2022 09:45 AM    Protein, total 6.2 (L) 10/25/2022 09:45 AM    Albumin 3.5 10/25/2022 09:45 AM    Globulin 2.7 10/25/2022 09:45 AM    A-G Ratio 1.3 10/25/2022 09:45 AM    ALT (SGPT) 45 10/25/2022 09:45 AM    AST (SGOT) 12 (L) 10/25/2022 09:45 AM     Lab Results   Component Value Date/Time    WBC 6.2 10/25/2022 09:45 AM    HGB 12.9 10/25/2022 09:45 AM    HCT 39.0 10/25/2022 09:45 AM    PLATELET 296 56/38/7495 09:45 AM    .3 (H) 10/25/2022 09:45 AM

## 2022-12-08 ENCOUNTER — OFFICE VISIT (OUTPATIENT)
Dept: RHEUMATOLOGY | Age: 77
End: 2022-12-08

## 2022-12-08 ENCOUNTER — APPOINTMENT (OUTPATIENT)
Dept: GENERAL RADIOLOGY | Age: 77
End: 2022-12-08
Payer: MEDICARE

## 2022-12-08 VITALS
RESPIRATION RATE: 16 BRPM | HEART RATE: 74 BPM | DIASTOLIC BLOOD PRESSURE: 50 MMHG | OXYGEN SATURATION: 95 % | WEIGHT: 225 LBS | HEIGHT: 68 IN | SYSTOLIC BLOOD PRESSURE: 100 MMHG | TEMPERATURE: 98 F | BODY MASS INDEX: 34.1 KG/M2

## 2022-12-08 DIAGNOSIS — R05.2 SUBACUTE COUGH: Primary | ICD-10-CM

## 2022-12-08 DIAGNOSIS — M05.79 SEROPOSITIVE RHEUMATOID ARTHRITIS OF MULTIPLE SITES (HCC): ICD-10-CM

## 2022-12-08 DIAGNOSIS — Z79.60 LONG-TERM USE OF IMMUNOSUPPRESSANT MEDICATION: ICD-10-CM

## 2022-12-08 DIAGNOSIS — M81.8 AGE-RELATED OSTEOPOROSIS WITHOUT FRACTURE: ICD-10-CM

## 2022-12-08 DIAGNOSIS — M1A.0720 CHRONIC IDIOPATHIC GOUT INVOLVING TOE OF LEFT FOOT WITHOUT TOPHUS: ICD-10-CM

## 2022-12-08 DIAGNOSIS — M81.0 AGE-RELATED OSTEOPOROSIS WITHOUT CURRENT PATHOLOGICAL FRACTURE: ICD-10-CM

## 2022-12-08 DIAGNOSIS — R05.2 SUBACUTE COUGH: ICD-10-CM

## 2022-12-08 PROCEDURE — G8536 NO DOC ELDER MAL SCRN: HCPCS | Performed by: INTERNAL MEDICINE

## 2022-12-08 PROCEDURE — 3074F SYST BP LT 130 MM HG: CPT | Performed by: INTERNAL MEDICINE

## 2022-12-08 PROCEDURE — G8417 CALC BMI ABV UP PARAM F/U: HCPCS | Performed by: INTERNAL MEDICINE

## 2022-12-08 PROCEDURE — 1101F PT FALLS ASSESS-DOCD LE1/YR: CPT | Performed by: INTERNAL MEDICINE

## 2022-12-08 PROCEDURE — 1123F ACP DISCUSS/DSCN MKR DOCD: CPT | Performed by: INTERNAL MEDICINE

## 2022-12-08 PROCEDURE — G0463 HOSPITAL OUTPT CLINIC VISIT: HCPCS | Performed by: INTERNAL MEDICINE

## 2022-12-08 PROCEDURE — 3078F DIAST BP <80 MM HG: CPT | Performed by: INTERNAL MEDICINE

## 2022-12-08 PROCEDURE — G8427 DOCREV CUR MEDS BY ELIG CLIN: HCPCS | Performed by: INTERNAL MEDICINE

## 2022-12-08 PROCEDURE — G9717 DOC PT DX DEP/BP F/U NT REQ: HCPCS | Performed by: INTERNAL MEDICINE

## 2022-12-08 PROCEDURE — 99214 OFFICE O/P EST MOD 30 MIN: CPT | Performed by: INTERNAL MEDICINE

## 2022-12-08 RX ORDER — METHOTREXATE 25 MG/ML
20 INJECTION INTRA-ARTERIAL; INTRAMUSCULAR; INTRATHECAL; INTRAVENOUS
Qty: 12 ML | Refills: 0 | Status: SHIPPED | OUTPATIENT
Start: 2022-12-14 | End: 2022-12-28

## 2022-12-08 NOTE — PROGRESS NOTES
Chief Complaint   Patient presents with    Joint Pain     1. Have you been to the ER, urgent care clinic since your last visit? Hospitalized since your last visit? No    2. Have you seen or consulted any other health care providers outside of the [address] [place] [address] since your last visit? Include any pap smears or colon screening.  No

## 2022-12-08 NOTE — PROGRESS NOTES
REASON FOR VISIT    This is a follow-up visit for Mr. Margarita Lee for     ICD-10-CM   1. Seropositive rheumatoid arthritis of multiple sites ([facility] M05.79   2. 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     Inflammatory arthritis phenotype includes:  Anti-CCP positive: yes (>250)  Rheumatoid factor positive: yes (>650)  Erosive disease: yes  Extra-articular manifestations include: left iritis    Immunosuppression Screening (10/15/2020):  Quantiferon TB: negative    PPD:  Not performed  Hepatitis B: negative   Hepatitis C: negative     Therapy History includes:  Current DMARD therapy include: methotrexate 25 mg SubQ every Wednesday (7/13/2021 to present), Simponi Aria every 8 weeks (5/08/2018 to present)  Prior DMARD therapy include: methotrexate 17.5 mg every Wednesday (6/15/2020 to 10/14/2020), methotrexate 20 mg every Wednesday (11/20/2017 to 6/15/2020; 10/15/2020 to 7/13/2021),  Discontinued DMARDs because of inefficacy: None  Discontinued DMARDs because of side effects: None  Unaffordable DMARDs: Enbrel    HISTORY OF PRESENT ILLNESS    Mr. Margarita Lee returns for a follow-up. LV 9/8/2022. He says that he feels OK today. Pt takes 1 mL of subQ methotrexate once per week with daily folic acid. Pt saw his eye doctor since , and there were no problems. Pt notes that he has some pain in his ankles with walking. He has no more than 20 minutes of morning stiffness each morning. Pt notes that he has had problems with a cough since . He just finished antibiotics a few days ago. He notes that the antibiotics helped, but he is still spitting stuff up. No fevers. Pt says that he has been experiencing slightly increased shortness of breath lately, though yesterday he played 18 holes of golf and walked the whole course. Pt went to his PCP because of a rash in his ankle that came up after a bite. He used steroid cream on it and it has pretty much cleared up.      REVIEW OF SYSTEMS    A comprehensive review of systems was performed and pertinent results are documented in the HPI, review of systems is otherwise non-contributory. PAST MEDICAL HISTORY    He has a past medical history of Allergic rhinitis, Arthritis, Autoimmune disease ([facility] Basal cell carcinoma (BCC) in situ of skin, Cancer ([facility] GERD (gastroesophageal reflux disease), Gout, Hypercholesterolemia, Hypertension, Melanoma in situ of back ([facility] Skin cancer (melanoma) ([facility] and Sleep apnea. FAMILY HISTORY    His family history includes Cancer in his paternal grandfather and another family member; Dementia in his mother; Diabetes in his paternal grandmother; Heart Disease in his father; Kidney Disease in his father. SOCIAL HISTORY    He reports that he quit smoking about 42 years ago. His smoking use included cigarettes. He has never used smokeless tobacco. He reports current alcohol use of about 14.0 standard drinks per week. He reports that he does not use drugs. MEDICATIONS    Current Outpatient Medications   Medication Sig    [START ON 12/10/2022] Insulin Syringe-Needle U-100 1 mL 31 gauge x 5/16 syrg 1 Syringe by Does Not Apply route Every Saturday. clotrimazole-betamethasone (LOTRISONE) topical cream Apply  to affected area two (2) times a day. colestipoL (COLESTID) 1 gram tablet TAKE TWO TABLETS BY MOUTH TWICE A DAY    methotrexate, PF, 25 mg/mL injection INJECT 1 ML UNDER THE SKIN ONCE WEEKLY ON WEDNESDAYS    pantoprazole (PROTONIX) 40 mg tablet TAKE ONE TABLET BY MOUTH TWICE A DAY    allopurinoL (ZYLOPRIM) 300 mg tablet TAKE ONE TABLET BY MOUTH ONE TIME DAILY    ergocalciferol (ERGOCALCIFEROL) 1,250 mcg (50,000 unit) capsule TAKE ONE CAPSULE BY MOUTH EVERY WEEK    folic acid (FOLVITE) 1 mg tablet TAKE ONE TABLET BY MOUTH DAILY    fexofenadine (ALLEGRA) 180 mg tablet Take  by mouth daily. guaiFENesin ER (MUCINEX) 600 mg ER tablet Take 600 mg by mouth as needed.     0.9% sodium chloride solp 100 mL with golimumab 12.5 mg/mL soln 2 mg/kg 2 mg/kg by IntraVENous route once. Every eight weeks  Indications: rhumetoid arthritis    lisinopril (PRINIVIL, ZESTRIL) 40 mg tablet Take 40 mg by mouth daily. aspirin delayed-release 81 mg tablet Take  by mouth daily. albuterol (PROVENTIL HFA, VENTOLIN HFA, PROAIR HFA) 90 mcg/actuation inhaler Take  by inhalation as needed. omega-3 fatty acids-vitamin e 1,000 mg cap Take 1 Cap by mouth.    citalopram (CELEXA) 20 mg tablet Take 1 Tab by mouth daily. atorvastatin (LIPITOR) 40 mg tablet Take 1 Tab by mouth daily. amlodipine (NORVASC) 2.5 mg tablet Take 1 Tab by mouth daily. No current facility-administered medications for this visit. ALLERGIES    Allergies   Allergen Reactions    Stings [Sting, Bee] Swelling     Swelling at site       PHYSICAL EXAMINATION    Visit Vitals  BP (!) 100/50 (BP 1 Location: Left upper arm, BP Patient Position: Sitting, BP Cuff Size: Adult)   Pulse 74   Temp 98 °F (36.7 °C) (Oral)   Resp 16   Ht 5' 8\" (1.727 m)   Wt 225 lb (102.1 kg)   SpO2 95%   BMI 34.21 kg/m²     General:  The patient is well developed, well nourished, alert, and in no apparent distress. Eyes: Sclera are anicteric. Interval cleared bilateral conjunctival injection, no photophobia  HEENT:  Oropharynx is clear. No oral ulcers. Adequate salivary pooling. No cervical or supraclavicular lymphadenopathy. Lungs:  Decreased bibasilar breath sounds without crackles, wheeze, or stridor. Normal respiratory effort. Cor:  Regular rate and rhythm. No murmur rub or gallop. Abdomen: Soft, non-tender, without hepatomegaly or masses. Extremities: No calf tenderness or edema. Warm and well perfused. Skin: Cratered left forearm lesion s/p cryoablation. No tophi. Neuro: Nonfocal, no foot or wrist drop. Negative straight leg raise bilaterally.   Musculoskeletal:    A comprehensive musculoskeletal exam was performed for all joints of each upper and lower extremity and assessed for swelling, tenderness and range of motion. Results are documented as below:  Bilateral shoulder crepitus, left shoulder stably limited to 60deg abduction, flexion intact  Ulnar deviation of MCPs, no tenderness or synovitis. Paralumbar tenderness bilaterally. Left knee TKA scar. Bilateral hallux valgus without tophi  No evidence of synovitis in the small joints of the hands, wrists, shoulders, elbows, hips, knees or ankles. DATA REVIEW    Laboratory   11/28/22: Triglyceride 164, HGB A1c 5.4, Prostate specific Ag 0  10/25/22: CRP <0.29 mg/dL, ESR 19, Cr 0.78, LFT WNL,   8/30/22: CRP <0.29 mg/dL, ESR 13, Cr 0.71, ALT 42, AST 13, Tbili 0.8, Alk phos 71, CBC WNL, CBC WNL  7/5/22: Uric acid 4, CRP 0.3 mg/L, WBC 6.1, HGB 12.6, Plt 142, ESR 39, Cr 0.74, LFT WNL  5/10/22: CRP 0.6 mg/L, ESR 6, Cr 0.81, LFT WNL, WBC 7.2, HGB 13.3, Plt 189  3/10/22: MARGARET speckled pattern 1:160, quantiFERON plus negative, Hepatitis B core IgM nonreactive, Hep B cpre Ab total negative, Hep B surface Ab nonreactive, Hep B surface Ag negative, CRP 6.1 mg/L, ESR 13, Cr 1.08, LFT WNL, EBC 4.4, HGB 14.1, Plt 174    Recent laboratory results were reviewed, summarized, and discussed with the patient. Imaging    Musculoskeletal Ultrasound    None    Radiographs    XR BA SWALLOW ESOPHOGRAM (10/20/22):   1. Hiatal hernia. 2. Gastroesophageal reflux. 3. No mass or constricting lesion. XR SPINE LUMB (9/8/22): Impression: Multilevel lumbosacral degenerative disc disease. Grade 1 spondylolisthesis L4-5. No acute abnormality. Bilateral Hand 11/20/2017: RIGHT: No fracture or dislocation on plain film. The bones are osteopenic. Mild narrowing of the radiocarpal joint. Probable erosion of the radius and lunate at the radiocarpal joint. No widening of the intercarpal spaces. Subtle erosion of the ulnar styloid. Mild narrowing of the triscaphe joint is age-appropriate.  Irregular arthritis of the first and second CMC joints includes osteophytes and erosions. There are erosions of the first through fourth metacarpal heads at the MCP points. Moderate narrowing and anterior subluxation of the second MCP joint. IP joints are within normal limits. No chondrocalcinosis or periosteal reaction. LEFT: No fracture or dislocation on plain film. The bones are osteopenic. Moderate narrowing of the radiocarpal joint with subtle erosions of the lunate. Marrow DR UVJ with erosions. No definite ulnar styloid erosion. Triscap and first [facility] joint osteoarthritis are age-appropriate. Large erosions on both sides of the second MCP joint and in the third metacarpal head. IP joints are within normal limits. No chondrocalcinosis or periosteal reaction. Bilateral Foot 11/20/2017: RIGHT: No fracture or dislocation on plain film. Bones are osteopenic. Talonavicular joint space narrowing, osteophytes, and erosions. Subtalar arthritis is partially imaged. TMT joints are within normal limits. Severe narrowing of the first MTP joint with osteophytes and erosions. There is erosion and the fifth tarsal head. Mild narrowing of the second MTP joint without definable erosion. No periosteal reaction. LEFT: No fracture or dislocation on plain film. Bones are osteopenic. Intertarsal joints are within normal limits. Moderate narrowing of the first MTP joint with central and marginal erosions. 35 degrees hallux valgus angulation and fibular subluxation of the first proximal phalanx. There are also erosions in the laterally subluxed hallux sesamoids. Mild joint space narrowing and erosions of the third MTP joint. Fragmentation of the fourth middle phalanx is adjacent to the fourth PIP joint erosions. There are also erosions of the second and fifth PIP joints. First IP joint osteoarthritis is mild. CT Imaging    CT Right Upper Extremity with contrast 10/19/2017: examination of the soft tissues demonstrates no drainable fluid collection.  There is no pathologically enlarged nodes within the right axilla. Alignment is normal. There is no bone destruction or fracture. No significant fatty atrophy. MR Imaging    None    DXA     9/21/21 DEXA BONE DENSITY STUDY: Femoral Neck Left:  Bone mineral density (gm/cm2):  0.745  % of peak bone mass:  70  % for age matched controls:  66  T-score:  -2.5  Z-score:  -1.6  Total Hip Left:  Bone mineral density (gm/cm2):  0.897  % of peak bone mass:  81  % for age matched controls:  80  T-score:  -1.4  Z-score:  -1  Lumbar Spine:  L1 plus L2  Bone mineral density (gm/cm2):  1.080  % of peak bone mass:  89  % for age matched controls:  80  T-score:  -1.1  Z-score:  -1.2  33% Radius Left:  Bone mineral density (gm/cm2):  0.582  % of peak bone mass:  72  % for age matched controls:  80  T-score:  -2.8  Z-score:  -1.8       ASSESSMENT AND PLAN    This is a follow-up visit for Mr. Christian Dolan for seropositive erosive rheumatoid arthritis, nontophaceous gout, and bilateral iritis, with low disease actiity with full-dose subQ methotrexate and Simponi infusions. Trialing decrease in methotrexate to 20mg weekly, updating plain films to ensure no occult radiographic progression. 1. Subacute cough  - XR CHEST PA LAT; Future    2. Seropositive rheumatoid arthritis of multiple sites (HCC)  - Decrease methotrexate to 20mg subQ weekly  - Cont Simponi q2mo  - XR HAND RT MIN 3 V; Future  - XR HAND LT MIN 3 V; Future  - XR FOOT RT MIN 3 V; Future  - XR FOOT LT MIN 3 V; Future  - XR CHEST PA LAT; Future  - C REACTIVE PROTEIN, QT; Future  - CBC WITH AUTOMATED DIFF; Future  - METABOLIC PANEL, COMPREHENSIVE; Future  - SED RATE (ESR); Future    3. Long-term use of immunosuppressant medication  - CBC WITH AUTOMATED DIFF; Future  - METABOLIC PANEL, COMPREHENSIVE; Future    4. Chronic idiopathic gout involving toe of left foot without tophus  - Cont allopurinol 300mg daily, no interval flares  - Uric acid 4.0 on 7/2/22, at goal <5.0    5.  Age-related osteoporosis without fracture  - Cont Prolia q6mo, due this month      Patient Instructions   1) Decrease to 0.8 mL of sub cutaneous Methotrexate once per week with daily folic acid. Let me know if your joints are more stiff and painful on this dose and we can increase our dose again. 2) Continue to take Simponi every 2 months as scheduled. 3) I am ordering hand, foot, and chest Xrays for you to get at your earliest convenience. 4) Check labs in 3 months. 5) Follow up in 3 months. Let me know if you have any questions or concerns in the meantime. TODAY'S ORDERS    Orders Placed This Encounter    XR HAND RT MIN 3 V    XR HAND LT MIN 3 V    XR FOOT RT MIN 3 V    XR FOOT LT MIN 3 V    XR CHEST PA LAT    C REACTIVE PROTEIN, QT    CBC WITH AUTOMATED DIFF    METABOLIC PANEL, COMPREHENSIVE    SED RATE (ESR)    DISCONTD: methotrexate, PF, 25 mg/mL injection         Future Appointments   Date Time Provider Raiza Sanchez   3/9/2023  9:40 AM Rae Shepherd MD [facility] BS AMB     Face to face time: 15 minutes  Note preparation and records review day of service: 17 minutes  Total provider time day of service: 32 minutes    This was scribed by Shona Maldonado in the presence of Dr. Tu Dent. The note was reviewed and amended personally, and I agree with the above information.     Carolyn Murguia MD    Adult Rheumatology   [facility]  A Part of [facility], [address] [place] [address]   Phone [phone]  Fax [phone]

## 2022-12-08 NOTE — PATIENT INSTRUCTIONS
1) Decrease to 0.8 mL of sub cutaneous Methotrexate once per week with daily folic acid. Let me know if your joints are more stiff and painful on this dose and we can increase our dose again. 2) Continue to take Simponi every 2 months as scheduled. 3) I am ordering hand, foot, and chest Xrays for you to get at your earliest convenience. 4) Check labs in 3 months. 5) Follow up in 3 months. Let me know if you have any questions or concerns in the meantime.

## 2022-12-20 ENCOUNTER — HOSPITAL ENCOUNTER (OUTPATIENT)
Dept: INFUSION THERAPY | Age: 77
Discharge: HOME | End: 2022-12-20
Attending: INTERNAL MEDICINE
Payer: MEDICARE

## 2022-12-20 ENCOUNTER — OFFICE VISIT (OUTPATIENT)
Dept: INTERNAL MEDICINE CLINIC | Age: 77
End: 2022-12-20
Payer: MEDICARE

## 2022-12-20 VITALS
RESPIRATION RATE: 15 BRPM | HEIGHT: 68 IN | OXYGEN SATURATION: 92 % | TEMPERATURE: 98 F | SYSTOLIC BLOOD PRESSURE: 138 MMHG | BODY MASS INDEX: 34.83 KG/M2 | WEIGHT: 229.8 LBS | DIASTOLIC BLOOD PRESSURE: 61 MMHG | HEART RATE: 45 BPM

## 2022-12-20 VITALS
OXYGEN SATURATION: 96 % | HEART RATE: 74 BPM | RESPIRATION RATE: 16 BRPM | WEIGHT: 225.5 LBS | BODY MASS INDEX: 34.17 KG/M2 | TEMPERATURE: 97.6 F | HEIGHT: 68 IN | DIASTOLIC BLOOD PRESSURE: 74 MMHG | SYSTOLIC BLOOD PRESSURE: 156 MMHG

## 2022-12-20 DIAGNOSIS — M05.79 SEROPOSITIVE RHEUMATOID ARTHRITIS OF MULTIPLE SITES (HCC): ICD-10-CM

## 2022-12-20 DIAGNOSIS — Z00.00 MEDICARE ANNUAL WELLNESS VISIT, SUBSEQUENT: Primary | ICD-10-CM

## 2022-12-20 DIAGNOSIS — M81.8 AGE-RELATED OSTEOPOROSIS WITHOUT FRACTURE: Primary | ICD-10-CM

## 2022-12-20 DIAGNOSIS — M10.9 GOUT, UNSPECIFIED CAUSE, UNSPECIFIED CHRONICITY, UNSPECIFIED SITE: ICD-10-CM

## 2022-12-20 DIAGNOSIS — J40 BRONCHITIS: ICD-10-CM

## 2022-12-20 LAB
ALBUMIN SERPL-MCNC: 3.6 G/DL (ref 3.5–5)
ALBUMIN/GLOB SERPL: 1.1 {RATIO} (ref 1.1–2.2)
ALP SERPL-CCNC: 70 U/L (ref 45–117)
ALT SERPL-CCNC: 49 U/L (ref 12–78)
ANION GAP SERPL CALC-SCNC: 8 MMOL/L (ref 5–15)
AST SERPL-CCNC: 22 U/L (ref 15–37)
BASOPHILS # BLD: 0 K/UL (ref 0–0.1)
BASOPHILS NFR BLD: 1 % (ref 0–1)
BILIRUB SERPL-MCNC: 0.8 MG/DL (ref 0.2–1)
BUN SERPL-MCNC: 17 MG/DL (ref 6–20)
BUN/CREAT SERPL: 21 (ref 12–20)
CALCIUM SERPL-MCNC: 9.2 MG/DL (ref 8.5–10.1)
CHLORIDE SERPL-SCNC: 108 MMOL/L (ref 97–108)
CO2 SERPL-SCNC: 24 MMOL/L (ref 21–32)
CREAT SERPL-MCNC: 0.82 MG/DL (ref 0.7–1.3)
CRP SERPL HS-MCNC: 1.4 MG/L
DIFFERENTIAL METHOD BLD: ABNORMAL
EOSINOPHIL # BLD: 0.2 K/UL (ref 0–0.4)
EOSINOPHIL NFR BLD: 2 % (ref 0–7)
ERYTHROCYTE [DISTWIDTH] IN BLOOD BY AUTOMATED COUNT: 13.2 % (ref 11.5–14.5)
ERYTHROCYTE [SEDIMENTATION RATE] IN BLOOD: 12 MM/HR (ref 0–20)
GLOBULIN SER CALC-MCNC: 3.4 G/DL (ref 2–4)
GLUCOSE SERPL-MCNC: 97 MG/DL (ref 65–100)
HCT VFR BLD AUTO: 40.8 % (ref 36.6–50.3)
HGB BLD-MCNC: 13.7 G/DL (ref 12.1–17)
IMM GRANULOCYTES # BLD AUTO: 0 K/UL (ref 0–0.04)
IMM GRANULOCYTES NFR BLD AUTO: 0 % (ref 0–0.5)
LYMPHOCYTES # BLD: 1.8 K/UL (ref 0.8–3.5)
LYMPHOCYTES NFR BLD: 21 % (ref 12–49)
MCH RBC QN AUTO: 34.8 PG (ref 26–34)
MCHC RBC AUTO-ENTMCNC: 33.6 G/DL (ref 30–36.5)
MCV RBC AUTO: 103.6 FL (ref 80–99)
MONOCYTES # BLD: 0.9 K/UL (ref 0–1)
MONOCYTES NFR BLD: 11 % (ref 5–13)
NEUTS SEG # BLD: 5.4 K/UL (ref 1.8–8)
NEUTS SEG NFR BLD: 65 % (ref 32–75)
NRBC # BLD: 0 K/UL (ref 0–0.01)
NRBC BLD-RTO: 0 PER 100 WBC
PLATELET # BLD AUTO: 205 K/UL (ref 150–400)
PMV BLD AUTO: 11.1 FL (ref 8.9–12.9)
POTASSIUM SERPL-SCNC: 4.6 MMOL/L (ref 3.5–5.1)
PROT SERPL-MCNC: 7 G/DL (ref 6.4–8.2)
RBC # BLD AUTO: 3.94 M/UL (ref 4.1–5.7)
SODIUM SERPL-SCNC: 140 MMOL/L (ref 136–145)
WBC # BLD AUTO: 8.2 K/UL (ref 4.1–11.1)

## 2022-12-20 PROCEDURE — 74011250636 HC RX REV CODE- 250/636: Performed by: INTERNAL MEDICINE

## 2022-12-20 PROCEDURE — G8417 CALC BMI ABV UP PARAM F/U: HCPCS | Performed by: INTERNAL MEDICINE

## 2022-12-20 PROCEDURE — G8536 NO DOC ELDER MAL SCRN: HCPCS | Performed by: INTERNAL MEDICINE

## 2022-12-20 PROCEDURE — 36415 COLL VENOUS BLD VENIPUNCTURE: CPT

## 2022-12-20 PROCEDURE — G8427 DOCREV CUR MEDS BY ELIG CLIN: HCPCS | Performed by: INTERNAL MEDICINE

## 2022-12-20 PROCEDURE — 96372 THER/PROPH/DIAG INJ SC/IM: CPT

## 2022-12-20 PROCEDURE — 86141 C-REACTIVE PROTEIN HS: CPT

## 2022-12-20 PROCEDURE — 85652 RBC SED RATE AUTOMATED: CPT

## 2022-12-20 PROCEDURE — 80053 COMPREHEN METABOLIC PANEL: CPT

## 2022-12-20 PROCEDURE — G8754 DIAS BP LESS 90: HCPCS | Performed by: INTERNAL MEDICINE

## 2022-12-20 PROCEDURE — 85025 COMPLETE CBC W/AUTO DIFF WBC: CPT

## 2022-12-20 PROCEDURE — 74011000258 HC RX REV CODE- 258: Performed by: INTERNAL MEDICINE

## 2022-12-20 PROCEDURE — 1101F PT FALLS ASSESS-DOCD LE1/YR: CPT | Performed by: INTERNAL MEDICINE

## 2022-12-20 PROCEDURE — 96413 CHEMO IV INFUSION 1 HR: CPT

## 2022-12-20 PROCEDURE — G8752 SYS BP LESS 140: HCPCS | Performed by: INTERNAL MEDICINE

## 2022-12-20 PROCEDURE — 96401 CHEMO ANTI-NEOPL SQ/IM: CPT

## 2022-12-20 PROCEDURE — G0439 PPPS, SUBSEQ VISIT: HCPCS | Performed by: INTERNAL MEDICINE

## 2022-12-20 PROCEDURE — G9717 DOC PT DX DEP/BP F/U NT REQ: HCPCS | Performed by: INTERNAL MEDICINE

## 2022-12-20 RX ORDER — HYDROCORTISONE SODIUM SUCCINATE 100 MG/2ML
100 INJECTION, POWDER, FOR SOLUTION INTRAMUSCULAR; INTRAVENOUS AS NEEDED
Status: ACTIVE | OUTPATIENT
Start: 2022-12-20 | End: 2022-12-20

## 2022-12-20 RX ORDER — ONDANSETRON 2 MG/ML
8 INJECTION INTRAMUSCULAR; INTRAVENOUS AS NEEDED
OUTPATIENT
Start: 2023-02-12

## 2022-12-20 RX ORDER — EPINEPHRINE 1 MG/ML
0.3 INJECTION, SOLUTION, CONCENTRATE INTRAVENOUS AS NEEDED
Status: ACTIVE | OUTPATIENT
Start: 2022-12-20 | End: 2022-12-20

## 2022-12-20 RX ORDER — SODIUM CHLORIDE 9 MG/ML
25 INJECTION, SOLUTION INTRAVENOUS CONTINUOUS
Status: DISPENSED | OUTPATIENT
Start: 2022-12-20 | End: 2022-12-20

## 2022-12-20 RX ORDER — ALBUTEROL SULFATE 90 UG/1
1-2 AEROSOL, METERED RESPIRATORY (INHALATION) AS NEEDED
Status: ACTIVE | OUTPATIENT
Start: 2022-12-20 | End: 2022-12-20

## 2022-12-20 RX ORDER — DIPHENHYDRAMINE HYDROCHLORIDE 50 MG/ML
50 INJECTION, SOLUTION INTRAMUSCULAR; INTRAVENOUS AS NEEDED
Start: 2023-01-03

## 2022-12-20 RX ORDER — ASCORBIC ACID 500 MG
1000 TABLET ORAL DAILY
COMMUNITY

## 2022-12-20 RX ORDER — SODIUM CHLORIDE 9 MG/ML
25 INJECTION, SOLUTION INTRAVENOUS CONTINUOUS
OUTPATIENT
Start: 2023-02-12

## 2022-12-20 RX ORDER — ONDANSETRON 2 MG/ML
8 INJECTION INTRAMUSCULAR; INTRAVENOUS AS NEEDED
OUTPATIENT
Start: 2023-01-03

## 2022-12-20 RX ORDER — HEPARIN 100 UNIT/ML
300-500 SYRINGE INTRAVENOUS AS NEEDED
Status: ACTIVE | OUTPATIENT
Start: 2022-12-20 | End: 2022-12-20

## 2022-12-20 RX ORDER — DIPHENHYDRAMINE HYDROCHLORIDE 50 MG/ML
25 INJECTION, SOLUTION INTRAMUSCULAR; INTRAVENOUS AS NEEDED
Start: 2023-01-03

## 2022-12-20 RX ORDER — DIPHENHYDRAMINE HYDROCHLORIDE 50 MG/ML
50 INJECTION, SOLUTION INTRAMUSCULAR; INTRAVENOUS AS NEEDED
Start: 2023-02-12

## 2022-12-20 RX ORDER — ALBUTEROL SULFATE 0.83 MG/ML
2.5 SOLUTION RESPIRATORY (INHALATION) AS NEEDED
Start: 2023-02-12

## 2022-12-20 RX ORDER — DIPHENHYDRAMINE HYDROCHLORIDE 50 MG/ML
50 INJECTION, SOLUTION INTRAMUSCULAR; INTRAVENOUS AS NEEDED
Status: ACTIVE | OUTPATIENT
Start: 2022-12-20 | End: 2022-12-20

## 2022-12-20 RX ORDER — SODIUM CHLORIDE 0.9 % (FLUSH) 0.9 %
10 SYRINGE (ML) INJECTION AS NEEDED
Status: DISPENSED | OUTPATIENT
Start: 2022-12-20 | End: 2022-12-20

## 2022-12-20 RX ORDER — HYDROCORTISONE SODIUM SUCCINATE 100 MG/2ML
100 INJECTION, POWDER, FOR SOLUTION INTRAMUSCULAR; INTRAVENOUS AS NEEDED
OUTPATIENT
Start: 2023-01-03

## 2022-12-20 RX ORDER — EPINEPHRINE 1 MG/ML
0.3 INJECTION, SOLUTION, CONCENTRATE INTRAVENOUS AS NEEDED
OUTPATIENT
Start: 2023-01-03

## 2022-12-20 RX ORDER — CEFDINIR 300 MG/1
300 CAPSULE ORAL 2 TIMES DAILY
Qty: 14 CAPSULE | Refills: 0 | Status: SHIPPED | OUTPATIENT
Start: 2022-12-20 | End: 2022-12-27

## 2022-12-20 RX ORDER — DIPHENHYDRAMINE HYDROCHLORIDE 50 MG/ML
25 INJECTION, SOLUTION INTRAMUSCULAR; INTRAVENOUS AS NEEDED
Start: 2023-02-12

## 2022-12-20 RX ORDER — ACETAMINOPHEN 325 MG/1
650 TABLET ORAL AS NEEDED
Start: 2023-01-03

## 2022-12-20 RX ORDER — SODIUM CHLORIDE 9 MG/ML
10 INJECTION INTRAMUSCULAR; INTRAVENOUS; SUBCUTANEOUS AS NEEDED
Status: ACTIVE | OUTPATIENT
Start: 2022-12-20 | End: 2022-12-20

## 2022-12-20 RX ORDER — SODIUM CHLORIDE 9 MG/ML
10 INJECTION INTRAMUSCULAR; INTRAVENOUS; SUBCUTANEOUS AS NEEDED
OUTPATIENT
Start: 2023-02-12

## 2022-12-20 RX ORDER — HEPARIN 100 UNIT/ML
300-500 SYRINGE INTRAVENOUS AS NEEDED
Start: 2023-02-12

## 2022-12-20 RX ORDER — ZOSTER VACCINE RECOMBINANT, ADJUVANTED 50 MCG/0.5
0.5 KIT INTRAMUSCULAR ONCE
Qty: 0.5 ML | Refills: 1 | Status: SHIPPED | OUTPATIENT
Start: 2022-12-20 | End: 2022-12-20

## 2022-12-20 RX ORDER — SODIUM CHLORIDE 0.9 % (FLUSH) 0.9 %
10 SYRINGE (ML) INJECTION AS NEEDED
OUTPATIENT
Start: 2023-02-12

## 2022-12-20 RX ORDER — EPINEPHRINE 1 MG/ML
0.3 INJECTION, SOLUTION, CONCENTRATE INTRAVENOUS AS NEEDED
OUTPATIENT
Start: 2023-02-12

## 2022-12-20 RX ORDER — ALBUTEROL SULFATE 0.83 MG/ML
2.5 SOLUTION RESPIRATORY (INHALATION) AS NEEDED
Start: 2023-01-03

## 2022-12-20 RX ORDER — ACETAMINOPHEN 325 MG/1
650 TABLET ORAL AS NEEDED
Start: 2023-02-12

## 2022-12-20 RX ORDER — HYDROCORTISONE SODIUM SUCCINATE 100 MG/2ML
100 INJECTION, POWDER, FOR SOLUTION INTRAMUSCULAR; INTRAVENOUS AS NEEDED
OUTPATIENT
Start: 2023-02-12

## 2022-12-20 RX ADMIN — DENOSUMAB 60 MG: 60 INJECTION SUBCUTANEOUS at 12:21

## 2022-12-20 RX ADMIN — SODIUM CHLORIDE 25 ML/HR: 9 INJECTION, SOLUTION INTRAVENOUS at 11:21

## 2022-12-20 RX ADMIN — GOLIMUMAB 212.5 MG: 50 SOLUTION INTRAVENOUS at 11:35

## 2022-12-20 NOTE — PROGRESS NOTES
[facility] Progress Note    Date: 2022    Name: Enmanuel Bedoya    MRN: [id number]         : 1945    Mr. Billie Lorenzo Arrived ambulatory and in no distress for Simponi and prollia. Assessment was completed, no acute issues at this time, no new complaints voiced. Chemotherapy Flowsheet 2022   Cycle [id number]   Date 2022   Drug / Regimen Simponi and Prolia   Notes given\           Mr. Sarabia's vitals were reviewed. Visit Vitals  BP (!) 156/74   Pulse 74   Temp 97.6 °F (36.4 °C)   Resp 16   Ht 5' 8\" (1.727 m)   Wt 102.3 kg (225 lb 8 oz)   SpO2 96%   BMI 34.29 kg/m²       Lab results were obtained and reviewed. Recent Results (from the past 12 hour(s))   CBC WITH AUTOMATED DIFF    Collection Time: 22 10:30 AM   Result Value Ref Range    WBC 8.2 4.1 - 11.1 K/uL    RBC 3.94 (L) 4.10 - 5.70 M/uL    HGB 13.7 12.1 - 17.0 g/dL    HCT 40.8 36.6 - 50.3 %    .6 (H) 80.0 - 99.0 FL    MCH 34.8 (H) 26.0 - 34.0 PG    MCHC 33.6 30.0 - 36.5 g/dL    RDW 13.2 11.5 - 14.5 %    PLATELET 118 049 - 219 K/uL    MPV 11.1 8.9 - 12.9 FL    NRBC 0.0 0  WBC    ABSOLUTE NRBC 0.00 0.00 - 0.01 K/uL    NEUTROPHILS 65 32 - 75 %    LYMPHOCYTES 21 12 - 49 %    MONOCYTES 11 5 - 13 %    EOSINOPHILS 2 0 - 7 %    BASOPHILS 1 0 - 1 %    IMMATURE GRANULOCYTES 0 0.0 - 0.5 %    ABS. NEUTROPHILS 5.4 1.8 - 8.0 K/UL    ABS. LYMPHOCYTES 1.8 0.8 - 3.5 K/UL    ABS. MONOCYTES 0.9 0.0 - 1.0 K/UL    ABS. EOSINOPHILS 0.2 0.0 - 0.4 K/UL    ABS. BASOPHILS 0.0 0.0 - 0.1 K/UL    ABS. IMM.  GRANS. 0.0 0.00 - 0.04 K/UL    DF AUTOMATED     METABOLIC PANEL, COMPREHENSIVE    Collection Time: 22 10:30 AM   Result Value Ref Range    Sodium 140 136 - 145 mmol/L    Potassium 4.6 3.5 - 5.1 mmol/L    Chloride 108 97 - 108 mmol/L    CO2 24 21 - 32 mmol/L    Anion gap 8 5 - 15 mmol/L    Glucose 97 65 - 100 mg/dL    BUN 17 6 - 20 MG/DL    Creatinine 0.82 0.70 - 1.30 MG/DL    BUN/Creatinine ratio 21 (H) 12 - 20      eGFR >60 >60 ml/min/1.73m2    Calcium 9.2 8.5 - 10.1 MG/DL    Bilirubin, total 0.8 0.2 - 1.0 MG/DL    ALT (SGPT) 49 12 - 78 U/L    AST (SGOT) 22 15 - 37 U/L    Alk. phosphatase 70 45 - 117 U/L    Protein, total 7.0 6.4 - 8.2 g/dL    Albumin 3.6 3.5 - 5.0 g/dL    Globulin 3.4 2.0 - 4.0 g/dL    A-G Ratio 1.1 1.1 - 2.2     CRP, HIGH SENSITIVITY    Collection Time: 12/20/22 10:30 AM   Result Value Ref Range    CRP, High sensitivity 1.4 mg/L   SED RATE (ESR)    Collection Time: 12/20/22 10:30 AM   Result Value Ref Range    Sed rate, automated 12 0 - 20 mm/hr       Medications:      Medications Administered       0.9% sodium chloride infusion       Admin Date  12/20/2022 Action  New Bag Dose  25 mL/hr Rate  25 mL/hr Route  IntraVENous Administered By  Yves Teresa RN              denosumab (PROLIA) injection 60 mg       Admin Date  12/20/2022 Action  Given Dose  60 mg Route  SubCUTAneous Administered By  Yves Teresa RN              golimumab (SIMPONI ARIA) 212.5 mg in 0.9% sodium chloride, overfill volume 127 mL infusion       Admin Date  12/20/2022 Action  New Bag Dose  212.5 mg Rate  254 mL/hr Route  IntraVENous Administered By  Yves Teresa RN                      Two nurses verified prior to administering: Drug name, drug dose, infusion volume or drug volume when prepared in a syringe, rate of administration, route of administration,  expiration dates and/or times, appearance and physical integrity of the drugs, rate set on infusion pump, when used sequencing of drug administration. Mr. Herminio Hendrix tolerated treatment well and was discharged from [place] [id number] in stable condition at 1230. PIV de accessed per protocol.    Florence Velázquez RN  December 20, 2022

## 2022-12-21 NOTE — PROGRESS NOTES
This is a Subsequent Medicare Annual Wellness Visit providing Personalized Prevention Plan Services (PPPS) (Performed 12 months after initial AWV and PPPS )    I have reviewed the patient's medical history in detail and updated the computerized patient record. Chief Complaint   Patient presents with    Annual Wellness Visit    Labs     Nonfasting. Patient of Dr. Mary Chavira presents for a wellness visit. Blood work done on the 28th showed controlled cholesterol with LDL 76, hemoglobin A1c with no signs of prediabetes, 5.4%, PSA normal and undetectable. He has no function, metabolic panel, CBC and inflammatory markers followed by rheumatology. Seen rheumatology for seropositive rheumatoid arthritis. He is receiving Simponi and Prolia injections today. Feel the symptoms are overall stable. History     Past Medical History:   Diagnosis Date    Allergic rhinitis     Arthritis     RA    Autoimmune disease ([address]     Basal cell carcinoma (BCC) in situ of skin     shoudler and scalp    Cancer (HCC)     prostate    GERD (gastroesophageal reflux disease)     Gout     Hypercholesterolemia     Hypertension     Melanoma in situ of back ([address]     Skin cancer (melanoma) ([address]     Sleep apnea     Bipap at home       Past Surgical History:   Procedure Laterality Date    COLONOSCOPY N/A 05/06/2019    7 polyps. repeat 3 years. COLONOSCOPY performed by Roseann Sparks MD at [address] [facility], COLON, DIAGNOSTIC      09, due 12, done 11, due 14    HX CATARACT REMOVAL  04/2019    HX HEENT      wisdom teeth extraction    HX KNEE ARTHROSCOPY Left     HX KNEE REPLACEMENT Left 2012    HX OTHER SURGICAL N/A 05/31/2018    Basal cell removal of scalp    HX PROSTATECTOMY      HX TONSILLECTOMY         Current Outpatient Medications   Medication Sig    ascorbic acid, vitamin C, (Vitamin C) 500 mg tablet Take 1,000 mg by mouth daily.     cefdinir (OMNICEF) 300 mg capsule Take 1 Capsule by mouth two (2) times a day for 7 days.    Shingrix, PF, 50 mcg/0.5 mL susr injection 0.5 mL by IntraMUSCular route once for 1 dose. Receive 2nd dose in 2-6 months. For Shingles (Zoster) prevention    methotrexate, PF, 25 mg/mL injection 0.8 mL by SubCUTAneous route every Wednesday. Insulin Syringe-Needle U-100 1 mL 31 gauge x 5/16 syrg 1 Syringe by Does Not Apply route Every Saturday. clotrimazole-betamethasone (LOTRISONE) topical cream Apply  to affected area two (2) times a day. colestipoL (COLESTID) 1 gram tablet TAKE TWO TABLETS BY MOUTH TWICE A DAY    pantoprazole (PROTONIX) 40 mg tablet TAKE ONE TABLET BY MOUTH TWICE A DAY    allopurinoL (ZYLOPRIM) 300 mg tablet TAKE ONE TABLET BY MOUTH ONE TIME DAILY    ergocalciferol (ERGOCALCIFEROL) 1,250 mcg (50,000 unit) capsule TAKE ONE CAPSULE BY MOUTH EVERY WEEK    folic acid (FOLVITE) 1 mg tablet TAKE ONE TABLET BY MOUTH DAILY    fexofenadine (ALLEGRA) 180 mg tablet Take  by mouth daily. guaiFENesin ER (MUCINEX) 600 mg ER tablet Take 600 mg by mouth as needed. 0.9% sodium chloride solp 100 mL with golimumab 12.5 mg/mL soln 2 mg/kg 2 mg/kg by IntraVENous route once. Every eight weeks  Indications: rhumetoid arthritis    lisinopril (PRINIVIL, ZESTRIL) 40 mg tablet Take 40 mg by mouth daily. aspirin delayed-release 81 mg tablet Take  by mouth daily. albuterol (PROVENTIL HFA, VENTOLIN HFA, PROAIR HFA) 90 mcg/actuation inhaler Take  by inhalation as needed. omega-3 fatty acids-vitamin e 1,000 mg cap Take 1 Cap by mouth.    citalopram (CELEXA) 20 mg tablet Take 1 Tab by mouth daily. atorvastatin (LIPITOR) 40 mg tablet Take 1 Tab by mouth daily. amlodipine (NORVASC) 2.5 mg tablet Take 1 Tab by mouth daily. No current facility-administered medications for this visit.      Facility-Administered Medications Ordered in Other Visits   Medication Dose Route Frequency    0.9% sodium chloride infusion  25 mL/hr IntraVENous CONTINUOUS    sodium chloride (NS) flush 10 mL  10 mL IntraVENous PRN    0.9% sodium chloride injection 10 mL  10 mL IntraVENous PRN    heparin (porcine) pf 300-500 Units  300-500 Units InterCATHeter PRN    sodium chloride 0.9 % bolus infusion 500 mL  500 mL IntraVENous PRN    EPINEPHrine HCl (PF) (ADRENALIN) 1 mg/mL (1 mL) injection 0.3 mg  0.3 mg SubCUTAneous PRN    hydrocortisone Sod Succ (PF) (SOLU-CORTEF) injection 100 mg  100 mg IntraVENous PRN    diphenhydrAMINE (BENADRYL) injection 50 mg  50 mg IntraVENous PRN    albuterol (PROVENTIL HFA, VENTOLIN HFA, PROAIR HFA) inhaler 1-2 Puff  1-2 Puff Inhalation PRN       Allergies   Allergen Reactions    Stings [Sting, Bee] Swelling     Swelling at site       Family History   Problem Relation Age of Onset    Dementia Mother     Heart Disease Father         CAD    Kidney Disease Father         renal failure    Diabetes Paternal Grandmother     Cancer Paternal Grandfather         colon    Cancer Other         family hx colon cancer        reports that he quit smoking about 42 years ago. His smoking use included cigarettes. He has never used smokeless tobacco.   reports current alcohol use of about 14.0 standard drinks per week. Depression Risk Factor Screening:       Alcohol Risk Factor Screening: On any occasion during the past 3 months, have you had more than 3 drinks containing alcohol? No    Do you average more than 14 drinks per week? No      Functional Ability and Level of Safety:     Hearing Loss   mild    Activities of Daily Living   Self-care. Requires assistance with: no ADLs    Fall Risk     Fall Risk Assessment, last 12 mths 12/8/2022   Able to walk? Yes   Fall in past 12 months? 0   Do you feel unsteady? 0   Are you worried about falling 0         Abuse Screen   Patient is not abused    Review of Systems   A comprehensive review of systems was negative except for that written in the HPI.     Physical Examination     Evaluation of Cognitive Function:  Mood/affect:  neutral, happy  Appearance: age appropriate  Family member/caregiver input: none    Blood pressure 138/61, pulse (!) 45, temperature 98 °F (36.7 °C), temperature source Oral, resp. rate 15, height 5' 8\" (1.727 m), weight 229 lb 12.8 oz (104.2 kg), SpO2 92 %. General appearance: alert, cooperative, no distress, appears stated age  Neck: supple, symmetrical, trachea midline, no adenopathy, thyroid: not enlarged, symmetric, no tenderness/mass/nodules, no carotid bruit and no JVD  Lungs: clear to auscultation bilaterally  Heart: regular rate and rhythm, S1, S2 normal, no murmur, click, rub or gallop  Extremities: extremities normal, atraumatic, no cyanosis or edema    Patient Care Team:  Johnathan Woodruff MD as PCP - General (Internal Medicine Physician)  Johnathan Woodruff MD as PCP - [facility]  Lelo Lee MD as Physician (Cardiovascular Disease Physician)  Chong Benitez MD as Consulting Provider (Ophthalmology)      Advice/Referrals/Counseling   Education and counseling provided. See below for specific orders    Assessment/Plan       Diagnoses and all orders for this visit:    1. Medicare annual wellness visit, subsequent  ACP reviewed. Primary medical decision maker reviewed with patient and updated in chart. he is otherwise up-to-date or have declined preventative services except for those ordered below. Shingrix vaccination series if he has not had it done. History of prostatectomy with normal PSA. Due for colonoscopy as of May 2022. -     Shingrix, PF, 50 mcg/0.5 mL susr injection; 0.5 mL by IntraMUSCular route once for 1 dose. Receive 2nd dose in 2-6 months. For Shingles (Zoster) prevention    2. Bronchitis  Reports ongoing chest congestion and mild sinus congestion. He was treated with Augmentin for 7 days. He is on immune suppressive medication. Does not appear ill. Chest x-ray December 8 as ordered by rheumatology was normal.  We will initiate trial of 7 days of cefdinir.   -     cefdinir (OMNICEF) 300 mg capsule; Take 1 Capsule by mouth two (2) times a day for 7 days. Potential medication side effects were discussed with the patient; let me know if any occur.   Return for yearly Annual Wellness Visits

## 2022-12-27 DIAGNOSIS — M05.79 SEROPOSITIVE RHEUMATOID ARTHRITIS OF MULTIPLE SITES (HCC): ICD-10-CM

## 2022-12-28 DIAGNOSIS — M05.79 SEROPOSITIVE RHEUMATOID ARTHRITIS OF MULTIPLE SITES (HCC): ICD-10-CM

## 2022-12-28 RX ORDER — FOLIC ACID 1 MG/1
TABLET ORAL
Qty: 90 TABLET | Refills: 5 | Status: SHIPPED | OUTPATIENT
Start: 2022-12-28

## 2022-12-28 RX ORDER — METHOTREXATE 25 MG/ML
INJECTION INTRA-ARTERIAL; INTRAMUSCULAR; INTRATHECAL; INTRAVENOUS
Qty: 24 ML | Refills: 0 | Status: SHIPPED | OUTPATIENT
Start: 2022-12-28

## 2022-12-28 RX ORDER — METHOTREXATE 25 MG/ML
20 INJECTION INTRA-ARTERIAL; INTRAMUSCULAR; INTRATHECAL; INTRAVENOUS
Qty: 12 ML | Refills: 0 | OUTPATIENT
Start: 2022-12-28

## 2022-12-28 NOTE — TELEPHONE ENCOUNTER
Last visit 12/08/22  Last Labs   Lab Results   Component Value Date/Time    Sodium 140 12/20/2022 10:30 AM    Potassium 4.6 12/20/2022 10:30 AM    Chloride 108 12/20/2022 10:30 AM    CO2 24 12/20/2022 10:30 AM    Anion gap 8 12/20/2022 10:30 AM    Glucose 97 12/20/2022 10:30 AM    BUN 17 12/20/2022 10:30 AM    Creatinine 0.82 12/20/2022 10:30 AM    BUN/Creatinine ratio 21 (H) 12/20/2022 10:30 AM    GFR est AA >60 08/30/2022 10:20 AM    GFR est non-AA >60 08/30/2022 10:20 AM    Calcium 9.2 12/20/2022 10:30 AM    Bilirubin, total 0.8 12/20/2022 10:30 AM    Alk.  phosphatase 70 12/20/2022 10:30 AM    Protein, total 7.0 12/20/2022 10:30 AM    Albumin 3.6 12/20/2022 10:30 AM    Globulin 3.4 12/20/2022 10:30 AM    A-G Ratio 1.1 12/20/2022 10:30 AM    ALT (SGPT) 49 12/20/2022 10:30 AM    AST (SGOT) 22 12/20/2022 10:30 AM     Lab Results   Component Value Date/Time    WBC 8.2 12/20/2022 10:30 AM    HGB 13.7 12/20/2022 10:30 AM    HCT 40.8 12/20/2022 10:30 AM    PLATELET 647 93/23/6826 10:30 AM    .6 (H) 12/20/2022 10:30 AM

## 2023-02-03 DIAGNOSIS — M05.79 SEROPOSITIVE RHEUMATOID ARTHRITIS OF MULTIPLE SITES (HCC): ICD-10-CM

## 2023-02-03 RX ORDER — METHOTREXATE 25 MG/ML
INJECTION INTRA-ARTERIAL; INTRAMUSCULAR; INTRATHECAL; INTRAVENOUS
Qty: 24 ML | Refills: 0 | Status: SHIPPED | OUTPATIENT
Start: 2023-02-03

## 2023-02-09 RX ORDER — CALCIUM CARB/VITAMIN D3/VIT K1 500-100-40
1 TABLET,CHEWABLE ORAL
Qty: 90 EACH | Refills: 0 | Status: SHIPPED | OUTPATIENT
Start: 2023-02-11

## 2023-02-13 ENCOUNTER — TELEPHONE (OUTPATIENT)
Dept: INTERNAL MEDICINE CLINIC | Age: 78
End: 2023-02-13

## 2023-02-13 NOTE — TELEPHONE ENCOUNTER
----- Message from Dc sent at 2/13/2023  9:23 AM EST -----  Subject: Appointment Request    Reason for Call: Established Patient Appointment needed: Routine Existing   Condition Follow Up    QUESTIONS    Reason for appointment request? Available appointments did not meet   patient need     Additional Information for Provider? Tremor in R hand for several   years-worsening, screened green. Pt would like to discuss w their PCP   ASAP. Soonest available appt isnt until 05/19.  Asked for callback to   schedule appt kavon Dickson.   ---------------------------------------------------------------------------  --------------  Randal LUKE  [phone]; OK to leave message on voicemail  ---------------------------------------------------------------------------  --------------  SCRIPT ANSWERS  COVID Screen: Jed Doshi

## 2023-02-14 ENCOUNTER — HOSPITAL ENCOUNTER (OUTPATIENT)
Dept: INFUSION THERAPY | Age: 78
Discharge: HOME | End: 2023-02-14
Attending: INTERNAL MEDICINE
Payer: MEDICARE

## 2023-02-14 VITALS
HEART RATE: 53 BPM | RESPIRATION RATE: 16 BRPM | BODY MASS INDEX: 35.52 KG/M2 | DIASTOLIC BLOOD PRESSURE: 63 MMHG | TEMPERATURE: 97.5 F | SYSTOLIC BLOOD PRESSURE: 144 MMHG | OXYGEN SATURATION: 95 % | WEIGHT: 234.4 LBS | HEIGHT: 68 IN

## 2023-02-14 DIAGNOSIS — M05.79 SEROPOSITIVE RHEUMATOID ARTHRITIS OF MULTIPLE SITES (HCC): Primary | ICD-10-CM

## 2023-02-14 LAB
ALBUMIN SERPL-MCNC: 3.4 G/DL (ref 3.5–5)
ALBUMIN/GLOB SERPL: 1.1 (ref 1.1–2.2)
ALP SERPL-CCNC: 69 U/L (ref 45–117)
ALT SERPL-CCNC: 43 U/L (ref 12–78)
ANION GAP SERPL CALC-SCNC: 4 MMOL/L (ref 5–15)
AST SERPL-CCNC: 12 U/L (ref 15–37)
BASOPHILS # BLD: 0 K/UL (ref 0–0.1)
BASOPHILS NFR BLD: 1 % (ref 0–1)
BILIRUB SERPL-MCNC: 0.6 MG/DL (ref 0.2–1)
BUN SERPL-MCNC: 14 MG/DL (ref 6–20)
BUN/CREAT SERPL: 17 (ref 12–20)
CALCIUM SERPL-MCNC: 8.8 MG/DL (ref 8.5–10.1)
CHLORIDE SERPL-SCNC: 109 MMOL/L (ref 97–108)
CO2 SERPL-SCNC: 26 MMOL/L (ref 21–32)
CREAT SERPL-MCNC: 0.81 MG/DL (ref 0.7–1.3)
CRP SERPL-MCNC: <0.29 MG/DL (ref 0–0.6)
DIFFERENTIAL METHOD BLD: ABNORMAL
EOSINOPHIL # BLD: 0.2 K/UL (ref 0–0.4)
EOSINOPHIL NFR BLD: 3 % (ref 0–7)
ERYTHROCYTE [DISTWIDTH] IN BLOOD BY AUTOMATED COUNT: 13.2 % (ref 11.5–14.5)
ERYTHROCYTE [SEDIMENTATION RATE] IN BLOOD: 8 MM/HR (ref 0–20)
GLOBULIN SER CALC-MCNC: 3 G/DL (ref 2–4)
GLUCOSE SERPL-MCNC: 94 MG/DL (ref 65–100)
HCT VFR BLD AUTO: 43.4 % (ref 36.6–50.3)
HGB BLD-MCNC: 14.4 G/DL (ref 12.1–17)
IMM GRANULOCYTES # BLD AUTO: 0.1 K/UL (ref 0–0.04)
IMM GRANULOCYTES NFR BLD AUTO: 1 % (ref 0–0.5)
LYMPHOCYTES # BLD: 1.7 K/UL (ref 0.8–3.5)
LYMPHOCYTES NFR BLD: 21 % (ref 12–49)
MCH RBC QN AUTO: 35 PG (ref 26–34)
MCHC RBC AUTO-ENTMCNC: 33.2 G/DL (ref 30–36.5)
MCV RBC AUTO: 105.6 FL (ref 80–99)
MONOCYTES # BLD: 1 K/UL (ref 0–1)
MONOCYTES NFR BLD: 12 % (ref 5–13)
NEUTS SEG # BLD: 5.1 K/UL (ref 1.8–8)
NEUTS SEG NFR BLD: 62 % (ref 32–75)
NRBC # BLD: 0 K/UL (ref 0–0.01)
NRBC BLD-RTO: 0 PER 100 WBC
PLATELET # BLD AUTO: 191 K/UL (ref 150–400)
PMV BLD AUTO: 10.3 FL (ref 8.9–12.9)
POTASSIUM SERPL-SCNC: 4.1 MMOL/L (ref 3.5–5.1)
PROT SERPL-MCNC: 6.4 G/DL (ref 6.4–8.2)
RBC # BLD AUTO: 4.11 M/UL (ref 4.1–5.7)
SODIUM SERPL-SCNC: 139 MMOL/L (ref 136–145)
WBC # BLD AUTO: 8 K/UL (ref 4.1–11.1)

## 2023-02-14 PROCEDURE — 86140 C-REACTIVE PROTEIN: CPT

## 2023-02-14 PROCEDURE — 85025 COMPLETE CBC W/AUTO DIFF WBC: CPT

## 2023-02-14 PROCEDURE — 85652 RBC SED RATE AUTOMATED: CPT

## 2023-02-14 PROCEDURE — 36415 COLL VENOUS BLD VENIPUNCTURE: CPT

## 2023-02-14 PROCEDURE — 74011250636 HC RX REV CODE- 250/636: Performed by: INTERNAL MEDICINE

## 2023-02-14 PROCEDURE — 80053 COMPREHEN METABOLIC PANEL: CPT

## 2023-02-14 PROCEDURE — 74011000258 HC RX REV CODE- 258: Performed by: INTERNAL MEDICINE

## 2023-02-14 PROCEDURE — 96413 CHEMO IV INFUSION 1 HR: CPT

## 2023-02-14 RX ORDER — HYDROCORTISONE SODIUM SUCCINATE 100 MG/2ML
100 INJECTION, POWDER, FOR SOLUTION INTRAMUSCULAR; INTRAVENOUS AS NEEDED
OUTPATIENT
Start: 2023-02-14

## 2023-02-14 RX ORDER — DIPHENHYDRAMINE HYDROCHLORIDE 50 MG/ML
50 INJECTION, SOLUTION INTRAMUSCULAR; INTRAVENOUS AS NEEDED
Start: 2023-02-14

## 2023-02-14 RX ORDER — SODIUM CHLORIDE 9 MG/ML
10 INJECTION INTRAVENOUS AS NEEDED
OUTPATIENT
Start: 2023-02-14

## 2023-02-14 RX ORDER — SODIUM CHLORIDE 9 MG/ML
25 INJECTION, SOLUTION INTRAVENOUS CONTINUOUS
Status: DISPENSED | OUTPATIENT
Start: 2023-02-14 | End: 2023-02-14

## 2023-02-14 RX ORDER — HEPARIN 100 UNIT/ML
300-500 SYRINGE INTRAVENOUS AS NEEDED
Start: 2023-02-14

## 2023-02-14 RX ORDER — ONDANSETRON 2 MG/ML
8 INJECTION INTRAMUSCULAR; INTRAVENOUS AS NEEDED
OUTPATIENT
Start: 2023-02-14

## 2023-02-14 RX ORDER — SODIUM CHLORIDE 9 MG/ML
25 INJECTION, SOLUTION INTRAVENOUS CONTINUOUS
Status: CANCELLED | OUTPATIENT
Start: 2023-02-14

## 2023-02-14 RX ORDER — EPINEPHRINE 1 MG/ML
0.3 INJECTION, SOLUTION, CONCENTRATE INTRAVENOUS AS NEEDED
OUTPATIENT
Start: 2023-02-14

## 2023-02-14 RX ORDER — SODIUM CHLORIDE 0.9 % (FLUSH) 0.9 %
10 SYRINGE (ML) INJECTION AS NEEDED
OUTPATIENT
Start: 2023-02-14

## 2023-02-14 RX ORDER — DIPHENHYDRAMINE HYDROCHLORIDE 50 MG/ML
25 INJECTION, SOLUTION INTRAMUSCULAR; INTRAVENOUS AS NEEDED
Start: 2023-02-14

## 2023-02-14 RX ORDER — ACETAMINOPHEN 325 MG/1
650 TABLET ORAL AS NEEDED
Start: 2023-02-14

## 2023-02-14 RX ORDER — ALBUTEROL SULFATE 0.83 MG/ML
2.5 SOLUTION RESPIRATORY (INHALATION) AS NEEDED
Start: 2023-02-14

## 2023-02-14 RX ADMIN — SODIUM CHLORIDE 25 ML/HR: 9 INJECTION, SOLUTION INTRAVENOUS at 10:49

## 2023-02-14 RX ADMIN — GOLIMUMAB 212.5 MG: 50 SOLUTION INTRAVENOUS at 10:55

## 2023-02-14 NOTE — PROGRESS NOTES
[facility] - Chemotherapy Progress Note    [id number] Pt admit to [facility] for q 8 week Simponi ambulatory in stable condition. Assessment completed. No new concerns voiced. PIV with positive blood return. Chemotherapy Flowsheet 2/14/2023   Cycle q 8 weeks   Date 2/14/2023   Drug / Regimen Simponi   Notes -       Visit Vitals  /73   Pulse (!) 51   Temp 97.5 °F (36.4 °C)   Resp 16   Ht 5' 8\" (1.727 m)   Wt 106.3 kg (234 lb 6.4 oz)   SpO2 95%   BMI 35.64 kg/m²         Pt recently ( 1/20/23) had melanoma removed from left shoulder- stitches to be removed this week. RN verified with BAM palafox to treat today but encourage pt to discuss with Dermatologist long term usage of drug. Pt agrees. Two nurses verified prior to administering:    Drug name    Drug dose    Infusion volume or drug volume when prepared in a syringe    Rate of administration    Route of administration    Expiration dates and/or times    Appearance and physical integrity of the drugs    Rate set on infusion pump, when used    Sequencing of drug administration    Medications:  Medications Administered       0.9% sodium chloride infusion       Admin Date  02/14/2023 Action  New Bag Dose  25 mL/hr Rate  25 mL/hr Route  IntraVENous Administered By  Thompson Cottrell, RN              golimumab (SIMPONI ARIA) 212.5 mg in 0.9% sodium chloride, overfill volume 127 mL infusion       Admin Date  02/14/2023 Action  New Bag Dose  212.5 mg Rate  254 mL/hr Route  IntraVENous Administered By  Thompson Cottrell, [address] [place] [address] Pt tolerated treatment well. PIV maintained positive blood return throughout treatment, flushed with positive blood return at conclusion and throughout treatment. D/c home ambulatory in no distress. Pt aware of next appointment scheduled for 4/11/23 @ 1100.       declined AVS  Labs collected- to be resulted in CC

## 2023-02-24 ENCOUNTER — OFFICE VISIT (OUTPATIENT)
Dept: INTERNAL MEDICINE CLINIC | Age: 78
End: 2023-02-24
Payer: MEDICARE

## 2023-02-24 VITALS
BODY MASS INDEX: 35.92 KG/M2 | RESPIRATION RATE: 16 BRPM | OXYGEN SATURATION: 96 % | TEMPERATURE: 97.6 F | HEART RATE: 67 BPM | HEIGHT: 68 IN | SYSTOLIC BLOOD PRESSURE: 108 MMHG | DIASTOLIC BLOOD PRESSURE: 58 MMHG | WEIGHT: 237 LBS

## 2023-02-24 DIAGNOSIS — F33.9 RECURRENT DEPRESSION (HCC): ICD-10-CM

## 2023-02-24 DIAGNOSIS — E66.01 SEVERE OBESITY (BMI 35.0-39.9) WITH COMORBIDITY (HCC): ICD-10-CM

## 2023-02-24 DIAGNOSIS — C43.9 MALIGNANT MELANOMA, UNSPECIFIED SITE (HCC): ICD-10-CM

## 2023-02-24 DIAGNOSIS — R25.1 TREMOR: ICD-10-CM

## 2023-02-24 DIAGNOSIS — R73.01 IFG (IMPAIRED FASTING GLUCOSE): Primary | ICD-10-CM

## 2023-02-24 DIAGNOSIS — M05.79 SEROPOSITIVE RHEUMATOID ARTHRITIS OF MULTIPLE SITES (HCC): ICD-10-CM

## 2023-02-24 RX ORDER — PRIMIDONE 50 MG/1
100 TABLET ORAL
Qty: 60 TABLET | Refills: 2 | Status: SHIPPED | OUTPATIENT
Start: 2023-02-24

## 2023-02-24 NOTE — PROGRESS NOTES
Bonita Fothergill is a 69 y.o. male who presents today for Tremors (Patient states his tremors seem to be getting worse than usual. )  . He has a history of   Patient Active Problem List   Diagnosis Code    Mixed hyperlipidemia E78.2    Other abnormal glucose R73.09    Essential hypertension, benign I10    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    Allergic rhinitis, cause unspecified J30.9    Reflux esophagitis K21.00    Benign neoplasm of colon D12.6    Contact dermatitis and other eczema, due to unspecified cause L25.9    Bunion M21.619    History of prostate cancer Z85.46    Advanced care planning/counseling discussion Z71.89    Low testosterone R79.89    Primary osteoarthritis of both knees M17.0    Seropositive rheumatoid arthritis of multiple sites ([facility] M05.79    Long-term use of immunosuppressant medication Z79.60    Vitamin D deficiency E55.9    Recurrent depression (HCC) F33.9    Severe obesity with body mass index (BMI) of 35.0 to 39.9 with serious comorbidity (HCC) E66.01    Gout M10.9    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 Today patient is here for an acute visit. Blood pressure initially borderline low. Denies any orthostatic symptoms. Problem visit:  Bonita Fothergill is here for complaint of tremor. Problem began over a decade ago. Severity is mild to moderate  Character of problem: with eating and movement starting to become symptomatic. Patient denies any Parkinson history in his family. Denies any cognitive changes. Denies any other balance issues. S/p melanoma removal of L Shoulder. Scar healing. Reports that mood is stable. Has been on SSRI for some time.   Inflammatory arthritis is stable    ROS  Review of Systems   Constitutional:  Negative for chills, fever and weight loss. HENT:  Negative for congestion and sore throat. Eyes:  Negative for blurred vision, double vision and photophobia. Respiratory:  Negative for cough and shortness of breath. Cardiovascular:  Negative for chest pain, palpitations and leg swelling. Gastrointestinal:  Negative for abdominal pain, constipation, diarrhea, heartburn, nausea and vomiting. Genitourinary:  Negative for dysuria, frequency and urgency. Musculoskeletal:  Negative for joint pain and myalgias. Skin:  Negative for rash. Neurological:  Positive for tremors. Negative for headaches. Endo/Heme/Allergies:  Does not bruise/bleed easily. Psychiatric/Behavioral:  Negative for memory loss and suicidal ideas. Visit Vitals  BP 93/64 (BP 1 Location: Right upper arm, BP Patient Position: Sitting, BP Cuff Size: Adult)   Pulse 67   Temp 97.6 °F (36.4 °C) (Oral)   Resp 16   Ht 5' 8\" (1.727 m)   Wt 237 lb (107.5 kg)   SpO2 96%   BMI 36.04 kg/m²       Physical Exam  Constitutional:       Appearance: He is well-developed. He is not diaphoretic. HENT:      Head: Normocephalic and atraumatic. Eyes:      Pupils: Pupils are equal, round, and reactive to light. Neck:      Vascular: No JVD. Cardiovascular:      Rate and Rhythm: Normal rate and regular rhythm. Heart sounds: No murmur heard. Pulmonary:      Effort: Pulmonary effort is normal. No respiratory distress. Breath sounds: Normal breath sounds. No wheezing. Abdominal:      General: Bowel sounds are normal. There is no distension. Palpations: Abdomen is soft. Tenderness: There is no abdominal tenderness. Musculoskeletal:         General: Normal range of motion. Cervical back: Normal range of motion and neck supple. Skin:     General: Skin is warm and dry. Neurological:      Mental Status: He is alert and oriented to person, place, and time. Cranial Nerves: No cranial nerve deficit.       Comments: Very mild tremor to both upper extremities. Right greater than left. Questionable cogwheel rigidity on testing of right arm. Psychiatric:         Behavior: Behavior normal.         Current Outpatient Medications   Medication Sig    Insulin Syringe-Needle U-100 1 mL 31 gauge x 5/16 syrg 1 Syringe by Does Not Apply route Every Saturday. methotrexate, PF, 25 mg/mL injection INJECT 1 ML UNDER THE SKIN ONCE WEEKLY ON WEDNESDAYS    folic acid (FOLVITE) 1 mg tablet TAKE ONE TABLET BY MOUTH DAILY    ascorbic acid, vitamin C, (VITAMIN C) 500 mg tablet Take 1,000 mg by mouth daily. colestipoL (COLESTID) 1 gram tablet TAKE TWO TABLETS BY MOUTH TWICE A DAY    pantoprazole (PROTONIX) 40 mg tablet TAKE ONE TABLET BY MOUTH TWICE A DAY    allopurinoL (ZYLOPRIM) 300 mg tablet TAKE ONE TABLET BY MOUTH ONE TIME DAILY    ergocalciferol (ERGOCALCIFEROL) 1,250 mcg (50,000 unit) capsule TAKE ONE CAPSULE BY MOUTH EVERY WEEK    fexofenadine (ALLEGRA) 180 mg tablet Take  by mouth daily. guaiFENesin ER (MUCINEX) 600 mg ER tablet Take 600 mg by mouth as needed. 0.9% sodium chloride solp 100 mL with golimumab 12.5 mg/mL soln 2 mg/kg 2 mg/kg by IntraVENous route once. Every eight weeks  Indications: rhumetoid arthritis    lisinopril (PRINIVIL, ZESTRIL) 40 mg tablet Take 40 mg by mouth daily. aspirin delayed-release 81 mg tablet Take  by mouth daily. albuterol (PROVENTIL HFA, VENTOLIN HFA, PROAIR HFA) 90 mcg/actuation inhaler Take  by inhalation as needed. omega-3 fatty acids-vitamin e 1,000 mg cap Take 1 Cap by mouth.    citalopram (CELEXA) 20 mg tablet Take 1 Tab by mouth daily. atorvastatin (LIPITOR) 40 mg tablet Take 1 Tab by mouth daily. amlodipine (NORVASC) 2.5 mg tablet Take 1 Tab by mouth daily. clotrimazole-betamethasone (LOTRISONE) topical cream Apply  to affected area two (2) times a day. (Patient not taking: Reported on 2/24/2023)     No current facility-administered medications for this visit.         Past Medical History:   Diagnosis Date    Allergic rhinitis     Arthritis     RA    Autoimmune disease ([facility]     Basal cell carcinoma (BCC) in situ of skin     shoudler and scalp    Cancer (HCC)     prostate    GERD (gastroesophageal reflux disease)     Gout     Hypercholesterolemia     Hypertension     Melanoma in situ of back ([facility]     Skin cancer (melanoma) ([facility]     Sleep apnea     Bipap at home      Past Surgical History:   Procedure Laterality Date    COLONOSCOPY N/A 2019    7 polyps. repeat 3 years. COLONOSCOPY performed by Reuben Cummings MD at [address] [facility]  2019    HX COLONOSCOPY      09, due 12, done 11, due 14    HX HEENT      wisdom teeth extraction    HX KNEE ARTHROSCOPY Left     HX KNEE REPLACEMENT Left     HX OTHER SURGICAL N/A 2018    Basal cell removal of scalp    HX OTHER SURGICAL  2023    Melanoma removal on back    HX PROSTATECTOMY      HX TONSILLECTOMY        Social History     Tobacco Use    Smoking status: Former     Types: Cigarettes     Quit date:      Years since quittin.1    Smokeless tobacco: Never   Substance Use Topics    Alcohol use: Yes     Alcohol/week: 14.0 standard drinks     Types: 14 Cans of beer per week     Comment: 4 days per week      Family History   Problem Relation Age of Onset    Dementia Mother     Heart Disease Father         CAD    Kidney Disease Father         renal failure    Diabetes Paternal Grandmother     Cancer Paternal Grandfather         colon    Cancer Other         family hx colon cancer        Allergies   Allergen Reactions    Stings [Sting, Bee] Swelling     Swelling at site        Assessment/Plan  Diagnoses and all orders for this visit:    1. IFG (impaired fasting glucose)-repeat A1c  -     HEMOGLOBIN A1C WITH EAG; Future  -     TSH 3RD GENERATION; Future    2. Severe obesity (BMI 35.0-39. 9) with comorbidity (HCC)    3. Seropositive rheumatoid arthritis of multiple sites (HCC)-stable    4.  Recurrent depression (HCC)-mood stable    5. Malignant melanoma, unspecified site (HCC)-status post Mohs to the left shoulder. 6. Tremor-patient reports slight worsening of right arm. Some atypical findings on physical exam.  Would like him to see neurologist.  In the meantime we will try him on primidone at low-dose. Discussed side effects. Pulse would not tolerate beta-blocking.  -     primidone (MYSOLINE) 50 mg tablet; Take 2 Tablets by mouth nightly.  -     REFERRAL TO NEUROLOGY  -     [facility] 3RD GENERATION; Future          Roe Menezes MD  2/24/2023    This note was created with the help of speech recognition software Fabian Talbot) and may contain some 'sound alike' errors.

## 2023-03-14 ENCOUNTER — OFFICE VISIT (OUTPATIENT)
Dept: RHEUMATOLOGY | Age: 78
End: 2023-03-14
Payer: MEDICARE

## 2023-03-14 VITALS
BODY MASS INDEX: 36.46 KG/M2 | SYSTOLIC BLOOD PRESSURE: 116 MMHG | RESPIRATION RATE: 16 BRPM | OXYGEN SATURATION: 97 % | DIASTOLIC BLOOD PRESSURE: 62 MMHG | WEIGHT: 239.8 LBS | TEMPERATURE: 97.5 F | HEART RATE: 46 BPM

## 2023-03-14 DIAGNOSIS — M05.79 SEROPOSITIVE RHEUMATOID ARTHRITIS OF MULTIPLE SITES (HCC): Primary | ICD-10-CM

## 2023-03-14 DIAGNOSIS — Z79.60 LONG-TERM USE OF IMMUNOSUPPRESSANT MEDICATION: ICD-10-CM

## 2023-03-14 DIAGNOSIS — M81.0 AGE-RELATED OSTEOPOROSIS WITHOUT CURRENT PATHOLOGICAL FRACTURE: ICD-10-CM

## 2023-03-14 PROCEDURE — G8427 DOCREV CUR MEDS BY ELIG CLIN: HCPCS | Performed by: INTERNAL MEDICINE

## 2023-03-14 PROCEDURE — G8417 CALC BMI ABV UP PARAM F/U: HCPCS | Performed by: INTERNAL MEDICINE

## 2023-03-14 PROCEDURE — 1123F ACP DISCUSS/DSCN MKR DOCD: CPT | Performed by: INTERNAL MEDICINE

## 2023-03-14 PROCEDURE — 3074F SYST BP LT 130 MM HG: CPT | Performed by: INTERNAL MEDICINE

## 2023-03-14 PROCEDURE — 1101F PT FALLS ASSESS-DOCD LE1/YR: CPT | Performed by: INTERNAL MEDICINE

## 2023-03-14 PROCEDURE — G8536 NO DOC ELDER MAL SCRN: HCPCS | Performed by: INTERNAL MEDICINE

## 2023-03-14 PROCEDURE — 99215 OFFICE O/P EST HI 40 MIN: CPT | Performed by: INTERNAL MEDICINE

## 2023-03-14 PROCEDURE — G9717 DOC PT DX DEP/BP F/U NT REQ: HCPCS | Performed by: INTERNAL MEDICINE

## 2023-03-14 PROCEDURE — 3078F DIAST BP <80 MM HG: CPT | Performed by: INTERNAL MEDICINE

## 2023-03-14 PROCEDURE — G0463 HOSPITAL OUTPT CLINIC VISIT: HCPCS | Performed by: INTERNAL MEDICINE

## 2023-03-14 RX ORDER — METHOTREXATE 25 MG/ML
15 INJECTION INTRA-ARTERIAL; INTRAMUSCULAR; INTRATHECAL; INTRAVENOUS
Qty: 14 ML | Refills: 0 | Status: SHIPPED | OUTPATIENT
Start: 2023-03-14

## 2023-03-14 NOTE — PROGRESS NOTES
Chief Complaint   Patient presents with    Joint Pain     1. Have you been to the ER, urgent care clinic since your last visit? Hospitalized since your last visit? No    2. Have you seen or consulted any other health care providers outside of the [address] [place] [address] since your last visit? Include any pap smears or colon screening.  No

## 2023-03-14 NOTE — PROGRESS NOTES
REASON FOR VISIT    This is a follow-up visit for Mr. Kymberly Mazariegos for     ICD-10-CM   1. Seropositive rheumatoid arthritis of multiple sites ([facility] M05.79   2. 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     Inflammatory arthritis phenotype includes:  Anti-CCP positive: yes (>250)  Rheumatoid factor positive: yes (>650)  Erosive disease: yes  Extra-articular manifestations include: left iritis    Immunosuppression Screening (10/15/2020):  Quantiferon TB: negative    PPD:  Not performed  Hepatitis B: negative   Hepatitis C: negative     Therapy History includes:  Current DMARD therapy include: methotrexate 25 mg SubQ every Wednesday (7/13/2021 to present), Simponi Aria every 8 weeks (5/08/2018 to present)  Prior DMARD therapy include: methotrexate 17.5 mg every Wednesday (6/15/2020 to 10/14/2020), methotrexate 20 mg every Wednesday (11/20/2017 to 6/15/2020; 10/15/2020 to 7/13/2021),  Discontinued DMARDs because of inefficacy: None  Discontinued DMARDs because of side effects: None  Unaffordable DMARDs: Enbrel    HISTORY OF PRESENT ILLNESS    Mr. Kymberly Mazariegos returns for a follow-up. LV 9/8/2022. He says that he feels OK today. Continues to feel good from a joint standpoint. In interim diagnosed with 3rd melanoma in the last 5 years, from left scapula. This one was the largest yet, says margins were clear. Reminds me he was in the [place] and [place], spent many years shirtless in full sun. Recently started primidone for more noticeable tremor in hands, which has been helpful. Has upcoming visit with [facility]. Still playing golf 3 days/week, until he had his large left scapular incision with stitches. REVIEW OF SYSTEMS    A comprehensive review of systems was performed and pertinent results are documented in the HPI, review of systems is otherwise non-contributory.     PAST MEDICAL HISTORY    He has a past medical history of Allergic rhinitis, Arthritis, Autoimmune disease ([facility] Basal cell carcinoma (BCC) in situ of skin, [facility] ([facility] GERD (gastroesophageal reflux disease), Gout, Hypercholesterolemia, Hypertension, Melanoma in situ of back ([facility] Skin cancer (melanoma) ([facility] and Sleep apnea. FAMILY HISTORY    His family history includes Cancer in his paternal grandfather and another family member; Dementia in his mother; Diabetes in his paternal grandmother; Heart Disease in his father; Kidney Disease in his father. SOCIAL HISTORY    He reports that he quit smoking about 43 years ago. His smoking use included cigarettes. He has never used smokeless tobacco. He reports current alcohol use of about 14.0 standard drinks per week. He reports that he does not use drugs. MEDICATIONS    Current Outpatient Medications   Medication Sig    primidone (MYSOLINE) 50 mg tablet Take 2 Tablets by mouth nightly. Insulin Syringe-Needle U-100 1 mL 31 gauge x 5/16 syrg 1 Syringe by Does Not Apply route Every Saturday. methotrexate, PF, 25 mg/mL injection INJECT 1 ML UNDER THE SKIN ONCE WEEKLY ON WEDNESDAYS    folic acid (FOLVITE) 1 mg tablet TAKE ONE TABLET BY MOUTH DAILY    ascorbic acid, vitamin C, (VITAMIN C) 500 mg tablet Take 1,000 mg by mouth daily. colestipoL (COLESTID) 1 gram tablet TAKE TWO TABLETS BY MOUTH TWICE A DAY    pantoprazole (PROTONIX) 40 mg tablet TAKE ONE TABLET BY MOUTH TWICE A DAY    allopurinoL (ZYLOPRIM) 300 mg tablet TAKE ONE TABLET BY MOUTH ONE TIME DAILY    ergocalciferol (ERGOCALCIFEROL) 1,250 mcg (50,000 unit) capsule TAKE ONE CAPSULE BY MOUTH EVERY WEEK    fexofenadine (ALLEGRA) 180 mg tablet Take  by mouth daily. guaiFENesin ER (MUCINEX) 600 mg ER tablet Take 600 mg by mouth as needed. 0.9% sodium chloride solp 100 mL with golimumab 12.5 mg/mL soln 2 mg/kg 2 mg/kg by IntraVENous route once. Every eight weeks  Indications: rhumetoid arthritis    lisinopril (PRINIVIL, ZESTRIL) 40 mg tablet Take 40 mg by mouth daily.     aspirin delayed-release 81 mg tablet Take  by mouth daily. albuterol (PROVENTIL HFA, VENTOLIN HFA, PROAIR HFA) 90 mcg/actuation inhaler Take  by inhalation as needed. omega-3 fatty acids-vitamin e 1,000 mg cap Take 1 Cap by mouth.    citalopram (CELEXA) 20 mg tablet Take 1 Tab by mouth daily. atorvastatin (LIPITOR) 40 mg tablet Take 1 Tab by mouth daily. amlodipine (NORVASC) 2.5 mg tablet Take 1 Tab by mouth daily. clotrimazole-betamethasone (LOTRISONE) topical cream Apply  to affected area two (2) times a day. (Patient not taking: No sig reported)     No current facility-administered medications for this visit. ALLERGIES    Allergies   Allergen Reactions    Stings [Sting, Bee] Swelling     Swelling at site       PHYSICAL EXAMINATION    Visit Vitals  /62 (BP 1 Location: Left upper arm, BP Patient Position: Sitting, BP Cuff Size: Large adult)   Pulse (!) 46   Temp 97.5 °F (36.4 °C) (Temporal)   Resp 16   Wt 239 lb 12.8 oz (108.8 kg)   SpO2 97%   BMI 36.46 kg/m²     General:  The patient is well developed, well nourished, alert, and in no apparent distress. Eyes: Sclera are anicteric. Still cleared bilateral conjunctival injection, no photophobia  HEENT:  Oropharynx is clear. No oral ulcers. Adequate salivary pooling. No cervical or supraclavicular lymphadenopathy. Lungs:  Today no crackles, wheeze, or stridor. Normal respiratory effort. Cor:  Regular rate and rhythm. No murmur rub or gallop. Abdomen: Soft, non-tender, without hepatomegaly or masses. Extremities: No calf tenderness or edema. Warm and well perfused. Skin: Cratered left forearm lesion s/p cryoablation. Sutureline over left scapula not examined. No tophi. Neuro: Nonfocal, no foot or wrist drop. Negative straight leg raise bilaterally. Musculoskeletal:    A comprehensive musculoskeletal exam was performed for all joints of each upper and lower extremity and assessed for swelling, tenderness and range of motion.  Results are documented as below: Bilateral shoulder crepitus, left shoulder stably limited to 60deg abduction, flexion intact  Ulnar deviation of MCPs. Olivia and heberden nodes without tenderness or synovitis  Paralumbar tenderness bilaterally. Left knee TKA scar. Bilateral hallux valgus without tophi  No evidence of synovitis in the small joints of the hands, wrists, shoulders, elbows, hips, knees or ankles. DATA REVIEW    Laboratory   2/14/23: ESR 8, WBC 8.0, Hgb 14.4 (), Plt 191; Cr 0.81, LFTs WNL, CRP <0.29mg/dL  11/28/22: Triglyceride 164, HGB A1c 5.4, Prostate specific Ag 0  10/25/22: CRP <0.29 mg/dL, ESR 19, Cr 0.78, LFT WNL,   8/30/22: CRP <0.29 mg/dL, ESR 13, Cr 0.71, ALT 42, AST 13, Tbili 0.8, Alk phos 71, CBC WNL, CBC WNL  7/5/22: Uric acid 4, CRP 0.3 mg/L, WBC 6.1, HGB 12.6, Plt 142, ESR 39, Cr 0.74, LFT WNL  5/10/22: CRP 0.6 mg/L, ESR 6, Cr 0.81, LFT WNL, WBC 7.2, HGB 13.3, Plt 189  3/10/22: MARGARET speckled pattern 1:160, quantiFERON plus negative, Hepatitis B core IgM nonreactive, Hep B cpre Ab total negative, Hep B surface Ab nonreactive, Hep B surface Ag negative, CRP 6.1 mg/L, ESR 13, Cr 1.08, LFT WNL, EBC 4.4, HGB 14.1, Plt 174    Recent laboratory results were reviewed, summarized, and discussed with the patient. Imaging    Musculoskeletal Ultrasound    None    Radiographs    XR BA SWALLOW ESOPHOGRAM (10/20/22):   1. Hiatal hernia. 2. Gastroesophageal reflux. 3. No mass or constricting lesion. XR SPINE LUMB (9/8/22): Impression: Multilevel lumbosacral degenerative disc disease. Grade 1 spondylolisthesis L4-5. No acute abnormality. Bilateral Hand 11/20/2017: RIGHT: No fracture or dislocation on plain film. The bones are osteopenic. Mild narrowing of the radiocarpal joint. Probable erosion of the radius and lunate at the radiocarpal joint. No widening of the intercarpal spaces. Subtle erosion of the ulnar styloid. Mild narrowing of the triscaphe joint is age-appropriate.  Irregular arthritis of the first and second CMC joints includes osteophytes and erosions. There are erosions of the first through fourth metacarpal heads at the MCP points. Moderate narrowing and anterior subluxation of the second MCP joint. IP joints are within normal limits. No chondrocalcinosis or periosteal reaction. LEFT: No fracture or dislocation on plain film. The bones are osteopenic. Moderate narrowing of the radiocarpal joint with subtle erosions of the lunate. Marrow DR UVJ with erosions. No definite ulnar styloid erosion. Triscap and first [facility] joint osteoarthritis are age-appropriate. Large erosions on both sides of the second MCP joint and in the third metacarpal head. IP joints are within normal limits. No chondrocalcinosis or periosteal reaction. Bilateral Foot 11/20/2017: RIGHT: No fracture or dislocation on plain film. Bones are osteopenic. Talonavicular joint space narrowing, osteophytes, and erosions. Subtalar arthritis is partially imaged. TMT joints are within normal limits. Severe narrowing of the first MTP joint with osteophytes and erosions. There is erosion and the fifth tarsal head. Mild narrowing of the second MTP joint without definable erosion. No periosteal reaction. LEFT: No fracture or dislocation on plain film. Bones are osteopenic. Intertarsal joints are within normal limits. Moderate narrowing of the first MTP joint with central and marginal erosions. 35 degrees hallux valgus angulation and fibular subluxation of the first proximal phalanx. There are also erosions in the laterally subluxed hallux sesamoids. Mild joint space narrowing and erosions of the third MTP joint. Fragmentation of the fourth middle phalanx is adjacent to the fourth PIP joint erosions. There are also erosions of the second and fifth PIP joints. First IP joint osteoarthritis is mild. CT Imaging    CT Right Upper Extremity with contrast 10/19/2017: examination of the soft tissues demonstrates no drainable fluid collection.  There is no pathologically enlarged nodes within the right axilla. Alignment is normal. There is no bone destruction or fracture. No significant fatty atrophy. MR Imaging    None    DXA     9/21/21 DEXA BONE DENSITY STUDY: Femoral Neck Left:  Bone mineral density (gm/cm2):  0.745  % of peak bone mass:  70  % for age matched controls:  66  T-score:  -2.5  Z-score:  -1.6  Total Hip Left:  Bone mineral density (gm/cm2):  0.897  % of peak bone mass:  81  % for age matched controls:  80  T-score:  -1.4  Z-score:  -1  Lumbar Spine:  L1 plus L2  Bone mineral density (gm/cm2):  1.080  % of peak bone mass:  89  % for age matched controls:  80  T-score:  -1.1  Z-score:  -1.2  33% Radius Left:  Bone mineral density (gm/cm2):  0.582  % of peak bone mass:  72  % for age matched controls:  80  T-score:  -2.8  Z-score:  -1.8       ASSESSMENT AND PLAN    This is a follow-up visit for Mr. Jeff Elizondo for seropositive erosive rheumatoid arthritis, nontophaceous gout, and bilateral iritis, with low disease actiity with full-dose subQ methotrexate and Simponi infusions. We discussed a potential trial of rituximab in place of golimumab for the recurrent melanomas, but he prefers for now to first trial dose reductions of the methotrexate. 1. Seropositive rheumatoid arthritis of multiple sites (HCC)  - Reduce methotrexate to 15mg subQ weekly  - Cont Simponi infusions every 8 weeks  - C REACTIVE PROTEIN, QT; Standing  - CBC WITH AUTOMATED DIFF; Standing  - METABOLIC PANEL, COMPREHENSIVE; Standing  - SED RATE (ESR); Standing  - methotrexate, PF, 25 mg/mL injection; 0.6 mL by SubCUTAneous route every seven (7) days. Dispense: 14 mL; Refill: 0    2. Long-term use of immunosuppressant medication  - CBC WITH AUTOMATED DIFF; Standing  - METABOLIC PANEL, COMPREHENSIVE; Standing    3. Age-related osteoporosis without current pathological fracture   - Cont Prolia q6mo, next due June.         Patient Instructions   Decrease methotrexate to 0.6mL (15mg) per injection. Continue daily folic acid, and your regular Simponi infusions. Let me know if you are diagnosed with any further melanomas so we can continue to weigh the risk and benefit of continuing this regimen. Rituximab would be one treatment option that may have a lower associated skin cancer risk. Continue Prolia shots every 6 months for bone density. Labs every 3 months. Return in 3 months after next labs. If you experience a flare of arthritis before next visit, OK to increase methotrexate back to 0.8mL and let me know.     TODAY'S ORDERS    Orders Placed This Encounter    C REACTIVE PROTEIN, QT    CBC WITH AUTOMATED DIFF    METABOLIC PANEL, COMPREHENSIVE    SED RATE (ESR)    methotrexate, PF, 25 mg/mL injection           Future Appointments   Date Time Provider Raiza Sanchez   4/11/2023 10:00 AM SS INF2 CH4 <2H [facility]   6/6/2023 10:00 AM SS INF2 CH4 <2H [facility]   6/22/2023  9:20 AM Jena Silverio MD [facility] AMB   8/1/2023 10:00 AM SS INF2 CH4 <2H [facility]   9/26/2023 10:00 AM SS INF2 CH4 <2H [facility]     Face to face time: 30 minutes  Note preparation and records review day of service: 15 minutes  Total provider time day of service: 45 minutes      Zayra Alejandra MD    Adult Rheumatology   [facility]  A Part of [facility], [address] [place] [address] Road   Phone [phone]  Fax [phone]

## 2023-03-14 NOTE — PATIENT INSTRUCTIONS
Decrease methotrexate to 0.6mL (15mg) per injection. Continue daily folic acid, and your regular Simponi infusions. Let me know if you are diagnosed with any further melanomas so we can continue to weigh the risk and benefit of continuing this regimen. Rituximab would be one treatment option that may have a lower associated skin cancer risk. Continue Prolia shots every 6 months for bone density. Labs every 3 months. Return in 3 months after next labs. If you experience a flare of arthritis before next visit, OK to increase methotrexate back to 0.8mL and let me know.

## 2023-03-15 RX ORDER — SODIUM CHLORIDE 0.9 % (FLUSH) 0.9 %
5-40 SYRINGE (ML) INJECTION AS NEEDED
OUTPATIENT
Start: 2023-03-22

## 2023-03-15 RX ORDER — EPINEPHRINE 1 MG/ML
0.3 INJECTION, SOLUTION, CONCENTRATE INTRAVENOUS AS NEEDED
OUTPATIENT
Start: 2023-03-22

## 2023-03-15 RX ORDER — SODIUM CHLORIDE 9 MG/ML
5-250 INJECTION, SOLUTION INTRAVENOUS AS NEEDED
OUTPATIENT
Start: 2023-03-22

## 2023-03-15 RX ORDER — SODIUM CHLORIDE 9 MG/ML
5-40 INJECTION INTRAVENOUS AS NEEDED
OUTPATIENT
Start: 2023-03-22

## 2023-03-15 RX ORDER — ONDANSETRON 2 MG/ML
8 INJECTION INTRAMUSCULAR; INTRAVENOUS AS NEEDED
OUTPATIENT
Start: 2023-03-22

## 2023-03-15 RX ORDER — DIPHENHYDRAMINE HYDROCHLORIDE 50 MG/ML
25 INJECTION, SOLUTION INTRAMUSCULAR; INTRAVENOUS AS NEEDED
Start: 2023-03-22

## 2023-03-15 RX ORDER — HYDROCORTISONE SODIUM SUCCINATE 100 MG/2ML
100 INJECTION, POWDER, FOR SOLUTION INTRAMUSCULAR; INTRAVENOUS AS NEEDED
OUTPATIENT
Start: 2023-03-22

## 2023-03-15 RX ORDER — ALBUTEROL SULFATE 0.83 MG/ML
2.5 SOLUTION RESPIRATORY (INHALATION) AS NEEDED
Start: 2023-03-22

## 2023-03-15 RX ORDER — ACETAMINOPHEN 325 MG/1
650 TABLET ORAL AS NEEDED
Start: 2023-03-22

## 2023-03-15 RX ORDER — DIPHENHYDRAMINE HYDROCHLORIDE 50 MG/ML
50 INJECTION, SOLUTION INTRAMUSCULAR; INTRAVENOUS AS NEEDED
Start: 2023-03-22

## 2023-03-15 RX ORDER — HEPARIN 100 UNIT/ML
500 SYRINGE INTRAVENOUS AS NEEDED
Start: 2023-03-22

## 2023-03-25 DIAGNOSIS — K21.9 GASTROESOPHAGEAL REFLUX DISEASE WITHOUT ESOPHAGITIS: ICD-10-CM

## 2023-03-25 RX ORDER — PANTOPRAZOLE SODIUM 40 MG/1
TABLET, DELAYED RELEASE ORAL
Qty: 180 TABLET | Refills: 1 | Status: SHIPPED | OUTPATIENT
Start: 2023-03-25

## 2023-04-11 ENCOUNTER — HOSPITAL ENCOUNTER (OUTPATIENT)
Dept: INFUSION THERAPY | Age: 78
Discharge: HOME | End: 2023-04-11
Attending: INTERNAL MEDICINE
Payer: MEDICARE

## 2023-04-11 VITALS
RESPIRATION RATE: 16 BRPM | TEMPERATURE: 97.6 F | SYSTOLIC BLOOD PRESSURE: 129 MMHG | OXYGEN SATURATION: 96 % | WEIGHT: 236.7 LBS | DIASTOLIC BLOOD PRESSURE: 62 MMHG | HEART RATE: 41 BPM | BODY MASS INDEX: 35.99 KG/M2

## 2023-04-11 DIAGNOSIS — M05.79 SEROPOSITIVE RHEUMATOID ARTHRITIS OF MULTIPLE SITES (HCC): Primary | ICD-10-CM

## 2023-04-11 LAB
ALBUMIN SERPL-MCNC: 3.4 G/DL (ref 3.5–5)
ALBUMIN/GLOB SERPL: 1.1 (ref 1.1–2.2)
ALP SERPL-CCNC: 69 U/L (ref 45–117)
ALT SERPL-CCNC: 35 U/L (ref 12–78)
ANION GAP SERPL CALC-SCNC: 4 MMOL/L (ref 5–15)
AST SERPL-CCNC: 11 U/L (ref 15–37)
BASOPHILS # BLD: 0 K/UL (ref 0–0.1)
BASOPHILS NFR BLD: 0 % (ref 0–1)
BILIRUB SERPL-MCNC: 0.6 MG/DL (ref 0.2–1)
BUN SERPL-MCNC: 17 MG/DL (ref 6–20)
BUN/CREAT SERPL: 19 (ref 12–20)
CALCIUM SERPL-MCNC: 9.1 MG/DL (ref 8.5–10.1)
CHLORIDE SERPL-SCNC: 108 MMOL/L (ref 97–108)
CO2 SERPL-SCNC: 27 MMOL/L (ref 21–32)
CREAT SERPL-MCNC: 0.88 MG/DL (ref 0.7–1.3)
CRP SERPL HS-MCNC: 0.7 MG/L
DIFFERENTIAL METHOD BLD: ABNORMAL
EOSINOPHIL # BLD: 0.2 K/UL (ref 0–0.4)
EOSINOPHIL NFR BLD: 3 % (ref 0–7)
ERYTHROCYTE [DISTWIDTH] IN BLOOD BY AUTOMATED COUNT: 13.2 % (ref 11.5–14.5)
ERYTHROCYTE [SEDIMENTATION RATE] IN BLOOD: 6 MM/HR (ref 0–20)
GLOBULIN SER CALC-MCNC: 3.1 G/DL (ref 2–4)
GLUCOSE SERPL-MCNC: 121 MG/DL (ref 65–100)
HCT VFR BLD AUTO: 41.6 % (ref 36.6–50.3)
HGB BLD-MCNC: 13.9 G/DL (ref 12.1–17)
IMM GRANULOCYTES # BLD AUTO: 0 K/UL (ref 0–0.04)
IMM GRANULOCYTES NFR BLD AUTO: 0 % (ref 0–0.5)
LYMPHOCYTES # BLD: 1.5 K/UL (ref 0.8–3.5)
LYMPHOCYTES NFR BLD: 22 % (ref 12–49)
MCH RBC QN AUTO: 34.9 PG (ref 26–34)
MCHC RBC AUTO-ENTMCNC: 33.4 G/DL (ref 30–36.5)
MCV RBC AUTO: 104.5 FL (ref 80–99)
MONOCYTES # BLD: 0.5 K/UL (ref 0–1)
MONOCYTES NFR BLD: 7 % (ref 5–13)
NEUTS SEG # BLD: 4.5 K/UL (ref 1.8–8)
NEUTS SEG NFR BLD: 68 % (ref 32–75)
NRBC # BLD: 0 K/UL (ref 0–0.01)
NRBC BLD-RTO: 0 PER 100 WBC
PLATELET # BLD AUTO: 158 K/UL (ref 150–400)
PMV BLD AUTO: 10.8 FL (ref 8.9–12.9)
POTASSIUM SERPL-SCNC: 4 MMOL/L (ref 3.5–5.1)
PROT SERPL-MCNC: 6.5 G/DL (ref 6.4–8.2)
RBC # BLD AUTO: 3.98 M/UL (ref 4.1–5.7)
SODIUM SERPL-SCNC: 139 MMOL/L (ref 136–145)
WBC # BLD AUTO: 6.7 K/UL (ref 4.1–11.1)

## 2023-04-11 PROCEDURE — 96413 CHEMO IV INFUSION 1 HR: CPT

## 2023-04-11 PROCEDURE — 85025 COMPLETE CBC W/AUTO DIFF WBC: CPT

## 2023-04-11 PROCEDURE — 74011250636 HC RX REV CODE- 250/636: Performed by: INTERNAL MEDICINE

## 2023-04-11 PROCEDURE — 80053 COMPREHEN METABOLIC PANEL: CPT

## 2023-04-11 PROCEDURE — 86141 C-REACTIVE PROTEIN HS: CPT

## 2023-04-11 PROCEDURE — 36415 COLL VENOUS BLD VENIPUNCTURE: CPT

## 2023-04-11 PROCEDURE — 85652 RBC SED RATE AUTOMATED: CPT

## 2023-04-11 PROCEDURE — 74011000258 HC RX REV CODE- 258: Performed by: INTERNAL MEDICINE

## 2023-04-11 RX ORDER — ALBUTEROL SULFATE 0.83 MG/ML
2.5 SOLUTION RESPIRATORY (INHALATION) AS NEEDED
Start: 2023-05-01

## 2023-04-11 RX ORDER — EPINEPHRINE 1 MG/ML
0.3 INJECTION, SOLUTION, CONCENTRATE INTRAVENOUS AS NEEDED
OUTPATIENT
Start: 2023-05-01

## 2023-04-11 RX ORDER — SODIUM CHLORIDE 9 MG/ML
5-250 INJECTION, SOLUTION INTRAVENOUS AS NEEDED
Status: DISPENSED | OUTPATIENT
Start: 2023-04-11 | End: 2023-04-11

## 2023-04-11 RX ORDER — DIPHENHYDRAMINE HYDROCHLORIDE 50 MG/ML
50 INJECTION, SOLUTION INTRAMUSCULAR; INTRAVENOUS AS NEEDED
Start: 2023-05-01

## 2023-04-11 RX ORDER — DIPHENHYDRAMINE HYDROCHLORIDE 50 MG/ML
25 INJECTION, SOLUTION INTRAMUSCULAR; INTRAVENOUS AS NEEDED
Start: 2023-05-01

## 2023-04-11 RX ORDER — HEPARIN 100 UNIT/ML
500 SYRINGE INTRAVENOUS AS NEEDED
Start: 2023-05-01

## 2023-04-11 RX ORDER — ACETAMINOPHEN 325 MG/1
650 TABLET ORAL AS NEEDED
Start: 2023-05-01

## 2023-04-11 RX ORDER — SODIUM CHLORIDE 9 MG/ML
5-250 INJECTION, SOLUTION INTRAVENOUS AS NEEDED
OUTPATIENT
Start: 2023-06-06

## 2023-04-11 RX ORDER — SODIUM CHLORIDE 9 MG/ML
5-250 INJECTION, SOLUTION INTRAVENOUS AS NEEDED
OUTPATIENT
Start: 2023-05-01

## 2023-04-11 RX ORDER — SODIUM CHLORIDE 0.9 % (FLUSH) 0.9 %
5-40 SYRINGE (ML) INJECTION AS NEEDED
OUTPATIENT
Start: 2023-05-01

## 2023-04-11 RX ORDER — ONDANSETRON 2 MG/ML
8 INJECTION INTRAMUSCULAR; INTRAVENOUS AS NEEDED
OUTPATIENT
Start: 2023-05-01

## 2023-04-11 RX ORDER — SODIUM CHLORIDE 9 MG/ML
5-250 INJECTION, SOLUTION INTRAVENOUS AS NEEDED
Status: CANCELLED | OUTPATIENT
Start: 2023-05-01

## 2023-04-11 RX ORDER — SODIUM CHLORIDE 9 MG/ML
5-40 INJECTION INTRAVENOUS AS NEEDED
OUTPATIENT
Start: 2023-05-01

## 2023-04-11 RX ORDER — HYDROCORTISONE SODIUM SUCCINATE 100 MG/2ML
100 INJECTION, POWDER, FOR SOLUTION INTRAMUSCULAR; INTRAVENOUS AS NEEDED
OUTPATIENT
Start: 2023-05-01

## 2023-04-11 RX ADMIN — SODIUM CHLORIDE 25 ML/HR: 9 INJECTION, SOLUTION INTRAVENOUS at 11:14

## 2023-04-11 RX ADMIN — GOLIMUMAB 212.5 MG: 50 SOLUTION INTRAVENOUS at 11:17

## 2023-04-11 NOTE — PROGRESS NOTES
Outpatient Infusion [facility] - Chemotherapy Progress Note    1000 Pt admit to [facility] for Simponi in stable condition. Assessment completed. No new concerns voiced. LAC PIV with positive blood return. Labs were drawn and sent for processing. Pt does not have elevated temp and denies feeling ill or being on antibiotics. Chemotherapy Flowsheet 4/11/2023   Cycle -   Date 4/11/2023   Drug / Regimen Simponi   Notes -         VS  Patient Vitals for the past 12 hrs:   Temp Pulse Resp BP SpO2   04/11/23 1158 -- (!) 41 -- 129/62 --   04/11/23 1004 97.6 °F (36.4 °C) (!) 50 16 (!) 113/56 96 %         Medications:  Medications Administered       0.9% sodium chloride infusion       Admin Date  04/11/2023 Action  New Bag Dose  25 mL/hr Rate  25 mL/hr Route  IntraVENous Administered By  Joana Cabello RN              golimumab (SIMPONI ARIA) 212.5 mg in 0.9% sodium chloride, overfill volume 127 mL infusion       Admin Date  04/11/2023 Action  New Bag Dose  212.5 mg Rate  254 mL/hr Route  IntraVENous Administered By  Joana Cabello, OMAR                      1200 Pt tolerated treatment well. PIV maintained positive blood return throughout treatment, flushed with positive blood return at conclusion. D/c home in no distress. Pt aware of next appointment scheduled for 6/6.     Labs  Recent Results (from the past 12 hour(s))   CBC WITH AUTOMATED DIFF    Collection Time: 04/11/23 10:08 AM   Result Value Ref Range    WBC 6.7 4.1 - 11.1 K/uL    RBC 3.98 (L) 4.10 - 5.70 M/uL    HGB 13.9 12.1 - 17.0 g/dL    HCT 41.6 36.6 - 50.3 %    .5 (H) 80.0 - 99.0 FL    MCH 34.9 (H) 26.0 - 34.0 PG    MCHC 33.4 30.0 - 36.5 g/dL    RDW 13.2 11.5 - 14.5 %    PLATELET 508 468 - 215 K/uL    MPV 10.8 8.9 - 12.9 FL    NRBC 0.0 0  WBC    ABSOLUTE NRBC 0.00 0.00 - 0.01 K/uL    NEUTROPHILS 68 32 - 75 %    LYMPHOCYTES 22 12 - 49 %    MONOCYTES 7 5 - 13 %    EOSINOPHILS 3 0 - 7 %    BASOPHILS 0 0 - 1 %    IMMATURE GRANULOCYTES 0 0.0 - 0.5 %    ABS. NEUTROPHILS 4.5 1.8 - 8.0 K/UL    ABS. LYMPHOCYTES 1.5 0.8 - 3.5 K/UL    ABS. MONOCYTES 0.5 0.0 - 1.0 K/UL    ABS. EOSINOPHILS 0.2 0.0 - 0.4 K/UL    ABS. BASOPHILS 0.0 0.0 - 0.1 K/UL    ABS. IMM. GRANS. 0.0 0.00 - 0.04 K/UL    DF AUTOMATED     METABOLIC PANEL, COMPREHENSIVE    Collection Time: 04/11/23 10:08 AM   Result Value Ref Range    Sodium 139 136 - 145 mmol/L    Potassium 4.0 3.5 - 5.1 mmol/L    Chloride 108 97 - 108 mmol/L    CO2 27 21 - 32 mmol/L    Anion gap 4 (L) 5 - 15 mmol/L    Glucose 121 (H) 65 - 100 mg/dL    BUN 17 6 - 20 MG/DL    Creatinine 0.88 0.70 - 1.30 MG/DL    BUN/Creatinine ratio 19 12 - 20      eGFR >60 >60 ml/min/1.73m2    Calcium 9.1 8.5 - 10.1 MG/DL    Bilirubin, total 0.6 0.2 - 1.0 MG/DL    ALT (SGPT) 35 12 - 78 U/L    AST (SGOT) 11 (L) 15 - 37 U/L    Alk.  phosphatase 69 45 - 117 U/L    Protein, total 6.5 6.4 - 8.2 g/dL    Albumin 3.4 (L) 3.5 - 5.0 g/dL    Globulin 3.1 2.0 - 4.0 g/dL    A-G Ratio 1.1 1.1 - 2.2     SED RATE (ESR)    Collection Time: 04/11/23 10:08 AM   Result Value Ref Range    Sed rate, automated 6 0 - 20 mm/hr

## 2023-04-18 ENCOUNTER — TELEPHONE (OUTPATIENT)
Dept: INTERNAL MEDICINE CLINIC | Age: 78
End: 2023-04-18

## 2023-04-21 DIAGNOSIS — Z79.60 LONG-TERM USE OF IMMUNOSUPPRESSANT MEDICATION: ICD-10-CM

## 2023-04-21 DIAGNOSIS — M05.79 SEROPOSITIVE RHEUMATOID ARTHRITIS OF MULTIPLE SITES (HCC): Primary | ICD-10-CM

## 2023-04-24 ENCOUNTER — ANESTHESIA EVENT (OUTPATIENT)
Dept: ENDOSCOPY | Age: 78
End: 2023-04-24
Payer: MEDICARE

## 2023-04-24 DIAGNOSIS — Z79.60 LONG-TERM USE OF IMMUNOSUPPRESSANT MEDICATION: ICD-10-CM

## 2023-04-24 DIAGNOSIS — M05.79 SEROPOSITIVE RHEUMATOID ARTHRITIS OF MULTIPLE SITES (HCC): Primary | ICD-10-CM

## 2023-04-25 ENCOUNTER — HOSPITAL ENCOUNTER (OUTPATIENT)
Age: 78
Setting detail: OUTPATIENT SURGERY
Discharge: HOME | End: 2023-04-25
Attending: INTERNAL MEDICINE | Admitting: INTERNAL MEDICINE
Payer: MEDICARE

## 2023-04-25 ENCOUNTER — ANESTHESIA (OUTPATIENT)
Dept: ENDOSCOPY | Age: 78
End: 2023-04-25
Payer: MEDICARE

## 2023-04-25 VITALS
BODY MASS INDEX: 36.4 KG/M2 | WEIGHT: 231.92 LBS | HEART RATE: 52 BPM | OXYGEN SATURATION: 99 % | SYSTOLIC BLOOD PRESSURE: 147 MMHG | RESPIRATION RATE: 15 BRPM | HEIGHT: 67 IN | DIASTOLIC BLOOD PRESSURE: 79 MMHG | TEMPERATURE: 98.1 F

## 2023-04-25 DIAGNOSIS — Z79.60 LONG-TERM USE OF IMMUNOSUPPRESSANT MEDICATION: ICD-10-CM

## 2023-04-25 DIAGNOSIS — M05.79 SEROPOSITIVE RHEUMATOID ARTHRITIS OF MULTIPLE SITES (HCC): Primary | ICD-10-CM

## 2023-04-25 PROBLEM — J45.909 ASTHMA: Status: ACTIVE | Noted: 2023-04-25

## 2023-04-25 PROBLEM — Z91.09 ENVIRONMENTAL ALLERGIES: Status: ACTIVE | Noted: 2023-04-25

## 2023-04-25 PROBLEM — M19.90 ARTHRITIS: Status: ACTIVE | Noted: 2023-04-25

## 2023-04-25 PROBLEM — C43.9 MALIGNANT MELANOMA (HCC): Status: ACTIVE | Noted: 2022-11-28

## 2023-04-25 PROBLEM — E66.9 OBESITY: Status: ACTIVE | Noted: 2023-04-25

## 2023-04-25 PROBLEM — K21.9 GASTRO-ESOPHAGEAL REFLUX: Status: ACTIVE | Noted: 2023-04-25

## 2023-04-25 PROBLEM — F32.A DEPRESSION: Status: ACTIVE | Noted: 2017-12-28

## 2023-04-25 PROCEDURE — 88305 TISSUE EXAM BY PATHOLOGIST: CPT

## 2023-04-25 PROCEDURE — 74011250636 HC RX REV CODE- 250/636: Performed by: NURSE ANESTHETIST, CERTIFIED REGISTERED

## 2023-04-25 PROCEDURE — 2709999900 HC NON-CHARGEABLE SUPPLY: Performed by: INTERNAL MEDICINE

## 2023-04-25 PROCEDURE — 76060000032 HC ANESTHESIA 0.5 TO 1 HR: Performed by: INTERNAL MEDICINE

## 2023-04-25 PROCEDURE — 76040000007: Performed by: INTERNAL MEDICINE

## 2023-04-25 PROCEDURE — 74011000250 HC RX REV CODE- 250: Performed by: NURSE ANESTHETIST, CERTIFIED REGISTERED

## 2023-04-25 PROCEDURE — 77030013992 HC SNR POLYP ENDOSC BSC -B: Performed by: INTERNAL MEDICINE

## 2023-04-25 PROCEDURE — 77030021593 HC FCPS BIOP ENDOSC BSC -A: Performed by: INTERNAL MEDICINE

## 2023-04-25 RX ORDER — MIDAZOLAM HYDROCHLORIDE 1 MG/ML
.25-5 INJECTION, SOLUTION INTRAMUSCULAR; INTRAVENOUS
Status: DISCONTINUED | OUTPATIENT
Start: 2023-04-25 | End: 2023-04-25 | Stop reason: HOSPADM

## 2023-04-25 RX ORDER — PROPOFOL 10 MG/ML
INJECTION, EMULSION INTRAVENOUS AS NEEDED
Status: DISCONTINUED | OUTPATIENT
Start: 2023-04-25 | End: 2023-04-25 | Stop reason: HOSPADM

## 2023-04-25 RX ORDER — SODIUM CHLORIDE 9 MG/ML
INJECTION, SOLUTION INTRAVENOUS
Status: DISCONTINUED | OUTPATIENT
Start: 2023-04-25 | End: 2023-04-25 | Stop reason: HOSPADM

## 2023-04-25 RX ORDER — FLUMAZENIL 0.1 MG/ML
0.2 INJECTION INTRAVENOUS
Status: DISCONTINUED | OUTPATIENT
Start: 2023-04-25 | End: 2023-04-25 | Stop reason: HOSPADM

## 2023-04-25 RX ORDER — NALOXONE HYDROCHLORIDE 0.4 MG/ML
0.4 INJECTION, SOLUTION INTRAMUSCULAR; INTRAVENOUS; SUBCUTANEOUS
Status: DISCONTINUED | OUTPATIENT
Start: 2023-04-25 | End: 2023-04-25 | Stop reason: HOSPADM

## 2023-04-25 RX ORDER — EPINEPHRINE 0.1 MG/ML
1 INJECTION INTRACARDIAC; INTRAVENOUS
Status: DISCONTINUED | OUTPATIENT
Start: 2023-04-25 | End: 2023-04-25 | Stop reason: HOSPADM

## 2023-04-25 RX ORDER — GLYCOPYRROLATE 0.2 MG/ML
INJECTION INTRAMUSCULAR; INTRAVENOUS AS NEEDED
Status: DISCONTINUED | OUTPATIENT
Start: 2023-04-25 | End: 2023-04-25 | Stop reason: HOSPADM

## 2023-04-25 RX ORDER — ATROPINE SULFATE 0.1 MG/ML
0.4 INJECTION INTRAVENOUS
Status: DISCONTINUED | OUTPATIENT
Start: 2023-04-25 | End: 2023-04-25 | Stop reason: HOSPADM

## 2023-04-25 RX ORDER — DEXTROMETHORPHAN/PSEUDOEPHED 2.5-7.5/.8
1.2 DROPS ORAL
Status: DISCONTINUED | OUTPATIENT
Start: 2023-04-25 | End: 2023-04-25 | Stop reason: HOSPADM

## 2023-04-25 RX ORDER — LIDOCAINE HYDROCHLORIDE 20 MG/ML
INJECTION, SOLUTION EPIDURAL; INFILTRATION; INTRACAUDAL; PERINEURAL AS NEEDED
Status: DISCONTINUED | OUTPATIENT
Start: 2023-04-25 | End: 2023-04-25 | Stop reason: HOSPADM

## 2023-04-25 RX ORDER — SODIUM CHLORIDE 9 MG/ML
50 INJECTION, SOLUTION INTRAVENOUS CONTINUOUS
Status: DISCONTINUED | OUTPATIENT
Start: 2023-04-25 | End: 2023-04-25 | Stop reason: HOSPADM

## 2023-04-25 RX ORDER — PROPOFOL 10 MG/ML
INJECTION, EMULSION INTRAVENOUS
Status: DISCONTINUED | OUTPATIENT
Start: 2023-04-25 | End: 2023-04-25 | Stop reason: HOSPADM

## 2023-04-25 RX ADMIN — GLYCOPYRROLATE 0.2 MG: 0.2 INJECTION INTRAMUSCULAR; INTRAVENOUS at 13:18

## 2023-04-25 RX ADMIN — SODIUM CHLORIDE: 9 INJECTION, SOLUTION INTRAVENOUS at 13:14

## 2023-04-25 RX ADMIN — PROPOFOL 150 MCG/KG/MIN: 10 INJECTION, EMULSION INTRAVENOUS at 13:20

## 2023-04-25 RX ADMIN — PROPOFOL 30 MG: 10 INJECTION, EMULSION INTRAVENOUS at 13:20

## 2023-04-25 RX ADMIN — LIDOCAINE HYDROCHLORIDE 100 MG: 20 INJECTION, SOLUTION EPIDURAL; INFILTRATION; INTRACAUDAL; PERINEURAL at 13:20

## 2023-04-25 RX ADMIN — PROPOFOL 30 MG: 10 INJECTION, EMULSION INTRAVENOUS at 13:26

## 2023-04-25 RX ADMIN — PROPOFOL 30 MG: 10 INJECTION, EMULSION INTRAVENOUS at 13:23

## 2023-04-25 NOTE — DISCHARGE INSTRUCTIONS
[address] [facility]. Mariya Barton M.D.  [phone]                 COLON and EGD DISCHARGE INSTRUCTIONS    2023    John Haley  :  1945  [place] Medical Record Number:  [id number]      DISCOMFORT:  Sore throat- throat lozenges or warm salt water gargle  Redness at IV site- apply warm compress to area; if redness or soreness persist- contact your physician  There may be a slight amount of blood passed from the rectum  Gaseous discomfort- walking, belching will help relieve any discomfort  You may not operate a vehicle for 12 hours  You may not engage in an occupation involving machinery or appliances for rest of today  You may not drink alcoholic beverages for at least 12 hours  Avoid making any critical decisions for at least 24 hour  DIET:   High fiber diet. GERD diet: avoid fried and fatty foods. peppermint, chocolate, alcohol, coffee, citrus fruits and juices, tomoato products; avoid lying down for 2 to 3 hours after eating.   - however -  remember your colon is empty and a heavy meal will produce gas. Avoid these foods:  vegetables, fried / greasy foods, carbonated drinks for today     ACTIVITY:  You may  resume your normal daily activities it is recommended that you spend the remainder of the day resting -  avoid any strenuous activity and driving. CALL M.D. ANY SIGN OF:   Increasing pain, nausea, vomiting  Abdominal distension (swelling)  New increased bleeding (oral or rectal)  Fever (chills)  Pain in chest area  Bloody discharge from nose or mouth  Shortness of breath      Follow-up Instructions:   Call Dr. Alejo Letters if any questions at [phone]. Results of procedure / biopsy in 7 to 10 days, we will call you with these results. Your endoscopy showed a small hiatal hernia and stable Allen's esophagus. Biopsies were obtained. Continue with anti-reflux measures and current medications.    Your colonoscopy showed a total of 5 polyps that were removed and sent to pathology, diverticulosis and small internal hemorrhoids. Will likely need repeat colonoscopy in 3 years.

## 2023-04-25 NOTE — ANESTHESIA PREPROCEDURE EVALUATION
Relevant Problems   RESPIRATORY SYSTEM   (+) Asthma      NEUROLOGY   (+) Depression   (+) Dysthymic disorder      CARDIOVASCULAR   (+) Primary hypertension      GASTROINTESTINAL   (+) Gastro-esophageal reflux      ENDOCRINE   (+) Arthritis   (+) Chronic idiopathic gout involving toe of left foot without tophus   (+) Obesity   (+) Seropositive rheumatoid arthritis of multiple sites (HCC)      PERSONAL HX & FAMILY HX OF CANCER   (+) Carcinoma of prostate (HCC)   (+) Malignant melanoma (HCC)       Anesthetic History   No history of anesthetic complications            Review of Systems / Medical History  Patient summary reviewed, nursing notes reviewed and pertinent labs reviewed    Pulmonary        Sleep apnea: BiPAP    Asthma : well controlled       Neuro/Psych         Psychiatric history     Cardiovascular    Hypertension          Hyperlipidemia    Exercise tolerance: >4 METS     GI/Hepatic/Renal  Within defined limits   GERD           Endo/Other  Within defined limits      Obesity and arthritis  Pertinent negatives: No morbid obesity   Other Findings            Physical Exam    Airway  Mallampati: II  TM Distance: 4 - 6 cm  Neck ROM: normal range of motion   Mouth opening: Normal     Cardiovascular  Regular rate and rhythm,  S1 and S2 normal,  no murmur, click, rub, or gallop  Rhythm: regular  Rate: normal         Dental  No notable dental hx       Pulmonary  Breath sounds clear to auscultation               Abdominal  Abdominal exam normal       Other Findings            Anesthetic Plan    ASA: 3  Anesthesia type: MAC and general - backup          Induction: Intravenous  Anesthetic plan and risks discussed with: Patient

## 2023-04-25 NOTE — PROGRESS NOTES
Judy [place]  1945  [id number]    Situation:  Verbal report received from:   Allyssa Santos RN   Procedure: Procedure(s):  ESOPHAGOGASTRODUODENOSCOPY (EGD)  COLONOSCOPY  ESOPHAGOGASTRODUODENAL (EGD) BIOPSY  ENDOSCOPIC POLYPECTOMY    Background:    Preoperative diagnosis: Reflux involving intestinal tract [K21.9]  Screen for colon cancer [Z12.11]  Postoperative diagnosis: Esophagitis  Hiatal Hernia  Diverticulosis    :  Dr. Amarilis Young   Assistant(s): Endoscopy Technician-1: Adele Bloom LPN  Endoscopy RN-1: Rubi Craig RN    Specimens:   ID Type Source Tests Collected by Time Destination   1 : Bx @ 37cm  Preservative Esophagus, Distal  Caprice Bass MD 4/25/2023 1330 Pathology   2 : Bx @ 35cm Preservative Esophagus, Distal  Caprice Bass MD 4/25/2023 1332 Pathology   3 : Ascending colon polyps Preservative Colon, Ascending  Caprice Bass MD 4/25/2023 1340 Pathology   4 : Descending colon polyps Preservative Colon, Descending  Caprice Bass MD 4/25/2023 1344 Pathology     H. Pylori  no    Assessment:  Intra-procedure medications     Anesthesia gave intra-procedure sedation and medications, see anesthesia flow sheet yes    Intravenous fluids: NS@ KVO     Vital signs stable   yes    Abdominal assessment: round and soft   yes    Recommendation:  Discharge patient per MD order  yes.   Return to floor  outpatient  Family or Friend   spouse  Permission to share finding with family or friend yes

## 2023-04-25 NOTE — PROGRESS NOTES
Endoscopy discharge instructions have been reviewed and given to patient. The patient verbalized understanding and acceptance of instructions. Dr. Katie Cervantes discussed with patient procedure findings and next steps.

## 2023-04-25 NOTE — ANESTHESIA POSTPROCEDURE EVALUATION
Procedure(s):  ESOPHAGOGASTRODUODENOSCOPY (EGD)  COLONOSCOPY  ESOPHAGOGASTRODUODENAL (EGD) BIOPSY  ENDOSCOPIC POLYPECTOMY. MAC, general - backup    Anesthesia Post Evaluation        Patient location during evaluation: PACU  Patient participation: complete - patient participated  Level of consciousness: awake and alert  Pain management: adequate  Airway patency: patent  Anesthetic complications: no  Cardiovascular status: acceptable  Respiratory status: acceptable  Hydration status: acceptable  Post anesthesia nausea and vomiting:  none  Final Post Anesthesia Temperature Assessment:  Normothermia (36.0-37.5 degrees C)      INITIAL Post-op Vital signs:   Vitals Value Taken Time   /79 04/25/23 1410   Temp 36.7 °C (98.1 °F) 04/25/23 1400   Pulse 45 04/25/23 1410   Resp 16 04/25/23 1410   SpO2 98 % 04/25/23 1410   Vitals shown include unvalidated device data.

## 2023-04-25 NOTE — PROCEDURES
Deboarh Allen M.D.  [phone]           2023                EGD Operative Report  Sachin Rodriguez  :  1945  [facility] Medical Record Number:  [id number]      Indication:  Allen's/esophageal ulcer     : Pat Sheehan MD    Referring Provider:  Heather Palacio MD      Anesthesia/Sedation:  MAC anesthesia    Airway assessment: No airway problems anticipated    Pre-Procedural Exam:      Airway: clear, no airway problems anticipated  Heart: RRR, without gallops or rubs  Lungs: clear bilaterally without wheezes, crackles, or rhonchi  Abdomen: soft, nontender, nondistended, bowel sounds present  Mental Status: awake, alert and oriented to person, place and time       Procedure Details     After infomed consent was obtained for the procedure, with all risks and benefits of procedure explained the patient was taken to the endoscopy suite and placed in the left lateral decubitus position. Following sequential administration of sedation as per above, the endoscope was inserted into the mouth and advanced under direct vision to second portion of the duodenum. A careful inspection was made as the gastroscope was withdrawn, including a retroflexed view of the proximal stomach; findings and interventions are described below. Findings:   Esophagus:Irregular Z-line with two mucosa tongues consistent with Allen's esophagus seen extending over a centimeter from the Z-line. Two islets seen at 35 cm  Stomach: Small hiatal hernia, otherwise mucosa within normal  Duodenum/jejunum: normal    Therapies:  none    Specimens: Four quadrant biopsies obtained at 37 cm and 35 cm from the gums           Complications:   None; patient tolerated the procedure well. EBL:  None.            Impression:    Short segment Allen's esophagus, biopsies obtained                          Small hiatal hernia    Recommendations:    -Continue acid suppression.  -Await pathology  -Continue with anti-reflux measures    Cheng Silver MD

## 2023-04-25 NOTE — PROCEDURES
Jam Evans M.D.  [phone]            2023          Colonoscopy Operative Report  Javier Wilkerson  :  1945  [place] Medical Record Number:  [id number]      Indications:    Personal history of colon polyps (screening only)     :  Lindy Crocekr MD    Referring Provider: Padmini Armenta MD    Sedation:  MAC anesthesia    Pre-Procedural Exam:      Airway: clear,  No airway problems anticipated  Heart: RRR, without gallops or rubs  Lungs: clear bilaterally without wheezes, crackles, or rhonchi  Abdomen: soft, nontender, nondistended, bowel sounds present  Mental Status: awake, alert and oriented to person, place and time     Procedure Details:  After informed consent was obtained with all risks and benefits of procedure explained and preoperative exam completed, the patient was taken to the endoscopy suite and placed in the left lateral decubitus position. Upon sequential sedation as per above, a digital rectal exam was performed. The Olympus videocolonoscope  was inserted in the rectum and carefully advanced to the cecum, which was identified by the ileocecal valve and appendiceal orifice. The quality of preparation was good. The colonoscope was slowly withdrawn with careful inspection and evaluation between folds. Retroflexion in the rectum was performed. Findings:   Terminal Ileum: not intubated  Cecum: normal  Ascending Colon: 2  Sessile polyp(s), the largest 3 mm in size;  Transverse Colon: normal  Descending Colon: 3  Sessile polyp(s), the largest 4 mm in size;  Sigmoid: no mucosal lesion appreciated  moderate diverticulosis; Rectum: no mucosal lesion appreciated  Grade 1 internal hemorrhoid(s);     Interventions:  5 complete polypectomy were performed using cold snare  and the polyps were  retrieved    Specimen Removed:  specimen #1, 3 mm in size, located in the ascending colon removed by cold snare and retrieved for pathology  #2, 3, 4 mm in size, located in the descending colon removed by cold snare and retrieved for pathology    Complications: None. EBL:  None. Impression:  A total of 5 polyps were removed and sent to pathology                        Sigmoid diverticulosis and small internal hemorrhoids    Recommendations:  -If adenoma is present, repeat colonoscopy in 3 years.   -High fiber diet.    -Resume current medication(s)    Discharge Disposition:  Home in the company of a  when able to ambulate.     Estrellita Cook MD  4/25/2023  1:54 PM

## 2023-04-25 NOTE — PERIOP NOTES
Care of patient assumed from the anesthesia provider. Patient tolerated procedure well. Abdomen remains soft and non tender post procedure, no complaints or indication of discomfort noted at this time. See anesthesia note. Endoscope was pre-cleaned at bedside immediately following procedure by Marino Shaw. Bedside verbal report given to QUINTIN GUTIERREZ RN.

## 2023-04-28 DIAGNOSIS — M05.79 SEROPOSITIVE RHEUMATOID ARTHRITIS OF MULTIPLE SITES (HCC): Primary | ICD-10-CM

## 2023-04-28 DIAGNOSIS — Z79.60 LONG-TERM USE OF IMMUNOSUPPRESSANT MEDICATION: ICD-10-CM

## 2023-06-02 DIAGNOSIS — M05.79 RHEUMATOID ARTHRITIS WITH RHEUMATOID FACTOR OF MULTIPLE SITES WITHOUT ORGAN OR SYSTEMS INVOLVEMENT (HCC): Primary | ICD-10-CM

## 2023-06-05 DIAGNOSIS — M05.79 SEROPOSITIVE RHEUMATOID ARTHRITIS OF MULTIPLE SITES (HCC): Primary | ICD-10-CM

## 2023-06-05 RX ORDER — SODIUM CHLORIDE 9 MG/ML
5-250 INJECTION, SOLUTION INTRAVENOUS PRN
Status: CANCELLED | OUTPATIENT
Start: 2023-06-06

## 2023-06-05 RX ORDER — EPINEPHRINE 1 MG/ML
0.3 INJECTION, SOLUTION, CONCENTRATE INTRAVENOUS PRN
Status: CANCELLED | OUTPATIENT
Start: 2023-06-06

## 2023-06-05 RX ORDER — SODIUM CHLORIDE 9 MG/ML
INJECTION, SOLUTION INTRAVENOUS CONTINUOUS
Status: CANCELLED | OUTPATIENT
Start: 2023-06-06

## 2023-06-05 RX ORDER — SODIUM CHLORIDE 0.9 % (FLUSH) 0.9 %
5-40 SYRINGE (ML) INJECTION PRN
Status: CANCELLED | OUTPATIENT
Start: 2023-06-06

## 2023-06-05 RX ORDER — DIPHENHYDRAMINE HYDROCHLORIDE 50 MG/ML
50 INJECTION INTRAMUSCULAR; INTRAVENOUS
Status: CANCELLED | OUTPATIENT
Start: 2023-06-06

## 2023-06-05 RX ORDER — ONDANSETRON 2 MG/ML
8 INJECTION INTRAMUSCULAR; INTRAVENOUS
Status: CANCELLED | OUTPATIENT
Start: 2023-06-06

## 2023-06-05 RX ORDER — ALBUTEROL SULFATE 90 UG/1
4 AEROSOL, METERED RESPIRATORY (INHALATION) PRN
Status: CANCELLED | OUTPATIENT
Start: 2023-06-06

## 2023-06-05 RX ORDER — ACETAMINOPHEN 325 MG/1
650 TABLET ORAL
Status: CANCELLED | OUTPATIENT
Start: 2023-06-06

## 2023-06-05 RX ORDER — HEPARIN SODIUM (PORCINE) LOCK FLUSH IV SOLN 100 UNIT/ML 100 UNIT/ML
500 SOLUTION INTRAVENOUS PRN
Status: CANCELLED | OUTPATIENT
Start: 2023-06-06

## 2023-06-06 ENCOUNTER — HOSPITAL ENCOUNTER (OUTPATIENT)
Facility: HOSPITAL | Age: 78
Setting detail: INFUSION SERIES
End: 2023-06-06
Payer: MEDICARE

## 2023-06-06 ENCOUNTER — APPOINTMENT (OUTPATIENT)
Dept: INFUSION THERAPY | Age: 78
End: 2023-06-06
Attending: INTERNAL MEDICINE

## 2023-06-06 VITALS
WEIGHT: 237 LBS | HEIGHT: 67 IN | SYSTOLIC BLOOD PRESSURE: 140 MMHG | OXYGEN SATURATION: 92 % | RESPIRATION RATE: 16 BRPM | BODY MASS INDEX: 37.2 KG/M2 | DIASTOLIC BLOOD PRESSURE: 71 MMHG | TEMPERATURE: 97.4 F | HEART RATE: 50 BPM

## 2023-06-06 DIAGNOSIS — M05.79 SEROPOSITIVE RHEUMATOID ARTHRITIS OF MULTIPLE SITES (HCC): Primary | ICD-10-CM

## 2023-06-06 LAB
ALBUMIN SERPL-MCNC: 3.5 G/DL (ref 3.5–5)
ALBUMIN/GLOB SERPL: 1.1 (ref 1.1–2.2)
ALP SERPL-CCNC: 69 U/L (ref 45–117)
ALT SERPL-CCNC: 41 U/L (ref 12–78)
ANION GAP SERPL CALC-SCNC: 5 MMOL/L (ref 5–15)
AST SERPL-CCNC: 14 U/L (ref 15–37)
BILIRUB SERPL-MCNC: 0.8 MG/DL (ref 0.2–1)
BUN SERPL-MCNC: 20 MG/DL (ref 6–20)
BUN/CREAT SERPL: 22 (ref 12–20)
CALCIUM SERPL-MCNC: 8.7 MG/DL (ref 8.5–10.1)
CHLORIDE SERPL-SCNC: 109 MMOL/L (ref 97–108)
CO2 SERPL-SCNC: 25 MMOL/L (ref 21–32)
CREAT SERPL-MCNC: 0.89 MG/DL (ref 0.7–1.3)
CRP SERPL-MCNC: <0.29 MG/DL (ref 0–0.6)
ERYTHROCYTE [DISTWIDTH] IN BLOOD BY AUTOMATED COUNT: 13.5 % (ref 11.5–14.5)
GLOBULIN SER CALC-MCNC: 3.1 G/DL (ref 2–4)
GLUCOSE SERPL-MCNC: 129 MG/DL (ref 65–100)
HCT VFR BLD AUTO: 39.8 % (ref 36.6–50.3)
HGB BLD-MCNC: 14.1 G/DL (ref 12.1–17)
MCH RBC QN AUTO: 36 PG (ref 26–34)
MCHC RBC AUTO-ENTMCNC: 35.4 G/DL (ref 30–36.5)
MCV RBC AUTO: 101.5 FL (ref 80–99)
NRBC # BLD: 0 K/UL (ref 0–0.01)
NRBC BLD-RTO: 0 PER 100 WBC
PLATELET # BLD AUTO: 163 K/UL (ref 150–400)
PMV BLD AUTO: 11 FL (ref 8.9–12.9)
POTASSIUM SERPL-SCNC: 4.3 MMOL/L (ref 3.5–5.1)
PROT SERPL-MCNC: 6.6 G/DL (ref 6.4–8.2)
RBC # BLD AUTO: 3.92 M/UL (ref 4.1–5.7)
SODIUM SERPL-SCNC: 139 MMOL/L (ref 136–145)
WBC # BLD AUTO: 5.9 K/UL (ref 4.1–11.1)

## 2023-06-06 PROCEDURE — 2580000003 HC RX 258: Performed by: INTERNAL MEDICINE

## 2023-06-06 PROCEDURE — 85652 RBC SED RATE AUTOMATED: CPT

## 2023-06-06 PROCEDURE — 86140 C-REACTIVE PROTEIN: CPT

## 2023-06-06 PROCEDURE — 80053 COMPREHEN METABOLIC PANEL: CPT

## 2023-06-06 PROCEDURE — 36415 COLL VENOUS BLD VENIPUNCTURE: CPT

## 2023-06-06 PROCEDURE — 96413 CHEMO IV INFUSION 1 HR: CPT

## 2023-06-06 PROCEDURE — 6360000002 HC RX W HCPCS: Performed by: INTERNAL MEDICINE

## 2023-06-06 PROCEDURE — 85027 COMPLETE CBC AUTOMATED: CPT

## 2023-06-06 RX ORDER — ACETAMINOPHEN 325 MG/1
650 TABLET ORAL
OUTPATIENT
Start: 2023-06-06

## 2023-06-06 RX ORDER — SODIUM CHLORIDE 9 MG/ML
5-250 INJECTION, SOLUTION INTRAVENOUS PRN
Status: DISCONTINUED | OUTPATIENT
Start: 2023-06-06 | End: 2023-06-07 | Stop reason: HOSPADM

## 2023-06-06 RX ORDER — SODIUM CHLORIDE 9 MG/ML
5-250 INJECTION, SOLUTION INTRAVENOUS PRN
Status: CANCELLED | OUTPATIENT
Start: 2023-06-06

## 2023-06-06 RX ORDER — SODIUM CHLORIDE 9 MG/ML
INJECTION, SOLUTION INTRAVENOUS CONTINUOUS
OUTPATIENT
Start: 2023-06-06

## 2023-06-06 RX ORDER — ONDANSETRON 2 MG/ML
8 INJECTION INTRAMUSCULAR; INTRAVENOUS
OUTPATIENT
Start: 2023-06-06

## 2023-06-06 RX ORDER — HEPARIN SODIUM (PORCINE) LOCK FLUSH IV SOLN 100 UNIT/ML 100 UNIT/ML
500 SOLUTION INTRAVENOUS PRN
Status: CANCELLED | OUTPATIENT
Start: 2023-06-06

## 2023-06-06 RX ORDER — ALBUTEROL SULFATE 90 UG/1
4 AEROSOL, METERED RESPIRATORY (INHALATION) PRN
OUTPATIENT
Start: 2023-06-06

## 2023-06-06 RX ORDER — SODIUM CHLORIDE 9 MG/ML
5-250 INJECTION, SOLUTION INTRAVENOUS PRN
OUTPATIENT
Start: 2023-06-06

## 2023-06-06 RX ORDER — SODIUM CHLORIDE 0.9 % (FLUSH) 0.9 %
5-40 SYRINGE (ML) INJECTION PRN
OUTPATIENT
Start: 2023-06-06

## 2023-06-06 RX ORDER — DIPHENHYDRAMINE HYDROCHLORIDE 50 MG/ML
50 INJECTION INTRAMUSCULAR; INTRAVENOUS
OUTPATIENT
Start: 2023-06-06

## 2023-06-06 RX ORDER — EPINEPHRINE 1 MG/ML
0.3 INJECTION, SOLUTION, CONCENTRATE INTRAVENOUS PRN
OUTPATIENT
Start: 2023-06-06

## 2023-06-06 RX ORDER — METHOTREXATE 25 MG/ML
INJECTION INTRA-ARTERIAL; INTRAMUSCULAR; INTRATHECAL; INTRAVENOUS
Qty: 24 ML | Refills: 0 | Status: SHIPPED | OUTPATIENT
Start: 2023-06-06

## 2023-06-06 RX ADMIN — SODIUM CHLORIDE 25 ML/HR: 900 INJECTION, SOLUTION INTRAVENOUS at 10:54

## 2023-06-06 RX ADMIN — GOLIMUMAB 210 MG: 50 SOLUTION INTRAVENOUS at 10:54

## 2023-06-06 NOTE — PROGRESS NOTES
[facility] Progress Note    Date: 2023    Name: Ramón Collier    MRN: [id number]         : 1945    Mr. Rajeev Crowell Arrived ambulatory and in no distress for [facility]. Assessment was completed, no acute issues at this time, no new complaints voiced. 24 G PIV established to left arm, + blood return. Pt denies viral/bacterial illness and is not currently taking antibiotics. Mr. Sidhu's vitals were reviewed.   Vitals:    23 1000   BP: 124/68   Pulse: 50   Resp: 16   Temp: 97.4 °F (36.3 °C)   SpO2: 92%     Recent Results (from the past 12 hour(s))   C-Reactive Protein    Collection Time: 23 10:17 AM   Result Value Ref Range    CRP <0.29 0.00 - 0.60 mg/dL   CBC    Collection Time: 23 10:17 AM   Result Value Ref Range    WBC 5.9 4.1 - 11.1 K/uL    RBC 3.92 (L) 4.10 - 5.70 M/uL    Hemoglobin 14.1 12.1 - 17.0 g/dL    Hematocrit 39.8 36.6 - 50.3 %    .5 (H) 80.0 - 99.0 FL    MCH 36.0 (H) 26.0 - 34.0 PG    MCHC 35.4 30.0 - 36.5 g/dL    RDW 13.5 11.5 - 14.5 %    Platelets 210 248 - 437 K/uL    MPV 11.0 8.9 - 12.9 FL    Nucleated RBCs 0.0 0  WBC    nRBC 0.00 0.00 - 0.01 K/uL   Comprehensive Metabolic Panel    Collection Time: 23 10:17 AM   Result Value Ref Range    Sodium 139 136 - 145 mmol/L    Potassium 4.3 3.5 - 5.1 mmol/L    Chloride 109 (H) 97 - 108 mmol/L    CO2 25 21 - 32 mmol/L    Anion Gap 5 5 - 15 mmol/L    Glucose 129 (H) 65 - 100 mg/dL    BUN 20 6 - 20 MG/DL    Creatinine 0.89 0.70 - 1.30 MG/DL    Bun/Cre Ratio 22 (H) 12 - 20      Est, Glom Filt Rate >60 >60 ml/min/1.73m2    Calcium 8.7 8.5 - 10.1 MG/DL    Total Bilirubin 0.8 0.2 - 1.0 MG/DL    ALT 41 12 - 78 U/L    AST 14 (L) 15 - 37 U/L    Alk Phosphatase 69 45 - 117 U/L    Total Protein 6.6 6.4 - 8.2 g/dL    Albumin 3.5 3.5 - 5.0 g/dL    Globulin 3.1 2.0 - 4.0 g/dL    Albumin/Globulin Ratio 1.1 1.1 - 2.2           Medications:  Medications Administered         0.9 % sodium chloride infusion Admin

## 2023-06-19 RX ORDER — PRIMIDONE 50 MG/1
TABLET ORAL
Qty: 60 TABLET | Refills: 2 | Status: SHIPPED | OUTPATIENT
Start: 2023-06-19

## 2023-08-01 ENCOUNTER — APPOINTMENT (OUTPATIENT)
Dept: INFUSION THERAPY | Age: 78
End: 2023-08-01
Attending: INTERNAL MEDICINE

## 2023-09-19 RX ORDER — PANTOPRAZOLE SODIUM 40 MG/1
TABLET, DELAYED RELEASE ORAL
Qty: 180 TABLET | Refills: 1 | Status: SHIPPED | OUTPATIENT
Start: 2023-09-19

## 2023-09-26 ENCOUNTER — APPOINTMENT (OUTPATIENT)
Dept: INFUSION THERAPY | Age: 78
End: 2023-09-26
Attending: INTERNAL MEDICINE

## 2023-09-26 RX ORDER — PRIMIDONE 50 MG/1
TABLET ORAL
Qty: 60 TABLET | Refills: 2 | Status: SHIPPED | OUTPATIENT
Start: 2023-09-26

## 2023-10-03 ENCOUNTER — HOSPITAL ENCOUNTER (OUTPATIENT)
Facility: HOSPITAL | Age: 78
Discharge: HOME | End: 2023-10-06
Payer: MEDICARE

## 2023-10-03 DIAGNOSIS — M81.0 SENILE OSTEOPOROSIS: ICD-10-CM

## 2023-10-03 PROCEDURE — 77080 DXA BONE DENSITY AXIAL: CPT

## 2023-11-01 SDOH — ECONOMIC STABILITY: HOUSING INSECURITY
IN THE LAST 12 MONTHS, WAS THERE A TIME WHEN YOU DID NOT HAVE A STEADY PLACE TO SLEEP OR SLEPT IN A SHELTER (INCLUDING NOW)?: NO

## 2023-11-01 SDOH — ECONOMIC STABILITY: FOOD INSECURITY: WITHIN THE PAST 12 MONTHS, THE FOOD YOU BOUGHT JUST DIDN'T LAST AND YOU DIDN'T HAVE MONEY TO GET MORE.: NEVER TRUE

## 2023-11-01 SDOH — ECONOMIC STABILITY: INCOME INSECURITY: HOW HARD IS IT FOR YOU TO PAY FOR THE VERY BASICS LIKE FOOD, HOUSING, MEDICAL CARE, AND HEATING?: NOT VERY HARD

## 2023-11-01 SDOH — ECONOMIC STABILITY: TRANSPORTATION INSECURITY
IN THE PAST 12 MONTHS, HAS LACK OF TRANSPORTATION KEPT YOU FROM MEETINGS, WORK, OR FROM GETTING THINGS NEEDED FOR DAILY LIVING?: NO

## 2023-11-01 SDOH — ECONOMIC STABILITY: FOOD INSECURITY: WITHIN THE PAST 12 MONTHS, YOU WORRIED THAT YOUR FOOD WOULD RUN OUT BEFORE YOU GOT MONEY TO BUY MORE.: NEVER TRUE

## 2023-11-02 ENCOUNTER — OFFICE VISIT (OUTPATIENT)
Age: 78
End: 2023-11-02
Payer: MEDICARE

## 2023-11-02 VITALS
TEMPERATURE: 97.9 F | RESPIRATION RATE: 16 BRPM | BODY MASS INDEX: 37.51 KG/M2 | HEART RATE: 117 BPM | DIASTOLIC BLOOD PRESSURE: 90 MMHG | WEIGHT: 239 LBS | HEIGHT: 67 IN | SYSTOLIC BLOOD PRESSURE: 152 MMHG | OXYGEN SATURATION: 96 %

## 2023-11-02 DIAGNOSIS — R00.2 PALPITATIONS: Primary | ICD-10-CM

## 2023-11-02 DIAGNOSIS — E78.2 MIXED HYPERLIPIDEMIA: ICD-10-CM

## 2023-11-02 DIAGNOSIS — J32.9 SINUSITIS, UNSPECIFIED CHRONICITY, UNSPECIFIED LOCATION: ICD-10-CM

## 2023-11-02 DIAGNOSIS — I48.91 ATRIAL FIBRILLATION, UNSPECIFIED TYPE (HCC): ICD-10-CM

## 2023-11-02 DIAGNOSIS — Z85.46 HISTORY OF PROSTATE CANCER: ICD-10-CM

## 2023-11-02 DIAGNOSIS — M05.79 RHEUMATOID ARTHRITIS WITH RHEUMATOID FACTOR OF MULTIPLE SITES WITHOUT ORGAN OR SYSTEMS INVOLVEMENT (HCC): ICD-10-CM

## 2023-11-02 DIAGNOSIS — I10 PRIMARY HYPERTENSION: ICD-10-CM

## 2023-11-02 PROCEDURE — 99214 OFFICE O/P EST MOD 30 MIN: CPT | Performed by: INTERNAL MEDICINE

## 2023-11-02 PROCEDURE — 93010 ELECTROCARDIOGRAM REPORT: CPT | Performed by: INTERNAL MEDICINE

## 2023-11-02 PROCEDURE — 1123F ACP DISCUSS/DSCN MKR DOCD: CPT | Performed by: INTERNAL MEDICINE

## 2023-11-02 PROCEDURE — G8484 FLU IMMUNIZE NO ADMIN: HCPCS | Performed by: INTERNAL MEDICINE

## 2023-11-02 PROCEDURE — G8428 CUR MEDS NOT DOCUMENT: HCPCS | Performed by: INTERNAL MEDICINE

## 2023-11-02 PROCEDURE — 93005 ELECTROCARDIOGRAM TRACING: CPT | Performed by: INTERNAL MEDICINE

## 2023-11-02 PROCEDURE — 3080F DIAST BP >= 90 MM HG: CPT | Performed by: INTERNAL MEDICINE

## 2023-11-02 PROCEDURE — G8417 CALC BMI ABV UP PARAM F/U: HCPCS | Performed by: INTERNAL MEDICINE

## 2023-11-02 PROCEDURE — 1036F TOBACCO NON-USER: CPT | Performed by: INTERNAL MEDICINE

## 2023-11-02 PROCEDURE — 3077F SYST BP >= 140 MM HG: CPT | Performed by: INTERNAL MEDICINE

## 2023-11-02 RX ORDER — AMOXICILLIN AND CLAVULANATE POTASSIUM 875; 125 MG/1; MG/1
1 TABLET, FILM COATED ORAL 2 TIMES DAILY
Qty: 20 TABLET | Refills: 0 | Status: SHIPPED | OUTPATIENT
Start: 2023-11-02 | End: 2023-11-12

## 2023-11-02 ASSESSMENT — PATIENT HEALTH QUESTIONNAIRE - PHQ9
SUM OF ALL RESPONSES TO PHQ QUESTIONS 1-9: 0
2. FEELING DOWN, DEPRESSED OR HOPELESS: 0
1. LITTLE INTEREST OR PLEASURE IN DOING THINGS: 0
SUM OF ALL RESPONSES TO PHQ QUESTIONS 1-9: 0
SUM OF ALL RESPONSES TO PHQ9 QUESTIONS 1 & 2: 0
SUM OF ALL RESPONSES TO PHQ QUESTIONS 1-9: 0
SUM OF ALL RESPONSES TO PHQ QUESTIONS 1-9: 0

## 2023-11-02 ASSESSMENT — ENCOUNTER SYMPTOMS
BACK PAIN: 0
ABDOMINAL PAIN: 0
COUGH: 1
SHORTNESS OF BREATH: 0
DIARRHEA: 0
WHEEZING: 0
CONSTIPATION: 0

## 2023-11-02 NOTE — PROGRESS NOTES
about 43 years ago. His smoking use included cigarettes. He smoked an average of 1 pack per day. He has never used smokeless tobacco.    reports current alcohol use of about 14.0 standard drinks of alcohol per week. BP Readings from Last 2 Encounters:   11/02/23 (!) 152/90   06/06/23 (!) 140/71     HLD: on statin. ROS  Review of Systems   Constitutional:  Positive for chills. Negative for activity change, appetite change and fever. HENT:  Positive for congestion. Respiratory:  Positive for cough (mild). Negative for shortness of breath and wheezing. Cardiovascular:  Positive for palpitations. Negative for chest pain. Gastrointestinal:  Negative for abdominal pain, constipation and diarrhea. GERD   Musculoskeletal:  Negative for back pain and myalgias. Neurological:  Negative for dizziness and headaches. Psychiatric/Behavioral:  Negative for agitation, behavioral problems and hallucinations. Vitals:    11/02/23 0812   BP: (!) 152/90   Pulse:    Resp:    Temp:    SpO2:        Physical Exam  Constitutional:       Appearance: Normal appearance. HENT:      Head: Normocephalic and atraumatic. Right Ear: Tympanic membrane and ear canal normal.      Left Ear: Tympanic membrane and ear canal normal.      Nose: Nose normal.   Eyes:      Extraocular Movements: Extraocular movements intact. Conjunctiva/sclera: Conjunctivae normal.   Cardiovascular:      Rate and Rhythm: Tachycardia present. Rhythm irregular. Pulses: Normal pulses. Heart sounds: No murmur heard. Pulmonary:      Effort: Pulmonary effort is normal. No respiratory distress. Breath sounds: Normal breath sounds. No wheezing. Comments: No wheezing. No egophony. No basilar crackles  Abdominal:      General: There is no distension. Palpations: Abdomen is soft. Musculoskeletal:         General: No swelling. Cervical back: Normal range of motion. Skin:     General: Skin is warm and dry.

## 2023-11-03 LAB
ALBUMIN SERPL-MCNC: 3.7 G/DL (ref 3.5–5)
ALBUMIN/GLOB SERPL: 1.3 (ref 1.1–2.2)
ALP SERPL-CCNC: 76 U/L (ref 45–117)
ALT SERPL-CCNC: 52 U/L (ref 12–78)
ANION GAP SERPL CALC-SCNC: 2 MMOL/L (ref 5–15)
AST SERPL-CCNC: 18 U/L (ref 15–37)
BASOPHILS # BLD: 0 K/UL (ref 0–0.1)
BASOPHILS NFR BLD: 1 % (ref 0–1)
BILIRUB SERPL-MCNC: 0.7 MG/DL (ref 0.2–1)
BUN SERPL-MCNC: 15 MG/DL (ref 6–20)
BUN/CREAT SERPL: 16 (ref 12–20)
CALCIUM SERPL-MCNC: 8.4 MG/DL (ref 8.5–10.1)
CHLORIDE SERPL-SCNC: 110 MMOL/L (ref 97–108)
CHOLEST SERPL-MCNC: 154 MG/DL
CO2 SERPL-SCNC: 27 MMOL/L (ref 21–32)
CREAT SERPL-MCNC: 0.95 MG/DL (ref 0.7–1.3)
DIFFERENTIAL METHOD BLD: ABNORMAL
EOSINOPHIL # BLD: 0.1 K/UL (ref 0–0.4)
EOSINOPHIL NFR BLD: 2 % (ref 0–7)
ERYTHROCYTE [DISTWIDTH] IN BLOOD BY AUTOMATED COUNT: 12.8 % (ref 11.5–14.5)
GLOBULIN SER CALC-MCNC: 2.9 G/DL (ref 2–4)
GLUCOSE SERPL-MCNC: 124 MG/DL (ref 65–100)
HCT VFR BLD AUTO: 43.2 % (ref 36.6–50.3)
HDLC SERPL-MCNC: 47 MG/DL
HDLC SERPL: 3.3 (ref 0–5)
HGB BLD-MCNC: 14.2 G/DL (ref 12.1–17)
IMM GRANULOCYTES # BLD AUTO: 0 K/UL (ref 0–0.04)
IMM GRANULOCYTES NFR BLD AUTO: 0 % (ref 0–0.5)
LDLC SERPL CALC-MCNC: 75.4 MG/DL (ref 0–100)
LYMPHOCYTES # BLD: 1.4 K/UL (ref 0.8–3.5)
LYMPHOCYTES NFR BLD: 28 % (ref 12–49)
MCH RBC QN AUTO: 35.5 PG (ref 26–34)
MCHC RBC AUTO-ENTMCNC: 32.9 G/DL (ref 30–36.5)
MCV RBC AUTO: 108 FL (ref 80–99)
MONOCYTES # BLD: 0.7 K/UL (ref 0–1)
MONOCYTES NFR BLD: 13 % (ref 5–13)
NEUTS SEG # BLD: 2.9 K/UL (ref 1.8–8)
NEUTS SEG NFR BLD: 56 % (ref 32–75)
NRBC # BLD: 0 K/UL (ref 0–0.01)
NRBC BLD-RTO: 0 PER 100 WBC
PLATELET # BLD AUTO: 139 K/UL (ref 150–400)
PMV BLD AUTO: 11.2 FL (ref 8.9–12.9)
POTASSIUM SERPL-SCNC: 4.2 MMOL/L (ref 3.5–5.1)
PROT SERPL-MCNC: 6.6 G/DL (ref 6.4–8.2)
PSA SERPL-MCNC: 0 NG/ML (ref 0.01–4)
RBC # BLD AUTO: 4 M/UL (ref 4.1–5.7)
SODIUM SERPL-SCNC: 139 MMOL/L (ref 136–145)
TRIGL SERPL-MCNC: 158 MG/DL
TSH SERPL DL<=0.05 MIU/L-ACNC: 2.17 UIU/ML (ref 0.36–3.74)
VLDLC SERPL CALC-MCNC: 31.6 MG/DL
WBC # BLD AUTO: 5.2 K/UL (ref 4.1–11.1)

## 2023-11-16 ENCOUNTER — OFFICE VISIT (OUTPATIENT)
Age: 78
End: 2023-11-16
Payer: MEDICARE

## 2023-11-16 VITALS
DIASTOLIC BLOOD PRESSURE: 60 MMHG | WEIGHT: 231 LBS | BODY MASS INDEX: 36.26 KG/M2 | OXYGEN SATURATION: 90 % | HEART RATE: 62 BPM | HEIGHT: 67 IN | SYSTOLIC BLOOD PRESSURE: 136 MMHG

## 2023-11-16 DIAGNOSIS — I10 PRIMARY HYPERTENSION: ICD-10-CM

## 2023-11-16 DIAGNOSIS — I48.91 ATRIAL FIBRILLATION, NEW ONSET (HCC): Primary | ICD-10-CM

## 2023-11-16 DIAGNOSIS — R07.9 CHEST PAIN: ICD-10-CM

## 2023-11-16 DIAGNOSIS — E78.5 HYPERLIPIDEMIA, UNSPECIFIED HYPERLIPIDEMIA TYPE: ICD-10-CM

## 2023-11-16 PROCEDURE — 93005 ELECTROCARDIOGRAM TRACING: CPT | Performed by: INTERNAL MEDICINE

## 2023-11-16 PROCEDURE — 99214 OFFICE O/P EST MOD 30 MIN: CPT | Performed by: INTERNAL MEDICINE

## 2023-11-16 RX ORDER — PREDNISONE 20 MG/1
40 TABLET ORAL DAILY
Qty: 12 TABLET | Refills: 0 | Status: SHIPPED | OUTPATIENT
Start: 2023-11-16 | End: 2023-11-22

## 2023-11-16 RX ORDER — METOPROLOL SUCCINATE 25 MG/1
25 TABLET, EXTENDED RELEASE ORAL DAILY
Qty: 90 TABLET | Refills: 3 | Status: SHIPPED | OUTPATIENT
Start: 2023-11-16

## 2023-11-16 ASSESSMENT — PATIENT HEALTH QUESTIONNAIRE - PHQ9
SUM OF ALL RESPONSES TO PHQ QUESTIONS 1-9: 0
1. LITTLE INTEREST OR PLEASURE IN DOING THINGS: 0
SUM OF ALL RESPONSES TO PHQ QUESTIONS 1-9: 0
2. FEELING DOWN, DEPRESSED OR HOPELESS: 0
SUM OF ALL RESPONSES TO PHQ9 QUESTIONS 1 & 2: 0

## 2023-11-16 NOTE — PROGRESS NOTES
Glenis Solomon is a 69 y.o. male    had concerns including Atrial Fibrillation, Chest Pain, Hypertension, and Cholesterol Problem. Vitals:    11/16/23 1001   BP: 136/60   Site: Left Upper Arm   Position: Sitting   Pulse: 62   SpO2: 90%   Weight: 104.8 kg (231 lb)   Height: 1.702 m (5' 7\")        Chest pain No    SOB No    Dizziness No    Swelling No    Refills No    Covid Vaccinated yes      1. Have you been to the ER, urgent care clinic since your last visit? Hospitalized since your last visit? no    2. Have you seen or consulted any other health care providers outside of the [address] [place] [address] since your last visit? Include any pap smears or colon screening.  no
Received VO from Dr. Carmelina Lizama:  Toprol XL 25mg po daily. Echo- PAF     Lexiscan stress Nuc- PAF, Chest pain. pt given green sheet instructions, hold metoprolol  See Dr. Elise Alonso in 1 month. Refill per VO of Dr. Norma Cotto:    Visit date not found    No future appointments.     Requested Prescriptions     Signed Prescriptions Disp Refills    metoprolol succinate (TOPROL XL) 25 MG extended release tablet 90 tablet 3     Sig: Take 1 tablet by mouth daily
normoactive bowel sounds are present, no palpable organomegaly and no detectable hernias  Lymph:  No cervical or inguinal adenopathy  Musc:  No cyanosis or clubbing  Skin:  No rashes or ulcers, skin turgor is good  Neuro:  Cranial nerves are grossly intact, no focal motor weakness, follows commands appropriately  Psych:  Good insight, oriented to person, place and time, alert     Labs:     Lab Results   Component Value Date/Time    WBC 5.2 11/02/2023 08:43 AM    HGB 14.2 11/02/2023 08:43 AM    HCT 43.2 11/02/2023 08:43 AM     11/02/2023 08:43 AM    .0 11/02/2023 08:43 AM     Lab Results   Component Value Date/Time    GLUCPOC 134 09/28/2021 11:18 AM      Lab Results   Component Value Date/Time    CHOL 154 11/02/2023 08:43 AM    HDL 47 11/02/2023 08:43 AM     Lab Results   Component Value Date/Time    ALT 52 11/02/2023 08:43 AM     11/02/2023 08:43 AM     No results found for: \"INR\", \"INREXT\", \"PTMR\", \"PT1\"   Lab Results   Component Value Date/Time    GFRAA >60 08/30/2022 10:20 AM    BUN 15 11/02/2023 08:43 AM     11/02/2023 08:43 AM    NAPOC 139 09/28/2021 11:18 AM    K 4.2 11/02/2023 08:43 AM    KPOCT 4.5 09/28/2021 11:18 AM     11/02/2023 08:43 AM    CLPOC 106 09/28/2021 11:18 AM    CO2 27 11/02/2023 08:43 AM     Lab Results   Component Value Date/Time    PSA 0.0 11/02/2023 08:43 AM    PSA 0.0 11/28/2022 10:04 AM    PSA 0.0 12/16/2020 09:39 AM    PSA 0.0 12/16/2019 10:45 AM     Lab Results   Component Value Date/Time    TSH 2.11 02/24/2023 03:56 PM      Lab Results   Component Value Date/Time    GLUCPOC 134 09/28/2021 11:18 AM      No results found for: \"CPK\", \"RCK1\", \"CKMB\", \"BNP\"   No results found for: \"BNP\", \"BNPPOC\", \"BNPNT\"   Lab Results   Component Value Date/Time     11/02/2023 08:43 AM    K 4.2 11/02/2023 08:43 AM     11/02/2023 08:43 AM    CO2 27 11/02/2023 08:43 AM    BUN 15 11/02/2023 08:43 AM    GFRAA >60 08/30/2022 10:20 AM      Lab Results   Component

## 2023-11-16 NOTE — PATIENT INSTRUCTIONS
Toprol XL 25mg po daily. Echo- PAF    Lexiscan stress Nuc- PAF, Chest pain. See Dr. Rebecca Waters in 1 month.

## 2023-12-05 DIAGNOSIS — I48.91 ATRIAL FIBRILLATION, UNSPECIFIED TYPE (HCC): ICD-10-CM

## 2023-12-05 RX ORDER — APIXABAN 5 MG/1
5 TABLET, FILM COATED ORAL 2 TIMES DAILY
Qty: 60 TABLET | Refills: 0 | Status: SHIPPED | OUTPATIENT
Start: 2023-12-05

## 2023-12-13 ENCOUNTER — ANCILLARY PROCEDURE (OUTPATIENT)
Age: 78
End: 2023-12-13
Payer: MEDICARE

## 2023-12-13 VITALS
DIASTOLIC BLOOD PRESSURE: 64 MMHG | SYSTOLIC BLOOD PRESSURE: 128 MMHG | BODY MASS INDEX: 36.26 KG/M2 | HEART RATE: 47 BPM | HEIGHT: 67 IN | WEIGHT: 231 LBS

## 2023-12-13 VITALS
BODY MASS INDEX: 36.26 KG/M2 | DIASTOLIC BLOOD PRESSURE: 64 MMHG | WEIGHT: 231 LBS | SYSTOLIC BLOOD PRESSURE: 128 MMHG | HEIGHT: 67 IN | HEART RATE: 41 BPM

## 2023-12-13 DIAGNOSIS — R07.9 CHEST PAIN: ICD-10-CM

## 2023-12-13 DIAGNOSIS — I48.91 ATRIAL FIBRILLATION, NEW ONSET (HCC): ICD-10-CM

## 2023-12-13 DIAGNOSIS — I10 PRIMARY HYPERTENSION: ICD-10-CM

## 2023-12-13 PROCEDURE — 78452 HT MUSCLE IMAGE SPECT MULT: CPT | Performed by: INTERNAL MEDICINE

## 2023-12-13 PROCEDURE — A9500 TC99M SESTAMIBI: HCPCS | Performed by: INTERNAL MEDICINE

## 2023-12-13 PROCEDURE — 93306 TTE W/DOPPLER COMPLETE: CPT | Performed by: INTERNAL MEDICINE

## 2023-12-13 RX ORDER — TETRAKIS(2-METHOXYISOBUTYLISOCYANIDE)COPPER(I) TETRAFLUOROBORATE 1 MG/ML
26.2 INJECTION, POWDER, LYOPHILIZED, FOR SOLUTION INTRAVENOUS
Status: COMPLETED | OUTPATIENT
Start: 2023-12-13 | End: 2023-12-13

## 2023-12-13 RX ORDER — REGADENOSON 0.08 MG/ML
0.4 INJECTION, SOLUTION INTRAVENOUS
Status: COMPLETED | OUTPATIENT
Start: 2023-12-13 | End: 2023-12-13

## 2023-12-13 RX ADMIN — REGADENOSON 0.4 MG: 0.08 INJECTION, SOLUTION INTRAVENOUS at 08:27

## 2023-12-13 RX ADMIN — TECHNETIUM TC-99M SESTAMIBI 26.2 MILLICURIE: 1 INJECTION INTRAVENOUS at 08:36

## 2023-12-14 PROCEDURE — A9500 TC99M SESTAMIBI: HCPCS | Performed by: INTERNAL MEDICINE

## 2023-12-14 RX ORDER — TETRAKIS(2-METHOXYISOBUTYLISOCYANIDE)COPPER(I) TETRAFLUOROBORATE 1 MG/ML
25.8 INJECTION, POWDER, LYOPHILIZED, FOR SOLUTION INTRAVENOUS
Status: COMPLETED | OUTPATIENT
Start: 2023-12-14 | End: 2023-12-14

## 2023-12-14 RX ADMIN — TECHNETIUM TC-99M SESTAMIBI 25.8 MILLICURIE: 1 INJECTION INTRAVENOUS at 08:15

## 2023-12-15 ENCOUNTER — TELEPHONE (OUTPATIENT)
Age: 78
End: 2023-12-15

## 2023-12-15 ENCOUNTER — OFFICE VISIT (OUTPATIENT)
Age: 78
End: 2023-12-15
Payer: MEDICARE

## 2023-12-15 VITALS
HEIGHT: 67 IN | HEART RATE: 52 BPM | WEIGHT: 232 LBS | OXYGEN SATURATION: 94 % | BODY MASS INDEX: 36.41 KG/M2 | SYSTOLIC BLOOD PRESSURE: 138 MMHG | DIASTOLIC BLOOD PRESSURE: 76 MMHG

## 2023-12-15 DIAGNOSIS — R07.9 CHEST PAIN, UNSPECIFIED TYPE: Primary | ICD-10-CM

## 2023-12-15 DIAGNOSIS — I10 PRIMARY HYPERTENSION: ICD-10-CM

## 2023-12-15 DIAGNOSIS — I48.0 PAF (PAROXYSMAL ATRIAL FIBRILLATION) (HCC): ICD-10-CM

## 2023-12-15 DIAGNOSIS — Z01.818 PRE-OP TESTING: ICD-10-CM

## 2023-12-15 DIAGNOSIS — R94.39 ABNORMAL STRESS TEST: ICD-10-CM

## 2023-12-15 LAB
ANION GAP SERPL CALC-SCNC: 5 MMOL/L (ref 5–15)
BUN SERPL-MCNC: 18 MG/DL (ref 6–20)
BUN/CREAT SERPL: 18 (ref 12–20)
CALCIUM SERPL-MCNC: 8.4 MG/DL (ref 8.5–10.1)
CHLORIDE SERPL-SCNC: 111 MMOL/L (ref 97–108)
CO2 SERPL-SCNC: 27 MMOL/L (ref 21–32)
CREAT SERPL-MCNC: 1 MG/DL (ref 0.7–1.3)
ECHO AO ASC DIAM: 4.2 CM
ECHO AO ASCENDING AORTA INDEX: 1.95 CM/M2
ECHO AO ROOT DIAM: 4.1 CM
ECHO AO ROOT INDEX: 1.91 CM/M2
ECHO AV AREA PEAK VELOCITY: 3 CM2
ECHO AV AREA VTI: 2.9 CM2
ECHO AV AREA/BSA PEAK VELOCITY: 1.4 CM2/M2
ECHO AV AREA/BSA VTI: 1.3 CM2/M2
ECHO AV MEAN GRADIENT: 6 MMHG
ECHO AV MEAN VELOCITY: 1.1 M/S
ECHO AV PEAK GRADIENT: 14 MMHG
ECHO AV PEAK VELOCITY: 1.9 M/S
ECHO AV VELOCITY RATIO: 0.68
ECHO AV VTI: 42.9 CM
ECHO BSA: 2.23 M2
ECHO BSA: 2.23 M2
ECHO EST RA PRESSURE: 3 MMHG
ECHO LA DIAMETER INDEX: 2.14 CM/M2
ECHO LA DIAMETER: 4.6 CM
ECHO LA TO AORTIC ROOT RATIO: 1.12
ECHO LA VOL A-L A2C: 94 ML (ref 18–58)
ECHO LA VOL A-L A4C: 114 ML (ref 18–58)
ECHO LA VOL MOD A2C: 89 ML (ref 18–58)
ECHO LA VOL MOD A4C: 110 ML (ref 18–58)
ECHO LA VOLUME AREA LENGTH: 108 ML
ECHO LA VOLUME INDEX A-L A2C: 44 ML/M2 (ref 16–34)
ECHO LA VOLUME INDEX A-L A4C: 53 ML/M2 (ref 16–34)
ECHO LA VOLUME INDEX AREA LENGTH: 50 ML/M2 (ref 16–34)
ECHO LA VOLUME INDEX MOD A2C: 41 ML/M2 (ref 16–34)
ECHO LA VOLUME INDEX MOD A4C: 51 ML/M2 (ref 16–34)
ECHO LV E' LATERAL VELOCITY: 8 CM/S
ECHO LV E' SEPTAL VELOCITY: 8 CM/S
ECHO LV EDV A2C: 121 ML
ECHO LV EDV A4C: 131 ML
ECHO LV EDV BP: 124 ML (ref 67–155)
ECHO LV EDV INDEX A4C: 61 ML/M2
ECHO LV EDV INDEX BP: 58 ML/M2
ECHO LV EDV NDEX A2C: 56 ML/M2
ECHO LV EJECTION FRACTION A2C: 66 %
ECHO LV EJECTION FRACTION A4C: 65 %
ECHO LV EJECTION FRACTION BIPLANE: 63 % (ref 55–100)
ECHO LV ESV A2C: 41 ML
ECHO LV ESV A4C: 46 ML
ECHO LV ESV BP: 45 ML (ref 22–58)
ECHO LV ESV INDEX A2C: 19 ML/M2
ECHO LV ESV INDEX A4C: 21 ML/M2
ECHO LV ESV INDEX BP: 21 ML/M2
ECHO LV FRACTIONAL SHORTENING: 32 % (ref 28–44)
ECHO LV INTERNAL DIMENSION DIASTOLE INDEX: 2.33 CM/M2
ECHO LV INTERNAL DIMENSION DIASTOLIC: 5 CM (ref 4.2–5.9)
ECHO LV INTERNAL DIMENSION SYSTOLIC INDEX: 1.58 CM/M2
ECHO LV INTERNAL DIMENSION SYSTOLIC: 3.4 CM
ECHO LV IVSD: 1 CM (ref 0.6–1)
ECHO LV MASS 2D: 182 G (ref 88–224)
ECHO LV MASS INDEX 2D: 84.6 G/M2 (ref 49–115)
ECHO LV POSTERIOR WALL DIASTOLIC: 1 CM (ref 0.6–1)
ECHO LV RELATIVE WALL THICKNESS RATIO: 0.4
ECHO LVOT AREA: 4.2 CM2
ECHO LVOT AV VTI INDEX: 0.69
ECHO LVOT DIAM: 2.3 CM
ECHO LVOT MEAN GRADIENT: 3 MMHG
ECHO LVOT PEAK GRADIENT: 7 MMHG
ECHO LVOT PEAK VELOCITY: 1.3 M/S
ECHO LVOT STROKE VOLUME INDEX: 56.8 ML/M2
ECHO LVOT SV: 122.1 ML
ECHO LVOT VTI: 29.4 CM
ECHO MV A VELOCITY: 0.47 M/S
ECHO MV AREA PHT: 4 CM2
ECHO MV AREA VTI: 3 CM2
ECHO MV E DECELERATION TIME (DT): 188.9 MS
ECHO MV E VELOCITY: 1.16 M/S
ECHO MV E/A RATIO: 2.47
ECHO MV E/E' LATERAL: 14.5
ECHO MV E/E' RATIO (AVERAGED): 14.5
ECHO MV EROA PISA: 0.2 CM2
ECHO MV LVOT VTI INDEX: 1.37
ECHO MV MAX VELOCITY: 1.1 M/S
ECHO MV MEAN GRADIENT: 1 MMHG
ECHO MV MEAN VELOCITY: 0.4 M/S
ECHO MV PEAK GRADIENT: 5 MMHG
ECHO MV PRESSURE HALF TIME (PHT): 54.8 MS
ECHO MV REGURGITANT ALIASING (NYQUIST) VELOCITY: 40 CM/S
ECHO MV REGURGITANT RADIUS PISA: 0.71 CM
ECHO MV REGURGITANT VELOCITY PISA: 6.1 M/S
ECHO MV REGURGITANT VOLUME PISA: 49.47 ML
ECHO MV REGURGITANT VTIA: 238.3 CM
ECHO MV VTI: 40.4 CM
ECHO RA AREA 4C: 23.8 CM2
ECHO RA END SYSTOLIC VOLUME APICAL 4 CHAMBER INDEX BSA: 33 ML/M2
ECHO RA VOLUME: 71 ML
ECHO RIGHT VENTRICULAR SYSTOLIC PRESSURE (RVSP): 35 MMHG
ECHO RV INTERNAL DIMENSION: 4.1 CM
ECHO RV TAPSE: 2.4 CM (ref 1.7–?)
ECHO TV REGURGITANT MAX VELOCITY: 2.83 M/S
ECHO TV REGURGITANT PEAK GRADIENT: 32 MMHG
ERYTHROCYTE [DISTWIDTH] IN BLOOD BY AUTOMATED COUNT: 12.9 % (ref 11.5–14.5)
GLUCOSE SERPL-MCNC: 87 MG/DL (ref 65–100)
HCT VFR BLD AUTO: 40.8 % (ref 36.6–50.3)
HGB BLD-MCNC: 13.7 G/DL (ref 12.1–17)
MCH RBC QN AUTO: 35.5 PG (ref 26–34)
MCHC RBC AUTO-ENTMCNC: 33.6 G/DL (ref 30–36.5)
MCV RBC AUTO: 105.7 FL (ref 80–99)
NRBC # BLD: 0 K/UL (ref 0–0.01)
NRBC BLD-RTO: 0 PER 100 WBC
NUC STRESS EJECTION FRACTION: 52 %
PLATELET # BLD AUTO: 196 K/UL (ref 150–400)
PMV BLD AUTO: 10.7 FL (ref 8.9–12.9)
POTASSIUM SERPL-SCNC: 4.2 MMOL/L (ref 3.5–5.1)
RBC # BLD AUTO: 3.86 M/UL (ref 4.1–5.7)
SODIUM SERPL-SCNC: 143 MMOL/L (ref 136–145)
STRESS BASELINE DIAS BP: 64 MMHG
STRESS BASELINE HR: 48 BPM
STRESS BASELINE ST DEPRESSION: 0 MM
STRESS BASELINE SYS BP: 128 MMHG
STRESS ESTIMATED WORKLOAD: 1 METS
STRESS EXERCISE DUR MIN: 0 MIN
STRESS EXERCISE DUR SEC: 0 SEC
STRESS O2 SAT PEAK: 96 %
STRESS O2 SAT REST: 97 %
STRESS PEAK DIAS BP: 76 MMHG
STRESS PEAK SYS BP: 134 MMHG
STRESS PERCENT HR ACHIEVED: 49 %
STRESS POST PEAK HR: 70 BPM
STRESS RATE PRESSURE PRODUCT: 9380 BPM*MMHG
STRESS TARGET HR: 143 BPM
TID: 0.94
WBC # BLD AUTO: 8.2 K/UL (ref 4.1–11.1)

## 2023-12-15 PROCEDURE — G8484 FLU IMMUNIZE NO ADMIN: HCPCS | Performed by: INTERNAL MEDICINE

## 2023-12-15 PROCEDURE — G8428 CUR MEDS NOT DOCUMENT: HCPCS | Performed by: INTERNAL MEDICINE

## 2023-12-15 PROCEDURE — 1036F TOBACCO NON-USER: CPT | Performed by: INTERNAL MEDICINE

## 2023-12-15 PROCEDURE — 99214 OFFICE O/P EST MOD 30 MIN: CPT | Performed by: INTERNAL MEDICINE

## 2023-12-15 PROCEDURE — 3075F SYST BP GE 130 - 139MM HG: CPT | Performed by: INTERNAL MEDICINE

## 2023-12-15 PROCEDURE — G8417 CALC BMI ABV UP PARAM F/U: HCPCS | Performed by: INTERNAL MEDICINE

## 2023-12-15 PROCEDURE — 3078F DIAST BP <80 MM HG: CPT | Performed by: INTERNAL MEDICINE

## 2023-12-15 PROCEDURE — 1123F ACP DISCUSS/DSCN MKR DOCD: CPT | Performed by: INTERNAL MEDICINE

## 2023-12-15 NOTE — TELEPHONE ENCOUNTER
Spoke with Pt of [facility]  schd. For 12/29/23 At 10:30 am arrive at 9am Pt aware that they need a  NPO from Midnight the night before. Check in at the second floor Outpt. Reg. Desk. Pt is to have Labs done on 12/15/23.   Medications:  Hold Eliquis 48 hours prior to procedure    VO by /Dr. Lizama/nurse Kandis BURGOS

## 2023-12-26 RX ORDER — PRIMIDONE 50 MG/1
TABLET ORAL
Qty: 60 TABLET | Refills: 2 | Status: SHIPPED | OUTPATIENT
Start: 2023-12-26

## 2023-12-29 ENCOUNTER — HOSPITAL ENCOUNTER (OUTPATIENT)
Facility: HOSPITAL | Age: 78
Setting detail: OUTPATIENT SURGERY
Discharge: HOME | End: 2023-12-29
Attending: STUDENT IN AN ORGANIZED HEALTH CARE EDUCATION/TRAINING PROGRAM | Admitting: STUDENT IN AN ORGANIZED HEALTH CARE EDUCATION/TRAINING PROGRAM
Payer: MEDICARE

## 2023-12-29 ENCOUNTER — DIRECT ADMIT ORDERS (OUTPATIENT)
Facility: HOSPITAL | Age: 78
End: 2023-12-29

## 2023-12-29 ENCOUNTER — TELEPHONE (OUTPATIENT)
Age: 78
End: 2023-12-29

## 2023-12-29 VITALS
HEART RATE: 49 BPM | WEIGHT: 235.9 LBS | RESPIRATION RATE: 18 BRPM | BODY MASS INDEX: 37.02 KG/M2 | DIASTOLIC BLOOD PRESSURE: 60 MMHG | HEIGHT: 67 IN | SYSTOLIC BLOOD PRESSURE: 131 MMHG | OXYGEN SATURATION: 93 % | TEMPERATURE: 98.4 F

## 2023-12-29 DIAGNOSIS — I25.10 CAD (CORONARY ARTERY DISEASE): Primary | ICD-10-CM

## 2023-12-29 DIAGNOSIS — R94.39 ABNORMAL CARDIOVASCULAR STRESS TEST: ICD-10-CM

## 2023-12-29 DIAGNOSIS — R94.39 ABNORMAL STRESS TEST: ICD-10-CM

## 2023-12-29 LAB — ECHO BSA: 2.25 M2

## 2023-12-29 PROCEDURE — 6360000002 HC RX W HCPCS: Performed by: STUDENT IN AN ORGANIZED HEALTH CARE EDUCATION/TRAINING PROGRAM

## 2023-12-29 PROCEDURE — C1894 INTRO/SHEATH, NON-LASER: HCPCS | Performed by: STUDENT IN AN ORGANIZED HEALTH CARE EDUCATION/TRAINING PROGRAM

## 2023-12-29 PROCEDURE — 93458 L HRT ARTERY/VENTRICLE ANGIO: CPT | Performed by: STUDENT IN AN ORGANIZED HEALTH CARE EDUCATION/TRAINING PROGRAM

## 2023-12-29 PROCEDURE — 2709999900 HC NON-CHARGEABLE SUPPLY: Performed by: STUDENT IN AN ORGANIZED HEALTH CARE EDUCATION/TRAINING PROGRAM

## 2023-12-29 PROCEDURE — 99152 MOD SED SAME PHYS/QHP 5/>YRS: CPT | Performed by: STUDENT IN AN ORGANIZED HEALTH CARE EDUCATION/TRAINING PROGRAM

## 2023-12-29 PROCEDURE — 6360000004 HC RX CONTRAST MEDICATION: Performed by: STUDENT IN AN ORGANIZED HEALTH CARE EDUCATION/TRAINING PROGRAM

## 2023-12-29 PROCEDURE — 2500000003 HC RX 250 WO HCPCS: Performed by: STUDENT IN AN ORGANIZED HEALTH CARE EDUCATION/TRAINING PROGRAM

## 2023-12-29 PROCEDURE — 99153 MOD SED SAME PHYS/QHP EA: CPT | Performed by: STUDENT IN AN ORGANIZED HEALTH CARE EDUCATION/TRAINING PROGRAM

## 2023-12-29 PROCEDURE — C1769 GUIDE WIRE: HCPCS | Performed by: STUDENT IN AN ORGANIZED HEALTH CARE EDUCATION/TRAINING PROGRAM

## 2023-12-29 RX ORDER — SODIUM CHLORIDE 0.9 % (FLUSH) 0.9 %
5-40 SYRINGE (ML) INJECTION EVERY 12 HOURS SCHEDULED
OUTPATIENT
Start: 2024-01-09

## 2023-12-29 RX ORDER — SODIUM CHLORIDE 0.9 % (FLUSH) 0.9 %
5-40 SYRINGE (ML) INJECTION PRN
Status: DISCONTINUED | OUTPATIENT
Start: 2023-12-29 | End: 2023-12-29 | Stop reason: HOSPADM

## 2023-12-29 RX ORDER — HEPARIN SODIUM 200 [USP'U]/100ML
INJECTION, SOLUTION INTRAVENOUS PRN
Status: DISCONTINUED | OUTPATIENT
Start: 2023-12-29 | End: 2023-12-29 | Stop reason: HOSPADM

## 2023-12-29 RX ORDER — SODIUM CHLORIDE 9 MG/ML
INJECTION, SOLUTION INTRAVENOUS PRN
Status: DISCONTINUED | OUTPATIENT
Start: 2023-12-29 | End: 2023-12-29 | Stop reason: HOSPADM

## 2023-12-29 RX ORDER — ACETAMINOPHEN 325 MG/1
650 TABLET ORAL EVERY 4 HOURS PRN
Status: DISCONTINUED | OUTPATIENT
Start: 2023-12-29 | End: 2023-12-29 | Stop reason: HOSPADM

## 2023-12-29 RX ORDER — MIDAZOLAM HYDROCHLORIDE 1 MG/ML
INJECTION INTRAMUSCULAR; INTRAVENOUS PRN
Status: DISCONTINUED | OUTPATIENT
Start: 2023-12-29 | End: 2023-12-29 | Stop reason: HOSPADM

## 2023-12-29 RX ORDER — SODIUM CHLORIDE 0.9 % (FLUSH) 0.9 %
5-40 SYRINGE (ML) INJECTION PRN
OUTPATIENT
Start: 2024-01-09

## 2023-12-29 RX ORDER — SODIUM CHLORIDE 9 MG/ML
INJECTION, SOLUTION INTRAVENOUS PRN
OUTPATIENT
Start: 2024-01-09

## 2023-12-29 RX ORDER — SODIUM CHLORIDE 9 MG/ML
INJECTION, SOLUTION INTRAVENOUS CONTINUOUS
OUTPATIENT
Start: 2024-01-09

## 2023-12-29 RX ORDER — CLOPIDOGREL BISULFATE 75 MG/1
75 TABLET ORAL DAILY
Qty: 90 TABLET | Refills: 1 | Status: SHIPPED | OUTPATIENT
Start: 2023-12-29

## 2023-12-29 RX ORDER — SODIUM CHLORIDE 0.9 % (FLUSH) 0.9 %
5-40 SYRINGE (ML) INJECTION EVERY 12 HOURS SCHEDULED
Status: DISCONTINUED | OUTPATIENT
Start: 2023-12-29 | End: 2023-12-29 | Stop reason: HOSPADM

## 2023-12-29 RX ORDER — LIDOCAINE HYDROCHLORIDE 10 MG/ML
INJECTION, SOLUTION INFILTRATION; PERINEURAL PRN
Status: DISCONTINUED | OUTPATIENT
Start: 2023-12-29 | End: 2023-12-29 | Stop reason: HOSPADM

## 2023-12-29 RX ORDER — FENTANYL CITRATE 50 UG/ML
INJECTION, SOLUTION INTRAMUSCULAR; INTRAVENOUS PRN
Status: DISCONTINUED | OUTPATIENT
Start: 2023-12-29 | End: 2023-12-29 | Stop reason: HOSPADM

## 2023-12-29 RX ORDER — HEPARIN SODIUM 1000 [USP'U]/ML
INJECTION, SOLUTION INTRAVENOUS; SUBCUTANEOUS PRN
Status: DISCONTINUED | OUTPATIENT
Start: 2023-12-29 | End: 2023-12-29 | Stop reason: HOSPADM

## 2023-12-29 RX ORDER — VERAPAMIL HYDROCHLORIDE 2.5 MG/ML
INJECTION, SOLUTION INTRAVENOUS PRN
Status: DISCONTINUED | OUTPATIENT
Start: 2023-12-29 | End: 2023-12-29 | Stop reason: HOSPADM

## 2023-12-29 NOTE — TELEPHONE ENCOUNTER
Mirna spoke with Anshu Vinson in Cath lab and arranged for Tuesday 8 am.     Writer spoke with patient after ID x2 and conveyed plan per Dr. Dana Ramos. Patient will arrive 6:30 am Tuesday 1/2/24 to outpatient registration. Patient included spouse for telephone call. Patient expressed understanding to hold eliquis 2 days prior and start plavix 5 days prior per Dr. Dana Ramos request.  Patient is aware that plavix prescription has been sent and awaiting  later today. Patient to return call with any questions or concerns.

## 2023-12-29 NOTE — TELEPHONE ENCOUNTER
Cath Lab Aida Cramer called to adjust date and time to Jan 9th 8am procedure with 7am arrival. Patient contacted to discuss date and time change.

## 2023-12-29 NOTE — TELEPHONE ENCOUNTER
----- Message from Saúl Stroud, DO sent at 12/29/2023 10:40 AM EST -----    Please schedule PCI with impella and rotablator. 2 time slots. Can do Monday at 1 pm, not a Wednesday. If Monday not available, can do tues or thurs at 8am.  Do not schedule additional outpt if you are using tues/thurs. Stop eliquis 2 days prior. Start plavix 5 days prior to procedure.

## 2023-12-29 NOTE — H&P
History and physical has been reviewed. The patient has been examined. There have been no significant clinical changes since the completion of the originally dated History and Physical.    Risk and benefit of cardiac catheterization/PCI was described in detail to patient.  (risk of vascular access complication with potential surgical intervention for management, stroke, myocardial infarction, emergent open heart surgery and death). Patient wishes to proceed with coronary angiography with possible intervention.          ASA 3  Airway 3

## 2023-12-29 NOTE — PROGRESS NOTES
8:04 AM  Patient arrived. ID and allergies verified verbally with patient. Pt voices understanding of procedure to be performed. Consent obtained. Pt prepped for procedure. 9:15 AM  TRANSFER - OUT REPORT:    Verbal report given to [facility] on [address] Ree  being transferred to cath for ordered procedure       Report consisted of patient's Situation, Background, Assessment and   Recommendations(SBAR). Information from the following report(s) Nurse Handoff Report was reviewed with the receiving nurse. Lines:   Peripheral IV 12/29/23 Right Forearm (Active)   Site Assessment Clean, dry & intact 12/29/23 0841   Line Status Blood return noted 12/29/23 0841   Phlebitis Assessment No symptoms 12/29/23 0841   Infiltration Assessment 0 12/29/23 0841   Dressing Status New dressing applied 12/29/23 0841   Dressing Type Transparent 12/29/23 0841   Dressing Intervention New 12/29/23 0841        Opportunity for questions and clarification was provided. Patient transported with:  Registered Nurse    10:05 AM  TRANSFER - IN REPORT:    [facility]  being received from [facility] for routine post-op      Report consisted of patient's Situation, Background, Assessment and   Recommendations(SBAR). Information from the following report(s) Nurse Handoff Report was reviewed with the receiving nurse. Opportunity for questions and clarification was provided. Assessment completed upon patient's arrival to unit and care assumed. 11:06 AM  3 ml air released from TR Band. No bleeding or hematoma noted. Radial and Ulnar pulse on right wrist palpable. Pt tolerated well. Will continue to monitor. 11:10 AM  3 ml air released from TR Band. No bleeding or hematoma noted. Radial and Ulnar pulse on right wrist palpable. Pt tolerated well. Will continue to monitor. 11:15 AM  3 ml air released from TR Band. No bleeding or hematoma noted.  Radial and Ulnar pulse on right

## 2024-01-08 ENCOUNTER — DIRECT ADMIT ORDERS (OUTPATIENT)
Age: 79
End: 2024-01-08

## 2024-01-09 ENCOUNTER — HOSPITAL ENCOUNTER (OUTPATIENT)
Facility: HOSPITAL | Age: 79
Setting detail: OUTPATIENT SURGERY
Discharge: HOME | End: 2024-01-09
Attending: STUDENT IN AN ORGANIZED HEALTH CARE EDUCATION/TRAINING PROGRAM | Admitting: STUDENT IN AN ORGANIZED HEALTH CARE EDUCATION/TRAINING PROGRAM
Payer: MEDICARE

## 2024-01-09 VITALS
DIASTOLIC BLOOD PRESSURE: 47 MMHG | HEIGHT: 67 IN | RESPIRATION RATE: 17 BRPM | TEMPERATURE: 98.3 F | OXYGEN SATURATION: 98 % | BODY MASS INDEX: 36.16 KG/M2 | WEIGHT: 230.4 LBS | HEART RATE: 66 BPM | SYSTOLIC BLOOD PRESSURE: 106 MMHG

## 2024-01-09 DIAGNOSIS — Z95.5 STATUS POST CORONARY ARTERY BALLOON DILATION: Primary | ICD-10-CM

## 2024-01-09 DIAGNOSIS — I25.10 CAD (CORONARY ARTERY DISEASE): ICD-10-CM

## 2024-01-09 LAB
ACT BLD: 271 SECS (ref 79–138)
ACT BLD: 304 SECS (ref 79–138)
ACT BLD: 353 SECS (ref 79–138)
ACT BLD: 385 SECS (ref 79–138)
ECHO BSA: 2.22 M2

## 2024-01-09 PROCEDURE — 93005 ELECTROCARDIOGRAM TRACING: CPT | Performed by: STUDENT IN AN ORGANIZED HEALTH CARE EDUCATION/TRAINING PROGRAM

## 2024-01-09 PROCEDURE — C1724 CATH, TRANS ATHEREC,ROTATION: HCPCS | Performed by: STUDENT IN AN ORGANIZED HEALTH CARE EDUCATION/TRAINING PROGRAM

## 2024-01-09 PROCEDURE — C9601 PERC DRUG-EL COR STENT BRAN: HCPCS | Performed by: STUDENT IN AN ORGANIZED HEALTH CARE EDUCATION/TRAINING PROGRAM

## 2024-01-09 PROCEDURE — 99153 MOD SED SAME PHYS/QHP EA: CPT | Performed by: STUDENT IN AN ORGANIZED HEALTH CARE EDUCATION/TRAINING PROGRAM

## 2024-01-09 PROCEDURE — C1769 GUIDE WIRE: HCPCS | Performed by: STUDENT IN AN ORGANIZED HEALTH CARE EDUCATION/TRAINING PROGRAM

## 2024-01-09 PROCEDURE — C9600 PERC DRUG-EL COR STENT SING: HCPCS | Performed by: STUDENT IN AN ORGANIZED HEALTH CARE EDUCATION/TRAINING PROGRAM

## 2024-01-09 PROCEDURE — C1887 CATHETER, GUIDING: HCPCS | Performed by: STUDENT IN AN ORGANIZED HEALTH CARE EDUCATION/TRAINING PROGRAM

## 2024-01-09 PROCEDURE — 85347 COAGULATION TIME ACTIVATED: CPT | Performed by: STUDENT IN AN ORGANIZED HEALTH CARE EDUCATION/TRAINING PROGRAM

## 2024-01-09 PROCEDURE — 2709999900 HC NON-CHARGEABLE SUPPLY: Performed by: STUDENT IN AN ORGANIZED HEALTH CARE EDUCATION/TRAINING PROGRAM

## 2024-01-09 PROCEDURE — C1761 HC CATH TRANSLUM INTRAVASCULAR LITHOTRIPSY CORONARY: HCPCS | Performed by: STUDENT IN AN ORGANIZED HEALTH CARE EDUCATION/TRAINING PROGRAM

## 2024-01-09 PROCEDURE — 93458 L HRT ARTERY/VENTRICLE ANGIO: CPT | Performed by: STUDENT IN AN ORGANIZED HEALTH CARE EDUCATION/TRAINING PROGRAM

## 2024-01-09 PROCEDURE — C9767 REVASC LITHOTRIP-STENT-ATHER: HCPCS | Performed by: STUDENT IN AN ORGANIZED HEALTH CARE EDUCATION/TRAINING PROGRAM

## 2024-01-09 PROCEDURE — 85347 COAGULATION TIME ACTIVATED: CPT

## 2024-01-09 PROCEDURE — 92978 ENDOLUMINL IVUS OCT C 1ST: CPT | Performed by: STUDENT IN AN ORGANIZED HEALTH CARE EDUCATION/TRAINING PROGRAM

## 2024-01-09 PROCEDURE — 6370000000 HC RX 637 (ALT 250 FOR IP): Performed by: STUDENT IN AN ORGANIZED HEALTH CARE EDUCATION/TRAINING PROGRAM

## 2024-01-09 PROCEDURE — 99152 MOD SED SAME PHYS/QHP 5/>YRS: CPT | Performed by: STUDENT IN AN ORGANIZED HEALTH CARE EDUCATION/TRAINING PROGRAM

## 2024-01-09 PROCEDURE — 2500000003 HC RX 250 WO HCPCS: Performed by: STUDENT IN AN ORGANIZED HEALTH CARE EDUCATION/TRAINING PROGRAM

## 2024-01-09 PROCEDURE — C1725 CATH, TRANSLUMIN NON-LASER: HCPCS | Performed by: STUDENT IN AN ORGANIZED HEALTH CARE EDUCATION/TRAINING PROGRAM

## 2024-01-09 PROCEDURE — 6360000002 HC RX W HCPCS: Performed by: STUDENT IN AN ORGANIZED HEALTH CARE EDUCATION/TRAINING PROGRAM

## 2024-01-09 PROCEDURE — 6360000004 HC RX CONTRAST MEDICATION: Performed by: STUDENT IN AN ORGANIZED HEALTH CARE EDUCATION/TRAINING PROGRAM

## 2024-01-09 PROCEDURE — C1874 STENT, COATED/COV W/DEL SYS: HCPCS | Performed by: STUDENT IN AN ORGANIZED HEALTH CARE EDUCATION/TRAINING PROGRAM

## 2024-01-09 PROCEDURE — C1753 CATH, INTRAVAS ULTRASOUND: HCPCS | Performed by: STUDENT IN AN ORGANIZED HEALTH CARE EDUCATION/TRAINING PROGRAM

## 2024-01-09 DEVICE — STENT ONYXNG30034UX ONYX 3.00X34RX
Type: IMPLANTABLE DEVICE | Status: FUNCTIONAL
Brand: ONYX FRONTIER™

## 2024-01-09 DEVICE — STENT ONYXNG30026UX ONYX 3.00X26RX
Type: IMPLANTABLE DEVICE | Status: FUNCTIONAL
Brand: ONYX FRONTIER™

## 2024-01-09 RX ORDER — SODIUM CHLORIDE 0.9 % (FLUSH) 0.9 %
5-40 SYRINGE (ML) INJECTION EVERY 12 HOURS SCHEDULED
Status: DISCONTINUED | OUTPATIENT
Start: 2024-01-09 | End: 2024-01-09 | Stop reason: HOSPADM

## 2024-01-09 RX ORDER — LIDOCAINE HYDROCHLORIDE 10 MG/ML
INJECTION, SOLUTION INFILTRATION; PERINEURAL PRN
Status: DISCONTINUED | OUTPATIENT
Start: 2024-01-09 | End: 2024-01-09 | Stop reason: HOSPADM

## 2024-01-09 RX ORDER — HEPARIN SODIUM 10000 [USP'U]/ML
INJECTION, SOLUTION INTRAVENOUS; SUBCUTANEOUS PRN
Status: DISCONTINUED | OUTPATIENT
Start: 2024-01-09 | End: 2024-01-09 | Stop reason: HOSPADM

## 2024-01-09 RX ORDER — SODIUM CHLORIDE 9 MG/ML
INJECTION, SOLUTION INTRAVENOUS PRN
Status: DISCONTINUED | OUTPATIENT
Start: 2024-01-09 | End: 2024-01-09 | Stop reason: HOSPADM

## 2024-01-09 RX ORDER — SODIUM CHLORIDE 0.9 % (FLUSH) 0.9 %
5-40 SYRINGE (ML) INJECTION PRN
Status: DISCONTINUED | OUTPATIENT
Start: 2024-01-09 | End: 2024-01-09 | Stop reason: HOSPADM

## 2024-01-09 RX ORDER — VERAPAMIL HYDROCHLORIDE 2.5 MG/ML
INJECTION, SOLUTION INTRAVENOUS PRN
Status: DISCONTINUED | OUTPATIENT
Start: 2024-01-09 | End: 2024-01-09 | Stop reason: HOSPADM

## 2024-01-09 RX ORDER — SODIUM CHLORIDE 9 MG/ML
INJECTION, SOLUTION INTRAVENOUS CONTINUOUS
Status: DISCONTINUED | OUTPATIENT
Start: 2024-01-09 | End: 2024-01-09 | Stop reason: HOSPADM

## 2024-01-09 RX ORDER — CLOPIDOGREL 300 MG/1
TABLET, FILM COATED ORAL PRN
Status: DISCONTINUED | OUTPATIENT
Start: 2024-01-09 | End: 2024-01-09 | Stop reason: HOSPADM

## 2024-01-09 RX ORDER — HEPARIN SODIUM 1000 [USP'U]/ML
INJECTION, SOLUTION INTRAVENOUS; SUBCUTANEOUS PRN
Status: DISCONTINUED | OUTPATIENT
Start: 2024-01-09 | End: 2024-01-09 | Stop reason: HOSPADM

## 2024-01-09 RX ORDER — FENTANYL CITRATE 50 UG/ML
INJECTION, SOLUTION INTRAMUSCULAR; INTRAVENOUS PRN
Status: DISCONTINUED | OUTPATIENT
Start: 2024-01-09 | End: 2024-01-09 | Stop reason: HOSPADM

## 2024-01-09 RX ORDER — MIDAZOLAM HYDROCHLORIDE 1 MG/ML
INJECTION INTRAMUSCULAR; INTRAVENOUS PRN
Status: DISCONTINUED | OUTPATIENT
Start: 2024-01-09 | End: 2024-01-09 | Stop reason: HOSPADM

## 2024-01-09 RX ORDER — ACETAMINOPHEN 325 MG/1
650 TABLET ORAL EVERY 4 HOURS PRN
Status: DISCONTINUED | OUTPATIENT
Start: 2024-01-09 | End: 2024-01-09 | Stop reason: HOSPADM

## 2024-01-09 NOTE — PROCEDURES
BRIEF PROCEDURE NOTE    Date of Procedure: 1/9/2024   Preoperative Diagnosis: abnormal stress test  Postoperative Diagnosis: multivessel CAD    Procedure:  moderate sedation, PCI / ezra, ivus, rotational atherectomy, coronary lithotripsy  Interventional Cardiologist: Navi Campbell DO  Assistant: None  Anesthesia: local + IV moderate sedation   I administered moderate sedation throughout this procedure. An independent trained observer pushed medications at my direction, and monitored the patient’s level of consciousness and physiological status throughout.  Estimated Blood Loss: Minimal    Access: right radial artery, 6F  Catheters:  Right coronary: AL 0.75 6F guide    Findings:     RCA:large caliber, diffuse proximal disease, mid-vessel sequential calcified 80 and 90% stenosis, small to moderate pda with diffuse mild to moderate disease, moderate pl branch with diffuse mild disease    LVEDP:  < 10mmhg      Heparin  Al 0.75 6F guide    Héctor blue.  Unable to pass 2.5 compliant balloon    Exchanged for rotofloppy wire with turnpike LP and trapping balloon    2 passess with 1.5mm isak at 170K rpm.    Exchanged for héctor blue with turnpicke LP and trapping balloon    Dilated RCA with 2.5 compliant and 3.0 NC balloon.  Mid-RCA treated with IVL.  3.5 balloon 60 pulses.  Unable to deliver to distal vessel due to spicule of calcium.      Dialted distal vessel with 3.0 angiosculpt TEDDY cutting balloon.    Distal into mid-rca treated with 3.0x26mm enzo ezra.  Overlapping mid-rca to proximal rca 3.5x34mm enzo ezra.  Stents deployed at 18 and 20 della respectively.    Post-dilated with 3.5 NC balloon at high pressure.    Ryan 2 flow pre and ryan 3 flow post          Specimens Removed: None    Implants: n/a    Closure Device: radial TR band    See full cath note.    Complications: none      Findings:  1. RCA treated with rotational atherectomy, intracoronary lithotripsy, cutting balloon.  Overlapping 3.0x26mm and 3.0x34mm enzo

## 2024-01-09 NOTE — DISCHARGE INSTRUCTIONS
[facility]    RADIAL CARDIAC CATHETERIZATION AND INTERVENTIONAL DISCHARGE INSTRUCTIONS       MEDICATIONS:  Take only medications ordered by your provider.     ACTIVITIES:  While the wound is healing; bleeding or swelling can occur as a result of stress or strain.  Carefully follow these guidelines:    DO NOT DRIVE for 24 hours after the procedure or as instructed by your provider.  You may shower 24 hours after your procedure. No soaking the wrist for 7 days (i.e., bath tub, swimming, washing dishes).  You may return to work in *** days.  It is essential that you DO NOT SMOKE.  Do not bend your wrist for 24 hours.  Do not lift more than 3-5 pounds with affected wrist for 1 week.     SITE CARE:  Keep site clean and dry.  Avoid lotions, ointments or powders at the wound site for 1 week.  Check the procedural site for any signs of infection (redness, drainage, swelling).  You may notice a small, hard lump or small bruise at the puncture site. This is normal and will clear in about 2 weeks.  If the lump increases in size; apply firm, direct pressure over the site. Notify your physician.  If there is a small amount of bleeding at the site; apply firm, direct continuous pressure over the site against the puncture site for 10 minutes. Your nurse will review this with you. Notify your physician.  If bleeding does not stop after 10 minutes or if there is a large amount of bleeding, call for an ambulance immediately. Continue to hold pressure until help arrives.     WHEN TO CALL YOUR DOCTOR:  Angina - if your angina symptoms return; use your nitroglycerin as instructed by your provider. If you do not have nitroglycerin or if your symptoms do not completely resolve after 10 -15 minutes, call an ambulance. Do not drive yourself to the hospital.  Warmth, redness, drainage and/or pain at the procedural site or temperature over 101°F.  Persistent tenderness or pain at procedural site.  Swelling, coolness, numbness

## 2024-01-09 NOTE — PROGRESS NOTES
1226  2 ml air released from TR Band. No bleeding or hematoma noted. Radial and Ulnar pulse on r wrist palpable. Pt tolerated well. Will continue to monitor.    12:31 PM  3 ml air released from TR Band. No bleeding or hematoma noted. Radial and Ulnar pulse on r wrist palpable. Pt tolerated well. Will continue to monitor.    12:36 PM  3 ml air released from TR Band. No bleeding or hematoma noted. Radial and Ulnar pulse on r wrist palpable. Pt tolerated well. Will continue to monitor.    12:41 PM  4 ml air released from TR Band. No bleeding or hematoma noted. Radial and Ulnar pulse on R wrist palpable. Pt tolerated well. Will continue to monitor.    Air release completed. TR Band removed from R wrist. No bleeding or  Hematoma. Dressing applied. Wrist immobilizer in place. Radial and ulnar pulse remain palpable on affected extremity. Pt tolerated well. Instructions given to pt regarding movement and activity restrictions. Pt voiced understanding.     1505  Patient ambulates in hallway or on unit.    1514  Pt discharged via wheelchair with wife. Personal belongings with patient upon discharge.  Discharge instructions reviewed with wife and patient.

## 2024-01-09 NOTE — H&P
History and physical has been reviewed. The patient has been examined. There have been no significant clinical changes since the completion of the originally dated History and Physical.    Risk and benefit of cardiac catheterization/PCI was described in detail to patient. Discussed role of atherectomy and lithotripsy for management of calcified CAD (risk of vascular access complication with potential surgical intervention for management, stroke, myocardial infarction, emergent open heart surgery and death).  Patient wishes to proceed with coronary angiography with intervention.     12/29/23    CARDIAC PROCEDURE 12/29/2023  4:14 PM (Final)    Conclusion    Severe left main eccentric stenosis    Severe concentric calcified lad stenosis    Severe calcified RCA stenosis    Mildly elevated lvedp    Findings:  L Main: large caliber, eccentric calcification with severe stenosis (60-70%)  LAD:dense calcification proximal subtotal occlusion with negatively remodeled distal vessel, small diagonal with diffuse mild diseas  LCx:large caliber, moderate om with diffuse mild disease, small distal Lcx  RCA:large caliber, mid-vessel sequential calcified 80 and 90% stenosis, small to moderate pda with diffuse mild to moderate disease, moderate pl branch with diffuse mild disease    LVEDP:  15 mmhg    Signed by: Navi Campbell DO on 12/29/2023  4:14 PM    Plan pci of RCA with staged pci of left main    ASA 3  Airway 3

## 2024-01-09 NOTE — CARDIO/PULMONARY
[facility] Cardiac Rehab-Referral  Chart review completed.  Cath results (12/23) and today's PCI procedure noted.  Met with patient at bedside in cath lab post recovery.  Pt was sleeping, but easily awakened.   PCI patient meets criteria for outpatient cardiac rehab.  [facility] outpatient cardiac rehab referral is initiated.  Educational handouts reciewed and provided on: PCI procedure, coronary artery disease, coronary artery risk factors, heart healthy eating, and community resources.  The benefits and format of the outpatient cardiac rehab program were also communicated. Reference information on [facility] Outpatient Cardiac Rehab Program also provided.  [place] cardiac rehab staff will contact patent, per telephone call, to assess and schedule program participation.   FANNIE Farias RN

## 2024-01-10 DIAGNOSIS — I48.91 ATRIAL FIBRILLATION, UNSPECIFIED TYPE (HCC): ICD-10-CM

## 2024-01-10 LAB
EKG ATRIAL RATE: 51 BPM
EKG DIAGNOSIS: NORMAL
EKG P AXIS: 10 DEGREES
EKG P-R INTERVAL: 184 MS
EKG Q-T INTERVAL: 482 MS
EKG QRS DURATION: 84 MS
EKG QTC CALCULATION (BAZETT): 444 MS
EKG R AXIS: 11 DEGREES
EKG T AXIS: 33 DEGREES
EKG VENTRICULAR RATE: 51 BPM

## 2024-01-10 PROCEDURE — 93010 ELECTROCARDIOGRAM REPORT: CPT | Performed by: STUDENT IN AN ORGANIZED HEALTH CARE EDUCATION/TRAINING PROGRAM

## 2024-01-10 RX ORDER — APIXABAN 5 MG/1
5 TABLET, FILM COATED ORAL 2 TIMES DAILY
Qty: 60 TABLET | Refills: 0 | Status: SHIPPED | OUTPATIENT
Start: 2024-01-10

## 2024-01-25 ENCOUNTER — OFFICE VISIT (OUTPATIENT)
Age: 79
End: 2024-01-25
Payer: MEDICARE

## 2024-01-25 VITALS
BODY MASS INDEX: 36.1 KG/M2 | HEIGHT: 67 IN | WEIGHT: 230 LBS | OXYGEN SATURATION: 95 % | DIASTOLIC BLOOD PRESSURE: 60 MMHG | HEART RATE: 51 BPM | SYSTOLIC BLOOD PRESSURE: 100 MMHG

## 2024-01-25 DIAGNOSIS — I48.0 PAF (PAROXYSMAL ATRIAL FIBRILLATION) (HCC): ICD-10-CM

## 2024-01-25 DIAGNOSIS — E66.01 SEVERE OBESITY (BMI 35.0-39.9) WITH COMORBIDITY (HCC): ICD-10-CM

## 2024-01-25 DIAGNOSIS — I25.118 CORONARY ARTERY DISEASE OF NATIVE ARTERY OF NATIVE HEART WITH STABLE ANGINA PECTORIS (HCC): Primary | ICD-10-CM

## 2024-01-25 DIAGNOSIS — R94.39 ABNORMAL STRESS TEST: ICD-10-CM

## 2024-01-25 DIAGNOSIS — I10 PRIMARY HYPERTENSION: ICD-10-CM

## 2024-01-25 PROCEDURE — G8417 CALC BMI ABV UP PARAM F/U: HCPCS | Performed by: STUDENT IN AN ORGANIZED HEALTH CARE EDUCATION/TRAINING PROGRAM

## 2024-01-25 PROCEDURE — 3074F SYST BP LT 130 MM HG: CPT | Performed by: STUDENT IN AN ORGANIZED HEALTH CARE EDUCATION/TRAINING PROGRAM

## 2024-01-25 PROCEDURE — G8428 CUR MEDS NOT DOCUMENT: HCPCS | Performed by: STUDENT IN AN ORGANIZED HEALTH CARE EDUCATION/TRAINING PROGRAM

## 2024-01-25 PROCEDURE — 3078F DIAST BP <80 MM HG: CPT | Performed by: STUDENT IN AN ORGANIZED HEALTH CARE EDUCATION/TRAINING PROGRAM

## 2024-01-25 PROCEDURE — 99214 OFFICE O/P EST MOD 30 MIN: CPT | Performed by: STUDENT IN AN ORGANIZED HEALTH CARE EDUCATION/TRAINING PROGRAM

## 2024-01-25 PROCEDURE — 1036F TOBACCO NON-USER: CPT | Performed by: STUDENT IN AN ORGANIZED HEALTH CARE EDUCATION/TRAINING PROGRAM

## 2024-01-25 PROCEDURE — G8484 FLU IMMUNIZE NO ADMIN: HCPCS | Performed by: STUDENT IN AN ORGANIZED HEALTH CARE EDUCATION/TRAINING PROGRAM

## 2024-01-25 PROCEDURE — 1123F ACP DISCUSS/DSCN MKR DOCD: CPT | Performed by: STUDENT IN AN ORGANIZED HEALTH CARE EDUCATION/TRAINING PROGRAM

## 2024-01-25 NOTE — PROGRESS NOTES
[place]  [address] [address], [address]  [place] [address]    Office [phone],Fax [phone]           Navi Sidhu is a 78 y.o. male presents to the office for post-PCI f/up      Assessment/Recommendations:     Diagnosis Orders   1. Coronary artery disease of native artery of native heart with stable angina pectoris (HCC)        2. PAF (paroxysmal atrial fibrillation) (HCC)        3. Severe obesity (BMI 35.0-39.9) with comorbidity (HCC)        4. Abnormal stress test        5. Primary hypertension            Fibrocalcific coronary artery disease with a history of stable angina.  Previously patient underwent stress testing which showed ischemia in the anterior apical wall.  Subsequent cardiac catheterization showed severe calcific multivessel coronary artery disease.  Patient was demonstrating left main coronary artery disease with functional  of the LAD with diffusely diseased proximal LAD into a small to moderate diagonal.  Rather small circumflex system without any critical disease.  Right coronary artery showed multilevel severe calcific disease.  Patient underwent rotational arthrectomy with subsequent PCI.  At the conclusion of the procedure there was evidence of increased collateralization of the apical LAD from the distal right coronary artery bed.  Patient has not returned to his normal activity level but reports significant improvement in his angina symptoms.  Discussed plan of care with patient the office today.  Since his angina has completely resolved, recommend to proceed with subsequent exercise Cardiolite to evaluate for further ischemia.  Left main into LAD PCI will be very complicated due to negative remodeling of the LAD system with severe eccentric calcification of the left main.  If his angina symptoms remain at bay with increasing his activity level and subsequent stress testing does not show any residual ischemia, will defer PCI of the

## 2024-01-25 NOTE — PROGRESS NOTES
Chief Complaint   Patient presents with    Follow-up    Follow-Up from Hospital     ER- 1/9- CAD    Atrial Fibrillation     Vitals:    01/25/24 1329   BP: 100/60   Site: Left Upper Arm   Position: Sitting   Pulse: 51   SpO2: 95%   Weight: 104.3 kg (230 lb)   Height: 1.702 m (5' 7\")         Chest pain denied     Recent hospital stays denied     Refills denied

## 2024-01-31 ENCOUNTER — ANCILLARY PROCEDURE (OUTPATIENT)
Age: 79
End: 2024-01-31
Payer: MEDICARE

## 2024-01-31 VITALS — HEIGHT: 67 IN | BODY MASS INDEX: 36.1 KG/M2 | WEIGHT: 230 LBS

## 2024-01-31 DIAGNOSIS — I10 PRIMARY HYPERTENSION: ICD-10-CM

## 2024-01-31 DIAGNOSIS — I25.118 CORONARY ARTERY DISEASE OF NATIVE ARTERY OF NATIVE HEART WITH STABLE ANGINA PECTORIS (HCC): ICD-10-CM

## 2024-01-31 DIAGNOSIS — I48.0 PAF (PAROXYSMAL ATRIAL FIBRILLATION) (HCC): ICD-10-CM

## 2024-01-31 DIAGNOSIS — R94.39 ABNORMAL STRESS TEST: ICD-10-CM

## 2024-01-31 DIAGNOSIS — E66.01 SEVERE OBESITY (BMI 35.0-39.9) WITH COMORBIDITY (HCC): ICD-10-CM

## 2024-01-31 PROCEDURE — A9500 TC99M SESTAMIBI: HCPCS | Performed by: STUDENT IN AN ORGANIZED HEALTH CARE EDUCATION/TRAINING PROGRAM

## 2024-01-31 PROCEDURE — PBSHW PBB SHADOW CHARGE: Performed by: STUDENT IN AN ORGANIZED HEALTH CARE EDUCATION/TRAINING PROGRAM

## 2024-01-31 PROCEDURE — 78452 HT MUSCLE IMAGE SPECT MULT: CPT | Performed by: INTERNAL MEDICINE

## 2024-01-31 PROCEDURE — 93017 CV STRESS TEST TRACING ONLY: CPT | Performed by: INTERNAL MEDICINE

## 2024-01-31 RX ORDER — TETRAKIS(2-METHOXYISOBUTYLISOCYANIDE)COPPER(I) TETRAFLUOROBORATE 1 MG/ML
24.1 INJECTION, POWDER, LYOPHILIZED, FOR SOLUTION INTRAVENOUS
Status: COMPLETED | OUTPATIENT
Start: 2024-01-31 | End: 2024-01-31

## 2024-01-31 RX ORDER — REGADENOSON 0.08 MG/ML
0.4 INJECTION, SOLUTION INTRAVENOUS
Status: COMPLETED | OUTPATIENT
Start: 2024-01-31 | End: 2024-01-31

## 2024-01-31 RX ADMIN — TECHNETIUM TC 99M SESTAMIBI 24.1 MILLICURIE: 1 INJECTION, POWDER, FOR SOLUTION INTRAVENOUS at 13:55

## 2024-01-31 RX ADMIN — REGADENOSON 0.4 MG: 0.08 INJECTION, SOLUTION INTRAVENOUS at 14:00

## 2024-02-01 PROCEDURE — A9500 TC99M SESTAMIBI: HCPCS | Performed by: STUDENT IN AN ORGANIZED HEALTH CARE EDUCATION/TRAINING PROGRAM

## 2024-02-01 RX ORDER — TETRAKIS(2-METHOXYISOBUTYLISOCYANIDE)COPPER(I) TETRAFLUOROBORATE 1 MG/ML
25.5 INJECTION, POWDER, LYOPHILIZED, FOR SOLUTION INTRAVENOUS
Status: COMPLETED | OUTPATIENT
Start: 2024-02-01 | End: 2024-02-01

## 2024-02-01 RX ADMIN — TECHNETIUM TC 99M SESTAMIBI 25.5 MILLICURIE: 1 INJECTION, POWDER, FOR SOLUTION INTRAVENOUS at 13:22

## 2024-02-02 LAB
ECHO BSA: 2.22 M2
NUC STRESS EJECTION FRACTION: 64 %
STRESS BASELINE DIAS BP: 68 MMHG
STRESS BASELINE HR: 48 BPM
STRESS BASELINE ST DEPRESSION: 0 MM
STRESS BASELINE SYS BP: 132 MMHG
STRESS ESTIMATED WORKLOAD: 0 METS
STRESS EXERCISE DUR MIN: 0 MIN
STRESS EXERCISE DUR SEC: 0 SEC
STRESS O2 SAT PEAK: 96 %
STRESS O2 SAT REST: 98 %
STRESS PEAK DIAS BP: 60 MMHG
STRESS PEAK SYS BP: 110 MMHG
STRESS PERCENT HR ACHIEVED: 69 %
STRESS POST PEAK HR: 99 BPM
STRESS RATE PRESSURE PRODUCT: NORMAL BPM*MMHG
STRESS ST DEPRESSION: 0 MM
STRESS TARGET HR: 143 BPM
TID: 1.16

## 2024-02-02 PROCEDURE — 78452 HT MUSCLE IMAGE SPECT MULT: CPT | Performed by: INTERNAL MEDICINE

## 2024-02-02 PROCEDURE — 93018 CV STRESS TEST I&R ONLY: CPT | Performed by: INTERNAL MEDICINE

## 2024-02-02 PROCEDURE — 93016 CV STRESS TEST SUPVJ ONLY: CPT | Performed by: INTERNAL MEDICINE

## 2024-02-06 DIAGNOSIS — I48.91 ATRIAL FIBRILLATION, UNSPECIFIED TYPE (HCC): ICD-10-CM

## 2024-02-06 RX ORDER — APIXABAN 5 MG/1
5 TABLET, FILM COATED ORAL 2 TIMES DAILY
Qty: 60 TABLET | Refills: 0 | Status: SHIPPED | OUTPATIENT
Start: 2024-02-06

## 2024-02-08 ENCOUNTER — TELEPHONE (OUTPATIENT)
Age: 79
End: 2024-02-08

## 2024-02-13 ENCOUNTER — CLINICAL DOCUMENTATION (OUTPATIENT)
Age: 79
End: 2024-02-13

## 2024-02-14 ENCOUNTER — OFFICE VISIT (OUTPATIENT)
Age: 79
End: 2024-02-14
Payer: MEDICARE

## 2024-02-14 ENCOUNTER — TELEPHONE (OUTPATIENT)
Age: 79
End: 2024-02-14

## 2024-02-14 VITALS
RESPIRATION RATE: 16 BRPM | SYSTOLIC BLOOD PRESSURE: 120 MMHG | BODY MASS INDEX: 37.28 KG/M2 | OXYGEN SATURATION: 94 % | DIASTOLIC BLOOD PRESSURE: 60 MMHG | WEIGHT: 237.5 LBS | HEIGHT: 67 IN | HEART RATE: 43 BPM | TEMPERATURE: 98.2 F

## 2024-02-14 DIAGNOSIS — M05.79 RHEUMATOID ARTHRITIS WITH RHEUMATOID FACTOR OF MULTIPLE SITES WITHOUT ORGAN OR SYSTEMS INVOLVEMENT (HCC): ICD-10-CM

## 2024-02-14 DIAGNOSIS — I25.10 CORONARY ARTERY DISEASE INVOLVING NATIVE HEART WITHOUT ANGINA PECTORIS, UNSPECIFIED VESSEL OR LESION TYPE: ICD-10-CM

## 2024-02-14 DIAGNOSIS — C43.9 MALIGNANT MELANOMA OF SKIN, UNSPECIFIED (HCC): ICD-10-CM

## 2024-02-14 DIAGNOSIS — I48.91 ATRIAL FIBRILLATION, UNSPECIFIED TYPE (HCC): ICD-10-CM

## 2024-02-14 DIAGNOSIS — J32.9 SINUSITIS, UNSPECIFIED CHRONICITY, UNSPECIFIED LOCATION: Primary | ICD-10-CM

## 2024-02-14 PROBLEM — D68.69 SECONDARY HYPERCOAGULABLE STATE (HCC): Status: ACTIVE | Noted: 2024-02-14

## 2024-02-14 PROCEDURE — G8428 CUR MEDS NOT DOCUMENT: HCPCS | Performed by: INTERNAL MEDICINE

## 2024-02-14 PROCEDURE — 3074F SYST BP LT 130 MM HG: CPT | Performed by: INTERNAL MEDICINE

## 2024-02-14 PROCEDURE — G8484 FLU IMMUNIZE NO ADMIN: HCPCS | Performed by: INTERNAL MEDICINE

## 2024-02-14 PROCEDURE — 99214 OFFICE O/P EST MOD 30 MIN: CPT | Performed by: INTERNAL MEDICINE

## 2024-02-14 PROCEDURE — 1123F ACP DISCUSS/DSCN MKR DOCD: CPT | Performed by: INTERNAL MEDICINE

## 2024-02-14 PROCEDURE — 1036F TOBACCO NON-USER: CPT | Performed by: INTERNAL MEDICINE

## 2024-02-14 PROCEDURE — 3078F DIAST BP <80 MM HG: CPT | Performed by: INTERNAL MEDICINE

## 2024-02-14 PROCEDURE — G8417 CALC BMI ABV UP PARAM F/U: HCPCS | Performed by: INTERNAL MEDICINE

## 2024-02-14 RX ORDER — PREDNISONE 20 MG/1
40 TABLET ORAL DAILY
Qty: 10 TABLET | Refills: 0 | Status: SHIPPED | OUTPATIENT
Start: 2024-02-14 | End: 2024-02-19

## 2024-02-14 RX ORDER — AMOXICILLIN AND CLAVULANATE POTASSIUM 875; 125 MG/1; MG/1
1 TABLET, FILM COATED ORAL 2 TIMES DAILY
Qty: 20 TABLET | Refills: 0 | Status: SHIPPED | OUTPATIENT
Start: 2024-02-14 | End: 2024-02-24

## 2024-02-14 NOTE — TELEPHONE ENCOUNTER
02/14/2024--I called and spoke with patient's wife and she stated that patient has his annual sinus infection, he is coughing and has drainage. Appointment made for today at 11:15 am with . The wife verbalized understanding and had no further concerns at that time.

## 2024-02-14 NOTE — PROGRESS NOTES
Smoking status: Former     Current packs/day: 0.00     Types: Cigarettes     Quit date: 1980     Years since quittin.1    Smokeless tobacco: Never   Substance Use Topics    Alcohol use: Yes     Alcohol/week: 14.0 standard drinks of alcohol      Family History   Problem Relation Age of Onset    Cancer Other         family hx colon cancer    Cancer Paternal Grandfather         colon    Diabetes Paternal Grandmother     Kidney Disease Father         renal failure    Dementia Mother     Heart Disease Father         CAD        Allergies   Allergen Reactions    Bee Venom Swelling     Swelling at site        Assessment/Plan  Navi was seen today for sinus problem.    Diagnoses and all orders for this visit:    Sinusitis, unspecified chronicity, unspecified location-remains on immunosuppression.  Recurrent symptoms of bacterial sinusitis  -     amoxicillin-clavulanate (AUGMENTIN) 875-125 MG per tablet; Take 1 tablet by mouth 2 times daily for 10 days  -     predniSONE (DELTASONE) 20 MG tablet; Take 2 tablets by mouth daily for 5 days    Atrial fibrillation, unspecified type (HCC)-monitoring at home.  Asymptomatic.  On oral anticoagulation    Coronary artery disease involving native heart without angina pectoris, unspecified vessel or lesion type-status post SPENSER x 2.  Looking back he is feeling a lot better    Rheumatoid arthritis with rheumatoid factor of multiple sites without organ or systems involvement (HCC)-remains on the same Biologics    Malignant melanoma of skin, unspecified (HCC)-dermatology        No follow-up provider specified.    Flaco Plascencia MD  2024    This note was created with the help of speech recognition software (Dragon) and may contain some 'sound alike' errors.

## 2024-02-14 NOTE — TELEPHONE ENCOUNTER
The patient wife Magda is requesting a call back to be advise on the patient sinus infection. PSR offered appointment she decline.[phone]

## 2024-02-20 DIAGNOSIS — R05.9 COUGH, UNSPECIFIED TYPE: Primary | ICD-10-CM

## 2024-02-21 ENCOUNTER — HOSPITAL ENCOUNTER (OUTPATIENT)
Facility: HOSPITAL | Age: 79
Discharge: HOME | End: 2024-02-24
Attending: INTERNAL MEDICINE
Payer: MEDICARE

## 2024-02-21 DIAGNOSIS — R05.9 COUGH, UNSPECIFIED TYPE: ICD-10-CM

## 2024-02-21 PROCEDURE — 71046 X-RAY EXAM CHEST 2 VIEWS: CPT

## 2024-02-21 RX ORDER — LEVOFLOXACIN 750 MG/1
750 TABLET, FILM COATED ORAL DAILY
Qty: 5 TABLET | Refills: 0 | Status: SHIPPED | OUTPATIENT
Start: 2024-02-21 | End: 2024-02-26

## 2024-02-22 ENCOUNTER — TELEPHONE (OUTPATIENT)
Age: 79
End: 2024-02-22

## 2024-02-22 NOTE — TELEPHONE ENCOUNTER
----- Message from Cammy Cervantes sent at 2/22/2024  9:00 AM EST -----  Subject: Appointment Request    Reason for Call: Established Patient Appointment needed: Routine Existing   Condition Follow Up    QUESTIONS    Reason for appointment request? Available appointments did not meet   patient need     Additional Information for Provider? Patient's wife Magda is calling   because she states patient needs a follow-up appointment from patient   being sick per Dr. Plascencia. She stated that patient needs to be seen next   week, either Wednesday or Thursday. Please advise.  ---------------------------------------------------------------------------  --------------  CALL BACK INFO  [phone]; OK to leave message on voicemail  ---------------------------------------------------------------------------  --------------  SCRIPT ANSWERS

## 2024-02-29 ENCOUNTER — OFFICE VISIT (OUTPATIENT)
Age: 79
End: 2024-02-29
Payer: MEDICARE

## 2024-02-29 VITALS
BODY MASS INDEX: 37.04 KG/M2 | RESPIRATION RATE: 16 BRPM | SYSTOLIC BLOOD PRESSURE: 132 MMHG | HEIGHT: 67 IN | HEART RATE: 55 BPM | OXYGEN SATURATION: 97 % | WEIGHT: 236 LBS | DIASTOLIC BLOOD PRESSURE: 80 MMHG | TEMPERATURE: 98.1 F

## 2024-02-29 DIAGNOSIS — C43.4 MALIGNANT MELANOMA OF SCALP (HCC): ICD-10-CM

## 2024-02-29 DIAGNOSIS — I10 PRIMARY HYPERTENSION: ICD-10-CM

## 2024-02-29 DIAGNOSIS — I48.91 ATRIAL FIBRILLATION, UNSPECIFIED TYPE (HCC): ICD-10-CM

## 2024-02-29 DIAGNOSIS — J18.9 PNEUMONIA OF LEFT LUNG DUE TO INFECTIOUS ORGANISM, UNSPECIFIED PART OF LUNG: Primary | ICD-10-CM

## 2024-02-29 DIAGNOSIS — I25.10 CORONARY ARTERY DISEASE INVOLVING NATIVE HEART WITHOUT ANGINA PECTORIS, UNSPECIFIED VESSEL OR LESION TYPE: ICD-10-CM

## 2024-02-29 DIAGNOSIS — M05.79 RHEUMATOID ARTHRITIS WITH RHEUMATOID FACTOR OF MULTIPLE SITES WITHOUT ORGAN OR SYSTEMS INVOLVEMENT (HCC): ICD-10-CM

## 2024-02-29 PROBLEM — D68.69 SECONDARY HYPERCOAGULABLE STATE (HCC): Status: RESOLVED | Noted: 2024-02-14 | Resolved: 2024-02-29

## 2024-02-29 PROCEDURE — 99214 OFFICE O/P EST MOD 30 MIN: CPT | Performed by: INTERNAL MEDICINE

## 2024-02-29 PROCEDURE — G8484 FLU IMMUNIZE NO ADMIN: HCPCS | Performed by: INTERNAL MEDICINE

## 2024-02-29 PROCEDURE — 1123F ACP DISCUSS/DSCN MKR DOCD: CPT | Performed by: INTERNAL MEDICINE

## 2024-02-29 PROCEDURE — G8428 CUR MEDS NOT DOCUMENT: HCPCS | Performed by: INTERNAL MEDICINE

## 2024-02-29 PROCEDURE — G8417 CALC BMI ABV UP PARAM F/U: HCPCS | Performed by: INTERNAL MEDICINE

## 2024-02-29 PROCEDURE — 1036F TOBACCO NON-USER: CPT | Performed by: INTERNAL MEDICINE

## 2024-02-29 PROCEDURE — 3079F DIAST BP 80-89 MM HG: CPT | Performed by: INTERNAL MEDICINE

## 2024-02-29 PROCEDURE — 3075F SYST BP GE 130 - 139MM HG: CPT | Performed by: INTERNAL MEDICINE

## 2024-02-29 ASSESSMENT — ENCOUNTER SYMPTOMS
SHORTNESS OF BREATH: 0
WHEEZING: 0
DIARRHEA: 0
BACK PAIN: 0
CONSTIPATION: 0
ABDOMINAL PAIN: 0

## 2024-02-29 NOTE — PROGRESS NOTES
Navi Sidhu is a 78 y.o. male who presents today for Follow-up (F/u pneumonia - improving with a little cough  )  .      He has a history of   Patient Active Problem List   Diagnosis    Chronic idiopathic gout involving toe of left foot without tophus    Primary localized osteoarthrosis, lower leg    Plantar fasciitis of right foot    Dysthymic disorder    Benign neoplasm of colon    Gout    Age-related osteoporosis without fracture    Seropositive rheumatoid arthritis of multiple sites (HCC)    Long-term use of immunosuppressant medication    Advanced care planning/counseling discussion    Vitamin D deficiency    Low testosterone    Primary osteoarthritis of both knees    Hyperlipidemia    Carcinoma of prostate (HCC)    Primary hypertension    Swelling of first metatarsophalangeal (MTP) joint    Depression    Malignant melanoma (HCC)    Arthritis    Asthma    Gastro-esophageal reflux    Obesity    Environmental allergies    Abnormal stress test    PAF (paroxysmal atrial fibrillation) (HCC)   .    Today patient is here for follow-up.     Pneumonia: Ultimately we did  treat patient for a lingular pneumonia due to persistence of symptoms on x-ray.  Patient reports that he is feeling much better.  Coughing has subsided.    Has been diagnosed with melanoma on head. Will be stopping Golimumab.  Will be seeing Surg/Onc.     Did have stents placed late last year.  He is still on Plavix as well as full anticoagulation for his A-fib.  We discussed that antiplatelet therapy may become a problem depending on the timeframe that they want to remove this melanoma.  They have decided to stop his arthritis infusion due to length with melanomas.  They will be seeing him on the day that he is due for his next infusion.    Hypertension  Hypertension ROS: taking medications as instructed, no medication side effects noted, no TIA's, no swelling of ankles     reports that he quit smoking about 44 years ago. His smoking use included

## 2024-03-04 ENCOUNTER — OFFICE VISIT (OUTPATIENT)
Age: 79
End: 2024-03-04
Payer: MEDICARE

## 2024-03-04 VITALS
HEART RATE: 51 BPM | HEIGHT: 67 IN | WEIGHT: 241 LBS | OXYGEN SATURATION: 95 % | DIASTOLIC BLOOD PRESSURE: 50 MMHG | BODY MASS INDEX: 37.83 KG/M2 | SYSTOLIC BLOOD PRESSURE: 110 MMHG | RESPIRATION RATE: 14 BRPM

## 2024-03-04 DIAGNOSIS — I48.0 PAF (PAROXYSMAL ATRIAL FIBRILLATION) (HCC): ICD-10-CM

## 2024-03-04 DIAGNOSIS — I25.118 CORONARY ARTERY DISEASE OF NATIVE ARTERY OF NATIVE HEART WITH STABLE ANGINA PECTORIS (HCC): Primary | ICD-10-CM

## 2024-03-04 DIAGNOSIS — I10 PRIMARY HYPERTENSION: ICD-10-CM

## 2024-03-04 DIAGNOSIS — E66.01 SEVERE OBESITY (BMI 35.0-39.9) WITH COMORBIDITY (HCC): ICD-10-CM

## 2024-03-04 PROCEDURE — 3078F DIAST BP <80 MM HG: CPT | Performed by: INTERNAL MEDICINE

## 2024-03-04 PROCEDURE — 1036F TOBACCO NON-USER: CPT | Performed by: INTERNAL MEDICINE

## 2024-03-04 PROCEDURE — 99214 OFFICE O/P EST MOD 30 MIN: CPT | Performed by: INTERNAL MEDICINE

## 2024-03-04 PROCEDURE — G8484 FLU IMMUNIZE NO ADMIN: HCPCS | Performed by: INTERNAL MEDICINE

## 2024-03-04 PROCEDURE — G8427 DOCREV CUR MEDS BY ELIG CLIN: HCPCS | Performed by: INTERNAL MEDICINE

## 2024-03-04 PROCEDURE — 1123F ACP DISCUSS/DSCN MKR DOCD: CPT | Performed by: INTERNAL MEDICINE

## 2024-03-04 PROCEDURE — G8417 CALC BMI ABV UP PARAM F/U: HCPCS | Performed by: INTERNAL MEDICINE

## 2024-03-04 PROCEDURE — 3074F SYST BP LT 130 MM HG: CPT | Performed by: INTERNAL MEDICINE

## 2024-03-04 NOTE — PROGRESS NOTES
had concerns including Hypertension, Coronary Artery Disease, and Other (PAF, abnormal stress test).    Vitals:    03/04/24 1310   BP: (!) 110/50   Site: Left Upper Arm   Position: Sitting   Pulse: 51   Resp: 14   SpO2: 95%   Weight: 109.3 kg (241 lb)   Height: 1.702 m (5' 7\")        Chest pain No    Refills No        1. Have you been to the ER, urgent care clinic since your last visit? No       Hospitalized since your last visit? No       Where?        Date?

## 2024-03-04 NOTE — PROGRESS NOTES
Risk stratification and Medication hold faxed to [facility] @ [phone]

## 2024-03-04 NOTE — PROGRESS NOTES
Office Follow-up    NAME: Navi Sidhu   :  1945  MRM:  [id number]    Date:  3/4/2024         Assessment and Plan:     1.  Paroxysmal atrial fibrillation (First diagnosed ): Triggers may be multifactorial.  Remain in SR. Continue [employer] cardia mobile monitoring at home.   Continue Eliquis 5 mg p.o. twice daily and Toprol XL. HR in 50's. Echo normal LVEF. Stress Nuc show mild to moderate anteroapical ischemia.     2.  CAD with  of LM/LAD and Severe RCA PCI 23: LAD fibrocalcific dz was left alone for medical rx. Repeat stress Nuc in 2024 show mod ischemia of the anterior wall SDS 8. Symptomatically has significantly impoved after PCI to RCA. Continue Plavix, Metoprolol, Norvasc, Liisinopril and Atorvastatin.        3.  Hypertension: Blood pressures controlled.  Continue amlodipine and lisinopril.    4.  Dyslipidemia: Continue statins.  Most recent LDL cholesterol from 2023 was 75.  Goal LDL is 70 or below.    5.  History of rheumatoid arthritis: He gets methotrexate treatment along with immune modulators.    6.  Sleep apnea: He is currently on BiPAP.    7. PreOp Cardiac Eval for skin melanoma excision: Advised to wait 90 days post PCI before he can inturrupt Plavix. Wrote this recommendation on his preop cl form.     8. See Dr. Lizama in 6 months.      He lives on [address].   He loves to Golf. Has been to [employer] courses many times.                        ATTENTION:   This medical record was transcribed using an electronic medical records/speech recognition system.  Although proofread, it may and can contain electronic, spelling and other errors.  Corrections may be executed at a later time.  Please feel free to contact us for any clarifications as needed.      Subjective:     Navi Sidhu, a 78 y.o. year-old who presents for followup.    Exam:     BP (!) 110/50 (Site: Left Upper Arm, Position: Sitting)   Pulse 51   Resp 14   Ht 1.702 m (5' 7\")   Wt

## 2024-03-06 ENCOUNTER — TELEPHONE (OUTPATIENT)
Facility: HOSPITAL | Age: 79
End: 2024-03-06

## 2024-03-06 NOTE — TELEPHONE ENCOUNTER
[facility] Cardiac Rehab- Referral Update.  Follow up from seeing patient on 1/9/24.  Pt has had further cardiac testing.  Noted are left main and LAD disease.  Pt's last cardiology visit, per cardiology note, pt will not undergo and intervention at this time.  Pt voiced improvement after RCA intervention.  Pt also suffered from a pneumonia event over the last 3 weeks.  Telephone call placed to discuss the format and benefits of participating in an outpatient cardiac rehab program.  This was a follow up to the bedside visit in January.  Pt stated at this time he will decline.  He has other medical conditions which will require treatment.  He feels he is unable to commit to CR program.  Pt stated he is aware of the program.  Pt informed he has 1 year from his PCI/RCA procedure to enroll and to inform his cardiology to generate a new referral.  FANNIE Farias RN

## 2024-03-08 ENCOUNTER — TELEPHONE (OUTPATIENT)
Age: 79
End: 2024-03-08

## 2024-03-08 NOTE — TELEPHONE ENCOUNTER
Patient wife ( Magda) called explaining that the patient has a Melanoma on his head. Dr. Tejada is asking for the patient can have surgery to treat Melanoma on 3/26. Patient stated that Dr. Lizama asked him to push the procedure out until 4/9. Dr. Tejada feels that the procedure is \"medically neccessary\". Patient wants to know if he can have surgery on 3/26.     Patient # [phone]   Dr. Tejada # [phone]

## 2024-03-12 ENCOUNTER — TELEPHONE (OUTPATIENT)
Age: 79
End: 2024-03-12

## 2024-03-12 DIAGNOSIS — I48.91 ATRIAL FIBRILLATION, UNSPECIFIED TYPE (HCC): ICD-10-CM

## 2024-03-12 RX ORDER — APIXABAN 5 MG/1
5 TABLET, FILM COATED ORAL 2 TIMES DAILY
Qty: 60 TABLET | Refills: 0 | Status: SHIPPED | OUTPATIENT
Start: 2024-03-12

## 2024-03-12 NOTE — TELEPHONE ENCOUNTER
Spoke to Magda, pt''s wife. She stated that in the office Dr. Lizama no surgery until after April 9th, now Oncology wants to move the surgery to 3/27.  I urged them to wait the full three months post stent for surgery and holding blood thinner. I don't understand what 2 weeks difference for a melanoma would make ,when it will make a lot of difference in the healing of the stent.  Magda expressed understanding and agreement. Pt has no further questions or concerns at this time.

## 2024-03-12 NOTE — TELEPHONE ENCOUNTER
Received VO from Dr. Raza Lizama:    That will be 3 months after his stent therefore it is Ok to proceed with Melanoma surgery. He can hold Plavix 5 days prior to surgery and resume after the surgery.       ####################################    Unable to reach pt's wife, Magda. Message left detailed message explaining the above along with instructions to have surgeon fax us a request for clearance, if needed.

## 2024-03-26 RX ORDER — PRIMIDONE 50 MG/1
TABLET ORAL
Qty: 60 TABLET | Refills: 2 | Status: SHIPPED | OUTPATIENT
Start: 2024-03-26

## 2024-03-27 ENCOUNTER — HOSPITAL ENCOUNTER (OUTPATIENT)
Facility: HOSPITAL | Age: 79
Discharge: HOME | End: 2024-03-30
Payer: MEDICARE

## 2024-03-27 DIAGNOSIS — J18.9 PNEUMONIA OF LEFT LUNG DUE TO INFECTIOUS ORGANISM, UNSPECIFIED PART OF LUNG: ICD-10-CM

## 2024-03-27 PROCEDURE — 71046 X-RAY EXAM CHEST 2 VIEWS: CPT

## 2024-04-05 ENCOUNTER — TELEPHONE (OUTPATIENT)
Age: 79
End: 2024-04-05

## 2024-04-05 NOTE — TELEPHONE ENCOUNTER
Cardiac Clearance Request.    Surgery 04/19  [facility]  Dr. Tray Mendoza  Scalp incision for melanoma    Sandrine Bird RN  Phone - [phone]  Fax - [phone]

## 2024-04-16 DIAGNOSIS — I48.91 ATRIAL FIBRILLATION, UNSPECIFIED TYPE (HCC): ICD-10-CM

## 2024-04-16 RX ORDER — PANTOPRAZOLE SODIUM 40 MG/1
TABLET, DELAYED RELEASE ORAL
Qty: 180 TABLET | Refills: 1 | Status: SHIPPED | OUTPATIENT
Start: 2024-04-16

## 2024-04-16 RX ORDER — APIXABAN 5 MG/1
5 TABLET, FILM COATED ORAL 2 TIMES DAILY
Qty: 60 TABLET | Refills: 5 | Status: SHIPPED | OUTPATIENT
Start: 2024-04-16

## 2024-04-16 NOTE — TELEPHONE ENCOUNTER
Received medication refill from pharmacy     Medication: Eliquis 5 mg tablet     QTY: 60    Direction: Take one tablet by mouth twice daily     Refills: 0      Medication: Pantoprazole (Protonix) 40 mg tablet     QTY: 180    Direction: Take 1 tablet by mouth twice daily     Refills: 1    Medication refill routed to provider

## 2024-05-28 ENCOUNTER — CLINICAL DOCUMENTATION (OUTPATIENT)
Age: 79
End: 2024-05-28

## 2024-05-28 NOTE — PROGRESS NOTES
Received VO from Dr. Raza Lizama:    Since he is monitoring his HR daily with [employer], please stop Eliquis. Continue Plavix.       Changes made to the chart

## 2024-06-14 RX ORDER — CLOPIDOGREL BISULFATE 75 MG/1
75 TABLET ORAL DAILY
Qty: 90 TABLET | Refills: 1 | Status: SHIPPED | OUTPATIENT
Start: 2024-06-14

## 2024-06-14 NOTE — TELEPHONE ENCOUNTER
Refill per VO of Dr. JIGAR Campbell, OD:    Last appt:  3/4/2024    Future Appointments   Date Time Provider Department Center   9/19/2024 10:20 AM Raza Lizama MD [facility]       Requested Prescriptions     Pending Prescriptions Disp Refills    clopidogrel (PLAVIX) 75 MG tablet [Pharmacy Med Name: CLOPIDOGREL 75 MG TAB[*]] 90 tablet 1     Sig: TAKE ONE TABLET BY MOUTH ONE TIME DAILY

## 2024-07-01 RX ORDER — PRIMIDONE 50 MG/1
100 TABLET ORAL NIGHTLY
Qty: 60 TABLET | Refills: 0 | Status: SHIPPED | OUTPATIENT
Start: 2024-07-01

## 2024-07-26 ENCOUNTER — TELEPHONE (OUTPATIENT)
Age: 79
End: 2024-07-26

## 2024-08-05 RX ORDER — PRIMIDONE 50 MG/1
TABLET ORAL
Qty: 60 TABLET | Refills: 0 | Status: SHIPPED | OUTPATIENT
Start: 2024-08-05

## 2024-08-12 RX ORDER — METOPROLOL SUCCINATE 25 MG/1
25 TABLET, EXTENDED RELEASE ORAL DAILY
Qty: 90 TABLET | Refills: 0 | Status: SHIPPED | OUTPATIENT
Start: 2024-08-12

## 2024-09-10 ENCOUNTER — TELEMEDICINE (OUTPATIENT)
Age: 79
End: 2024-09-10
Payer: MEDICARE

## 2024-09-10 DIAGNOSIS — R73.01 IFG (IMPAIRED FASTING GLUCOSE): ICD-10-CM

## 2024-09-10 DIAGNOSIS — I10 PRIMARY HYPERTENSION: ICD-10-CM

## 2024-09-10 DIAGNOSIS — I48.91 ATRIAL FIBRILLATION, UNSPECIFIED TYPE (HCC): Primary | ICD-10-CM

## 2024-09-10 DIAGNOSIS — I25.10 CORONARY ARTERY DISEASE INVOLVING NATIVE HEART WITHOUT ANGINA PECTORIS, UNSPECIFIED VESSEL OR LESION TYPE: ICD-10-CM

## 2024-09-10 DIAGNOSIS — C43.4 MALIGNANT MELANOMA OF SCALP (HCC): ICD-10-CM

## 2024-09-10 DIAGNOSIS — M05.79 RHEUMATOID ARTHRITIS WITH RHEUMATOID FACTOR OF MULTIPLE SITES WITHOUT ORGAN OR SYSTEMS INVOLVEMENT (HCC): ICD-10-CM

## 2024-09-10 DIAGNOSIS — E78.5 HYPERLIPIDEMIA, UNSPECIFIED HYPERLIPIDEMIA TYPE: ICD-10-CM

## 2024-09-10 PROCEDURE — 1123F ACP DISCUSS/DSCN MKR DOCD: CPT | Performed by: INTERNAL MEDICINE

## 2024-09-10 PROCEDURE — 1036F TOBACCO NON-USER: CPT | Performed by: INTERNAL MEDICINE

## 2024-09-10 PROCEDURE — 99214 OFFICE O/P EST MOD 30 MIN: CPT | Performed by: INTERNAL MEDICINE

## 2024-09-10 PROCEDURE — G8417 CALC BMI ABV UP PARAM F/U: HCPCS | Performed by: INTERNAL MEDICINE

## 2024-09-10 PROCEDURE — G8428 CUR MEDS NOT DOCUMENT: HCPCS | Performed by: INTERNAL MEDICINE

## 2024-09-10 RX ORDER — DULOXETIN HYDROCHLORIDE 20 MG/1
20 CAPSULE, DELAYED RELEASE ORAL DAILY
COMMUNITY

## 2024-09-10 RX ORDER — PRIMIDONE 50 MG/1
50 TABLET ORAL 2 TIMES DAILY
Qty: 180 TABLET | Refills: 3 | Status: SHIPPED | OUTPATIENT
Start: 2024-09-10

## 2024-09-10 RX ORDER — CEPHALEXIN 500 MG/1
CAPSULE ORAL
COMMUNITY

## 2024-09-10 ASSESSMENT — ENCOUNTER SYMPTOMS
DIARRHEA: 0
WHEEZING: 0
SHORTNESS OF BREATH: 0
BACK PAIN: 1
ABDOMINAL PAIN: 0
CONSTIPATION: 0

## 2024-09-19 ENCOUNTER — OFFICE VISIT (OUTPATIENT)
Age: 79
End: 2024-09-19
Payer: MEDICARE

## 2024-09-19 VITALS
HEART RATE: 50 BPM | OXYGEN SATURATION: 95 % | RESPIRATION RATE: 18 BRPM | SYSTOLIC BLOOD PRESSURE: 112 MMHG | WEIGHT: 232 LBS | BODY MASS INDEX: 36.41 KG/M2 | DIASTOLIC BLOOD PRESSURE: 78 MMHG | HEIGHT: 67 IN

## 2024-09-19 DIAGNOSIS — I25.83 CORONARY ARTERY DISEASE DUE TO LIPID RICH PLAQUE: ICD-10-CM

## 2024-09-19 DIAGNOSIS — I10 PRIMARY HYPERTENSION: ICD-10-CM

## 2024-09-19 DIAGNOSIS — I48.0 PAF (PAROXYSMAL ATRIAL FIBRILLATION) (HCC): Primary | ICD-10-CM

## 2024-09-19 DIAGNOSIS — I25.10 CORONARY ARTERY DISEASE DUE TO LIPID RICH PLAQUE: ICD-10-CM

## 2024-09-19 PROCEDURE — 1036F TOBACCO NON-USER: CPT | Performed by: INTERNAL MEDICINE

## 2024-09-19 PROCEDURE — 99214 OFFICE O/P EST MOD 30 MIN: CPT | Performed by: INTERNAL MEDICINE

## 2024-09-19 PROCEDURE — 93010 ELECTROCARDIOGRAM REPORT: CPT | Performed by: INTERNAL MEDICINE

## 2024-09-19 PROCEDURE — 1123F ACP DISCUSS/DSCN MKR DOCD: CPT | Performed by: INTERNAL MEDICINE

## 2024-09-19 PROCEDURE — 3078F DIAST BP <80 MM HG: CPT | Performed by: INTERNAL MEDICINE

## 2024-09-19 PROCEDURE — 3074F SYST BP LT 130 MM HG: CPT | Performed by: INTERNAL MEDICINE

## 2024-09-19 PROCEDURE — G8427 DOCREV CUR MEDS BY ELIG CLIN: HCPCS | Performed by: INTERNAL MEDICINE

## 2024-09-19 PROCEDURE — G8417 CALC BMI ABV UP PARAM F/U: HCPCS | Performed by: INTERNAL MEDICINE

## 2024-09-19 PROCEDURE — 93005 ELECTROCARDIOGRAM TRACING: CPT | Performed by: INTERNAL MEDICINE

## 2024-10-02 RX ORDER — PANTOPRAZOLE SODIUM 40 MG/1
TABLET, DELAYED RELEASE ORAL
Qty: 180 TABLET | Refills: 1 | Status: SHIPPED | OUTPATIENT
Start: 2024-10-02

## 2024-11-06 RX ORDER — METOPROLOL SUCCINATE 25 MG/1
25 TABLET, EXTENDED RELEASE ORAL DAILY
Qty: 90 TABLET | Refills: 3 | Status: SHIPPED | OUTPATIENT
Start: 2024-11-06

## 2024-12-28 ENCOUNTER — OFFICE VISIT (OUTPATIENT)
Age: 79
End: 2024-12-28

## 2024-12-28 VITALS
DIASTOLIC BLOOD PRESSURE: 65 MMHG | TEMPERATURE: 99.1 F | SYSTOLIC BLOOD PRESSURE: 97 MMHG | OXYGEN SATURATION: 91 % | RESPIRATION RATE: 18 BRPM | HEART RATE: 60 BPM

## 2024-12-28 DIAGNOSIS — J06.9 UPPER RESPIRATORY TRACT INFECTION, UNSPECIFIED TYPE: ICD-10-CM

## 2024-12-28 DIAGNOSIS — R50.9 FEVER, UNSPECIFIED FEVER CAUSE: Primary | ICD-10-CM

## 2024-12-28 LAB
INFLUENZA A ANTIGEN, POC: NORMAL
INFLUENZA B ANTIGEN, POC: NORMAL
Lab: NORMAL
PERFORMING INSTRUMENT: NORMAL
QC PASS/FAIL: NORMAL
SARS-COV-2, POC: NORMAL

## 2024-12-28 RX ORDER — ATENOLOL 25 MG/1
1 TABLET ORAL DAILY
COMMUNITY

## 2024-12-28 RX ORDER — DOXYCYCLINE HYCLATE 100 MG
TABLET ORAL
COMMUNITY
Start: 2024-12-06

## 2024-12-28 RX ORDER — METHYLPREDNISOLONE 4 MG/1
TABLET ORAL
COMMUNITY
Start: 2024-12-12

## 2024-12-28 NOTE — PROGRESS NOTES
Detected    Lot Number [id number]     QC Pass/Fail pass     Performing Instrument BinaxNOW    POCT Influenza A/B Antigen   Result Value Ref Range    Inflenza A Ag pass     Influenza B Ag pass          XR Results (most recent):  @DAVID(QVT8129:1)@         Physical Exam  Constitutional:       Appearance: Normal appearance.   HENT:      Head: Normocephalic and atraumatic.      Right Ear: Tympanic membrane, ear canal and external ear normal.      Left Ear: Tympanic membrane, ear canal and external ear normal.      Nose: Congestion present.      Mouth/Throat:      Mouth: Mucous membranes are moist.      Pharynx: Oropharynx is clear.   Eyes:      Extraocular Movements: Extraocular movements intact.      Conjunctiva/sclera: Conjunctivae normal.      Pupils: Pupils are equal, round, and reactive to light.   Cardiovascular:      Rate and Rhythm: Normal rate and regular rhythm.      Pulses: Normal pulses.      Heart sounds: Normal heart sounds.   Pulmonary:      Effort: Pulmonary effort is normal.      Breath sounds: Examination of the right-lower field reveals decreased breath sounds. Examination of the left-lower field reveals decreased breath sounds. Decreased breath sounds present.   Abdominal:      Palpations: Abdomen is soft.   Musculoskeletal:         General: Normal range of motion.      Cervical back: Normal range of motion.   Skin:     General: Skin is warm.   Neurological:      General: No focal deficit present.      Mental Status: He is alert and oriented to person, place, and time. Mental status is at baseline.   Psychiatric:         Mood and Affect: Mood normal.         Behavior: Behavior normal.         Judgment: Judgment normal.        Vitals:    12/28/24 1344   BP: 97/65   Site: Right Upper Arm   Position: Sitting   Cuff Size: Large Adult   Pulse: 60   Resp: 18   Temp: 99.1 °F (37.3 °C)   TempSrc: Oral   SpO2: 91%        Allergies   Allergen Reactions    Bee Venom Swelling     Swelling at site

## 2024-12-28 NOTE — PATIENT INSTRUCTIONS
Thank you for visiting [facility] today.    Flu and Covid- Negative     Please continue taking Azithromycin as previously prescribed     Nasal Congestion:  Flonase (over the counter) nasal spray, once a day  Saline irrigation kits help wash out sinuses 1-2 times a day  Normal saline nasal spray  Afrin nasal spray for no more than 3-5 days    Cough:  Throat lozenges, hot tea, and honey may help  Vicks VapoRub at night to help with cough and relieve muscles aches and pain  If not prescribed a cough medication, Delsym or Robitussin are options.  It is an over the counter cough medication containing dextromethorphan to help suppress cough at night  If you have high blood pressure, take Coricidin HBP (or the generic form) instead.  Follow instructions on the box.  For thick mucus, take Mucinex (with Guafenesin only) to help thin the mucus.  Follow instructions on the box.  You will need to drink plenty of water with this medication.    Sore Throat:  Lozenges, as needed. Cepacol lozenges will help numb the throat  Chloraseptic spray also helps to numb throat pain  Salt water gargles to soothe throat pain    Sinus pain/pressure:  Warm, wet towel on face to help with facial sinus pain/pressure    Headache/Pain Fever/Body Aches:  If you can take NSAIDs, take Ibuprofen 400-800mg every 8 hours as needed  If you cannot take NSAIDs, take Tylenol 325-500mg every 6 hours as needed    Miscellanous:  Zyrtec/Xyzal/Allegra/Claritin during the day or Benadryl at night may help with allergies.  These medications also come in decongestant forms and may be used if you are having nasal/sinus congestion.  Simple foods like chicken noodle soup, smoothies, hot tea with lemon and honey may also provide some relief.  Cool mist humidifier  Stay hydrated  Vitamin C 75-90 mg daily  Zinc 40 mg daily    Please follow up with your primary care provider within 2-5 days if your signs and symptoms have not resolved or worsened.  Please be

## 2025-01-04 ENCOUNTER — OFFICE VISIT (OUTPATIENT)
Age: 80
End: 2025-01-04

## 2025-01-04 VITALS
TEMPERATURE: 98.1 F | HEIGHT: 67 IN | OXYGEN SATURATION: 93 % | BODY MASS INDEX: 36.73 KG/M2 | SYSTOLIC BLOOD PRESSURE: 138 MMHG | RESPIRATION RATE: 18 BRPM | HEART RATE: 50 BPM | DIASTOLIC BLOOD PRESSURE: 83 MMHG | WEIGHT: 234 LBS

## 2025-01-04 DIAGNOSIS — J45.21 MILD INTERMITTENT REACTIVE AIRWAY DISEASE WITH ACUTE EXACERBATION: Primary | ICD-10-CM

## 2025-01-04 DIAGNOSIS — R06.2 WHEEZING: ICD-10-CM

## 2025-01-04 DIAGNOSIS — R05.2 SUBACUTE COUGH: ICD-10-CM

## 2025-01-04 RX ORDER — ALBUTEROL SULFATE 90 UG/1
2 INHALANT RESPIRATORY (INHALATION) EVERY 6 HOURS PRN
Qty: 18 G | Refills: 0 | Status: SHIPPED | OUTPATIENT
Start: 2025-01-04

## 2025-01-04 RX ORDER — METHYLPREDNISOLONE 4 MG/1
4 TABLET ORAL SEE ADMIN INSTRUCTIONS
Qty: 1 KIT | Refills: 0 | Status: SHIPPED | OUTPATIENT
Start: 2025-01-04

## 2025-01-04 RX ORDER — IPRATROPIUM BROMIDE AND ALBUTEROL SULFATE 2.5; .5 MG/3ML; MG/3ML
1 SOLUTION RESPIRATORY (INHALATION) ONCE
Status: COMPLETED | OUTPATIENT
Start: 2025-01-04 | End: 2025-01-04

## 2025-01-04 RX ORDER — BENZONATATE 100 MG/1
100 CAPSULE ORAL 3 TIMES DAILY PRN
Qty: 30 CAPSULE | Refills: 0 | Status: SHIPPED | OUTPATIENT
Start: 2025-01-04 | End: 2025-01-14

## 2025-01-04 RX ADMIN — IPRATROPIUM BROMIDE AND ALBUTEROL SULFATE 1 DOSE: 2.5; .5 SOLUTION RESPIRATORY (INHALATION) at 17:05

## 2025-01-04 ASSESSMENT — ENCOUNTER SYMPTOMS
EYES NEGATIVE: 1
GASTROINTESTINAL NEGATIVE: 1
ALLERGIC/IMMUNOLOGIC NEGATIVE: 1
COUGH: 1

## 2025-01-04 NOTE — PATIENT INSTRUCTIONS
Thank you for visiting [facility] today    Nasal Congestion:  Flonase or Nasonex (over the counter) nasal spray, once a day  Saline irrigation kits help wash out sinuses 1-2 times a day  Normal saline nasal spray  Afrin nasal spray no longer than three days  Cough:  Throat lozenges, hot tea, and honey may help  Vicks VapoRub at night to help with cough and relieve muscles aches and pain  If not prescribed a cough medication, Robitussin DM is an option.  It is an over the counter cough medication containing dextromethorphan to help suppress cough at night   *Please only take when absolutely needed, as this is a controlled substance that can cause addiction   *Please only take cough syrup at nighttime as it causes drowsiness   *Do not drive or operate any machinery while taking this medication  If you have high blood pressure, take Coricidin HBP (or the generic form) instead.  Follow instructions on the box.  Congestion:  For thick mucus, take Mucinex (with Guafenesin only) to help thin the mucus.  Follow instructions on the box.  You will need to drink plenty of water with this medication.  Sore Throat:  Lozenges, as needed. Cepacol lozenges will help numb the throat  Chloraseptic spray also helps to numb throat pain  Salt water gargles to soothe throat pain  Sinus pain/pressure:  Warm, wet towel on face to help with facial sinus pain/pressure  Headache/Pain Fever/Body Aches:  If you can take NSAIDs, take Ibuprofen 400-800mg every 8 hours as needed  If you cannot take NSAIDs, take Tylenol 325-500mg every 6 hours as needed  Miscellanous:  Zyrtec/Xyzal/Allegra/Claritin during the day or Benadryl at night may help with allergies.  You  may also use the decongestant version of these medications.   Simple foods like chicken noodle soup, smoothies, hot tea with lemon and honey may also help  Avoid smoking  Minimize exposure to irritants    Please follow up with your primary care provider within 2-5 days if

## 2025-01-04 NOTE — PROGRESS NOTES
(87107 UT) capsule TAKE ONE CAPSULE BY MOUTH EVERY WEEK      folic acid (FOLVITE) 1 MG tablet TAKE ONE TABLET BY MOUTH DAILY      lisinopril (PRINIVIL;ZESTRIL) 40 MG tablet Take by mouth daily      cephALEXin (KEFLEX) 500 MG capsule TAKE ONE CAPSULE BY MOUTH TWICE A DAY FOR 7 DAYS TO PREVENT INFECTION      albuterol sulfate HFA (PROVENTIL;VENTOLIN;PROAIR) 108 (90 Base) MCG/ACT inhaler Inhale into the lungs as needed       Current Facility-Administered Medications   Medication Dose Route Frequency Provider Last Rate Last Admin    ipratropium 0.5 mg-albuterol 2.5 mg (DUONEB) nebulizer solution 1 Dose  1 Dose Inhalation Once             Past Medical History:   Diagnosis Date    Allergic rhinitis     Arthritis     RA    Autoimmune disease (HCC)     Basal cell carcinoma (BCC) in situ of skin     shoudler and scalp    Cancer (HCC)     prostate    GERD (gastroesophageal reflux disease)     Gout     Hypercholesterolemia     Hypertension     Melanoma in situ of back (HCC)     Skin cancer (melanoma) (HCC)     scalp    Sleep apnea     Bipap at home    Squamous cell carcinoma of scalp     two squamous cell carcinoma on scalp        Past Surgical History:   Procedure Laterality Date    CARDIAC PROCEDURE N/A 12/29/2023    Left heart cath / coronary angiography performed by Navi Campbell DO at [facility] CARDIAC CATH LAB    CARDIAC PROCEDURE N/A 01/09/2024    Insert SPENSER - x2 RCA, overlapping performed by Navi Campbell DO at [facility] CARDIAC CATH LAB    CARDIAC PROCEDURE N/A 01/09/2024    Left heart cath / coronary angiography performed by Navi Campbell DO at [facility] CARDIAC CATH LAB    CARDIAC PROCEDURE N/A 01/09/2024    Intravascular lithotripsy coronary performed by Navi Campbell DO at [facility] CARDIAC CATH LAB    CARDIAC PROCEDURE N/A 01/09/2024    Atherectomy coronary performed by Navi Campbell DO at [facility] CARDIAC CATH LAB    CARDIAC PROCEDURE N/A 01/09/2024    Intravascular ultrasound performed by Navi Campbell DO at [facility] CARDIAC CATH LAB

## 2025-03-06 ENCOUNTER — OFFICE VISIT (OUTPATIENT)
Facility: CLINIC | Age: 80
End: 2025-03-06
Payer: MEDICARE

## 2025-03-06 VITALS
HEIGHT: 67 IN | TEMPERATURE: 98.3 F | WEIGHT: 241 LBS | SYSTOLIC BLOOD PRESSURE: 122 MMHG | BODY MASS INDEX: 37.83 KG/M2 | HEART RATE: 51 BPM | OXYGEN SATURATION: 90 % | DIASTOLIC BLOOD PRESSURE: 70 MMHG | RESPIRATION RATE: 16 BRPM

## 2025-03-06 DIAGNOSIS — I10 PRIMARY HYPERTENSION: ICD-10-CM

## 2025-03-06 DIAGNOSIS — R31.9 HEMATURIA, UNSPECIFIED TYPE: Primary | ICD-10-CM

## 2025-03-06 DIAGNOSIS — M05.79 RHEUMATOID ARTHRITIS WITH RHEUMATOID FACTOR OF MULTIPLE SITES WITHOUT ORGAN OR SYSTEMS INVOLVEMENT (HCC): ICD-10-CM

## 2025-03-06 DIAGNOSIS — E78.5 HYPERLIPIDEMIA, UNSPECIFIED HYPERLIPIDEMIA TYPE: ICD-10-CM

## 2025-03-06 DIAGNOSIS — R68.89 CONGESTION OF THROAT: ICD-10-CM

## 2025-03-06 DIAGNOSIS — I48.91 ATRIAL FIBRILLATION, UNSPECIFIED TYPE (HCC): ICD-10-CM

## 2025-03-06 DIAGNOSIS — I25.10 CORONARY ARTERY DISEASE INVOLVING NATIVE HEART WITHOUT ANGINA PECTORIS, UNSPECIFIED VESSEL OR LESION TYPE: ICD-10-CM

## 2025-03-06 DIAGNOSIS — R73.01 IFG (IMPAIRED FASTING GLUCOSE): ICD-10-CM

## 2025-03-06 DIAGNOSIS — R41.3 MEMORY CHANGES: ICD-10-CM

## 2025-03-06 DIAGNOSIS — C43.4 MALIGNANT MELANOMA OF SCALP (HCC): ICD-10-CM

## 2025-03-06 LAB
ANION GAP SERPL CALC-SCNC: 5 MMOL/L (ref 2–12)
BILIRUBIN, URINE, POC: NEGATIVE
BLOOD URINE, POC: NEGATIVE
BUN SERPL-MCNC: 19 MG/DL (ref 6–20)
BUN/CREAT SERPL: 20 (ref 12–20)
CALCIUM SERPL-MCNC: 9.2 MG/DL (ref 8.5–10.1)
CHLORIDE SERPL-SCNC: 108 MMOL/L (ref 97–108)
CHOLEST SERPL-MCNC: 143 MG/DL
CO2 SERPL-SCNC: 25 MMOL/L (ref 21–32)
CREAT SERPL-MCNC: 0.94 MG/DL (ref 0.7–1.3)
EST. AVERAGE GLUCOSE BLD GHB EST-MCNC: 114 MG/DL
GLUCOSE SERPL-MCNC: 127 MG/DL (ref 65–100)
GLUCOSE URINE, POC: NEGATIVE
HBA1C MFR BLD: 5.6 % (ref 4–5.6)
HDLC SERPL-MCNC: 41 MG/DL
HDLC SERPL: 3.5 (ref 0–5)
KETONES, URINE, POC: NEGATIVE
LDLC SERPL CALC-MCNC: 54.2 MG/DL (ref 0–100)
LEUKOCYTE ESTERASE, URINE, POC: NEGATIVE
NITRITE, URINE, POC: NEGATIVE
PH, URINE, POC: 6 (ref 4.6–8)
POTASSIUM SERPL-SCNC: 4.3 MMOL/L (ref 3.5–5.1)
PROTEIN,URINE, POC: NEGATIVE
SODIUM SERPL-SCNC: 138 MMOL/L (ref 136–145)
SPECIFIC GRAVITY, URINE, POC: 1.02 (ref 1–1.03)
TRIGL SERPL-MCNC: 239 MG/DL
URINALYSIS CLARITY, POC: CLEAR
URINALYSIS COLOR, POC: YELLOW
UROBILINOGEN, POC: NORMAL MG/DL
VLDLC SERPL CALC-MCNC: 47.8 MG/DL

## 2025-03-06 PROCEDURE — 1036F TOBACCO NON-USER: CPT | Performed by: INTERNAL MEDICINE

## 2025-03-06 PROCEDURE — 1123F ACP DISCUSS/DSCN MKR DOCD: CPT | Performed by: INTERNAL MEDICINE

## 2025-03-06 PROCEDURE — 99214 OFFICE O/P EST MOD 30 MIN: CPT | Performed by: INTERNAL MEDICINE

## 2025-03-06 PROCEDURE — G8417 CALC BMI ABV UP PARAM F/U: HCPCS | Performed by: INTERNAL MEDICINE

## 2025-03-06 PROCEDURE — 3078F DIAST BP <80 MM HG: CPT | Performed by: INTERNAL MEDICINE

## 2025-03-06 PROCEDURE — 81002 URINALYSIS NONAUTO W/O SCOPE: CPT | Performed by: INTERNAL MEDICINE

## 2025-03-06 PROCEDURE — PBSHW AMB POC URINALYSIS DIP STICK MANUAL W/O MICRO: Performed by: INTERNAL MEDICINE

## 2025-03-06 PROCEDURE — 3074F SYST BP LT 130 MM HG: CPT | Performed by: INTERNAL MEDICINE

## 2025-03-06 PROCEDURE — G8428 CUR MEDS NOT DOCUMENT: HCPCS | Performed by: INTERNAL MEDICINE

## 2025-03-06 PROCEDURE — 1126F AMNT PAIN NOTED NONE PRSNT: CPT | Performed by: INTERNAL MEDICINE

## 2025-03-06 RX ORDER — LEFLUNOMIDE 10 MG/1
10 TABLET ORAL DAILY
COMMUNITY
Start: 2025-03-04

## 2025-03-06 RX ORDER — PREDNISONE 5 MG/1
TABLET ORAL DAILY
COMMUNITY
Start: 2025-03-04

## 2025-03-06 SDOH — ECONOMIC STABILITY: FOOD INSECURITY: WITHIN THE PAST 12 MONTHS, YOU WORRIED THAT YOUR FOOD WOULD RUN OUT BEFORE YOU GOT MONEY TO BUY MORE.: NEVER TRUE

## 2025-03-06 SDOH — ECONOMIC STABILITY: FOOD INSECURITY: WITHIN THE PAST 12 MONTHS, THE FOOD YOU BOUGHT JUST DIDN'T LAST AND YOU DIDN'T HAVE MONEY TO GET MORE.: NEVER TRUE

## 2025-03-06 ASSESSMENT — PATIENT HEALTH QUESTIONNAIRE - PHQ9
9. THOUGHTS THAT YOU WOULD BE BETTER OFF DEAD, OR OF HURTING YOURSELF: NOT AT ALL
2. FEELING DOWN, DEPRESSED OR HOPELESS: NOT AT ALL
SUM OF ALL RESPONSES TO PHQ QUESTIONS 1-9: 0
8. MOVING OR SPEAKING SO SLOWLY THAT OTHER PEOPLE COULD HAVE NOTICED. OR THE OPPOSITE, BEING SO FIGETY OR RESTLESS THAT YOU HAVE BEEN MOVING AROUND A LOT MORE THAN USUAL: NOT AT ALL
SUM OF ALL RESPONSES TO PHQ QUESTIONS 1-9: 0
7. TROUBLE CONCENTRATING ON THINGS, SUCH AS READING THE NEWSPAPER OR WATCHING TELEVISION: NOT AT ALL
10. IF YOU CHECKED OFF ANY PROBLEMS, HOW DIFFICULT HAVE THESE PROBLEMS MADE IT FOR YOU TO DO YOUR WORK, TAKE CARE OF THINGS AT HOME, OR GET ALONG WITH OTHER PEOPLE: NOT DIFFICULT AT ALL
6. FEELING BAD ABOUT YOURSELF - OR THAT YOU ARE A FAILURE OR HAVE LET YOURSELF OR YOUR FAMILY DOWN: NOT AT ALL
SUM OF ALL RESPONSES TO PHQ QUESTIONS 1-9: 0
5. POOR APPETITE OR OVEREATING: NOT AT ALL
4. FEELING TIRED OR HAVING LITTLE ENERGY: NOT AT ALL
1. LITTLE INTEREST OR PLEASURE IN DOING THINGS: NOT AT ALL
SUM OF ALL RESPONSES TO PHQ QUESTIONS 1-9: 0
3. TROUBLE FALLING OR STAYING ASLEEP: NOT AT ALL

## 2025-03-06 ASSESSMENT — ENCOUNTER SYMPTOMS
EYE ITCHING: 0
EYE DISCHARGE: 0
SORE THROAT: 0
BACK PAIN: 1
WHEEZING: 0
COLOR CHANGE: 0
SHORTNESS OF BREATH: 0
ABDOMINAL PAIN: 0
DIARRHEA: 0
CONSTIPATION: 0
EYE PAIN: 0
FACIAL SWELLING: 0

## 2025-03-06 NOTE — PROGRESS NOTES
Navi Sidhu is a 79 y.o. male who presents today for Hematuria (Pt stated he had blood in urine about a month ago; pt also has lower back pain; ) and Memory issues  (Off and on for the last couple of years; worsening in the last couple months; pt states he has a hard time remembering things )  .      He has a history of   Patient Active Problem List   Diagnosis    Chronic idiopathic gout involving toe of left foot without tophus    Primary localized osteoarthrosis, lower leg    Plantar fasciitis of right foot    Dysthymic disorder    Benign neoplasm of colon    Gout    Age-related osteoporosis without fracture    Seropositive rheumatoid arthritis of multiple sites (HCC)    Long-term use of immunosuppressant medication    Advanced care planning/counseling discussion    Vitamin D deficiency    Low testosterone    Primary osteoarthritis of both knees    Hyperlipidemia    Carcinoma of prostate (HCC)    Primary hypertension    Swelling of first metatarsophalangeal (MTP) joint    Depression    Malignant melanoma (HCC)    Arthritis    Asthma    Gastro-esophageal reflux    Obesity    Environmental allergies    Abnormal stress test    PAF (paroxysmal atrial fibrillation) (HCC)   .    Today patient is here for an acute visit.     History of Present Illness  The patient is a 79-year-old male who presents for an acute visit.    He has had some episodes of hematuria over the last month. He experienced a single episode of hematuria approximately 3 weeks ago while in [place], characterized by bright red blood in his urine during a period of straining to urinate. This was an isolated incident, and he has not observed any further hematuria since then. He is on oral anticoagulation with Eliquis, which had been temporarily stopped after his stent placement due to the need for antiplatelet therapy; this was resumed in the fall or late summer. He is not on aspirin. He has not consulted a urologist for this issue. He underwent

## 2025-03-13 ENCOUNTER — TELEPHONE (OUTPATIENT)
Age: 80
End: 2025-03-13

## 2025-03-13 ENCOUNTER — ANCILLARY PROCEDURE (OUTPATIENT)
Age: 80
End: 2025-03-13
Payer: MEDICARE

## 2025-03-13 ENCOUNTER — OFFICE VISIT (OUTPATIENT)
Age: 80
End: 2025-03-13
Payer: MEDICARE

## 2025-03-13 VITALS
HEIGHT: 67 IN | BODY MASS INDEX: 36.88 KG/M2 | SYSTOLIC BLOOD PRESSURE: 100 MMHG | HEART RATE: 54 BPM | WEIGHT: 235 LBS | OXYGEN SATURATION: 93 % | DIASTOLIC BLOOD PRESSURE: 60 MMHG

## 2025-03-13 DIAGNOSIS — I10 PRIMARY HYPERTENSION: ICD-10-CM

## 2025-03-13 DIAGNOSIS — I48.0 PAF (PAROXYSMAL ATRIAL FIBRILLATION) (HCC): Primary | ICD-10-CM

## 2025-03-13 DIAGNOSIS — I48.91 ATRIAL FIBRILLATION (HCC): ICD-10-CM

## 2025-03-13 DIAGNOSIS — I25.83 CORONARY ARTERY DISEASE DUE TO LIPID RICH PLAQUE: ICD-10-CM

## 2025-03-13 DIAGNOSIS — I25.10 CORONARY ARTERY DISEASE DUE TO LIPID RICH PLAQUE: ICD-10-CM

## 2025-03-13 PROCEDURE — 99214 OFFICE O/P EST MOD 30 MIN: CPT | Performed by: INTERNAL MEDICINE

## 2025-03-13 PROCEDURE — G8427 DOCREV CUR MEDS BY ELIG CLIN: HCPCS | Performed by: INTERNAL MEDICINE

## 2025-03-13 PROCEDURE — 3078F DIAST BP <80 MM HG: CPT | Performed by: INTERNAL MEDICINE

## 2025-03-13 PROCEDURE — 93229 REMOTE 30 DAY ECG TECH SUPP: CPT | Performed by: INTERNAL MEDICINE

## 2025-03-13 PROCEDURE — G8417 CALC BMI ABV UP PARAM F/U: HCPCS | Performed by: INTERNAL MEDICINE

## 2025-03-13 PROCEDURE — 93005 ELECTROCARDIOGRAM TRACING: CPT | Performed by: INTERNAL MEDICINE

## 2025-03-13 PROCEDURE — 1159F MED LIST DOCD IN RCRD: CPT | Performed by: INTERNAL MEDICINE

## 2025-03-13 PROCEDURE — 1036F TOBACCO NON-USER: CPT | Performed by: INTERNAL MEDICINE

## 2025-03-13 PROCEDURE — 1126F AMNT PAIN NOTED NONE PRSNT: CPT | Performed by: INTERNAL MEDICINE

## 2025-03-13 PROCEDURE — 1123F ACP DISCUSS/DSCN MKR DOCD: CPT | Performed by: INTERNAL MEDICINE

## 2025-03-13 PROCEDURE — 3074F SYST BP LT 130 MM HG: CPT | Performed by: INTERNAL MEDICINE

## 2025-03-13 PROCEDURE — 93010 ELECTROCARDIOGRAM REPORT: CPT | Performed by: INTERNAL MEDICINE

## 2025-03-13 NOTE — PROGRESS NOTES
INO received from Dr. COLLIN Lizama MD:    1 month loop recorder- PAF     Refer to EP for PVI and Watchman.    Message sent to Gladys Morales  See Dr. Lizama in 6 months.

## 2025-03-13 NOTE — TELEPHONE ENCOUNTER
----- Message from ANDRE BURGOS RN sent at 3/13/2025 10:25 AM EDT -----  Regardin day loop  Per Rathi1 month loop recorder- PAF

## 2025-03-13 NOTE — PROGRESS NOTES
Office Follow-up    NAME: Navi Sidhu   :  1945  MRM:  [id number]    Date:  3/13/2025         Assessment and Plan:     1.  Paroxysmal atrial fibrillation (First diagnosed 2023): Triggers may be multifactorial.  Remain in SR but home monitoring with [employer]ia show occasional PAF with HR in 102 spontanously converting to SR. Personally reviewed the strips. Only one episode that may be PAF with HR of 102. Continue Toprol XL. HR in 50's. Echo normal LVEF. Stress Nuc show mild to moderate anteroapical ischemia due to  of the LAD which is not intervenable. Continue Eliquis. Will refer to EP for PVI ablation. Check 1 month loop recorder.  Maybe a candidate for WATCHMAN. Refer to EP.     2.  CAD with  of LM/LAD and Severe RCA PCI 23: LAD fibrocalcific dz was left alone for medical rx. Repeat stress Nuc in 2024 show mod ischemia of the anterior wall SDS 8. Symptomatically has significantly impoved after PCI to RCA. Continue Metoprolol, Norvasc, Liisinopril and Atorvastatin. Continue Eliquis. He cannot take ASA with Eliquis due to recurrent nose bleeds.     3.  Hypertension: Blood pressures controlled.  Continue amlodipine and lisinopril.    4.  Dyslipidemia: Continue statins.  Most recent LDL cholesterol from 2023 was 75.  Goal LDL is 70 or below.    5.  History of rheumatoid arthritis: He gets methotrexate treatment along with immune modulators.    6.  Sleep apnea: He is currently on BiPAP.    7. See Dr. Lizama in 6 months.      He lives on [address].   He loves to Golf. Has been to [place] many times.                        ATTENTION:   This medical record was transcribed using an electronic medical records/speech recognition system.  Although proofread, it may and can contain electronic, spelling and other errors.  Corrections may be executed at a later time.  Please feel free to contact us for any clarifications as needed.      Subjective:

## 2025-03-13 NOTE — PROGRESS NOTES
Chief Complaint   Patient presents with    Atrial Fibrillation     PAF    Hypertension    Coronary Artery Disease     Vitals:    03/13/25 0940   BP: 100/60   BP Site: Left Upper Arm   Patient Position: Sitting   Pulse: 54   SpO2: 93%   Weight: 106.6 kg (235 lb)   Height: 1.702 m (5' 7\")         Chest pain: DENIED     Recent hospital stays: DENIED     Refills: DENIED

## 2025-03-31 RX ORDER — APIXABAN 5 MG/1
5 TABLET, FILM COATED ORAL 2 TIMES DAILY
Qty: 180 TABLET | Refills: 3 | Status: SHIPPED | OUTPATIENT
Start: 2025-03-31 | End: 2025-04-03

## 2025-03-31 RX ORDER — PANTOPRAZOLE SODIUM 40 MG/1
40 TABLET, DELAYED RELEASE ORAL 2 TIMES DAILY
Qty: 180 TABLET | Refills: 1 | Status: SHIPPED | OUTPATIENT
Start: 2025-03-31

## 2025-03-31 NOTE — TELEPHONE ENCOUNTER
Refill per VO of Dr. Raza Lizama:    3/13/2025    Future Appointments   Date Time Provider Department Center   6/24/2025 10:00 AM Charles Diego MD [facility]   9/11/2025  8:30 AM Flaco Plascencia MD [facility]   9/22/2025  1:20 PM Raza Lizama MD [facility]       Requested Prescriptions     Pending Prescriptions Disp Refills    ELIQUIS 5 MG TABS tablet [Pharmacy Med Name: ELIQUIS 5 MG TAB[$]] 60 tablet 5     Sig: TAKE ONE TABLET BY MOUTH TWICE A DAY

## 2025-04-03 RX ORDER — APIXABAN 5 MG/1
5 TABLET, FILM COATED ORAL 2 TIMES DAILY
Qty: 180 TABLET | Refills: 3 | Status: SHIPPED | OUTPATIENT
Start: 2025-04-03

## 2025-04-03 NOTE — TELEPHONE ENCOUNTER
Refill per VO of Dr. Raza Lizama:    3/13/2025    Future Appointments   Date Time Provider Department Center   6/24/2025 10:00 AM Charles Diego MD [facility]   9/11/2025  8:30 AM Flaco Plascencia MD [facility]   9/22/2025  1:20 PM Raza Lizama MD [facility]       Requested Prescriptions     Pending Prescriptions Disp Refills    ELIQUIS 5 MG TABS tablet [Pharmacy Med Name: ELIQUIS 5 MG TAB[$]] 60 tablet 5     Sig: TAKE ONE TABLET BY MOUTH TWICE A DAY

## 2025-04-04 ENCOUNTER — TELEPHONE (OUTPATIENT)
Age: 80
End: 2025-04-04

## 2025-04-04 NOTE — TELEPHONE ENCOUNTER
I received a call from [facility] pharmacy, on behalf of the pt requesting a teir exception for Eliquis. I trid to explain to her there aren't any medications on a lower tier. She said that since the pt requested this, she is required to get more information.  I answered her questions assoicated with Eliquis, and the Dx/treatment. Ref # [id number]

## 2025-04-17 LAB
ALBUMIN: 4.2 G/DL
ALP BLD-CCNC: 102 U/L
ALT SERPL-CCNC: 35 U/L
ANION GAP SERPL CALCULATED.3IONS-SCNC: NORMAL MMOL/L
AST SERPL-CCNC: 15 U/L
BASOPHILS ABSOLUTE: NORMAL
BASOPHILS RELATIVE PERCENT: 0.4 %
BILIRUB SERPL-MCNC: 1 MG/DL (ref 0.1–1.4)
BUN BLDV-MCNC: 12 MG/DL
CALCIUM SERPL-MCNC: 9.1 MG/DL
CHLORIDE BLD-SCNC: 103 MMOL/L
CO2: 27 MMOL/L
CREAT SERPL-MCNC: 0.8 MG/DL
EOSINOPHILS ABSOLUTE: NORMAL
EOSINOPHILS RELATIVE PERCENT: 4.7 %
GFR, ESTIMATED: 90.3
GLUCOSE BLD-MCNC: 113 MG/DL
HCT VFR BLD CALC: 47.8 % (ref 41–53)
HEMOGLOBIN: 15.5 G/DL (ref 13.5–17.5)
LYMPHOCYTES ABSOLUTE: NORMAL
LYMPHOCYTES RELATIVE PERCENT: 19.9 %
Lab: 38
MCH RBC QN AUTO: 33.2 PG
MCHC RBC AUTO-ENTMCNC: 32.4 G/DL
MCV RBC AUTO: 102 FL
MONOCYTES ABSOLUTE: NORMAL
MONOCYTES RELATIVE PERCENT: 9.5 %
NEUTROPHILS ABSOLUTE: NORMAL
NEUTROPHILS RELATIVE PERCENT: 65.58 %
PLATELET # BLD: 190 K/ΜL
PMV BLD AUTO: 7.7 FL
POTASSIUM SERPL-SCNC: 4.2 MMOL/L
RBC # BLD: 4.67 10^6/ΜL
SODIUM BLD-SCNC: 138 MMOL/L
TOTAL PROTEIN: 6.8 G/DL (ref 6.4–8.2)
WBC # BLD: 9.5 10^3/ML

## 2025-04-18 LAB — C-REACTIVE PROTEIN: 6

## 2025-05-01 LAB — ECHO BSA: 2.24 M2

## 2025-05-02 ENCOUNTER — RESULTS FOLLOW-UP (OUTPATIENT)
Age: 80
End: 2025-05-02

## 2025-05-02 NOTE — RESULT ENCOUNTER NOTE
Pls notify>>> Holter results show- 14% PAF.   He has appt with Dr. Diego in June 2025. Recommend PVI ablation along with WATCHMAN.

## 2025-05-06 ENCOUNTER — OFFICE VISIT (OUTPATIENT)
Age: 80
End: 2025-05-06
Payer: MEDICARE

## 2025-05-06 VITALS
HEIGHT: 67 IN | BODY MASS INDEX: 36.57 KG/M2 | OXYGEN SATURATION: 92 % | WEIGHT: 233 LBS | HEART RATE: 50 BPM | DIASTOLIC BLOOD PRESSURE: 80 MMHG | SYSTOLIC BLOOD PRESSURE: 114 MMHG

## 2025-05-06 DIAGNOSIS — I25.118 CORONARY ARTERY DISEASE INVOLVING NATIVE CORONARY ARTERY OF NATIVE HEART WITH OTHER FORM OF ANGINA PECTORIS: ICD-10-CM

## 2025-05-06 DIAGNOSIS — G47.33 OSA (OBSTRUCTIVE SLEEP APNEA): ICD-10-CM

## 2025-05-06 DIAGNOSIS — I48.0 PAROXYSMAL ATRIAL FIBRILLATION (HCC): Primary | ICD-10-CM

## 2025-05-06 PROCEDURE — 1036F TOBACCO NON-USER: CPT | Performed by: HOSPITALIST

## 2025-05-06 PROCEDURE — 93010 ELECTROCARDIOGRAM REPORT: CPT | Performed by: HOSPITALIST

## 2025-05-06 PROCEDURE — 93005 ELECTROCARDIOGRAM TRACING: CPT | Performed by: HOSPITALIST

## 2025-05-06 PROCEDURE — G8417 CALC BMI ABV UP PARAM F/U: HCPCS | Performed by: HOSPITALIST

## 2025-05-06 PROCEDURE — 99215 OFFICE O/P EST HI 40 MIN: CPT | Performed by: HOSPITALIST

## 2025-05-06 PROCEDURE — 1123F ACP DISCUSS/DSCN MKR DOCD: CPT | Performed by: HOSPITALIST

## 2025-05-06 PROCEDURE — 99205 OFFICE O/P NEW HI 60 MIN: CPT | Performed by: HOSPITALIST

## 2025-05-06 PROCEDURE — G8428 CUR MEDS NOT DOCUMENT: HCPCS | Performed by: HOSPITALIST

## 2025-05-06 PROCEDURE — 3074F SYST BP LT 130 MM HG: CPT | Performed by: HOSPITALIST

## 2025-05-06 PROCEDURE — 3079F DIAST BP 80-89 MM HG: CPT | Performed by: HOSPITALIST

## 2025-05-06 RX ORDER — METOPROLOL TARTRATE 25 MG/1
25 TABLET, FILM COATED ORAL PRN
Qty: 180 TABLET | Refills: 1 | Status: SHIPPED | OUTPATIENT
Start: 2025-05-06

## 2025-05-06 NOTE — PROGRESS NOTES
Cardiac Electrophysiology OFFICE Consultation Note     Subjective:      Navi Sidhu is a pleasant 79 y.o. male.  He is a resident of [facility] [address].    Primary Cardiologist: Raza Lizama MD    He is retired. He loves to Golf. He lives on [address].    Navi Sidhu presents to electrophysiology clinic for management of Atrial Arrythmias.    His current medical conditions and PMH are detailed below.    Today's visit:  Date: 5/6/2025   He is here for initial EP consultation visit.  He has had atrial fibrillation diagnosed in 2023.  He has baseline bradycardia issues which has limited rate control medications.  He is on low-dose of metoprolol succinate 25 mg daily.  When he does have atrial fibrillation he has some chest discomfort symptoms.  Past Sunday, patient had chest pain. [employer] showed AF. HR stayed high around 128 bpm for around 12 hours.    Cardiac Rhythm Testing     All EKGs were personally interpreted by me as below    EKG from 11/2/2023 shows atrial fibrillation with  bpm  EKG from 5/6/2025 shows sinus bradycardia 48 bpm          Assessment:       ICD-10-CM    1. Paroxysmal atrial fibrillation (HCC)  I48.0 EKG 12 Lead     CTA CHEST W WO CONTRAST      2. Coronary artery disease involving native coronary artery of native heart with other form of angina pectoris  I25.118       3. JAMAL (obstructive sleep apnea)  G47.33           # Paroxysmal Atrial Fibrillation -progressing  Onset: 2023  Symptoms: chest tigthness  Alcohol: can assess  JAMAL: on cpap  Prior AAD: none  Current Meds/AAD: none  Prior Cardioversion: none  Ablation: pending  LA size: Severely dilated    --See impression/plan below    # Anticoagulation  # Recurrent nosebleeds  # Medication interactions  [id number] score of 4 [for HTN, Age-2, and Vascular(MI/CAD)]  --Anticoagulation: Eliquis 5 mg bid  He has had recurrent nosebleeds while he was on aspirin.  Of note, he is on primidone which decreases Eliquis

## 2025-05-06 NOTE — PATIENT INSTRUCTIONS
--Continue Metoprolol Succinate 25 mg once a day  --Also have PRN Metoprolol Tartrate 25 mg for heart rate greater than 100 bpm (can take upto 2 a day when Heart rate above 100 bpm)    --CT scan for Watchman planning and AF ablation  --Schedule for AF ablation  --Schedule for Watchman after 6 weeks of AF ablation    --Anticoagulation/blood thinner: It is very important for you to keep taking your blood thinner regularly.  Do not miss any doses prior to your procedure. Only hold Eliquis the morning of the procedure      Kerry will call you to get procedures scheduled.

## 2025-05-06 NOTE — PROGRESS NOTES
Chief Complaint   Patient presents with    Atrial Fibrillation     There were no vitals filed for this visit.   There were no vitals taken for this visit.

## 2025-05-12 ENCOUNTER — TELEPHONE (OUTPATIENT)
Age: 80
End: 2025-05-12

## 2025-05-12 DIAGNOSIS — I48.0 PAROXYSMAL ATRIAL FIBRILLATION (HCC): Primary | ICD-10-CM

## 2025-05-12 NOTE — TELEPHONE ENCOUNTER
Spoke to Pt of Afib Ablation Procedure with Dr. Diego on 6/9/25  at 8am arriving at 615am.  Please NPO from Midnight the night prior to the procedure. Please have lab work done Between 5/9/25-6/2/25. Check in at the 1st floor OutPt Registation desk at [facility].    Medications:  Hold eliquis the morning of the procedure     VO BY Dr. Diego/Nurse Dilcia HYDE

## 2025-05-12 NOTE — TELEPHONE ENCOUNTER
Spoke to Pt of Watchman/MATI Procedure with Dr. Diego on 8/15/25  at 11am arriving at 9am.  Please NPO from Midnight the night prior to the procedure. Please have lab work done Between 7/15/25-8/8/25. Check in at the 1st floor OutPt Registation desk at [facility].    Medications:  Hold eliquis the morning of the procedure     VO BY Dr. Diego/Nurse Dilcia HYDE

## 2025-05-13 ENCOUNTER — HOSPITAL ENCOUNTER (OUTPATIENT)
Facility: HOSPITAL | Age: 80
Discharge: HOME | End: 2025-05-16
Attending: HOSPITALIST
Payer: MEDICARE

## 2025-05-13 DIAGNOSIS — I48.0 PAROXYSMAL ATRIAL FIBRILLATION (HCC): ICD-10-CM

## 2025-05-13 PROCEDURE — 71275 CT ANGIOGRAPHY CHEST: CPT

## 2025-05-13 PROCEDURE — 6360000004 HC RX CONTRAST MEDICATION: Performed by: HOSPITALIST

## 2025-05-13 RX ORDER — IOPAMIDOL 755 MG/ML
100 INJECTION, SOLUTION INTRAVASCULAR
Status: COMPLETED | OUTPATIENT
Start: 2025-05-13 | End: 2025-05-13

## 2025-05-13 RX ADMIN — IOPAMIDOL 100 ML: 755 INJECTION, SOLUTION INTRAVENOUS at 12:44

## 2025-05-21 NOTE — PROGRESS NOTES
Cardiac Electrophysiology OFFICE Follow-up Note     Subjective:      Navi Sidhu is a pleasant 79 y.o. male.  He is a resident of [facility] [address].    Primary Cardiologist: Raza Lizama MD    He is retired. He loves to Golf. He lives on [address].    Navi Sidhu presents to electrophysiology clinic for management of Atrial Arrythmias.    His current medical conditions and PMH are detailed below.    Previous visit:  Date: 5/6/2025   He is here for initial EP consultation visit.  He has had atrial fibrillation diagnosed in 2023.  He has baseline bradycardia issues which has limited rate control medications.  He is on low-dose of metoprolol succinate 25 mg daily.  When he does have atrial fibrillation he has some chest discomfort symptoms.  Past Sunday, patient had chest pain. [employer] showed AF. HR stayed high around 128 bpm for around 12 hours.    Today's visit:  Date: 5/30/25- NP visit    He is doing well overall and denies chest pain, palpitations, dizziness or syncope. He had brief AF a week ago. He has no current bleeding issues on Eliquis.     He recommended the [employer]. He is going to [employer] [employer] tomorrow for a week.       Cardiac Rhythm Testing     All EKGs were personally interpreted by me as below    EKG from 11/2/2023 shows atrial fibrillation with  bpm  EKG from 5/6/2025 shows sinus bradycardia 48 bpm          Assessment:       ICD-10-CM    1. PAF (paroxysmal atrial fibrillation) ([facility])  I48.0       2. Anticoagulated  Z79.01       3. Coronary artery disease involving native coronary artery of native heart without angina pectoris  I25.10       4. Primary hypertension  I10             # Paroxysmal Atrial Fibrillation -progressing  Onset: 2023  Symptoms: chest tigthness  Alcohol: can assess  JAMAL: on cpap  Prior AAD: none  Current Meds/AAD: none  Prior Cardioversion: none  Ablation: pending  LA size: Severely dilated  --See impression/plan below    #  Just an FYI pt declining to scheduled procedure. no concerns

## 2025-05-22 DIAGNOSIS — I48.91 ATRIAL FIBRILLATION (HCC): ICD-10-CM

## 2025-05-22 DIAGNOSIS — I48.0 PAF (PAROXYSMAL ATRIAL FIBRILLATION) (HCC): ICD-10-CM

## 2025-05-22 DIAGNOSIS — I48.0 PAROXYSMAL ATRIAL FIBRILLATION (HCC): Primary | ICD-10-CM

## 2025-05-23 ENCOUNTER — PREP FOR PROCEDURE (OUTPATIENT)
Age: 80
End: 2025-05-23

## 2025-05-23 DIAGNOSIS — I48.91 ATRIAL FIBRILLATION (HCC): ICD-10-CM

## 2025-05-23 DIAGNOSIS — I48.0 PAF (PAROXYSMAL ATRIAL FIBRILLATION) (HCC): ICD-10-CM

## 2025-05-23 DIAGNOSIS — I48.0 PAROXYSMAL ATRIAL FIBRILLATION (HCC): ICD-10-CM

## 2025-05-24 LAB
ANION GAP SERPL CALC-SCNC: 6 MMOL/L (ref 2–12)
BASOPHILS # BLD: 0.05 K/UL (ref 0–0.1)
BASOPHILS NFR BLD: 0.6 % (ref 0–1)
BUN SERPL-MCNC: 15 MG/DL (ref 6–20)
BUN/CREAT SERPL: 14 (ref 12–20)
CALCIUM SERPL-MCNC: 9.3 MG/DL (ref 8.5–10.1)
CHLORIDE SERPL-SCNC: 107 MMOL/L (ref 97–108)
CO2 SERPL-SCNC: 25 MMOL/L (ref 21–32)
CREAT SERPL-MCNC: 1.04 MG/DL (ref 0.7–1.3)
DIFFERENTIAL METHOD BLD: NORMAL
EOSINOPHIL # BLD: 0.38 K/UL (ref 0–0.4)
EOSINOPHIL NFR BLD: 4.2 % (ref 0–7)
ERYTHROCYTE [DISTWIDTH] IN BLOOD BY AUTOMATED COUNT: 12.5 % (ref 11.5–14.5)
GLUCOSE SERPL-MCNC: 115 MG/DL (ref 65–100)
HCT VFR BLD AUTO: 43.2 % (ref 36.6–50.3)
HGB BLD-MCNC: 14.4 G/DL (ref 12.1–17)
IMM GRANULOCYTES # BLD AUTO: 0.04 K/UL (ref 0–0.04)
IMM GRANULOCYTES NFR BLD AUTO: 0.4 % (ref 0–0.5)
LYMPHOCYTES # BLD: 1.88 K/UL (ref 0.8–3.5)
LYMPHOCYTES NFR BLD: 20.9 % (ref 12–49)
MCH RBC QN AUTO: 32.7 PG (ref 26–34)
MCHC RBC AUTO-ENTMCNC: 33.3 G/DL (ref 30–36.5)
MCV RBC AUTO: 98 FL (ref 80–99)
MONOCYTES # BLD: 0.84 K/UL (ref 0–1)
MONOCYTES NFR BLD: 9.3 % (ref 5–13)
NEUTS SEG # BLD: 5.8 K/UL (ref 1.8–8)
NEUTS SEG NFR BLD: 64.6 % (ref 32–75)
NRBC # BLD: 0 K/UL (ref 0–0.01)
NRBC BLD-RTO: 0 PER 100 WBC
PLATELET # BLD AUTO: 187 K/UL (ref 150–400)
PMV BLD AUTO: 12.2 FL (ref 8.9–12.9)
POTASSIUM SERPL-SCNC: 4 MMOL/L (ref 3.5–5.1)
RBC # BLD AUTO: 4.41 M/UL (ref 4.1–5.7)
SODIUM SERPL-SCNC: 138 MMOL/L (ref 136–145)
WBC # BLD AUTO: 9 K/UL (ref 4.1–11.1)

## 2025-05-26 RX ORDER — SODIUM CHLORIDE 9 MG/ML
INJECTION, SOLUTION INTRAVENOUS PRN
Status: CANCELLED | OUTPATIENT
Start: 2025-05-26

## 2025-05-26 RX ORDER — SODIUM CHLORIDE 0.9 % (FLUSH) 0.9 %
5-40 SYRINGE (ML) INJECTION PRN
Status: CANCELLED | OUTPATIENT
Start: 2025-05-26

## 2025-05-26 RX ORDER — SODIUM CHLORIDE 0.9 % (FLUSH) 0.9 %
5-40 SYRINGE (ML) INJECTION EVERY 12 HOURS SCHEDULED
Status: CANCELLED | OUTPATIENT
Start: 2025-05-26

## 2025-05-30 ENCOUNTER — OFFICE VISIT (OUTPATIENT)
Age: 80
End: 2025-05-30
Payer: MEDICARE

## 2025-05-30 VITALS
DIASTOLIC BLOOD PRESSURE: 62 MMHG | OXYGEN SATURATION: 95 % | RESPIRATION RATE: 16 BRPM | SYSTOLIC BLOOD PRESSURE: 128 MMHG | HEIGHT: 67 IN | BODY MASS INDEX: 36.41 KG/M2 | HEART RATE: 56 BPM | WEIGHT: 232 LBS

## 2025-05-30 DIAGNOSIS — Z79.01 ANTICOAGULATED: ICD-10-CM

## 2025-05-30 DIAGNOSIS — I10 PRIMARY HYPERTENSION: ICD-10-CM

## 2025-05-30 DIAGNOSIS — I48.0 PAF (PAROXYSMAL ATRIAL FIBRILLATION) (HCC): Primary | ICD-10-CM

## 2025-05-30 DIAGNOSIS — I25.10 CORONARY ARTERY DISEASE INVOLVING NATIVE CORONARY ARTERY OF NATIVE HEART WITHOUT ANGINA PECTORIS: ICD-10-CM

## 2025-05-30 PROCEDURE — 3074F SYST BP LT 130 MM HG: CPT | Performed by: NURSE PRACTITIONER

## 2025-05-30 PROCEDURE — G8427 DOCREV CUR MEDS BY ELIG CLIN: HCPCS | Performed by: NURSE PRACTITIONER

## 2025-05-30 PROCEDURE — 3078F DIAST BP <80 MM HG: CPT | Performed by: NURSE PRACTITIONER

## 2025-05-30 PROCEDURE — 99214 OFFICE O/P EST MOD 30 MIN: CPT | Performed by: NURSE PRACTITIONER

## 2025-05-30 PROCEDURE — 1126F AMNT PAIN NOTED NONE PRSNT: CPT | Performed by: NURSE PRACTITIONER

## 2025-05-30 PROCEDURE — 1123F ACP DISCUSS/DSCN MKR DOCD: CPT | Performed by: NURSE PRACTITIONER

## 2025-05-30 PROCEDURE — 1160F RVW MEDS BY RX/DR IN RCRD: CPT | Performed by: NURSE PRACTITIONER

## 2025-05-30 PROCEDURE — G8417 CALC BMI ABV UP PARAM F/U: HCPCS | Performed by: NURSE PRACTITIONER

## 2025-05-30 PROCEDURE — 1036F TOBACCO NON-USER: CPT | Performed by: NURSE PRACTITIONER

## 2025-05-30 PROCEDURE — 1159F MED LIST DOCD IN RCRD: CPT | Performed by: NURSE PRACTITIONER

## 2025-05-30 NOTE — PATIENT INSTRUCTIONS
You are scheduled for the following procedure with Dr. Diego:  Afib Ablation at [facility], [address] [place] [id number]           PLEASE be aware that your procedure date/time is tentative and subject to change due to emergency cases.    Any changes to your procedure date/time will be communicated by the hospital staff.        Procedure date/time:    Monday, June 9, 2025 at 8:00 am - please arrive by 6:15 am     ARRIVAL time:  (You will need  to be discharged home with.)      [X]  [place] procedures: arrive to check in on 1st floor near [facility] two hours prior to your procedure.                 Medication Instructions:     Please HOLD eliquis the morning of the procedure                  Nothing to eat or drink after midnight before your procedure.      You may take all other medications as directed the morning of your procedure with a sip of water unless otherwise indicated.

## 2025-05-30 NOTE — PROGRESS NOTES
Chief Complaint   Patient presents with    Atrial Fibrillation       No recent hospital visits     Denies Chest Pain, Dizziness,Shortness of Breath.

## 2025-06-07 NOTE — DISCHARGE INSTRUCTIONS
days.        SYMPTOMS THAT NEED TO BE REPORTED IMMEDIATELY     Bleeding at the puncture site.  If there is bleeding, lie down and hold firm direct pressure for at least 5 minutes.  If the bleeding does not stop, go to the closest emergency room, or call 911.    Temperature more than 100.5 F.  Redness or warmth at the puncture site.  Increasing pain, numbness, coolness or blue discoloration of the extremity where the puncture is located.  Pulsating mass at the puncture site.  A new “lump” at the puncture site, or increasing swelling at the site. Please be aware that a “pea” or “marble” sized lump is normal, anything larger or increasing in size should be reported.  Bruising at the puncture site that enlarges or becomes painful (some bruising at the site is common and will go away in 7-14 days).  Dizziness, lightheadedness, fainting.    REMEMBER: If you feel something is an emergency or cannot be handled over the phone, call 911 or go to the closest emergency room.      FOLLOW UP CARE     Follow up with PORTIA Murdock on 8/1/2025 at 3:00 pm.      Future Appointments   Date Time Provider Department Center   6/9/2025  8:05 AM [facility] CATH LAB 2 [facility]   8/1/2025  3:00 PM Collette Lopez APRN - NP [facility]   8/15/2025 11:05 AM [facility] CATH LAB 2 [facility]   9/2/2025  8:40 AM Charles Diego MD [facility]   9/11/2025  8:30 AM Flaco Plascencia MD [facility]   9/22/2025  1:20 PM Raza Lizama MD [facility]         Charles Diego MD  Cardiac Electrophysiology / Cardiology    [facility] Cardiology  [facility]  [address] [address] [facility], [address]   [address] [place] [address], [facility] [address]  [place], [place] [address]    [place], [place] [address] [phone] / [phone] Fax  [phone] / [phone] Fax

## 2025-06-09 ENCOUNTER — ANESTHESIA EVENT (OUTPATIENT)
Facility: HOSPITAL | Age: 80
End: 2025-06-09
Payer: MEDICARE

## 2025-06-09 ENCOUNTER — ANESTHESIA (OUTPATIENT)
Facility: HOSPITAL | Age: 80
End: 2025-06-09
Payer: MEDICARE

## 2025-06-09 ENCOUNTER — HOSPITAL ENCOUNTER (OUTPATIENT)
Facility: HOSPITAL | Age: 80
Discharge: HOME | End: 2025-06-09
Attending: HOSPITALIST | Admitting: HOSPITALIST
Payer: MEDICARE

## 2025-06-09 VITALS
OXYGEN SATURATION: 91 % | RESPIRATION RATE: 20 BRPM | BODY MASS INDEX: 36.1 KG/M2 | HEIGHT: 67 IN | HEART RATE: 65 BPM | TEMPERATURE: 98.9 F | DIASTOLIC BLOOD PRESSURE: 63 MMHG | WEIGHT: 230 LBS | SYSTOLIC BLOOD PRESSURE: 155 MMHG

## 2025-06-09 DIAGNOSIS — I48.0 PAROXYSMAL ATRIAL FIBRILLATION (HCC): ICD-10-CM

## 2025-06-09 LAB
ABO + RH BLD: NORMAL
ACT BLD: 343 SECS (ref 79–138)
ACT BLD: 372 SECS (ref 79–138)
ACT BLD: 389 SECS (ref 79–138)
BLOOD GROUP ANTIBODIES SERPL: NORMAL
SPECIMEN EXP DATE BLD: NORMAL

## 2025-06-09 PROCEDURE — 86850 RBC ANTIBODY SCREEN: CPT

## 2025-06-09 PROCEDURE — 6360000002 HC RX W HCPCS

## 2025-06-09 PROCEDURE — 86901 BLOOD TYPING SEROLOGIC RH(D): CPT

## 2025-06-09 PROCEDURE — 93623 PRGRMD STIMJ&PACG IV RX NFS: CPT | Performed by: HOSPITALIST

## 2025-06-09 PROCEDURE — 85347 COAGULATION TIME ACTIVATED: CPT

## 2025-06-09 PROCEDURE — 3700000000 HC ANESTHESIA ATTENDED CARE: Performed by: HOSPITALIST

## 2025-06-09 PROCEDURE — C1766 INTRO/SHEATH,STRBLE,NON-PEEL: HCPCS | Performed by: HOSPITALIST

## 2025-06-09 PROCEDURE — 93657 TX L/R ATRIAL FIB ADDL: CPT | Performed by: HOSPITALIST

## 2025-06-09 PROCEDURE — 76937 US GUIDE VASCULAR ACCESS: CPT | Performed by: HOSPITALIST

## 2025-06-09 PROCEDURE — C1733 CATH, EP, OTHR THAN COOL-TIP: HCPCS | Performed by: HOSPITALIST

## 2025-06-09 PROCEDURE — 2500000003 HC RX 250 WO HCPCS

## 2025-06-09 PROCEDURE — 93656 COMPRE EP EVAL ABLTJ ATR FIB: CPT | Performed by: HOSPITALIST

## 2025-06-09 PROCEDURE — C1894 INTRO/SHEATH, NON-LASER: HCPCS | Performed by: HOSPITALIST

## 2025-06-09 PROCEDURE — 93622 COMP EP EVAL L VENTR PAC&REC: CPT | Performed by: HOSPITALIST

## 2025-06-09 PROCEDURE — C1769 GUIDE WIRE: HCPCS | Performed by: HOSPITALIST

## 2025-06-09 PROCEDURE — 2580000003 HC RX 258: Performed by: HOSPITALIST

## 2025-06-09 PROCEDURE — 2580000003 HC RX 258

## 2025-06-09 PROCEDURE — C1893 INTRO/SHEATH, FIXED,NON-PEEL: HCPCS | Performed by: HOSPITALIST

## 2025-06-09 PROCEDURE — 2709999900 HC NON-CHARGEABLE SUPPLY: Performed by: HOSPITALIST

## 2025-06-09 PROCEDURE — 3700000001 HC ADD 15 MINUTES (ANESTHESIA): Performed by: HOSPITALIST

## 2025-06-09 PROCEDURE — C1732 CATH, EP, DIAG/ABL, 3D/VECT: HCPCS | Performed by: HOSPITALIST

## 2025-06-09 PROCEDURE — C1759 CATH, INTRA ECHOCARDIOGRAPHY: HCPCS | Performed by: HOSPITALIST

## 2025-06-09 PROCEDURE — 86900 BLOOD TYPING SEROLOGIC ABO: CPT

## 2025-06-09 PROCEDURE — C1760 CLOSURE DEV, VASC: HCPCS | Performed by: HOSPITALIST

## 2025-06-09 RX ORDER — ACETAMINOPHEN 325 MG/1
650 TABLET ORAL EVERY 4 HOURS PRN
Status: DISCONTINUED | OUTPATIENT
Start: 2025-06-09 | End: 2025-06-09 | Stop reason: HOSPADM

## 2025-06-09 RX ORDER — PANTOPRAZOLE SODIUM 40 MG/1
40 TABLET, DELAYED RELEASE ORAL
Qty: 60 TABLET | Refills: 0 | Status: SHIPPED | OUTPATIENT
Start: 2025-06-09

## 2025-06-09 RX ORDER — SODIUM CHLORIDE 9 MG/ML
INJECTION, SOLUTION INTRAVENOUS PRN
Status: DISCONTINUED | OUTPATIENT
Start: 2025-06-09 | End: 2025-06-09 | Stop reason: HOSPADM

## 2025-06-09 RX ORDER — ROCURONIUM BROMIDE 10 MG/ML
INJECTION, SOLUTION INTRAVENOUS
Status: DISCONTINUED | OUTPATIENT
Start: 2025-06-09 | End: 2025-06-09 | Stop reason: SDUPTHER

## 2025-06-09 RX ORDER — DEXAMETHASONE SODIUM PHOSPHATE 4 MG/ML
INJECTION, SOLUTION INTRA-ARTICULAR; INTRALESIONAL; INTRAMUSCULAR; INTRAVENOUS; SOFT TISSUE
Status: DISCONTINUED | OUTPATIENT
Start: 2025-06-09 | End: 2025-06-09 | Stop reason: SDUPTHER

## 2025-06-09 RX ORDER — PROPOFOL 10 MG/ML
INJECTION, EMULSION INTRAVENOUS
Status: DISCONTINUED | OUTPATIENT
Start: 2025-06-09 | End: 2025-06-09 | Stop reason: SDUPTHER

## 2025-06-09 RX ORDER — ONDANSETRON 2 MG/ML
INJECTION INTRAMUSCULAR; INTRAVENOUS
Status: DISCONTINUED | OUTPATIENT
Start: 2025-06-09 | End: 2025-06-09 | Stop reason: SDUPTHER

## 2025-06-09 RX ORDER — PROTAMINE SULFATE 10 MG/ML
INJECTION, SOLUTION INTRAVENOUS
Status: DISCONTINUED | OUTPATIENT
Start: 2025-06-09 | End: 2025-06-09 | Stop reason: SDUPTHER

## 2025-06-09 RX ORDER — LIDOCAINE HYDROCHLORIDE 20 MG/ML
INJECTION, SOLUTION EPIDURAL; INFILTRATION; INTRACAUDAL; PERINEURAL
Status: DISCONTINUED | OUTPATIENT
Start: 2025-06-09 | End: 2025-06-09 | Stop reason: SDUPTHER

## 2025-06-09 RX ORDER — FENTANYL CITRATE 50 UG/ML
INJECTION, SOLUTION INTRAMUSCULAR; INTRAVENOUS
Status: DISCONTINUED | OUTPATIENT
Start: 2025-06-09 | End: 2025-06-09 | Stop reason: SDUPTHER

## 2025-06-09 RX ORDER — SODIUM CHLORIDE 0.9 % (FLUSH) 0.9 %
5-40 SYRINGE (ML) INJECTION EVERY 12 HOURS SCHEDULED
Status: DISCONTINUED | OUTPATIENT
Start: 2025-06-09 | End: 2025-06-09 | Stop reason: HOSPADM

## 2025-06-09 RX ORDER — SUCCINYLCHOLINE/SOD CL,ISO/PF 200MG/10ML
SYRINGE (ML) INTRAVENOUS
Status: DISCONTINUED | OUTPATIENT
Start: 2025-06-09 | End: 2025-06-09 | Stop reason: SDUPTHER

## 2025-06-09 RX ORDER — HEPARIN SODIUM 1000 [USP'U]/ML
INJECTION, SOLUTION INTRAVENOUS; SUBCUTANEOUS
Status: DISCONTINUED | OUTPATIENT
Start: 2025-06-09 | End: 2025-06-09 | Stop reason: SDUPTHER

## 2025-06-09 RX ORDER — SODIUM CHLORIDE 0.9 % (FLUSH) 0.9 %
5-40 SYRINGE (ML) INJECTION PRN
Status: DISCONTINUED | OUTPATIENT
Start: 2025-06-09 | End: 2025-06-09 | Stop reason: HOSPADM

## 2025-06-09 RX ORDER — SUCRALFATE 1 G/1
1 TABLET ORAL 2 TIMES DAILY
Qty: 60 TABLET | Refills: 0 | Status: SHIPPED | OUTPATIENT
Start: 2025-06-09

## 2025-06-09 RX ADMIN — ONDANSETRON 4 MG: 2 INJECTION, SOLUTION INTRAMUSCULAR; INTRAVENOUS at 10:40

## 2025-06-09 RX ADMIN — PROPOFOL 200 MG: 10 INJECTION, EMULSION INTRAVENOUS at 07:53

## 2025-06-09 RX ADMIN — ROCURONIUM BROMIDE 10 MG: 10 INJECTION, SOLUTION INTRAVENOUS at 07:53

## 2025-06-09 RX ADMIN — PHENYLEPHRINE HYDROCHLORIDE 50 MCG/MIN: 10 INJECTION INTRAVENOUS at 08:10

## 2025-06-09 RX ADMIN — LIDOCAINE HYDROCHLORIDE 100 MG: 20 INJECTION, SOLUTION EPIDURAL; INFILTRATION; INTRACAUDAL; PERINEURAL at 07:53

## 2025-06-09 RX ADMIN — PHENYLEPHRINE HYDROCHLORIDE 40 MCG: 10 INJECTION INTRAVENOUS at 10:16

## 2025-06-09 RX ADMIN — Medication 180 MG: at 07:53

## 2025-06-09 RX ADMIN — PHENYLEPHRINE HYDROCHLORIDE 40 MCG: 10 INJECTION INTRAVENOUS at 09:53

## 2025-06-09 RX ADMIN — DEXAMETHASONE SODIUM PHOSPHATE 4 MG: 4 INJECTION, SOLUTION INTRAMUSCULAR; INTRAVENOUS at 07:53

## 2025-06-09 RX ADMIN — ROCURONIUM BROMIDE 20 MG: 10 INJECTION, SOLUTION INTRAVENOUS at 08:16

## 2025-06-09 RX ADMIN — SODIUM CHLORIDE: 9 INJECTION, SOLUTION INTRAVENOUS at 07:53

## 2025-06-09 RX ADMIN — ROCURONIUM BROMIDE 20 MG: 10 INJECTION, SOLUTION INTRAVENOUS at 09:31

## 2025-06-09 RX ADMIN — FENTANYL CITRATE 25 MCG: 50 INJECTION INTRAMUSCULAR; INTRAVENOUS at 08:22

## 2025-06-09 RX ADMIN — ROCURONIUM BROMIDE 10 MG: 10 INJECTION, SOLUTION INTRAVENOUS at 10:24

## 2025-06-09 RX ADMIN — FENTANYL CITRATE 25 MCG: 50 INJECTION INTRAMUSCULAR; INTRAVENOUS at 07:53

## 2025-06-09 RX ADMIN — PHENYLEPHRINE HYDROCHLORIDE 80 MCG: 10 INJECTION INTRAVENOUS at 08:32

## 2025-06-09 RX ADMIN — SUGAMMADEX 200 MG: 100 INJECTION, SOLUTION INTRAVENOUS at 10:49

## 2025-06-09 RX ADMIN — ROCURONIUM BROMIDE 20 MG: 10 INJECTION, SOLUTION INTRAVENOUS at 08:04

## 2025-06-09 RX ADMIN — PROTAMINE SULFATE 20 MG: 10 INJECTION, SOLUTION INTRAVENOUS at 10:33

## 2025-06-09 RX ADMIN — FENTANYL CITRATE 50 MCG: 50 INJECTION INTRAMUSCULAR; INTRAVENOUS at 09:00

## 2025-06-09 RX ADMIN — PHENYLEPHRINE HYDROCHLORIDE 40 MCG: 10 INJECTION INTRAVENOUS at 10:32

## 2025-06-09 RX ADMIN — PHENYLEPHRINE HYDROCHLORIDE 80 MCG: 10 INJECTION INTRAVENOUS at 08:11

## 2025-06-09 RX ADMIN — ROCURONIUM BROMIDE 20 MG: 10 INJECTION, SOLUTION INTRAVENOUS at 08:48

## 2025-06-09 RX ADMIN — PROTAMINE SULFATE 10 MG: 10 INJECTION, SOLUTION INTRAVENOUS at 10:32

## 2025-06-09 RX ADMIN — HEPARIN SODIUM 16000 UNITS: 1000 INJECTION, SOLUTION INTRAVENOUS; SUBCUTANEOUS at 08:27

## 2025-06-09 RX ADMIN — PHENYLEPHRINE HYDROCHLORIDE 80 MCG: 10 INJECTION INTRAVENOUS at 08:30

## 2025-06-09 RX ADMIN — PHENYLEPHRINE HYDROCHLORIDE 40 MCG: 10 INJECTION INTRAVENOUS at 09:21

## 2025-06-09 RX ADMIN — PROTAMINE SULFATE 20 MG: 10 INJECTION, SOLUTION INTRAVENOUS at 10:37

## 2025-06-09 NOTE — PROGRESS NOTES
Cardiac Cath Lab Procedure Area Arrival Note:    Navi Sidhu arrived to Cardiac Cath Lab, Procedure Area. Patient identifiers verified with NAME and DATE OF BIRTH. Procedure verified with patient. Consent forms verified. Allergies verified. Patient informed of procedure and plan of care. Questions answered with review. Patient voiced understanding of procedure and plan of care.    Patient on cardiac monitor, non-invasive blood pressure, SPO2 monitor. On RA.  Patient status doing well without problems. Patient is A&Ox 4. Patient reports no pain or SOB.     Patient medicated during procedure with orders obtained and verified by Dr. Diego.    Refer to patients Cardiac Cath Lab PROCEDURE REPORT for vital signs, assessment, status, and response during procedure, printed at end of case. Printed report on chart or scanned into chart.

## 2025-06-09 NOTE — ANESTHESIA PRE PROCEDURE
blood products.    Plan discussed with CRNA and surgical team.                Lino Ku MD   6/9/2025

## 2025-06-09 NOTE — ANESTHESIA POSTPROCEDURE EVALUATION
Post-Anesthesia Evaluation and Assessment    Patient: Navi Sidhu MRN: [id number]  SSN: [id number]    YOB: 1945  Age: 79 y.o.  Sex: male      I have evaluated the patient and they are stable and ready for discharge from the PACU.     Cardiovascular Function/Vital Signs  Visit Vitals  /69   Pulse 65   Temp 98.4 °F (36.9 °C) (Oral)   Resp 18   Ht 1.702 m (5' 7\")   Wt 104.3 kg (230 lb)   SpO2 90%   BMI 36.02 kg/m²       Patient is status post General anesthesia for Procedure(s):  Ablation A-fib w complete ep study  Ep 3d mapping  Intracardiac echocardiogram  Ultrasound guided vascular access  Ablation following A-fib addl  Drug stimulation.    Nausea/Vomiting: None    Postoperative hydration reviewed and adequate.    Pain:      Managed    Neurological Status:       At baseline    Mental Status, Level of Consciousness: Alert and  oriented to person, place, and time    Pulmonary Status:       Adequate oxygenation and airway patent    Complications related to anesthesia: None    Post-anesthesia assessment completed. No concerns    Signed By: Cristopher David MD     June 9, 2025

## 2025-06-09 NOTE — PROGRESS NOTES
Ambulated patient to the bathroom with a steady gait with standby assist, voided without difficulty. Returned to stretcher. Vital signs stable.     Site is clean, dry, and intact. No bleeding, no hematoma.     Assisted patient in dressing/Patient dressed self.   Educated patient about their sedation precautions such as not driving, operating any machinery, or signing legal documents 24 hours post procedure.     Reviewed discharge instructions, including medications, and site care using the teach back method. Answered all questions. Verbalized understanding.     Removed peripheral IV    Escorted to discharge area in a wheelchair with all of their belongings including shirt, pants, underwear, shoes, cell phone, and glasses    spouse present to take patient home. Reviewed discharge instructions with spouse. Verbalized understanding.

## 2025-06-09 NOTE — PROCEDURES
ELECTROPHYSIOLOGY PROCEDURE      PROCEDURE DATE: 6/9/2025    PROCEDURES:    Paroxysmal  Atrial fibrillation Pulse Field Ablation ([employer] PulseSelect PFA)  Bilateral Pulmonary Vein Isolation - PFA ablation in Wide Area Circumferential pattern  Ablation for an Additional Discrete Arrhythmia Mechanism/Trigger    Typical Atrial flutter (CTI-dependent; Cavo-tricuspid isthmus)   EP study with CS catheter placement and pacing   Single transseptal puncture   Right and left heart catheterization   Intracardiac echocardiography   Ultrasound for venous access  Three-dimensional intracardiac electroanatomic mapping CARTO   Left atrial pacing/recording  General endotracheal anesthesia         BRIEF SUMMARY:   79 year old male with symptomatic paroxysmal atrial fibrillation was brought to EP lab for schedule AF ablation procedure.  He was placed under general endotracheal esthesia.  Bilateral femoral venous access was obtained under ultrasound guidance.  Baseline ICE images showed no pericardial effusion.    Single transseptal puncture for LA entry.  He was in sinus rhythm.  Initial substrate/voltage map was performed in atrial pacing which showed no significant scarring  PFA ablation performed in all 4 pulmonary veins.  Ablation lesions given antral as well as posterior.  Was difficult to get good sleep catheter positioning in the RSPV roof and anterior wall but eventually got good ablation lesion there.  Entrance and exit block was confirmed in all 4 pulmonary veins.  Burst atrial pacing did not induce any sustained atrial arrhythmia.  Brief atrial tachycardia induced.  History of possible atrial flutter, proceeded with CTI ablation with bidirectional block achieved.  Postablation ICE images show no pericardial fusion.    LAB PHYSICIAN: Charles Diego MD     NDICATION FOR PROCEDURE: Symptomatic paroxysmal atrial fibrillation intolerant or refractory to anti-arrhythmic medication   ?  CONSENT:  The purpose and nature of

## 2025-06-09 NOTE — PROGRESS NOTES
0650  Cardiac Cath Lab Recovery Arrival Note:      Navi Sidhu arrived to Cardiac Cath Lab, Recovery Area. Staff introduced to patient. Patient identifiers verified with NAME and DATE OF BIRTH. Procedure verified with patient. Consent forms reviewed and signed by patient or authorized representative and verified. Allergies verified.     Patient and family oriented to department. Patient and family informed of procedure and plan of care.     Questions answered with review. Patient prepped for procedure, per orders from physician, prior to arrival.    Patient on cardiac monitor, non-invasive blood pressure, SPO2 monitor. On room air. Patient is A&Ox 4. Patient reports no complaints.     Patient in stretcher, in low position, with side rails up, call bell within reach, patient instructed to call if assistance as needed.    Patient prep in: [facility] Recovery Area, [facility].   Patient family has pager # Magda (wife) [phone]   Family in: hospital waiting.   Prep by: ANDRE Liang  Assisted by: ANDRE Benavides

## 2025-06-09 NOTE — PROGRESS NOTES
EP LAB to Recovery Room Report    Procedure: A-Fib Ablation    MD: MD Catarina  Pre-procedure heart rhythm: Normal Sinus  Rhythm   Verbal Report given to Recovery Nurse on patient being transferred to Recovery Room for routine post-op. Patient stable upon transfer to .  Pt had general sedation with Anesthesia team, who managed MAR, vitals, and airway. Vitals, mental status, MAR, procedural summary discussed with recovery RN.           Post-procedure heart rhythm: Normal Sinus  Rhythm      Sheaths:    Right femoral vein 14fr sheath removed at 1033 , closed with mynx.    Left femoral vein 10fr sheath removed at 1037 , closed with mynx.  Left femoral vein 8fr sheath removed at 1040 , closed with mynx.

## 2025-06-09 NOTE — PROGRESS NOTES
TRANSFER - IN Cath Lab RR REPORT:    Verbal report received from ANDRE Casas & LINDSAY Mcintosh on Navi Sidhu  being received from the EP Lab for routine progression of care. Report consisted of patient’s Situation, Background, Assessment and Recommendations(SBAR). Procedural summary and MAR reviewed with receiving nurse. Opportunity for questions and clarification was provided. Assessment completed upon patient’s arrival to Cardiac Cath Lab RECOVERY AREA and care assumed.       Cardiac Cath Lab Recovery Arrival Note:    Navi Sidhu arrived to [facility] recovery area.  Patient procedure= a.fib ablation. Patient on cardiac monitor, non-invasive blood pressure, SPO2 monitor. On O2 @ 6lpm via nasal cannula.    Patient is A&Ox 4 Patient reports no complaints of pain.    PROCEDURE SITE CHECK:    Procedure site: No bleeding and no hematoma, no pain/discomfort reported at procedure site.     No change in patient status. Continue to monitor patient and status.

## 2025-06-09 NOTE — PROGRESS NOTES
Cardiac Cath Lab Procedure Area Arrival Note:    Navi Sidhu arrived to Cardiac Cath Lab, Procedure Area. Patient identifiers verified with NAME and DATE OF BIRTH. Procedure verified with patient. Consent forms verified. Allergies verified. Patient informed of procedure and plan of care. Questions answered with review. Patient voiced understanding of procedure and plan of care.    Patient on cardiac monitor, non-invasive blood pressure, SPO2 monitor. On room air.  Anesthesia team present during procedure to manage medications, fluids and airway. Patient status doing well without problems. Patient is A&Ox 4. Patient reports no problems.     Patient medicated during procedure with orders obtained and verified by Dr. Engel.    Refer to patients Cardiac Cath Lab PROCEDURE REPORT for vital signs, assessment, status, and response during procedure, printed at end of case. Printed report on chart or scanned into chart.

## 2025-06-11 LAB
ECHO BSA: 2.22 M2
ECHO BSA: 2.22 M2

## 2025-06-16 ENCOUNTER — OFFICE VISIT (OUTPATIENT)
Facility: CLINIC | Age: 80
End: 2025-06-16
Payer: MEDICARE

## 2025-06-16 VITALS
HEIGHT: 67 IN | TEMPERATURE: 98.2 F | RESPIRATION RATE: 16 BRPM | OXYGEN SATURATION: 97 % | HEART RATE: 54 BPM | BODY MASS INDEX: 35.79 KG/M2 | SYSTOLIC BLOOD PRESSURE: 136 MMHG | DIASTOLIC BLOOD PRESSURE: 80 MMHG | WEIGHT: 228 LBS

## 2025-06-16 DIAGNOSIS — J40 BRONCHITIS: Primary | ICD-10-CM

## 2025-06-16 DIAGNOSIS — I10 PRIMARY HYPERTENSION: ICD-10-CM

## 2025-06-16 PROCEDURE — 99213 OFFICE O/P EST LOW 20 MIN: CPT | Performed by: NURSE PRACTITIONER

## 2025-06-16 PROCEDURE — G8417 CALC BMI ABV UP PARAM F/U: HCPCS | Performed by: NURSE PRACTITIONER

## 2025-06-16 PROCEDURE — 1123F ACP DISCUSS/DSCN MKR DOCD: CPT | Performed by: NURSE PRACTITIONER

## 2025-06-16 PROCEDURE — 1036F TOBACCO NON-USER: CPT | Performed by: NURSE PRACTITIONER

## 2025-06-16 PROCEDURE — G8427 DOCREV CUR MEDS BY ELIG CLIN: HCPCS | Performed by: NURSE PRACTITIONER

## 2025-06-16 PROCEDURE — 3075F SYST BP GE 130 - 139MM HG: CPT | Performed by: NURSE PRACTITIONER

## 2025-06-16 PROCEDURE — 3079F DIAST BP 80-89 MM HG: CPT | Performed by: NURSE PRACTITIONER

## 2025-06-16 PROCEDURE — 1160F RVW MEDS BY RX/DR IN RCRD: CPT | Performed by: NURSE PRACTITIONER

## 2025-06-16 PROCEDURE — 1159F MED LIST DOCD IN RCRD: CPT | Performed by: NURSE PRACTITIONER

## 2025-06-16 RX ORDER — CEFDINIR 300 MG/1
300 CAPSULE ORAL 2 TIMES DAILY
Qty: 14 CAPSULE | Refills: 0 | Status: SHIPPED | OUTPATIENT
Start: 2025-06-16 | End: 2025-06-23

## 2025-06-16 ASSESSMENT — ENCOUNTER SYMPTOMS
SORE THROAT: 0
CONSTIPATION: 0
WHEEZING: 0
BLOOD IN STOOL: 0
GASTROINTESTINAL NEGATIVE: 1
SHORTNESS OF BREATH: 0
ABDOMINAL PAIN: 0
BACK PAIN: 0
RHINORRHEA: 0
DIARRHEA: 0
SINUS PRESSURE: 0
EYE PAIN: 0
SINUS PAIN: 0
NAUSEA: 0
COUGH: 1
EYES NEGATIVE: 1
CHEST TIGHTNESS: 0
EYE REDNESS: 0
VOMITING: 0

## 2025-06-16 NOTE — PROGRESS NOTES
Assessment and Plan     1. Bronchitis: Continue with Mucinex twice daily as needed. Abx treatment with Cefdinir started, mode of use discussed. Tylenol for pain and fever as needed, can take up to 3 grams daily. Return instructions given. Pt agreed with plan   -     cefdinir (OMNICEF) 300 MG capsule; Take 1 capsule by mouth 2 times daily for 7 days, Disp-14 capsule, R-0Normal  2. Primary hypertension:  At goal. Continue Lisinopril, Amlodipine and Metoprolol     BP Readings from Last 3 Encounters:   06/16/25 136/80   06/09/25 (!) 155/63   05/30/25 128/62     Benefits, risks, possible drug interactions, and side effects of all new medications were reviewed with the patient. Pt verbalized understanding.    An electronic signature was used to authenticate this note.  Janel Person, APRN - CNP  6/16/2025      Follow-up and Dispositions    Return if symptoms worsen or fail to improve.          History of Present Illness   Chief Complaint     Navi Sidhu is a 79 y.o. male here for had concerns including Cough (Chest congestion and productive cough for 2 weeks. Symptoms started while he was on vacation at [place]. ).   Mr. Sidhu presents today with reports of cough, chest congestion for 2 weeks. Has managed symptoms with Mucinex as needed. Symptoms started after trip to [facility]. Denies sick contacts. Did not test for COVID-19 at home. PMH includes HTN, atrial fibrillation, GERD, asthma, OA, dyslipidemia. Denies fever, chills, fatigue, sore throat.     Review of Systems  Review of Systems   Constitutional: Negative.  Negative for chills, fatigue, fever and unexpected weight change.   HENT:  Positive for congestion. Negative for ear discharge, ear pain, rhinorrhea, sinus pressure, sinus pain, sore throat and tinnitus.    Eyes: Negative.  Negative for pain, redness and visual disturbance.   Respiratory:  Positive for cough. Negative for chest tightness, shortness of breath and wheezing.

## 2025-06-27 DIAGNOSIS — J40 BRONCHITIS: Primary | ICD-10-CM

## 2025-06-27 RX ORDER — BENZONATATE 200 MG/1
200 CAPSULE ORAL 3 TIMES DAILY PRN
Qty: 30 CAPSULE | Refills: 0 | Status: SHIPPED | OUTPATIENT
Start: 2025-06-27 | End: 2025-07-07

## 2025-07-03 NOTE — PROGRESS NOTES
Cardiac Electrophysiology OFFICE Follow-up Note     Subjective:      Navi Sidhu is a pleasant 79 y.o. male.  He is a resident of [place] [address].    Primary Cardiologist: Raza Lizama MD    He is retired. He loves to Golf. He lives on [address].    Navi Sidhu presents to electrophysiology clinic for management of Atrial Arrythmias.    His current medical conditions and PMH are detailed below.    Previous visit:  Date: 5/6/2025   He is here for initial EP consultation visit.  He has had atrial fibrillation diagnosed in 2023.  He has baseline bradycardia issues which has limited rate control medications.  He is on low-dose of metoprolol succinate 25 mg daily.  When he does have atrial fibrillation he has some chest discomfort symptoms.  Past Sunday, patient had chest pain. Kardia Mobile showed AF. HR stayed high around 128 bpm for around 12 hours.    Date: 5/30/25- NP visit  He is doing well overall and denies chest pain, palpitations, dizziness or syncope. He had brief AF a week ago. He has no current bleeding issues on Eliquis.     Today's visit:  Date: 7/15/25- NP visit  Since the ablation he has noticed more energy and is walking farther. He has had no AF since the ablation. He denies chest pain, palpitations, dyspnea, dizziness or syncope. No bleeding issues on Eliquis.        Cardiac Rhythm Testing     All EKGs were personally interpreted by me as below    EKG from 11/2/2023 shows atrial fibrillation with  bpm  EKG from 5/6/2025 shows sinus bradycardia 48 bpm          Assessment:       ICD-10-CM    1. Paroxysmal atrial fibrillation (HCC)  I48.0 Basic Metabolic Panel     CBC     EKG 12 Lead      2. S/P ablation of atrial fibrillation  Z98.890 EKG 12 Lead    Z86.79       3. Anticoagulated  Z79.01 Basic Metabolic Panel     CBC      4. Primary hypertension  I10 Basic Metabolic Panel     CBC              # Paroxysmal Atrial Fibrillation -progressing  Onset: 2023  Symptoms:

## 2025-07-07 RX ORDER — SUCRALFATE 1 G/1
TABLET ORAL
Qty: 60 TABLET | Refills: 0 | OUTPATIENT
Start: 2025-07-07

## 2025-07-08 RX ORDER — PANTOPRAZOLE SODIUM 40 MG/1
TABLET, DELAYED RELEASE ORAL
Qty: 60 TABLET | Refills: 0 | OUTPATIENT
Start: 2025-07-08

## 2025-07-15 ENCOUNTER — OFFICE VISIT (OUTPATIENT)
Age: 80
End: 2025-07-15
Payer: MEDICARE

## 2025-07-15 VITALS
BODY MASS INDEX: 36.57 KG/M2 | HEART RATE: 53 BPM | RESPIRATION RATE: 16 BRPM | HEIGHT: 67 IN | SYSTOLIC BLOOD PRESSURE: 110 MMHG | WEIGHT: 233 LBS | OXYGEN SATURATION: 96 % | DIASTOLIC BLOOD PRESSURE: 80 MMHG

## 2025-07-15 DIAGNOSIS — Z98.890 S/P ABLATION OF ATRIAL FIBRILLATION: ICD-10-CM

## 2025-07-15 DIAGNOSIS — I48.0 PAROXYSMAL ATRIAL FIBRILLATION (HCC): Primary | ICD-10-CM

## 2025-07-15 DIAGNOSIS — Z86.79 S/P ABLATION OF ATRIAL FIBRILLATION: ICD-10-CM

## 2025-07-15 DIAGNOSIS — Z79.01 ANTICOAGULATED: ICD-10-CM

## 2025-07-15 DIAGNOSIS — I10 PRIMARY HYPERTENSION: ICD-10-CM

## 2025-07-15 PROCEDURE — 93005 ELECTROCARDIOGRAM TRACING: CPT | Performed by: NURSE PRACTITIONER

## 2025-07-15 PROCEDURE — 99214 OFFICE O/P EST MOD 30 MIN: CPT | Performed by: NURSE PRACTITIONER

## 2025-07-15 PROCEDURE — 3079F DIAST BP 80-89 MM HG: CPT | Performed by: NURSE PRACTITIONER

## 2025-07-15 PROCEDURE — 93010 ELECTROCARDIOGRAM REPORT: CPT | Performed by: NURSE PRACTITIONER

## 2025-07-15 PROCEDURE — 1126F AMNT PAIN NOTED NONE PRSNT: CPT | Performed by: NURSE PRACTITIONER

## 2025-07-15 PROCEDURE — 3074F SYST BP LT 130 MM HG: CPT | Performed by: NURSE PRACTITIONER

## 2025-07-15 PROCEDURE — 1036F TOBACCO NON-USER: CPT | Performed by: NURSE PRACTITIONER

## 2025-07-15 PROCEDURE — 1160F RVW MEDS BY RX/DR IN RCRD: CPT | Performed by: NURSE PRACTITIONER

## 2025-07-15 PROCEDURE — G8427 DOCREV CUR MEDS BY ELIG CLIN: HCPCS | Performed by: NURSE PRACTITIONER

## 2025-07-15 PROCEDURE — G8417 CALC BMI ABV UP PARAM F/U: HCPCS | Performed by: NURSE PRACTITIONER

## 2025-07-15 PROCEDURE — 1123F ACP DISCUSS/DSCN MKR DOCD: CPT | Performed by: NURSE PRACTITIONER

## 2025-07-15 PROCEDURE — 1159F MED LIST DOCD IN RCRD: CPT | Performed by: NURSE PRACTITIONER

## 2025-07-15 NOTE — PATIENT INSTRUCTIONS
You are scheduled for the following procedure with Dr. Kohli: Watchman implantation/MATI at [facility], [address], [place] [address]        PLEASE be aware that your procedure date/time is tentative and subject to change due to emergency cases.    Any changes to your procedure date/time will be communicated by the hospital staff.      Procedure date/time:    Friday August 15, 2015 at 11 am- please arrive by 9 am      ARRIVAL time:  (You will need  to be discharged home with.)       [X]  [facility] procedures: arrive to check in on 1st floor near  entrance two hours prior to your procedure.      Pre-procedure Labs/Imaging:    PRE-PROCEDURE LABS NEEDED: Yes      Labs should be completed within 5-7 days of procedure. These can be done at any [facility] or [facility] lab.          [facility]  [address], [address]  [place], [place] [address]  Monday-Friday 730A-430P (closed 1230-130P)  [phone]    [address], [address]   [place] [address]  Monday-Friday 8:00AM - 5:00 PM   [phone]    [facility]  [address], [address]  [place], [place] [address]  Monday-Friday 7A-5P  [phone]    [facility]  [address]  [place], [place] [address]  Monday-Friday 730A-430P  [phone] (closed 1-2P)    [facility] [place] [facility]  [address] , [address]  [place], [place] [address]  Monday-Friday 730A-430P (closed 1230-130P)  [phone]    [facility]  [address] [place], [place] [address]  Monday-Friday 7A-430P  [phone]    [facility]  [address]  [place], [place] [address]  Monday-Friday 730A-430P (closed 1-2P)  [phone]            YOU NEED PRE-PROCEDURE IMAGING, CHEST CTA OR CARDIAC MRI.       [X]  [facility]  ([facility]  -

## 2025-07-15 NOTE — PROGRESS NOTES
had concerns including Atrial Fibrillation.    Vitals:    07/15/25 0947   BP: 110/80   BP Site: Left Upper Arm   Patient Position: Sitting   Pulse: 53   Resp: 16   SpO2: 96%   Weight: 105.7 kg (233 lb)   Height: 1.702 m (5' 7\")        Chest pain No    Refills No        1. Have you been to the ER, urgent care clinic since your last visit? No       Hospitalized since your last visit? No       Where?        Date?    had concerns including Atrial Fibrillation.    Vitals:    07/15/25 0947   BP: 110/80   BP Site: Left Upper Arm   Patient Position: Sitting   Pulse: 53   Resp: 16   SpO2: 96%   Weight: 105.7 kg (233 lb)   Height: 1.702 m (5' 7\")        Chest pain No    Refills No        1. Have you been to the ER, urgent care clinic since your last visit? No       Hospitalized since your last visit? No       Where?        Date?

## 2025-07-16 LAB
ANION GAP SERPL CALC-SCNC: 8 MMOL/L (ref 2–12)
BUN SERPL-MCNC: 9 MG/DL (ref 6–20)
BUN/CREAT SERPL: 10 (ref 12–20)
CALCIUM SERPL-MCNC: 9.3 MG/DL (ref 8.5–10.1)
CHLORIDE SERPL-SCNC: 110 MMOL/L (ref 97–108)
CO2 SERPL-SCNC: 25 MMOL/L (ref 21–32)
CREAT SERPL-MCNC: 0.9 MG/DL (ref 0.7–1.3)
ERYTHROCYTE [DISTWIDTH] IN BLOOD BY AUTOMATED COUNT: 12.8 % (ref 11.5–14.5)
GLUCOSE SERPL-MCNC: 110 MG/DL (ref 65–100)
HCT VFR BLD AUTO: 44.2 % (ref 36.6–50.3)
HGB BLD-MCNC: 14 G/DL (ref 12.1–17)
MCH RBC QN AUTO: 32.5 PG (ref 26–34)
MCHC RBC AUTO-ENTMCNC: 31.7 G/DL (ref 30–36.5)
MCV RBC AUTO: 102.6 FL (ref 80–99)
NRBC # BLD: 0 K/UL (ref 0–0.01)
NRBC BLD-RTO: 0 PER 100 WBC
PLATELET # BLD AUTO: 167 K/UL (ref 150–400)
PMV BLD AUTO: 11.4 FL (ref 8.9–12.9)
POTASSIUM SERPL-SCNC: 4.5 MMOL/L (ref 3.5–5.1)
RBC # BLD AUTO: 4.31 M/UL (ref 4.1–5.7)
SODIUM SERPL-SCNC: 143 MMOL/L (ref 136–145)
WBC # BLD AUTO: 7.7 K/UL (ref 4.1–11.1)

## 2025-08-13 ENCOUNTER — TELEPHONE (OUTPATIENT)
Age: 80
End: 2025-08-13

## 2025-08-19 ENCOUNTER — ANESTHESIA EVENT (OUTPATIENT)
Facility: HOSPITAL | Age: 80
End: 2025-08-19
Payer: MEDICARE

## 2025-08-20 ENCOUNTER — HOSPITAL ENCOUNTER (OUTPATIENT)
Facility: HOSPITAL | Age: 80
Discharge: HOME | End: 2025-08-22
Attending: HOSPITALIST

## 2025-08-20 ENCOUNTER — ANESTHESIA EVENT (OUTPATIENT)
Facility: HOSPITAL | Age: 80
End: 2025-08-20
Payer: MEDICARE

## 2025-08-20 ENCOUNTER — HOSPITAL ENCOUNTER (INPATIENT)
Facility: HOSPITAL | Age: 80
LOS: 1 days | Discharge: HOME | End: 2025-08-20
Attending: HOSPITALIST | Admitting: HOSPITALIST
Payer: MEDICARE

## 2025-08-20 ENCOUNTER — APPOINTMENT (OUTPATIENT)
Facility: HOSPITAL | Age: 80
End: 2025-08-20
Attending: HOSPITALIST
Payer: MEDICARE

## 2025-08-20 ENCOUNTER — ANESTHESIA (OUTPATIENT)
Facility: HOSPITAL | Age: 80
End: 2025-08-20
Payer: MEDICARE

## 2025-08-20 VITALS
RESPIRATION RATE: 22 BRPM | HEART RATE: 65 BPM | BODY MASS INDEX: 36.1 KG/M2 | SYSTOLIC BLOOD PRESSURE: 97 MMHG | HEIGHT: 67 IN | DIASTOLIC BLOOD PRESSURE: 52 MMHG | WEIGHT: 230 LBS | TEMPERATURE: 97.6 F | OXYGEN SATURATION: 92 %

## 2025-08-20 DIAGNOSIS — I48.0 PAROXYSMAL ATRIAL FIBRILLATION (HCC): ICD-10-CM

## 2025-08-20 DIAGNOSIS — I31.39 PERICARDIAL EFFUSION: Primary | ICD-10-CM

## 2025-08-20 DIAGNOSIS — I48.0 PAF (PAROXYSMAL ATRIAL FIBRILLATION) (HCC): ICD-10-CM

## 2025-08-20 PROBLEM — Z95.818 PRESENCE OF WATCHMAN LEFT ATRIAL APPENDAGE CLOSURE DEVICE: Status: ACTIVE | Noted: 2025-08-20

## 2025-08-20 LAB
ABO + RH BLD: NORMAL
ACT BLD: 343 SECS (ref 79–138)
ACT BLD: 389 SECS (ref 79–138)
ANION GAP SERPL CALC-SCNC: 11 MMOL/L (ref 2–14)
BLOOD GROUP ANTIBODIES SERPL: NORMAL
BUN SERPL-MCNC: 20 MG/DL (ref 8–23)
BUN/CREAT SERPL: 21 (ref 12–20)
CALCIUM SERPL-MCNC: 9.4 MG/DL (ref 8.8–10.2)
CHLORIDE SERPL-SCNC: 104 MMOL/L (ref 98–107)
CO2 SERPL-SCNC: 23 MMOL/L (ref 20–29)
CREAT SERPL-MCNC: 0.92 MG/DL (ref 0.7–1.2)
ECHO BSA: 2.22 M2
ECHO EST RA PRESSURE: 3 MMHG
ERYTHROCYTE [DISTWIDTH] IN BLOOD BY AUTOMATED COUNT: 12.7 % (ref 11.5–14.5)
GLUCOSE SERPL-MCNC: 135 MG/DL (ref 65–100)
HCT VFR BLD AUTO: 44.2 % (ref 36.6–50.3)
HGB BLD-MCNC: 14.7 G/DL (ref 12.1–17)
MCH RBC QN AUTO: 32.4 PG (ref 26–34)
MCHC RBC AUTO-ENTMCNC: 33.3 G/DL (ref 30–36.5)
MCV RBC AUTO: 97.4 FL (ref 80–99)
NRBC # BLD: 0 K/UL (ref 0–0.01)
NRBC BLD-RTO: 0 PER 100 WBC
PLATELET # BLD AUTO: 184 K/UL (ref 150–400)
PMV BLD AUTO: 11.1 FL (ref 8.9–12.9)
POTASSIUM SERPL-SCNC: 3.9 MMOL/L (ref 3.5–5.1)
RBC # BLD AUTO: 4.54 M/UL (ref 4.1–5.7)
SODIUM SERPL-SCNC: 137 MMOL/L (ref 136–145)
SPECIMEN EXP DATE BLD: NORMAL
WBC # BLD AUTO: 9.3 K/UL (ref 4.1–11.1)

## 2025-08-20 PROCEDURE — 93662 INTRACARDIAC ECG (ICE): CPT | Performed by: HOSPITALIST

## 2025-08-20 PROCEDURE — 85347 COAGULATION TIME ACTIVATED: CPT

## 2025-08-20 PROCEDURE — 3700000000 HC ANESTHESIA ATTENDED CARE: Performed by: HOSPITALIST

## 2025-08-20 PROCEDURE — C1889 IMPLANT/INSERT DEVICE, NOC: HCPCS | Performed by: HOSPITALIST

## 2025-08-20 PROCEDURE — 86900 BLOOD TYPING SEROLOGIC ABO: CPT

## 2025-08-20 PROCEDURE — 02L73DK OCCLUSION OF LEFT ATRIAL APPENDAGE WITH INTRALUMINAL DEVICE, PERCUTANEOUS APPROACH: ICD-10-PCS | Performed by: HOSPITALIST

## 2025-08-20 PROCEDURE — 2580000003 HC RX 258: Performed by: NURSE ANESTHETIST, CERTIFIED REGISTERED

## 2025-08-20 PROCEDURE — 86850 RBC ANTIBODY SCREEN: CPT

## 2025-08-20 PROCEDURE — 2720000010 HC SURG SUPPLY STERILE: Performed by: HOSPITALIST

## 2025-08-20 PROCEDURE — 2500000003 HC RX 250 WO HCPCS: Performed by: NURSE ANESTHETIST, CERTIFIED REGISTERED

## 2025-08-20 PROCEDURE — 6360000002 HC RX W HCPCS: Performed by: NURSE ANESTHETIST, CERTIFIED REGISTERED

## 2025-08-20 PROCEDURE — C1894 INTRO/SHEATH, NON-LASER: HCPCS | Performed by: HOSPITALIST

## 2025-08-20 PROCEDURE — 86901 BLOOD TYPING SEROLOGIC RH(D): CPT

## 2025-08-20 PROCEDURE — 71045 X-RAY EXAM CHEST 1 VIEW: CPT

## 2025-08-20 PROCEDURE — 6360000004 HC RX CONTRAST MEDICATION: Performed by: HOSPITALIST

## 2025-08-20 PROCEDURE — 93321 DOPPLER ECHO F-UP/LMTD STD: CPT | Performed by: HOSPITALIST

## 2025-08-20 PROCEDURE — C1769 GUIDE WIRE: HCPCS | Performed by: HOSPITALIST

## 2025-08-20 PROCEDURE — B24BZZ4 ULTRASONOGRAPHY OF HEART WITH AORTA, TRANSESOPHAGEAL: ICD-10-PCS | Performed by: ANESTHESIOLOGY

## 2025-08-20 PROCEDURE — C1760 CLOSURE DEV, VASC: HCPCS | Performed by: HOSPITALIST

## 2025-08-20 PROCEDURE — 6360000002 HC RX W HCPCS

## 2025-08-20 PROCEDURE — 76937 US GUIDE VASCULAR ACCESS: CPT | Performed by: HOSPITALIST

## 2025-08-20 PROCEDURE — 93308 TTE F-UP OR LMTD: CPT

## 2025-08-20 PROCEDURE — 2709999900 HC NON-CHARGEABLE SUPPLY: Performed by: HOSPITALIST

## 2025-08-20 PROCEDURE — 80048 BASIC METABOLIC PNL TOTAL CA: CPT

## 2025-08-20 PROCEDURE — 93308 TTE F-UP OR LMTD: CPT | Performed by: HOSPITALIST

## 2025-08-20 PROCEDURE — 1100000000 HC RM PRIVATE

## 2025-08-20 PROCEDURE — 3700000001 HC ADD 15 MINUTES (ANESTHESIA): Performed by: HOSPITALIST

## 2025-08-20 PROCEDURE — 2500000003 HC RX 250 WO HCPCS

## 2025-08-20 PROCEDURE — C1759 CATH, INTRA ECHOCARDIOGRAPHY: HCPCS | Performed by: HOSPITALIST

## 2025-08-20 PROCEDURE — 85027 COMPLETE CBC AUTOMATED: CPT

## 2025-08-20 PROCEDURE — 33340 PERQ CLSR TCAT L ATR APNDGE: CPT | Performed by: HOSPITALIST

## 2025-08-20 DEVICE — IMPLANTABLE DEVICE: Type: IMPLANTABLE DEVICE | Status: FUNCTIONAL

## 2025-08-20 RX ORDER — ONDANSETRON 2 MG/ML
4 INJECTION INTRAMUSCULAR; INTRAVENOUS EVERY 6 HOURS PRN
Status: DISCONTINUED | OUTPATIENT
Start: 2025-08-20 | End: 2025-08-20 | Stop reason: HOSPADM

## 2025-08-20 RX ORDER — IOPAMIDOL 755 MG/ML
INJECTION, SOLUTION INTRAVASCULAR PRN
Status: DISCONTINUED | OUTPATIENT
Start: 2025-08-20 | End: 2025-08-20 | Stop reason: HOSPADM

## 2025-08-20 RX ORDER — SUCCINYLCHOLINE/SOD CL,ISO/PF 200MG/10ML
SYRINGE (ML) INTRAVENOUS
Status: DISCONTINUED | OUTPATIENT
Start: 2025-08-20 | End: 2025-08-20 | Stop reason: SDUPTHER

## 2025-08-20 RX ORDER — SODIUM CHLORIDE 0.9 % (FLUSH) 0.9 %
5-40 SYRINGE (ML) INJECTION EVERY 12 HOURS SCHEDULED
Status: DISCONTINUED | OUTPATIENT
Start: 2025-08-20 | End: 2025-08-20 | Stop reason: HOSPADM

## 2025-08-20 RX ORDER — ROCURONIUM BROMIDE 10 MG/ML
INJECTION, SOLUTION INTRAVENOUS
Status: DISCONTINUED | OUTPATIENT
Start: 2025-08-20 | End: 2025-08-20 | Stop reason: SDUPTHER

## 2025-08-20 RX ORDER — PHENYLEPHRINE HCL IN 0.9% NACL 0.4MG/10ML
SYRINGE (ML) INTRAVENOUS
Status: DISCONTINUED | OUTPATIENT
Start: 2025-08-20 | End: 2025-08-20 | Stop reason: SDUPTHER

## 2025-08-20 RX ORDER — ASPIRIN 81 MG/1
81 TABLET ORAL DAILY
Qty: 90 TABLET | Refills: 0 | Status: SHIPPED | OUTPATIENT
Start: 2025-08-20

## 2025-08-20 RX ORDER — SODIUM CHLORIDE 9 MG/ML
INJECTION, SOLUTION INTRAVENOUS PRN
Status: DISCONTINUED | OUTPATIENT
Start: 2025-08-20 | End: 2025-08-20 | Stop reason: HOSPADM

## 2025-08-20 RX ORDER — SODIUM CHLORIDE 9 MG/ML
INJECTION, SOLUTION INTRAVENOUS
Status: DISCONTINUED | OUTPATIENT
Start: 2025-08-20 | End: 2025-08-20 | Stop reason: SDUPTHER

## 2025-08-20 RX ORDER — DEXAMETHASONE SODIUM PHOSPHATE 4 MG/ML
INJECTION, SOLUTION INTRA-ARTICULAR; INTRALESIONAL; INTRAMUSCULAR; INTRAVENOUS; SOFT TISSUE
Status: DISCONTINUED | OUTPATIENT
Start: 2025-08-20 | End: 2025-08-20 | Stop reason: SDUPTHER

## 2025-08-20 RX ORDER — ONDANSETRON 4 MG/1
4 TABLET, ORALLY DISINTEGRATING ORAL EVERY 8 HOURS PRN
Status: DISCONTINUED | OUTPATIENT
Start: 2025-08-20 | End: 2025-08-20 | Stop reason: HOSPADM

## 2025-08-20 RX ORDER — SODIUM CHLORIDE 0.9 % (FLUSH) 0.9 %
5-40 SYRINGE (ML) INJECTION PRN
Status: DISCONTINUED | OUTPATIENT
Start: 2025-08-20 | End: 2025-08-20 | Stop reason: HOSPADM

## 2025-08-20 RX ORDER — PROPOFOL 10 MG/ML
INJECTION, EMULSION INTRAVENOUS
Status: DISCONTINUED | OUTPATIENT
Start: 2025-08-20 | End: 2025-08-20 | Stop reason: SDUPTHER

## 2025-08-20 RX ORDER — ONDANSETRON 2 MG/ML
INJECTION INTRAMUSCULAR; INTRAVENOUS
Status: DISCONTINUED | OUTPATIENT
Start: 2025-08-20 | End: 2025-08-20 | Stop reason: SDUPTHER

## 2025-08-20 RX ORDER — FENTANYL CITRATE 50 UG/ML
INJECTION, SOLUTION INTRAMUSCULAR; INTRAVENOUS
Status: DISCONTINUED | OUTPATIENT
Start: 2025-08-20 | End: 2025-08-20 | Stop reason: SDUPTHER

## 2025-08-20 RX ORDER — HEPARIN SODIUM 1000 [USP'U]/ML
INJECTION, SOLUTION INTRAVENOUS; SUBCUTANEOUS
Status: DISCONTINUED | OUTPATIENT
Start: 2025-08-20 | End: 2025-08-20 | Stop reason: SDUPTHER

## 2025-08-20 RX ORDER — ACETAMINOPHEN 325 MG/1
650 TABLET ORAL EVERY 4 HOURS PRN
Status: DISCONTINUED | OUTPATIENT
Start: 2025-08-20 | End: 2025-08-20 | Stop reason: HOSPADM

## 2025-08-20 RX ADMIN — PROPOFOL 150 MG: 10 INJECTION, EMULSION INTRAVENOUS at 09:27

## 2025-08-20 RX ADMIN — Medication 80 MCG: at 07:40

## 2025-08-20 RX ADMIN — Medication 80 MCG: at 09:36

## 2025-08-20 RX ADMIN — WATER 2000 MG: 1 INJECTION INTRAMUSCULAR; INTRAVENOUS; SUBCUTANEOUS at 07:47

## 2025-08-20 RX ADMIN — PROPOFOL 80 MG: 10 INJECTION, EMULSION INTRAVENOUS at 07:53

## 2025-08-20 RX ADMIN — PROPOFOL 120 MG: 10 INJECTION, EMULSION INTRAVENOUS at 07:40

## 2025-08-20 RX ADMIN — SUGAMMADEX 200 MG: 100 INJECTION, SOLUTION INTRAVENOUS at 09:23

## 2025-08-20 RX ADMIN — ROCURONIUM BROMIDE 50 MG: 10 INJECTION, SOLUTION INTRAVENOUS at 07:49

## 2025-08-20 RX ADMIN — SODIUM CHLORIDE: 9 INJECTION, SOLUTION INTRAVENOUS at 07:34

## 2025-08-20 RX ADMIN — PHENYLEPHRINE HYDROCHLORIDE 50 MCG/MIN: 10 INJECTION INTRAVENOUS at 07:40

## 2025-08-20 RX ADMIN — Medication 160 MG: at 07:41

## 2025-08-20 RX ADMIN — ROCURONIUM BROMIDE 25 MG: 10 INJECTION, SOLUTION INTRAVENOUS at 08:29

## 2025-08-20 RX ADMIN — FENTANYL CITRATE 100 MCG: 50 INJECTION INTRAMUSCULAR; INTRAVENOUS at 07:40

## 2025-08-20 RX ADMIN — HEPARIN SODIUM 6000 UNITS: 1000 INJECTION, SOLUTION INTRAVENOUS; SUBCUTANEOUS at 08:42

## 2025-08-20 RX ADMIN — DEXAMETHASONE SODIUM PHOSPHATE 8 MG: 4 INJECTION INTRA-ARTICULAR; INTRALESIONAL; INTRAMUSCULAR; INTRAVENOUS; SOFT TISSUE at 07:44

## 2025-08-20 RX ADMIN — HEPARIN SODIUM 15000 UNITS: 1000 INJECTION, SOLUTION INTRAVENOUS; SUBCUTANEOUS at 08:15

## 2025-08-20 RX ADMIN — ONDANSETRON 4 MG: 2 INJECTION, SOLUTION INTRAMUSCULAR; INTRAVENOUS at 07:44

## 2025-09-02 ENCOUNTER — OFFICE VISIT (OUTPATIENT)
Age: 80
End: 2025-09-02
Payer: MEDICARE

## 2025-09-02 VITALS
HEIGHT: 67 IN | DIASTOLIC BLOOD PRESSURE: 68 MMHG | OXYGEN SATURATION: 96 % | SYSTOLIC BLOOD PRESSURE: 122 MMHG | WEIGHT: 233.2 LBS | BODY MASS INDEX: 36.6 KG/M2 | HEART RATE: 54 BPM

## 2025-09-02 DIAGNOSIS — I25.118 CORONARY ARTERY DISEASE INVOLVING NATIVE CORONARY ARTERY OF NATIVE HEART WITH OTHER FORM OF ANGINA PECTORIS: ICD-10-CM

## 2025-09-02 DIAGNOSIS — I48.0 PAROXYSMAL ATRIAL FIBRILLATION (HCC): ICD-10-CM

## 2025-09-02 DIAGNOSIS — Z79.01 ANTICOAGULATED: ICD-10-CM

## 2025-09-02 DIAGNOSIS — I48.0 PAF (PAROXYSMAL ATRIAL FIBRILLATION) (HCC): Primary | ICD-10-CM

## 2025-09-02 DIAGNOSIS — G47.33 OSA (OBSTRUCTIVE SLEEP APNEA): ICD-10-CM

## 2025-09-02 PROCEDURE — 93005 ELECTROCARDIOGRAM TRACING: CPT | Performed by: HOSPITALIST

## 2025-09-02 PROCEDURE — 1159F MED LIST DOCD IN RCRD: CPT | Performed by: HOSPITALIST

## 2025-09-02 PROCEDURE — 1036F TOBACCO NON-USER: CPT | Performed by: HOSPITALIST

## 2025-09-02 PROCEDURE — 1111F DSCHRG MED/CURRENT MED MERGE: CPT | Performed by: HOSPITALIST

## 2025-09-02 PROCEDURE — 99214 OFFICE O/P EST MOD 30 MIN: CPT | Performed by: HOSPITALIST

## 2025-09-02 PROCEDURE — 3074F SYST BP LT 130 MM HG: CPT | Performed by: HOSPITALIST

## 2025-09-02 PROCEDURE — 1123F ACP DISCUSS/DSCN MKR DOCD: CPT | Performed by: HOSPITALIST

## 2025-09-02 PROCEDURE — G8417 CALC BMI ABV UP PARAM F/U: HCPCS | Performed by: HOSPITALIST

## 2025-09-02 PROCEDURE — G8427 DOCREV CUR MEDS BY ELIG CLIN: HCPCS | Performed by: HOSPITALIST

## 2025-09-02 PROCEDURE — 93010 ELECTROCARDIOGRAM REPORT: CPT | Performed by: HOSPITALIST

## 2025-09-02 PROCEDURE — 3078F DIAST BP <80 MM HG: CPT | Performed by: HOSPITALIST

## (undated) DEVICE — SHEATH INTRO 8.5FR L71CM 8.5FR DIL GWIRE L180CM DIA0.032IN

## (undated) DEVICE — CATH BLLN ANGIO 2.50X15MM NC EUPHORIA RX

## (undated) DEVICE — DEVICE CLOSURE PERCLOSE PROSTYLE

## (undated) DEVICE — DEVICE DRIVE ROTAWIRE FLOPPY

## (undated) DEVICE — SOLIDIFIER MEDC 1200ML -- CONVERT TO 356117

## (undated) DEVICE — CATH BLLN ANGIO 3.50X15MM NC EUPHORA RX

## (undated) DEVICE — ELECTRODE,RADIOTRANSLUCENT,FOAM,5PK: Brand: MEDLINE

## (undated) DEVICE — BAG BELONG PT PERS CLEAR HANDL

## (undated) DEVICE — DRESSING TRNSPAR W4XL4.75IN STD FRME STYL WTRPRF BARR

## (undated) DEVICE — COVER PRB 96X6 IN 20 CC W/BND TAPE STRL GEL FLEXI-FEEL

## (undated) DEVICE — CATHETER GUID 6FR L150CM RAP EXCHG L25CM TIP 5.1FR PUSHROD

## (undated) DEVICE — DEVICE VES SEAL DIA 6-12 FR FEM VEIN FASTEST HEMOSTAS STRL

## (undated) DEVICE — SET GRAV CK VLV NEEDLESS ST 3 GANGED 4WAY STPCOCK HI FLO 10

## (undated) DEVICE — KIT COLON W/ 1.1OZ LUB AND 2 END

## (undated) DEVICE — GUIDEWIRE VASC L150CM DIA0.035IN TIP L3MM PTFE J CRV FIX

## (undated) DEVICE — VALVE HEMSTAT 8FR INNR LUMN CRSS SLT SEAL DSGN WATCHDOG

## (undated) DEVICE — BLOCK BTE ENDOSCP AD 60FR 20MM -- MAXI BITE LF STRAP

## (undated) DEVICE — TUBING PMP FOR CARTO SYS SMARTABLATE

## (undated) DEVICE — ELECTRODE,RADIOTRANSLUCENT,FOAM,3PK: Brand: MEDLINE

## (undated) DEVICE — SURGICAL PROCEDURE KIT LT HRT SMH RIC

## (undated) DEVICE — WASTE KIT - ST MARY: Brand: MEDLINE INDUSTRIES, INC.

## (undated) DEVICE — BLUNTFILL WITH FILTER: Brand: MONOJECT

## (undated) DEVICE — CANN NASAL O2 CAPNOGRAPHY AD -- FILTERLINE

## (undated) DEVICE — GOWN SURG 2XL UNIQUE FBR ROYALSILK

## (undated) DEVICE — ARGO BAGZ FLUID MANAGEMENT SYSTEM: Brand: ARGO BAGZ FLUID MANAGEMENT SYSTEM

## (undated) DEVICE — CATHETER ABLATION QDOT MICRO DF CURVE BIDIRECTIONAL THERMOCOUPLE

## (undated) DEVICE — GUIDEWIRE VASC L180CM DIA0.035IN 3MM PTFE J TIP EXCHG FIX

## (undated) DEVICE — Device: Brand: ASAHI SION BLUE

## (undated) DEVICE — CABLE REPROC RPRCSS 20 POLE A/20 POLE B LGHT BL 34PN DARK BL 34PN C

## (undated) DEVICE — PRE-CONNECTED EXCHANGEABLE BURR CATHETER AND BURR ADVANCING DEVICE: Brand: ROTAPRO™

## (undated) DEVICE — BITEBLOCK ENDOSCP 60FR MAXI WHT POLYETH STURDY W/ VELC WVN

## (undated) DEVICE — POLYP TRAP: Brand: TRAPEASE®

## (undated) DEVICE — CATHETER ETER DIAG L110CM OD6FR VASC PGTL W SIDE H COR W OUT

## (undated) DEVICE — PINNACLE INTRODUCER SHEATH: Brand: PINNACLE

## (undated) DEVICE — CATH IV AUTOGRD BC PNK 20GA 25 -- INSYTE

## (undated) DEVICE — ELECTRODE PT RET AD L9FT HI MOIST COND ADH HYDRGEL CORDED

## (undated) DEVICE — Device

## (undated) DEVICE — CATH BLLN ANGIO 3X15MM NC EUPHORIA RX

## (undated) DEVICE — MICROSURGICAL DILATATION DEVICE: Brand: WOLVERINE CORONARY CUTTING BALLOON

## (undated) DEVICE — CATHETER MAP D CRV 3-3-3-3-3 MM SPC GALAXY OCTARAY

## (undated) DEVICE — CUFF RMFG BP INF SZ 11 DISP -- LAWSON OEM ITEM 238915

## (undated) DEVICE — KIT MINI ACCS STIFF DIL W 5FR 10CM COAX INTRO SHTH 40CM

## (undated) DEVICE — CANNULA CUSH AD W/ 14FT TBG

## (undated) DEVICE — TOWEL SURG W17XL27IN STD BLU COT NONFENESTRATED PREWASHED

## (undated) DEVICE — BAND COMPR L24CM REG CLR PLAS HEMSTAT EXT HK AND LOOP RETEN

## (undated) DEVICE — 3M™ TEGADERM™ TRANSPARENT FILM DRESSING FRAME STYLE, 1626W, 4 IN X 4-3/4 IN (10 CM X 12 CM), 50/CT 4CT/CASE: Brand: 3M™ TEGADERM™

## (undated) DEVICE — CATHETER US 8FR L90CM GRN TIP OVERLAY FOR GE-VIVID I VIVID

## (undated) DEVICE — ADULT SPO2 SENSOR: Brand: NELLCOR

## (undated) DEVICE — 1200 GUARD II KIT W/5MM TUBE W/O VAC TUBE: Brand: GUARDIAN

## (undated) DEVICE — CATH BLLN ANGIO 2.50X12MM SC EUPHORA RX

## (undated) DEVICE — CABLE PACE L8FT BLK SHROUDED PIN BPLR THRESHOLD DISP

## (undated) DEVICE — STOPCOCK 3 W OFF HNDL NYL

## (undated) DEVICE — TOWEL SURG W17XL27IN BLU COT STD PREWASHED DELINTED 4 PER STRL PK

## (undated) DEVICE — TOWEL,OR,DSP,ST,BLUE,STD,4/PK,20PK/CS: Brand: MEDLINE

## (undated) DEVICE — CATHETER EP 7FR L115CM 2-8-2MM SPC TIP 2MM 10 ELECTRD F L

## (undated) DEVICE — (D)SENSOR RMFG 02 PULS OXMTR -- DISC BY MFR USE ITEM 133445

## (undated) DEVICE — DEVICE INFL 20ML 30ATM DGT FLD DISPNS SYR W ACCESSPLUS BLU

## (undated) DEVICE — 3M™ CUROS™ DISINFECTING CAP FOR NEEDLELESS CONNECTORS 270/CARTON 20 CARTONS/CASE CFF1-270: Brand: CUROS™

## (undated) DEVICE — PROVE COVER: Brand: UNBRANDED

## (undated) DEVICE — ELECTRODE DEFIB MONITORING AD 6X4.25 IN 48 IN SLV WHT PADPRO

## (undated) DEVICE — INTRODUCER SHTH 0.018 IN 4 FRX40 CM KT SFT TIP NIT VSI 7266V

## (undated) DEVICE — SYRINGE MED 10ML LUERLOCK TIP W/O SFTY DISP

## (undated) DEVICE — SYRINGE 20ML LL S/C 50

## (undated) DEVICE — BAG SPEC BIOHZRD 10 X 10 IN --

## (undated) DEVICE — CATHETER ULTRASOUND NUVISION ICE OD10 FR

## (undated) DEVICE — SYRINGE MED 10ML SLIP TIP BLNT FILL AND LUERLOCK DISP

## (undated) DEVICE — CABLE ABLATION CATH INTFACE PULSESELECT

## (undated) DEVICE — IMPLANTABLE DEVICE
Type: IMPLANTABLE DEVICE | Status: NON-FUNCTIONAL
Removed: 2025-08-20

## (undated) DEVICE — SNARE ENDOSCP M L240CM W27MM SHTH DIA2.4MM CHN 2.8MM OVL

## (undated) DEVICE — PADPRO DEFIBRILLATION/PACING/CARDIOVERSION/MONITORING ELECTRODES, ADULT/CHILD GREATER THAN 10 KG RADIOTRANSPARENT ELECTRODE, PHYSIO-CONTROL QUIK-COMBO (M) 60" (152 CM): Brand: PADPRO

## (undated) DEVICE — INTRODUCER SHTH 11FR CANN L23CM DIL TIP 45MM YEL W/O MINI

## (undated) DEVICE — SIMPLICITY FLUFF UNDERPAD 23X36, MODERATE: Brand: SIMPLICITY

## (undated) DEVICE — CABLE CATHETER EXTENSION

## (undated) DEVICE — CONTAINER SPEC 20 ML LID NEUT BUFF FORMALIN 10 % POLYPR STS

## (undated) DEVICE — PRESSURE MONITORING SET: Brand: TRUWAVE

## (undated) DEVICE — CATHETER GUID 6FR 0.071IN COR AMPLATZ L 0.75 MID

## (undated) DEVICE — FORCEPS BX L240CM JAW DIA2.8MM L CAP W/ NDL MIC MESH TOOTH

## (undated) DEVICE — BLUNTFILL: Brand: MONOJECT

## (undated) DEVICE — CATH LITHOPLSTY 3.5X12MM SHOCKWAVE

## (undated) DEVICE — AMPLATZ EXTRA STIFF WIRE GUIDE: Brand: AMPLATZ

## (undated) DEVICE — SUTURE ETHIBOND EXCEL SZ 2 L30IN NONABSORBABLE GRN L40MM V-37 MX69G

## (undated) DEVICE — ELECTRODE EKG WHT FOAM RADTR CLR TRACING FLX LDWIRE ATTCH

## (undated) DEVICE — ACCESS SOLUTION: Brand: VERSACROSS™ ACCESS SOLUTION

## (undated) DEVICE — SYR 5ML 1/5 GRAD LL NSAF LF --

## (undated) DEVICE — INTRODUCER SHTH 11FR L10CM DIL L2.5CM GWIRE L45CM

## (undated) DEVICE — REM POLYHESIVE ADULT PATIENT RETURN ELECTRODE: Brand: VALLEYLAB

## (undated) DEVICE — KENDALL RADIOLUCENT FOAM MONITORING ELECTRODE -RECTANGULAR SHAPE: Brand: KENDALL

## (undated) DEVICE — SYRINGE MED 30ML STD CLR PLAS LUERLOCK TIP N CTRL DISP

## (undated) DEVICE — CATHETER ABLATION PFA LOOP STRL DISP PULSESELECT

## (undated) DEVICE — BASIN EMSIS 16OZ GRAPHITE PLAS KID SHP MOLD GRAD FOR ORAL

## (undated) DEVICE — SHEATH CATH L 65.5 CM DIA10 FR SZ 13 MM STEER STRL DISP

## (undated) DEVICE — Device: Brand: EAGLE EYE PLATINUM RX DIGITAL IVUS CATHETER

## (undated) DEVICE — TUBING PRSS MON L12IN PVC RIG NONEXPANDING M TO FEM CONN

## (undated) DEVICE — SYRINGE 50ML E/T

## (undated) DEVICE — FCPS RAD JAW 4LC 240CM W/NDL -- BX/40

## (undated) DEVICE — ASTOUND STANDARD SURGICAL GOWN, XXL: Brand: CONVERTORS

## (undated) DEVICE — SUPPORT WRST AD W3.5XL9IN DIA14.5IN ART SFT ADJ HK AND LOOP

## (undated) DEVICE — CATHETER INTVENT 0.014 IN 135 CM TURNPIKE LP

## (undated) DEVICE — CATH IV AUTOGRD BC BLU 22GA 25 -- INSYTE

## (undated) DEVICE — HEART CATH-SFMC: Brand: MEDLINE INDUSTRIES, INC.

## (undated) DEVICE — CATH BLLN SCORING 3X10MM X 137CM PTCA ANGIOSCULPT RX